# Patient Record
Sex: FEMALE | Race: BLACK OR AFRICAN AMERICAN | Employment: FULL TIME | URBAN - METROPOLITAN AREA
[De-identification: names, ages, dates, MRNs, and addresses within clinical notes are randomized per-mention and may not be internally consistent; named-entity substitution may affect disease eponyms.]

---

## 2017-01-14 ENCOUNTER — HOSPITAL ENCOUNTER (EMERGENCY)
Age: 22
Discharge: HOME | End: 2017-01-14
Attending: EMERGENCY MEDICINE
Payer: SELF-PAY

## 2017-01-14 VITALS
SYSTOLIC BLOOD PRESSURE: 164 MMHG | WEIGHT: 272 LBS | RESPIRATION RATE: 16 BRPM | OXYGEN SATURATION: 98 % | BODY MASS INDEX: 45.32 KG/M2 | TEMPERATURE: 98.2 F | HEART RATE: 90 BPM | HEIGHT: 65 IN | DIASTOLIC BLOOD PRESSURE: 72 MMHG

## 2017-01-14 DIAGNOSIS — J06.9 ACUTE UPPER RESPIRATORY INFECTION: Primary | ICD-10-CM

## 2017-01-14 DIAGNOSIS — J02.9 PHARYNGITIS, UNSPECIFIED ETIOLOGY: ICD-10-CM

## 2017-01-14 LAB
APPEARANCE UR: ABNORMAL
BACTERIA URNS QL MICRO: ABNORMAL /HPF
BILIRUB UR QL: NEGATIVE
CAOX CRY URNS QL MICRO: ABNORMAL
COLOR UR: ABNORMAL
DEPRECATED S PYO AG THROAT QL EIA: NEGATIVE
EPITH CASTS URNS QL MICRO: ABNORMAL /LPF
GLUCOSE UR STRIP.AUTO-MCNC: NEGATIVE MG/DL
HCG UR QL: NEGATIVE
HGB UR QL STRIP: NEGATIVE
KETONES UR QL STRIP.AUTO: NEGATIVE MG/DL
LEUKOCYTE ESTERASE UR QL STRIP.AUTO: NEGATIVE
MUCOUS THREADS URNS QL MICRO: ABNORMAL /LPF
NITRITE UR QL STRIP.AUTO: NEGATIVE
PH UR STRIP: 7.5 [PH] (ref 5–8)
PROT UR STRIP-MCNC: NEGATIVE MG/DL
RBC #/AREA URNS HPF: ABNORMAL /HPF (ref 0–5)
SP GR UR REFRACTOMETRY: 1.02 (ref 1–1.03)
UA: UC IF INDICATED,UAUC: ABNORMAL
UROBILINOGEN UR QL STRIP.AUTO: 0.2 EU/DL (ref 0.2–1)
WBC URNS QL MICRO: ABNORMAL /HPF (ref 0–4)

## 2017-01-14 PROCEDURE — 87880 STREP A ASSAY W/OPTIC: CPT | Performed by: EMERGENCY MEDICINE

## 2017-01-14 PROCEDURE — 74011250637 HC RX REV CODE- 250/637: Performed by: EMERGENCY MEDICINE

## 2017-01-14 PROCEDURE — 81025 URINE PREGNANCY TEST: CPT

## 2017-01-14 PROCEDURE — 87086 URINE CULTURE/COLONY COUNT: CPT | Performed by: EMERGENCY MEDICINE

## 2017-01-14 PROCEDURE — 87070 CULTURE OTHR SPECIMN AEROBIC: CPT | Performed by: EMERGENCY MEDICINE

## 2017-01-14 PROCEDURE — 81001 URINALYSIS AUTO W/SCOPE: CPT | Performed by: EMERGENCY MEDICINE

## 2017-01-14 PROCEDURE — 99284 EMERGENCY DEPT VISIT MOD MDM: CPT

## 2017-01-14 RX ORDER — IBUPROFEN 600 MG/1
600 TABLET ORAL
Status: COMPLETED | OUTPATIENT
Start: 2017-01-14 | End: 2017-01-14

## 2017-01-14 RX ORDER — ONDANSETRON 4 MG/1
8 TABLET, ORALLY DISINTEGRATING ORAL
Status: COMPLETED | OUTPATIENT
Start: 2017-01-14 | End: 2017-01-14

## 2017-01-14 RX ORDER — IBUPROFEN 600 MG/1
600 TABLET ORAL
Qty: 20 TAB | Refills: 0 | Status: SHIPPED | OUTPATIENT
Start: 2017-01-14 | End: 2017-08-17

## 2017-01-14 RX ORDER — ONDANSETRON 4 MG/1
4 TABLET, ORALLY DISINTEGRATING ORAL
Qty: 15 TAB | Refills: 0 | Status: SHIPPED | OUTPATIENT
Start: 2017-01-14 | End: 2017-08-17

## 2017-01-14 RX ORDER — BENZONATATE 100 MG/1
100 CAPSULE ORAL
Qty: 30 CAP | Refills: 0 | Status: SHIPPED | OUTPATIENT
Start: 2017-01-14 | End: 2017-01-21

## 2017-01-14 RX ADMIN — IBUPROFEN 600 MG: 600 TABLET, FILM COATED ORAL at 13:47

## 2017-01-14 RX ADMIN — ONDANSETRON 8 MG: 4 TABLET, ORALLY DISINTEGRATING ORAL at 13:13

## 2017-01-14 NOTE — LETTER
[address] 
[facility] EMERGENCY DEPT 
[address] [facility] [id number] [phone] Work/School Note Date: 1/14/2017 To Whom It May concern: 
 
Brady Wu was seen and treated today in the emergency room by the following provider(s): 
Attending Provider: Chelo Mcelroy MD.   
 
Brady Wu may return to work on January 15, 2017.  
 
Sincerely, 
 
 
 
 
Chelo Mcelory MD

## 2017-01-14 NOTE — ED TRIAGE NOTES
Pt complaining of a sore throat, cough, congestion, bilateral flank pain, nausea and vomiting onset of symptoms this wednesday

## 2017-01-14 NOTE — ED NOTES
Discharge instructions given by provider. Reports her pain is improved in throat and generalized aching rates 2/10.

## 2017-01-14 NOTE — ED PROVIDER NOTES
HPI Comments: 25 y.o. female with no significant past medical history who presents from home with chief complaint of sore throat. Pt reports she has had 7/10 sore throat for 3 days accompanied by cough, congestion, and 1 episode of vomiting yesterday. She states she had a kidney stone several months ago which led to a multi-systemic infection. Pt notes she stayed in [facility]'s ICU for 2-3 weeks to treat the infection 2 months ago, and she complains that she has not had her MP for the last 3 months. She has recently been taking Mucinex given to her by a relative for her sore throat. Pt denies dysuria. There are no other acute medical concerns at this time. Social hx: denies smoking and EtOH use. Note written by Lizzy Girard, as dictated by Ayo Duenas MD 1:02 PM    The history is provided by the patient. No past medical history on file. No past surgical history on file. No family history on file. Social History     Social History    Marital status: SINGLE     Spouse name: N/A    Number of children: N/A    Years of education: N/A     Occupational History    Not on file. Social History Main Topics    Smoking status: Not on file    Smokeless tobacco: Not on file    Alcohol use Not on file    Drug use: Not on file    Sexual activity: Not on file     Other Topics Concern    Not on file     Social History Narrative         ALLERGIES: Review of patient's allergies indicates no known allergies. Review of Systems   Constitutional: Negative for fever. HENT: Positive for congestion and sore throat. Negative for facial swelling and nosebleeds. Eyes: Negative for pain. Respiratory: Positive for cough. Negative for chest tightness and shortness of breath. Cardiovascular: Negative for chest pain and leg swelling. Gastrointestinal: Positive for vomiting. Negative for abdominal pain and diarrhea. Endocrine: Negative for polyuria.    Genitourinary: Negative for difficulty urinating and flank pain. Musculoskeletal: Negative for arthralgias and back pain. Skin: Negative for color change. Allergic/Immunologic: Negative for immunocompromised state. Neurological: Negative for dizziness and headaches. Hematological: Does not bruise/bleed easily. Psychiatric/Behavioral: Negative for agitation. All other systems reviewed and are negative. Vitals:    01/14/17 1242   BP: 164/72   Pulse: 90   Resp: 16   Temp: 98.2 °F (36.8 °C)   SpO2: 98%   Weight: 123.4 kg (272 lb)   Height: 5' 5\" (1.651 m)            Physical Exam   Constitutional: She is oriented to person, place, and time. She appears well-developed and well-nourished. Obese. HENT:   Head: Normocephalic and atraumatic. Right Ear: External ear normal.   Left Ear: External ear normal.   Nose: Nose normal.   Mouth/Throat: Oropharyngeal exudate present. Erythema and exudates on tonsils. Eyes: EOM are normal. Pupils are equal, round, and reactive to light. No scleral icterus. Neck: Normal range of motion. Neck supple. No JVD present. No tracheal deviation present. No thyromegaly present. Cardiovascular: Normal rate, regular rhythm and normal heart sounds. Exam reveals no friction rub. No murmur heard. Pulmonary/Chest: Effort normal and breath sounds normal. No stridor. No respiratory distress. She has no wheezes. She has no rales. She exhibits no tenderness. Abdominal: Soft. Bowel sounds are normal. She exhibits no distension. There is no tenderness. There is no rebound and no guarding. Musculoskeletal: Normal range of motion. She exhibits no edema or tenderness. Lymphadenopathy:     She has no cervical adenopathy. Neurological: She is alert and oriented to person, place, and time. She has normal reflexes. No cranial nerve deficit. Coordination normal.   Skin: Skin is warm and dry. No rash noted. No erythema. Psychiatric: She has a normal mood and affect.  Her behavior is normal. Judgment and thought content normal.   Nursing note and vitals reviewed. Note written by Lizzy Drew, as dictated by Adiel Becerra MD 1:02 PM     MDM  Number of Diagnoses or Management Options  Diagnosis management comments: 29-year-old  female presents to the emergency department with sore throat, nasal congestion, cough. Patient looks well and nontoxic. We'll check strep swab. She also had a pelvic kidney infection kidney stone couple of months ago. We'll check urinalysis. Will give ibuprofen and Zofran reassessed shortly. Patient agrees with this plan. Amount and/or Complexity of Data Reviewed  Clinical lab tests: ordered and reviewed  Tests in the medicine section of CPT®: ordered and reviewed  Decide to obtain previous medical records or to obtain history from someone other than the patient: yes  Review and summarize past medical records: yes  Independent visualization of images, tracings, or specimens: yes    Risk of Complications, Morbidity, and/or Mortality  Presenting problems: high  Diagnostic procedures: high  Management options: high    Patient Progress  Patient progress: improved    ED Course       Procedures    PROGRESS NOTE:  1:54 PM  Strep and pregnancy tests are negative, and UA is clear. Will discharge with cough medication, antiinflammatories, and nausea medication. Good return precautions given to patient. Close follow up with PCP recommended. Patient and/or family voices understanding of this plan. Discharge instructions were explained by me and all concerns were addressed.

## 2017-01-14 NOTE — DISCHARGE INSTRUCTIONS
We hope that we have addressed all of your medical concerns. The examination and treatment you received in the Emergency Department were for an emergent problem and were not intended as complete care. It is important that you follow up with your healthcare provider(s) for ongoing care. If your symptoms worsen or do not improve as expected, and you are unable to reach your usual health care provider(s), you should return to the Emergency Department. Today's healthcare is undergoing tremendous change, and patient satisfaction surveys are one of the many tools to assess the quality of medical care. You may receive a survey from the [employer] regarding your experience in the Emergency Department. I hope that your experience has been completely positive, particularly the medical care that I provided. As such, please participate in the survey; anything less than excellent does not meet my expectations or intentions. [address] [place] [address] participate in nationally recognized quality of care measures. If your blood pressure is greater than 120/80, as reported below, we urge that you seek medical care to address the potential of high blood pressure, commonly known as hypertension. Hypertension can be hereditary or can be caused by certain medical conditions, pain, stress, or \"white coat syndrome. \"       Please make an appointment with your health care provider(s) for follow up of your Emergency Department visit. VITALS:   Patient Vitals for the past 8 hrs:   Temp Pulse Resp BP SpO2   01/14/17 1242 98.2 °F (36.8 °C) 90 16 164/72 98 %          Thank you for allowing us to provide you with medical care today. We realize that you have many choices for your emergency care needs. Please choose us in the future for any continued health care needs.       Hubert Fernandez MD    [facility]   Office: [phone]            Recent Results (from the past 24 hour(s))   STREP AG SCREEN, GROUP A    Collection Time: 01/14/17  1:08 PM   Result Value Ref Range    Group A Strep Ag ID NEGATIVE  NEG     URINALYSIS W/ REFLEX CULTURE    Collection Time: 01/14/17  1:08 PM   Result Value Ref Range    Color YELLOW/STRAW      Appearance CLOUDY (A) CLEAR      Specific gravity 1.024 1.003 - 1.030      pH (UA) 7.5 5.0 - 8.0      Protein NEGATIVE  NEG mg/dL    Glucose NEGATIVE  NEG mg/dL    Ketone NEGATIVE  NEG mg/dL    Bilirubin NEGATIVE  NEG      Blood NEGATIVE  NEG      Urobilinogen 0.2 0.2 - 1.0 EU/dL    Nitrites NEGATIVE  NEG      Leukocyte Esterase NEGATIVE  NEG      WBC 0-4 0 - 4 /hpf    RBC 0-5 0 - 5 /hpf    Epithelial cells MODERATE (A) FEW /lpf    Bacteria 1+ (A) NEG /hpf    UA:UC IF INDICATED URINE CULTURE ORDERED (A) CNI      Mucus 1+ (A) NEG /lpf    CA Oxalate Crystals FEW (A) NEG     HCG URINE, QL. - POC    Collection Time: 01/14/17  1:15 PM   Result Value Ref Range    Pregnancy test,urine (POC) NEGATIVE  NEG         No results found. Rapid Strep Test: About This Test  What is it? A rapid strep test checks the bacteria in your throat to see if strep is the cause of your sore throat. Why is this test done? It may be done so your doctor can find out right away whether you have strep throat. There is another test for strep, called a throat culture, but that test takes a few days to get the results. How can you prepare for the test?  You don't need to do anything before you have this test.  What happens during the test?  · You will be asked to tilt your head back and open your mouth as wide as possible. · Your doctor will press your tongue down with a flat stick (tongue depressor) and then examine your mouth and throat. · A clean cotton swab will be rubbed over the back of your throat, around your tonsils, and over any red areas or sores to collect a sample. How long does the test take?   · The test takes less than a minute. · Results are available in 10 to 15 minutes. When should you call for help? Call your doctor now or seek immediate medical care if:  · Your pain gets worse on one side of your throat, or you have trouble opening your mouth. · You have a new or higher fever, or you have a fever with a stiff neck or severe headache. · Swallowing becomes harder, or you have any trouble breathing. · You are sensitive to light or feel very sleepy or confused. Watch closely for changes in your health, and be sure to contact your doctor if:  · You do not start to feel better after 2 days. Follow-up care is a key part of your treatment and safety. Be sure to make and go to all appointments, and call your doctor if you are having problems. It's also a good idea to keep a list of the medicines you take. Ask your doctor when you can expect to have your test results. Where can you learn more? Go to http://maryann-jm.info/. Enter B356 in the search box to learn more about \"Rapid Strep Test: About This Test.\"  Current as of: July 29, 2016  Content Version: 11.1  © 3675-6726 [employer]. Care instructions adapted under license by [employer] (which disclaims liability or warranty for this information). If you have questions about a medical condition or this instruction, always ask your healthcare professional. [address] any warranty or liability for your use of this information.

## 2017-01-16 LAB
BACTERIA SPEC CULT: NORMAL
BACTERIA SPEC CULT: NORMAL
CC UR VC: NORMAL
SERVICE CMNT-IMP: NORMAL
SERVICE CMNT-IMP: NORMAL

## 2017-08-17 ENCOUNTER — HOSPITAL ENCOUNTER (EMERGENCY)
Age: 22
Discharge: HOME | End: 2017-08-18
Attending: EMERGENCY MEDICINE
Payer: SELF-PAY

## 2017-08-17 ENCOUNTER — APPOINTMENT (OUTPATIENT)
Dept: CT IMAGING | Age: 22
End: 2017-08-17
Attending: PHYSICIAN ASSISTANT
Payer: SELF-PAY

## 2017-08-17 DIAGNOSIS — N13.30 HYDRONEPHROSIS OF RIGHT KIDNEY: ICD-10-CM

## 2017-08-17 DIAGNOSIS — N20.0 KIDNEY STONE ON RIGHT SIDE: Primary | ICD-10-CM

## 2017-08-17 LAB
BASOPHILS # BLD: 0 K/UL (ref 0–0.1)
BASOPHILS NFR BLD: 0 % (ref 0–1)
EOSINOPHIL # BLD: 0.1 K/UL (ref 0–0.4)
EOSINOPHIL NFR BLD: 1 % (ref 0–7)
ERYTHROCYTE [DISTWIDTH] IN BLOOD BY AUTOMATED COUNT: 14.8 % (ref 11.5–14.5)
HCG UR QL: NEGATIVE
HCT VFR BLD AUTO: 37.2 % (ref 35–47)
HGB BLD-MCNC: 12.3 G/DL (ref 11.5–16)
LYMPHOCYTES # BLD: 3.7 K/UL (ref 0.8–3.5)
LYMPHOCYTES NFR BLD: 30 % (ref 12–49)
MCH RBC QN AUTO: 27.1 PG (ref 26–34)
MCHC RBC AUTO-ENTMCNC: 33.1 G/DL (ref 30–36.5)
MCV RBC AUTO: 81.9 FL (ref 80–99)
MONOCYTES # BLD: 0.8 K/UL (ref 0–1)
MONOCYTES NFR BLD: 7 % (ref 5–13)
NEUTS SEG # BLD: 7.8 K/UL (ref 1.8–8)
NEUTS SEG NFR BLD: 62 % (ref 32–75)
PLATELET # BLD AUTO: 364 K/UL (ref 150–400)
RBC # BLD AUTO: 4.54 M/UL (ref 3.8–5.2)
WBC # BLD AUTO: 12.5 K/UL (ref 3.6–11)

## 2017-08-17 PROCEDURE — 85025 COMPLETE CBC W/AUTO DIFF WBC: CPT | Performed by: PHYSICIAN ASSISTANT

## 2017-08-17 PROCEDURE — 96374 THER/PROPH/DIAG INJ IV PUSH: CPT

## 2017-08-17 PROCEDURE — 99283 EMERGENCY DEPT VISIT LOW MDM: CPT

## 2017-08-17 PROCEDURE — 83690 ASSAY OF LIPASE: CPT | Performed by: PHYSICIAN ASSISTANT

## 2017-08-17 PROCEDURE — 96361 HYDRATE IV INFUSION ADD-ON: CPT

## 2017-08-17 PROCEDURE — 80053 COMPREHEN METABOLIC PANEL: CPT | Performed by: PHYSICIAN ASSISTANT

## 2017-08-17 PROCEDURE — 74176 CT ABD & PELVIS W/O CONTRAST: CPT

## 2017-08-17 PROCEDURE — 81025 URINE PREGNANCY TEST: CPT

## 2017-08-17 PROCEDURE — 81001 URINALYSIS AUTO W/SCOPE: CPT | Performed by: PHYSICIAN ASSISTANT

## 2017-08-17 PROCEDURE — 96375 TX/PRO/DX INJ NEW DRUG ADDON: CPT

## 2017-08-17 PROCEDURE — 74011250636 HC RX REV CODE- 250/636: Performed by: PHYSICIAN ASSISTANT

## 2017-08-17 PROCEDURE — 36415 COLL VENOUS BLD VENIPUNCTURE: CPT | Performed by: PHYSICIAN ASSISTANT

## 2017-08-17 RX ORDER — KETOROLAC TROMETHAMINE 30 MG/ML
30 INJECTION, SOLUTION INTRAMUSCULAR; INTRAVENOUS
Status: COMPLETED | OUTPATIENT
Start: 2017-08-17 | End: 2017-08-17

## 2017-08-17 RX ORDER — ONDANSETRON 2 MG/ML
4 INJECTION INTRAMUSCULAR; INTRAVENOUS
Status: COMPLETED | OUTPATIENT
Start: 2017-08-17 | End: 2017-08-17

## 2017-08-17 RX ADMIN — KETOROLAC TROMETHAMINE 30 MG: 30 INJECTION, SOLUTION INTRAMUSCULAR at 23:51

## 2017-08-17 RX ADMIN — ONDANSETRON 4 MG: 2 INJECTION INTRAMUSCULAR; INTRAVENOUS at 23:49

## 2017-08-17 RX ADMIN — SODIUM CHLORIDE 1000 ML: 900 INJECTION, SOLUTION INTRAVENOUS at 23:52

## 2017-08-18 VITALS
SYSTOLIC BLOOD PRESSURE: 139 MMHG | DIASTOLIC BLOOD PRESSURE: 54 MMHG | HEIGHT: 65 IN | TEMPERATURE: 97.4 F | WEIGHT: 280.2 LBS | RESPIRATION RATE: 18 BRPM | BODY MASS INDEX: 46.69 KG/M2 | HEART RATE: 89 BPM | OXYGEN SATURATION: 99 %

## 2017-08-18 LAB
ALBUMIN SERPL-MCNC: 3.9 G/DL (ref 3.5–5)
ALBUMIN/GLOB SERPL: 0.9 {RATIO} (ref 1.1–2.2)
ALP SERPL-CCNC: 108 U/L (ref 45–117)
ALT SERPL-CCNC: 17 U/L (ref 12–78)
ANION GAP SERPL CALC-SCNC: 5 MMOL/L (ref 5–15)
APPEARANCE UR: ABNORMAL
AST SERPL-CCNC: 18 U/L (ref 15–37)
BACTERIA URNS QL MICRO: NEGATIVE /HPF
BILIRUB SERPL-MCNC: 0.4 MG/DL (ref 0.2–1)
BILIRUB UR QL: NEGATIVE
BUN SERPL-MCNC: 12 MG/DL (ref 6–20)
BUN/CREAT SERPL: 15 (ref 12–20)
CALCIUM SERPL-MCNC: 9.4 MG/DL (ref 8.5–10.1)
CHLORIDE SERPL-SCNC: 102 MMOL/L (ref 97–108)
CO2 SERPL-SCNC: 30 MMOL/L (ref 21–32)
COLOR UR: ABNORMAL
CREAT SERPL-MCNC: 0.79 MG/DL (ref 0.55–1.02)
EPITH CASTS URNS QL MICRO: ABNORMAL /LPF
GLOBULIN SER CALC-MCNC: 4.3 G/DL (ref 2–4)
GLUCOSE SERPL-MCNC: 104 MG/DL (ref 65–100)
GLUCOSE UR STRIP.AUTO-MCNC: NEGATIVE MG/DL
HGB UR QL STRIP: ABNORMAL
KETONES UR QL STRIP.AUTO: NEGATIVE MG/DL
LEUKOCYTE ESTERASE UR QL STRIP.AUTO: NEGATIVE
LIPASE SERPL-CCNC: 107 U/L (ref 73–393)
MUCOUS THREADS URNS QL MICRO: ABNORMAL /LPF
NITRITE UR QL STRIP.AUTO: NEGATIVE
PH UR STRIP: 5.5 [PH] (ref 5–8)
POTASSIUM SERPL-SCNC: 3.8 MMOL/L (ref 3.5–5.1)
PROT SERPL-MCNC: 8.2 G/DL (ref 6.4–8.2)
PROT UR STRIP-MCNC: 30 MG/DL
RBC #/AREA URNS HPF: >100 /HPF (ref 0–5)
SODIUM SERPL-SCNC: 137 MMOL/L (ref 136–145)
SP GR UR REFRACTOMETRY: >1.03 (ref 1–1.03)
UROBILINOGEN UR QL STRIP.AUTO: 1 EU/DL (ref 0.2–1)
WBC URNS QL MICRO: ABNORMAL /HPF (ref 0–4)

## 2017-08-18 RX ORDER — HYDROCODONE BITARTRATE AND ACETAMINOPHEN 5; 325 MG/1; MG/1
1-2 TABLET ORAL
Qty: 20 TAB | Refills: 0 | Status: SHIPPED | OUTPATIENT
Start: 2017-08-18 | End: 2017-11-04

## 2017-08-18 RX ORDER — TAMSULOSIN HYDROCHLORIDE 0.4 MG/1
0.4 CAPSULE ORAL DAILY
Qty: 7 CAP | Refills: 0 | Status: SHIPPED | OUTPATIENT
Start: 2017-08-18 | End: 2017-08-25

## 2017-08-18 RX ORDER — ONDANSETRON 4 MG/1
4 TABLET, ORALLY DISINTEGRATING ORAL
Qty: 10 TAB | Refills: 0 | Status: SHIPPED | OUTPATIENT
Start: 2017-08-18 | End: 2017-11-04

## 2017-08-18 NOTE — ED TRIAGE NOTES
Patient arrives with c/o right flank pain since 1930. +nausea. Denies vomiting. Patient reports having the urge to have a BM but is unable to.

## 2017-08-18 NOTE — DISCHARGE INSTRUCTIONS
We hope that we have addressed all of your medical concerns. The examination and treatment you received in the Emergency Department were for an emergent problem and were not intended as complete care. It is important that you follow up with your healthcare provider(s) for ongoing care. If your symptoms worsen or do not improve as expected, and you are unable to reach your usual health care provider(s), you should return to the Emergency Department. Today's healthcare is undergoing tremendous change, and patient satisfaction surveys are one of the many tools to assess the quality of medical care. You may receive a survey from the [employer] regarding your experience in the Emergency Department. I hope that your experience has been completely positive, particularly the medical care that I provided. As such, please participate in the survey; anything less than excellent does not meet my expectations or intentions. [facility] and [address] [place] [address] participate in nationally recognized quality of care measures. If your blood pressure is greater than 120/80, as reported below, we urge that you seek medical care to address the potential of high blood pressure, commonly known as hypertension. Hypertension can be hereditary or can be caused by certain medical conditions, pain, stress, or \"white coat syndrome. \"       Please make an appointment with your health care provider(s) for follow up of your Emergency Department visit. VITALS:   Patient Vitals for the past 8 hrs:   Temp Pulse Resp BP SpO2   08/17/17 2314 97.4 °F (36.3 °C) 89 18 138/80 99 %          Thank you for allowing us to provide you with medical care today. We realize that you have many choices for your emergency care needs. Please choose us in the future for any continued health care needs.       Regards,           Itzel Owusu PA-C    [facility], [address] [facility]. Office: [phone]            Recent Results (from the past 24 hour(s))   URINALYSIS W/MICROSCOPIC    Collection Time: 08/17/17 11:44 PM   Result Value Ref Range    Color DARK YELLOW      Appearance CLOUDY (A) CLEAR      Specific gravity >1.030 (H) 1.003 - 1.030    pH (UA) 5.5 5.0 - 8.0      Protein 30 (A) NEG mg/dL    Glucose NEGATIVE  NEG mg/dL    Ketone NEGATIVE  NEG mg/dL    Bilirubin NEGATIVE  NEG      Blood LARGE (A) NEG      Urobilinogen 1.0 0.2 - 1.0 EU/dL    Nitrites NEGATIVE  NEG      Leukocyte Esterase NEGATIVE  NEG      WBC 0-4 0 - 4 /hpf    RBC >100 (H) 0 - 5 /hpf    Epithelial cells FEW FEW /lpf    Bacteria NEGATIVE  NEG /hpf    Mucus 2+ (A) NEG /lpf   CBC WITH AUTOMATED DIFF    Collection Time: 08/17/17 11:44 PM   Result Value Ref Range    WBC 12.5 (H) 3.6 - 11.0 K/uL    RBC 4.54 3.80 - 5.20 M/uL    HGB 12.3 11.5 - 16.0 g/dL    HCT 37.2 35.0 - 47.0 %    MCV 81.9 80.0 - 99.0 FL    MCH 27.1 26.0 - 34.0 PG    MCHC 33.1 30.0 - 36.5 g/dL    RDW 14.8 (H) 11.5 - 14.5 %    PLATELET 896 811 - 843 K/uL    NEUTROPHILS 62 32 - 75 %    LYMPHOCYTES 30 12 - 49 %    MONOCYTES 7 5 - 13 %    EOSINOPHILS 1 0 - 7 %    BASOPHILS 0 0 - 1 %    ABS. NEUTROPHILS 7.8 1.8 - 8.0 K/UL    ABS. LYMPHOCYTES 3.7 (H) 0.8 - 3.5 K/UL    ABS. MONOCYTES 0.8 0.0 - 1.0 K/UL    ABS. EOSINOPHILS 0.1 0.0 - 0.4 K/UL    ABS. BASOPHILS 0.0 0.0 - 0.1 K/UL   METABOLIC PANEL, COMPREHENSIVE    Collection Time: 08/17/17 11:44 PM   Result Value Ref Range    Sodium 137 136 - 145 mmol/L    Potassium 3.8 3.5 - 5.1 mmol/L    Chloride 102 97 - 108 mmol/L    CO2 30 21 - 32 mmol/L    Anion gap 5 5 - 15 mmol/L    Glucose 104 (H) 65 - 100 mg/dL    BUN 12 6 - 20 MG/DL    Creatinine 0.79 0.55 - 1.02 MG/DL    BUN/Creatinine ratio 15 12 - 20      GFR est AA >60 >60 ml/min/1.73m2    GFR est non-AA >60 >60 ml/min/1.73m2    Calcium 9.4 8.5 - 10.1 MG/DL    Bilirubin, total 0.4 0.2 - 1.0 MG/DL    ALT (SGPT) 17 12 - 78 U/L    AST (SGOT) 18 15 - 37 U/L    Alk. phosphatase 108 45 - 117 U/L    Protein, total 8.2 6.4 - 8.2 g/dL    Albumin 3.9 3.5 - 5.0 g/dL    Globulin 4.3 (H) 2.0 - 4.0 g/dL    A-G Ratio 0.9 (L) 1.1 - 2.2     LIPASE    Collection Time: 08/17/17 11:44 PM   Result Value Ref Range    Lipase 107 73 - 393 U/L   HCG URINE, QL. - POC    Collection Time: 08/17/17 11:45 PM   Result Value Ref Range    Pregnancy test,urine (POC) NEGATIVE  NEG         Ct Abd Pelv Wo Cont    Result Date: 8/18/2017  EXAM:  CT ABD PELV WO CONT INDICATION: R flank pain, hx of kidney stones COMPARISON: None. CONTRAST:  None. TECHNIQUE: Thin axial images were obtained through the abdomen and pelvis. Coronal and sagittal reconstructions were generated. Oral contrast was not administered. CT dose reduction was achieved through use of a standardized protocol tailored for this examination and automatic exposure control for dose modulation. The absence of intravenous contrast material reduces the sensitivity for evaluation of the solid parenchymal organs of the abdomen. FINDINGS: LUNG BASES: Clear. INCIDENTALLY IMAGED HEART AND MEDIASTINUM: Unremarkable. LIVER: No mass or biliary dilatation. GALLBLADDER: Unremarkable. SPLEEN: No mass. PANCREAS: No mass or ductal dilatation. ADRENALS: Unremarkable. KIDNEYS/URETERS: 2 mm proximal right ureteral stone with minimal right hydronephrosis. There are 2 punctate left renal stones in the mid and lower pole with no left hydronephrosis or left ureteral stone. No evident mass. STOMACH: Unremarkable. SMALL BOWEL: No dilatation or wall thickening. COLON: No dilatation or wall thickening. APPENDIX: Unremarkable. PERITONEUM: No ascites or pneumoperitoneum. RETROPERITONEUM: No lymphadenopathy or aortic aneurysm. REPRODUCTIVE ORGANS: Uterus and ovaries appear normal. URINARY BLADDER: No mass or calculus. BONES: No destructive bone lesion. ADDITIONAL COMMENTS: N/A     IMPRESSION: 2 mm proximal right ureteral stone with minimal right hydronephrosis.  Nonobstructing left nephrolithiasis. Kidney Stone: Care Instructions  Your Care Instructions    Kidney stones are formed when salts, minerals, and other substances normally found in the urine clump together. They can be as small as grains of sand or, rarely, as large as golf balls. While the stone is traveling through the ureter, which is the tube that carries urine from the kidney to the bladder, you will probably feel pain. The pain may be mild or very severe. You may also have some blood in your urine. As soon as the stone reaches the bladder, any intense pain should go away. If a stone is too large to pass on its own, you may need a medical procedure to help you pass the stone. The doctor has checked you carefully, but problems can develop later. If you notice any problems or new symptoms, get medical treatment right away. Follow-up care is a key part of your treatment and safety. Be sure to make and go to all appointments, and call your doctor if you are having problems. It's also a good idea to know your test results and keep a list of the medicines you take. How can you care for yourself at home? · Drink plenty of fluids, enough so that your urine is light yellow or clear like water. If you have kidney, heart, or liver disease and have to limit fluids, talk with your doctor before you increase the amount of fluids you drink. · Take pain medicines exactly as directed. Call your doctor if you think you are having a problem with your medicine. ¨ If the doctor gave you a prescription medicine for pain, take it as prescribed. ¨ If you are not taking a prescription pain medicine, ask your doctor if you can take an over-the-counter medicine. Read and follow all instructions on the label. · Your doctor may ask you to strain your urine so that you can collect your kidney stone when it passes. You can use a kitchen strainer or a tea strainer to catch the stone.  Store it in a plastic bag until you see your doctor again.  Preventing future kidney stones  Some changes in your diet may help prevent kidney stones. Depending on the cause of your stones, your doctor may recommend that you:  · Drink plenty of fluids, enough so that your urine is light yellow or clear like water. If you have kidney, heart, or liver disease and have to limit fluids, talk with your doctor before you increase the amount of fluids you drink. · Limit coffee, tea, and alcohol. Also avoid grapefruit juice. · Do not take more than the recommended daily dose of vitamins C and D.  · Avoid antacids such as Gaviscon, Maalox, Mylanta, or Tums. · Limit the amount of salt (sodium) in your diet. · Eat a balanced diet that is not too high in protein. · Limit foods that are high in a substance called oxalate, which can cause kidney stones. These foods include dark green vegetables, rhubarb, chocolate, wheat bran, nuts, cranberries, and beans. When should you call for help? Call your doctor now or seek immediate medical care if:  · You cannot keep down fluids. · Your pain gets worse. · You have a fever or chills. · You have new or worse pain in your back just below your rib cage (the flank area). · You have new or more blood in your urine. Watch closely for changes in your health, and be sure to contact your doctor if:  · You do not get better as expected. Where can you learn more? Go to http://maryann-jm.info/. Enter O819 in the search box to learn more about \"Kidney Stone: Care Instructions. \"  Current as of: April 3, 2017  Content Version: 11.3  © 2898-9211 [employer]. Care instructions adapted under license by [employer] (which disclaims liability or warranty for this information). If you have questions about a medical condition or this instruction, always ask your healthcare professional. [place] [address] any warranty or liability for your use of this information.          Learning About Diet for Kidney Stone Prevention  What are kidney stones? Kidney stones are made of salts and minerals in the urine that form small \"karolina. \" Stones can form in the kidneys and the ureters (the tubes that lead from the kidneys to the bladder). They can also form in the bladder. Stones may not cause a problem as long as they stay in the kidneys. But they can cause sudden, severe pain. Pain is most likely when the stones travel from the kidneys to the bladder. Kidney stones can cause bloody urine. Kidney stones often run in families. You are more likely to get them if you don't drink enough fluids, mainly water. Certain foods and drinks and some dietary supplements may also increase your risk for kidney stones if you consume too much of them. What can you do to prevent kidney stones? Changing what you eat may not prevent all types of kidney stones. But for people who have a history of certain kinds of kidney stones, some changes in diet may help. A dietitian can help you set up a meal plan that includes healthy, low-oxalate choices. Here are some general guidelines to get you started. Plan your meals and snacks around foods that are low in oxalate. These foods include:  · Corn, kale, parsnips, and squash,. · Beef, chicken, pork, turkey, and fish. · Milk, butter, cheese, and yogurt. You can eat certain foods that are medium-high in oxalate, but eat them only once in a while. These foods include:  · Bread. · Brown rice. · English muffins. · Figs. · Popcorn. · String beans. · Tomatoes. Limit very high-oxalate foods, including:  · Black tea. · Coffee. · Chocolate. · Dark green vegetables. · Nuts. Here are some other things you can do to help prevent kidney stones. · Drink plenty of fluids. If you have kidney, heart, or liver disease and have to limit fluids, talk with your doctor before you increase the amount of fluids you drink.   · Do not take more than the recommended daily dose of vitamins C and D.  · Limit the salt in your diet. · Eat a balanced diet that is not too high in protein. Follow-up care is a key part of your treatment and safety. Be sure to make and go to all appointments, and call your doctor if you are having problems. It's also a good idea to know your test results and keep a list of the medicines you take. Where can you learn more? Go to http://maryann-jm.info/. Enter C138 in the search box to learn more about \"Learning About Diet for Kidney Stone Prevention. \"  Current as of: July 26, 2016  Content Version: 11.3  © 2365-0927 [employer], [employer]. Care instructions adapted under license by [employer] (which disclaims liability or warranty for this information). If you have questions about a medical condition or this instruction, always ask your healthcare professional. [address] any warranty or liability for your use of this information.

## 2017-08-18 NOTE — ED PROVIDER NOTES
HPI Comments: 23 y.o. female with past medical history significant for kidney stone who presents from home accompanied by parents with chief complaint of right flank pain. Pt reports gradually worsening right flank pain since 1930 this evening. Pt describes the pain as \"shocking\" intermittent pain that was 10/10 when it was most severe. At time of onset, pt was performing house chores but denies heavy lifting. The pain began to radiate to her right upper and lower abdomen as pain persisted accompanied by nausea. She states she went to the bathroom due to severe nausea and \"I felt like I might throw up\", bent over the toilet, did not vomit but on the way back up began to have a different R mid-back pain that is exacerbated with movement, palpation, deep inhalation and \"when I cross my legs it hurts but when I uncross them it feels better\". She took two Aleve without relief after original flank pain began. While in ED, the pain in R flank is 5/10. She notes hx of similar symptoms related to a kidney stone x 1 year ago, seen at [facility] and dx with a L sided kidney store requiring lithotripsy. Pt denies F/C, CP, cough, SOB, vomiting or diarrhea. No recent travel, leg swelling, hemoptysis, taking estrogen or recent surgery. NKDA. No prescription medications. LMP: \"end of July\", denies chance of pregnancy. There are no other acute medical concerns at this time. Social hx: Non-smoker. No EtOH. Occupation: works at a   No PCP currently. Note written by Lizzy Mariee, as dictated by JORGE Tan 11:34 PM    The history is provided by the patient. No  was used. No past medical history on file. No past surgical history on file. No family history on file. Social History     Social History    Marital status: SINGLE     Spouse name: N/A    Number of children: N/A    Years of education: N/A     Occupational History    Not on file.      Social History Main Topics    Smoking status: Never Smoker    Smokeless tobacco: Not on file    Alcohol use No    Drug use: Not on file    Sexual activity: Not on file     Other Topics Concern    Not on file     Social History Narrative    No narrative on file         ALLERGIES: Review of patient's allergies indicates no known allergies. Review of Systems   Constitutional: Negative. Eyes: Negative for discharge. Respiratory: Negative for cough and shortness of breath. Cardiovascular: Negative for chest pain and leg swelling. Gastrointestinal: Positive for abdominal pain (right sided) and nausea. Negative for abdominal distention, diarrhea and vomiting. Genitourinary: Positive for flank pain (right). Negative for difficulty urinating, dysuria, frequency and hematuria. Musculoskeletal: Negative for back pain and neck pain. Skin: Negative for color change and pallor. Neurological: Negative for headaches. There were no vitals filed for this visit. Physical Exam   Constitutional: She is oriented to person, place, and time. She appears well-developed and well-nourished. She is active. Non-toxic appearance. No distress. Elevated BMI   HENT:   Head: Normocephalic and atraumatic. Eyes: Conjunctivae are normal. Pupils are equal, round, and reactive to light. Right eye exhibits no discharge. Left eye exhibits no discharge. Neck: Normal range of motion and full passive range of motion without pain. Neck supple. No tracheal tenderness present. Cardiovascular: Normal rate, regular rhythm, normal heart sounds, intact distal pulses and normal pulses. Exam reveals no gallop and no friction rub. No murmur heard. Pulmonary/Chest: Effort normal and breath sounds normal. No respiratory distress. She has no wheezes. She has no rales. She exhibits no tenderness. Abdominal: Soft. Bowel sounds are normal. She exhibits no distension. There is tenderness (suprapubic TTP).  There is no rebound and no guarding. R CVAT   Musculoskeletal: Normal range of motion. She exhibits no edema or tenderness. Back:    Neurological: She is alert and oriented to person, place, and time. She has normal strength. No cranial nerve deficit or sensory deficit. Coordination normal.   Skin: Skin is warm, dry and intact. No abrasion and no rash noted. She is not diaphoretic. No erythema. Psychiatric: She has a normal mood and affect. Her speech is normal and behavior is normal. Cognition and memory are normal.   Nursing note and vitals reviewed. MDM  Number of Diagnoses or Management Options  Diagnosis management comments:   Ddx: UTI, kidney stone, pregnancy, dehydration           Amount and/or Complexity of Data Reviewed  Clinical lab tests: ordered and reviewed  Tests in the radiology section of CPT®: ordered and reviewed    Patient Progress  Patient progress: stable    ED Course       Procedures    PERC score is 0, and pain began after bending forward and standing straight up, is also reproducible with palpation, deep breathing and \"when I cross my legs it hurt and when I uncrossed them it feels better\" which is more suggestive of a muscle strain. Will give Toradol and reassess. Rich Peralta PA-C    I discussed patient's PMH, exam findings as well as careplan with the ER attending who agrees with care plan. Rich Peralta PA-C    12:40 AM  I have reviewed the additional findings with the patient and encouraged them to follow-up with a primary care provider for appropriate outpatient evaluation and treatment. I have encouraged them to see the official results in [facility]\" or to retrieve the specifics of their results from medical records. The patient states that they understand that there were additional findings and that they agree to follow-up as recommended. DISCHARGE NOTE:  12:48 AM  The patient's results have been reviewed with them and/or available family.  Patient and/or family verbally conveyed their understanding and agreement of the patient's signs, symptoms, diagnosis, treatment and prognosis and additionally agree to follow up as recommended in the discharge instructions or to return to the Emergency Room should their condition change prior to their follow-up appointment. The patient/family verbally agrees with the care-plan and verbally conveys that all of their questions have been answered. The discharge instructions have also been provided to the patient and/or family with some educational information regarding the patient's diagnosis as well a list of reasons why the patient would want to return to the ER prior to their follow-up appointment, should their condition change. Plan:  1. F/U with urology  2. Rx Flomax, Norco, Zofran ODT  3.  Push liquids, kidney stone diet info given with discharge paperwork  Return precautions discussed and advised to return to ER if worse

## 2017-08-28 ENCOUNTER — HOSPITAL ENCOUNTER (EMERGENCY)
Age: 22
Discharge: HOME | End: 2017-08-29
Attending: EMERGENCY MEDICINE
Payer: SELF-PAY

## 2017-08-28 ENCOUNTER — APPOINTMENT (OUTPATIENT)
Dept: CT IMAGING | Age: 22
End: 2017-08-28
Attending: PHYSICIAN ASSISTANT
Payer: SELF-PAY

## 2017-08-28 ENCOUNTER — APPOINTMENT (OUTPATIENT)
Dept: ULTRASOUND IMAGING | Age: 22
End: 2017-08-28
Attending: PHYSICIAN ASSISTANT
Payer: SELF-PAY

## 2017-08-28 DIAGNOSIS — N39.0 URINARY TRACT INFECTION WITHOUT HEMATURIA, SITE UNSPECIFIED: ICD-10-CM

## 2017-08-28 DIAGNOSIS — R10.9 FLANK PAIN: Primary | ICD-10-CM

## 2017-08-28 DIAGNOSIS — N20.1 URETERAL STONE: ICD-10-CM

## 2017-08-28 LAB
ALBUMIN SERPL-MCNC: 3.7 G/DL (ref 3.5–5)
ALBUMIN/GLOB SERPL: 0.8 {RATIO} (ref 1.1–2.2)
ALP SERPL-CCNC: 115 U/L (ref 45–117)
ALT SERPL-CCNC: 21 U/L (ref 12–78)
ANION GAP SERPL CALC-SCNC: 8 MMOL/L (ref 5–15)
APPEARANCE UR: ABNORMAL
AST SERPL-CCNC: 12 U/L (ref 15–37)
BACTERIA URNS QL MICRO: ABNORMAL /HPF
BASOPHILS # BLD: 0 K/UL (ref 0–0.1)
BASOPHILS NFR BLD: 0 % (ref 0–1)
BILIRUB SERPL-MCNC: 0.4 MG/DL (ref 0.2–1)
BILIRUB UR QL CFM: NEGATIVE
BUN SERPL-MCNC: 9 MG/DL (ref 6–20)
BUN/CREAT SERPL: 11 (ref 12–20)
CALCIUM SERPL-MCNC: 9.4 MG/DL (ref 8.5–10.1)
CHLORIDE SERPL-SCNC: 102 MMOL/L (ref 97–108)
CO2 SERPL-SCNC: 28 MMOL/L (ref 21–32)
COLOR UR: ABNORMAL
CREAT SERPL-MCNC: 0.82 MG/DL (ref 0.55–1.02)
EOSINOPHIL # BLD: 0 K/UL (ref 0–0.4)
EOSINOPHIL NFR BLD: 0 % (ref 0–7)
EPITH CASTS URNS QL MICRO: ABNORMAL /LPF
ERYTHROCYTE [DISTWIDTH] IN BLOOD BY AUTOMATED COUNT: 15 % (ref 11.5–14.5)
GLOBULIN SER CALC-MCNC: 4.7 G/DL (ref 2–4)
GLUCOSE SERPL-MCNC: 100 MG/DL (ref 65–100)
GLUCOSE UR STRIP.AUTO-MCNC: NEGATIVE MG/DL
HCG UR QL: NEGATIVE
HCT VFR BLD AUTO: 37.7 % (ref 35–47)
HGB BLD-MCNC: 12.4 G/DL (ref 11.5–16)
HGB UR QL STRIP: ABNORMAL
HYALINE CASTS URNS QL MICRO: ABNORMAL /LPF (ref 0–5)
KETONES UR QL STRIP.AUTO: 15 MG/DL
LEUKOCYTE ESTERASE UR QL STRIP.AUTO: NEGATIVE
LIPASE SERPL-CCNC: 77 U/L (ref 73–393)
LYMPHOCYTES # BLD: 3.3 K/UL (ref 0.8–3.5)
LYMPHOCYTES NFR BLD: 18 % (ref 12–49)
MCH RBC QN AUTO: 27.4 PG (ref 26–34)
MCHC RBC AUTO-ENTMCNC: 32.9 G/DL (ref 30–36.5)
MCV RBC AUTO: 83.2 FL (ref 80–99)
MONOCYTES # BLD: 1.2 K/UL (ref 0–1)
MONOCYTES NFR BLD: 7 % (ref 5–13)
MUCOUS THREADS URNS QL MICRO: ABNORMAL /LPF
NEUTS SEG # BLD: 13.4 K/UL (ref 1.8–8)
NEUTS SEG NFR BLD: 75 % (ref 32–75)
NITRITE UR QL STRIP.AUTO: POSITIVE
PH UR STRIP: 6.5 [PH] (ref 5–8)
PLATELET # BLD AUTO: 340 K/UL (ref 150–400)
POTASSIUM SERPL-SCNC: 3.8 MMOL/L (ref 3.5–5.1)
PROT SERPL-MCNC: 8.4 G/DL (ref 6.4–8.2)
PROT UR STRIP-MCNC: >300 MG/DL
RBC # BLD AUTO: 4.53 M/UL (ref 3.8–5.2)
RBC #/AREA URNS HPF: >100 /HPF (ref 0–5)
SODIUM SERPL-SCNC: 138 MMOL/L (ref 136–145)
SP GR UR REFRACTOMETRY: >1.03 (ref 1–1.03)
UROBILINOGEN UR QL STRIP.AUTO: 1 EU/DL (ref 0.2–1)
WBC # BLD AUTO: 17.9 K/UL (ref 3.6–11)
WBC URNS QL MICRO: ABNORMAL /HPF (ref 0–4)

## 2017-08-28 PROCEDURE — 81025 URINE PREGNANCY TEST: CPT

## 2017-08-28 PROCEDURE — 76770 US EXAM ABDO BACK WALL COMP: CPT

## 2017-08-28 PROCEDURE — 81001 URINALYSIS AUTO W/SCOPE: CPT | Performed by: EMERGENCY MEDICINE

## 2017-08-28 PROCEDURE — 99283 EMERGENCY DEPT VISIT LOW MDM: CPT

## 2017-08-28 PROCEDURE — 96375 TX/PRO/DX INJ NEW DRUG ADDON: CPT

## 2017-08-28 PROCEDURE — 74011000258 HC RX REV CODE- 258: Performed by: PHYSICIAN ASSISTANT

## 2017-08-28 PROCEDURE — 74176 CT ABD & PELVIS W/O CONTRAST: CPT

## 2017-08-28 PROCEDURE — 74011250636 HC RX REV CODE- 250/636: Performed by: PHYSICIAN ASSISTANT

## 2017-08-28 PROCEDURE — 96365 THER/PROPH/DIAG IV INF INIT: CPT

## 2017-08-28 PROCEDURE — 36415 COLL VENOUS BLD VENIPUNCTURE: CPT | Performed by: PHYSICIAN ASSISTANT

## 2017-08-28 PROCEDURE — 87086 URINE CULTURE/COLONY COUNT: CPT | Performed by: PHYSICIAN ASSISTANT

## 2017-08-28 PROCEDURE — 80053 COMPREHEN METABOLIC PANEL: CPT | Performed by: PHYSICIAN ASSISTANT

## 2017-08-28 PROCEDURE — 83690 ASSAY OF LIPASE: CPT | Performed by: PHYSICIAN ASSISTANT

## 2017-08-28 PROCEDURE — 85025 COMPLETE CBC W/AUTO DIFF WBC: CPT | Performed by: PHYSICIAN ASSISTANT

## 2017-08-28 RX ORDER — KETOROLAC TROMETHAMINE 30 MG/ML
30 INJECTION, SOLUTION INTRAMUSCULAR; INTRAVENOUS
Status: COMPLETED | OUTPATIENT
Start: 2017-08-28 | End: 2017-08-28

## 2017-08-28 RX ORDER — ONDANSETRON 4 MG/1
4 TABLET, ORALLY DISINTEGRATING ORAL
Qty: 10 TAB | Refills: 0 | Status: SHIPPED | OUTPATIENT
Start: 2017-08-28 | End: 2017-11-04

## 2017-08-28 RX ORDER — OXYCODONE AND ACETAMINOPHEN 5; 325 MG/1; MG/1
1 TABLET ORAL
Qty: 10 TAB | Refills: 0 | Status: SHIPPED | OUTPATIENT
Start: 2017-08-28 | End: 2017-11-04

## 2017-08-28 RX ORDER — ONDANSETRON 2 MG/ML
4 INJECTION INTRAMUSCULAR; INTRAVENOUS
Status: COMPLETED | OUTPATIENT
Start: 2017-08-28 | End: 2017-08-28

## 2017-08-28 RX ORDER — CIPROFLOXACIN 500 MG/1
500 TABLET ORAL 2 TIMES DAILY
Qty: 14 TAB | Refills: 0 | Status: SHIPPED | OUTPATIENT
Start: 2017-08-28 | End: 2017-09-04

## 2017-08-28 RX ADMIN — SODIUM CHLORIDE 1000 ML: 900 INJECTION, SOLUTION INTRAVENOUS at 22:21

## 2017-08-28 RX ADMIN — KETOROLAC TROMETHAMINE 30 MG: 30 INJECTION, SOLUTION INTRAMUSCULAR at 22:18

## 2017-08-28 RX ADMIN — ONDANSETRON 4 MG: 2 INJECTION INTRAMUSCULAR; INTRAVENOUS at 22:18

## 2017-08-28 RX ADMIN — CEFTRIAXONE SODIUM 1 G: 1 INJECTION, POWDER, FOR SOLUTION INTRAMUSCULAR; INTRAVENOUS at 22:18

## 2017-08-29 VITALS
WEIGHT: 265 LBS | OXYGEN SATURATION: 99 % | RESPIRATION RATE: 20 BRPM | HEART RATE: 98 BPM | BODY MASS INDEX: 41.59 KG/M2 | DIASTOLIC BLOOD PRESSURE: 98 MMHG | TEMPERATURE: 98.1 F | SYSTOLIC BLOOD PRESSURE: 145 MMHG | HEIGHT: 67 IN

## 2017-08-29 NOTE — ED NOTES
Discharge instructions given by provider. Patient verbalized an understanding. Pt was not seen by this RN prior to pt discharge.

## 2017-08-29 NOTE — ED PROVIDER NOTES
HPI Comments: 23 y.o. female with past medical history significant for ureteral stone (was seen in ED on 8/17/17 for flank pain and diagnosed with stone; followed up with Dr. Esau Dandy and had ureteral stent placed last Monday) presents with complaints of right flank pain and dysuria which began earlier today. The pt explained that she was at work when she suddenly experienced worsening right flank pain and when she went to the use the restroom \"it burnt when I urinated. \"  She denies hematuria. The pt rates the pain as a 6/10 in severity. The pt describes the pain as sharp and intermittent. The pt denies taking anything at home for the discomfort. There are no other acute medical complaints at this time. PCP: Dahlia Miller PA-C    Patient is a 23 y.o. female presenting with urinary pain. Urinary Pain    Associated symptoms include back pain. Pertinent negatives include no nausea, no vomiting, no hematuria, no flank pain and no abdominal pain. No past medical history on file. No past surgical history on file. No family history on file. Social History     Social History    Marital status: SINGLE     Spouse name: N/A    Number of children: N/A    Years of education: N/A     Occupational History    Not on file. Social History Main Topics    Smoking status: Never Smoker    Smokeless tobacco: Never Used    Alcohol use No    Drug use: Not on file    Sexual activity: Not on file     Other Topics Concern    Not on file     Social History Narrative         ALLERGIES: Review of patient's allergies indicates no known allergies. Review of Systems   Constitutional: Negative for activity change, appetite change, diaphoresis and fever. HENT: Negative for ear discharge, ear pain, facial swelling, rhinorrhea, sore throat, tinnitus, trouble swallowing and voice change. Eyes: Negative for photophobia, pain, discharge, redness and visual disturbance.    Respiratory: Negative for cough, chest tightness, shortness of breath, wheezing and stridor. Cardiovascular: Negative for chest pain and palpitations. Gastrointestinal: Negative for abdominal pain, constipation, diarrhea, nausea and vomiting. Endocrine: Negative for polydipsia and polyuria. Genitourinary: Positive for dysuria. Negative for flank pain and hematuria. Musculoskeletal: Positive for back pain. Negative for arthralgias and myalgias. Skin: Negative for color change and rash. Neurological: Negative for dizziness, syncope, speech difficulty, light-headedness and numbness. Psychiatric/Behavioral: Negative for behavioral problems. Vitals:    08/28/17 2048 08/29/17 0006   BP: 159/83 (!) 145/98   Pulse: (!) 116 98   Resp: 20    Temp: 98.1 °F (36.7 °C)    SpO2: 99%    Weight: 120.2 kg (265 lb)    Height: 5' 7\" (1.702 m)             Physical Exam   Constitutional: She is oriented to person, place, and time. She appears well-developed and well-nourished. HENT:   Head: Normocephalic and atraumatic. Eyes: Conjunctivae are normal. Pupils are equal, round, and reactive to light. Right eye exhibits no discharge. Left eye exhibits no discharge. Neck: Normal range of motion. Neck supple. No thyromegaly present. Cardiovascular: Normal rate, regular rhythm and normal heart sounds. Exam reveals no gallop and no friction rub. No murmur heard. Pulmonary/Chest: Effort normal and breath sounds normal. No respiratory distress. She has no wheezes. Abdominal: Soft. Bowel sounds are normal. She exhibits no distension. There is no tenderness. There is no rebound and no guarding. Musculoskeletal: Normal range of motion. Neurological: She is alert and oriented to person, place, and time. Skin: Skin is warm. Psychiatric: She has a normal mood and affect.         MDM  Number of Diagnoses or Management Options  Flank pain:   Ureteral stone:   Urinary tract infection without hematuria, site unspecified:   Diagnosis management comments: Called and spoke to urology (Dr. Cintron Has on call for Dr. Alvaro Cardona) and reported pt UTI and new onset dysuria with worsening flank pain. He explained that as long as the pt is not febrile, does not appear toxic, and stent is in satisfactory position, she is okay to follow up at the [place] office as an outpatient and does not need to be admitted for IV abx. CT revealed satisfactory positioning of ureteral stent. Pt reports relief of symptoms after IV toradol. Tx with IV rocephin in ED. Will send urine for culture and dc home with abx and pain medicines. Pt given strict instructions to follow up with urologist tomorrow. Pt verbalized her understanding. Reviewed treatment plan with attending and they agree.   Tammy Bowling PA-C     ED Course       Procedures

## 2017-08-29 NOTE — DISCHARGE INSTRUCTIONS
Kidney Stone: Care Instructions  Your Care Instructions    Kidney stones are formed when salts, minerals, and other substances normally found in the urine clump together. They can be as small as grains of sand or, rarely, as large as golf balls. While the stone is traveling through the ureter, which is the tube that carries urine from the kidney to the bladder, you will probably feel pain. The pain may be mild or very severe. You may also have some blood in your urine. As soon as the stone reaches the bladder, any intense pain should go away. If a stone is too large to pass on its own, you may need a medical procedure to help you pass the stone. The doctor has checked you carefully, but problems can develop later. If you notice any problems or new symptoms, get medical treatment right away. Follow-up care is a key part of your treatment and safety. Be sure to make and go to all appointments, and call your doctor if you are having problems. It's also a good idea to know your test results and keep a list of the medicines you take. How can you care for yourself at home? · Drink plenty of fluids, enough so that your urine is light yellow or clear like water. If you have kidney, heart, or liver disease and have to limit fluids, talk with your doctor before you increase the amount of fluids you drink. · Take pain medicines exactly as directed. Call your doctor if you think you are having a problem with your medicine. ¨ If the doctor gave you a prescription medicine for pain, take it as prescribed. ¨ If you are not taking a prescription pain medicine, ask your doctor if you can take an over-the-counter medicine. Read and follow all instructions on the label. · Your doctor may ask you to strain your urine so that you can collect your kidney stone when it passes. You can use a kitchen strainer or a tea strainer to catch the stone. Store it in a plastic bag until you see your doctor again.   Preventing future kidney stones  Some changes in your diet may help prevent kidney stones. Depending on the cause of your stones, your doctor may recommend that you:  · Drink plenty of fluids, enough so that your urine is light yellow or clear like water. If you have kidney, heart, or liver disease and have to limit fluids, talk with your doctor before you increase the amount of fluids you drink. · Limit coffee, tea, and alcohol. Also avoid grapefruit juice. · Do not take more than the recommended daily dose of vitamins C and D.  · Avoid antacids such as Gaviscon, Maalox, Mylanta, or Tums. · Limit the amount of salt (sodium) in your diet. · Eat a balanced diet that is not too high in protein. · Limit foods that are high in a substance called oxalate, which can cause kidney stones. These foods include dark green vegetables, rhubarb, chocolate, wheat bran, nuts, cranberries, and beans. When should you call for help? Call your doctor now or seek immediate medical care if:  · You cannot keep down fluids. · Your pain gets worse. · You have a fever or chills. · You have new or worse pain in your back just below your rib cage (the flank area). · You have new or more blood in your urine. Watch closely for changes in your health, and be sure to contact your doctor if:  · You do not get better as expected. Where can you learn more? Go to http://maryann-jm.info/. Enter H166 in the search box to learn more about \"Kidney Stone: Care Instructions. \"  Current as of: April 3, 2017  Content Version: 11.3  © 5742-4949 [employer]. Care instructions adapted under license by [employer] (which disclaims liability or warranty for this information). If you have questions about a medical condition or this instruction, always ask your healthcare professional. [address] any warranty or liability for your use of this information.          Urinary Tract Infection in Women: Care Instructions  Your Care Instructions    A urinary tract infection, or UTI, is a general term for an infection anywhere between the kidneys and the urethra (where urine comes out). Most UTIs are bladder infections. They often cause pain or burning when you urinate. UTIs are caused by bacteria and can be cured with antibiotics. Be sure to complete your treatment so that the infection goes away. Follow-up care is a key part of your treatment and safety. Be sure to make and go to all appointments, and call your doctor if you are having problems. It's also a good idea to know your test results and keep a list of the medicines you take. How can you care for yourself at home? · Take your antibiotics as directed. Do not stop taking them just because you feel better. You need to take the full course of antibiotics. · Drink extra water and other fluids for the next day or two. This may help wash out the bacteria that are causing the infection. (If you have kidney, heart, or liver disease and have to limit fluids, talk with your doctor before you increase your fluid intake.)  · Avoid drinks that are carbonated or have caffeine. They can irritate the bladder. · Urinate often. Try to empty your bladder each time. · To relieve pain, take a hot bath or lay a heating pad set on low over your lower belly or genital area. Never go to sleep with a heating pad in place. To prevent UTIs  · Drink plenty of water each day. This helps you urinate often, which clears bacteria from your system. (If you have kidney, heart, or liver disease and have to limit fluids, talk with your doctor before you increase your fluid intake.)  · Urinate when you need to. · Urinate right after you have sex. · Change sanitary pads often. · Avoid douches, bubble baths, feminine hygiene sprays, and other feminine hygiene products that have deodorants. · After going to the bathroom, wipe from front to back. When should you call for help?   Call your doctor now or seek immediate medical care if:  · Symptoms such as fever, chills, nausea, or vomiting get worse or appear for the first time. · You have new pain in your back just below your rib cage. This is called flank pain. · There is new blood or pus in your urine. · You have any problems with your antibiotic medicine. Watch closely for changes in your health, and be sure to contact your doctor if:  · You are not getting better after taking an antibiotic for 2 days. · Your symptoms go away but then come back. Where can you learn more? Go to http://maryann-jm.info/. Enter K775 in the search box to learn more about \"Urinary Tract Infection in Women: Care Instructions. \"  Current as of: November 28, 2016  Content Version: 11.3  © 3487-6477 [employer]. Care instructions adapted under license by [employer] (which disclaims liability or warranty for this information). If you have questions about a medical condition or this instruction, always ask your healthcare professional. [place] [address] any warranty or liability for your use of this information. We hope that we have addressed all of your medical concerns. The examination and treatment you received in the Emergency Department were for an emergent problem and were not intended as complete care. It is important that you follow up with your healthcare provider(s) for ongoing care. If your symptoms worsen or do not improve as expected, and you are unable to reach your usual health care provider(s), you should return to the Emergency Department. Today's healthcare is undergoing tremendous change, and patient satisfaction surveys are one of the many tools to assess the quality of medical care. You may receive a survey from the [employer] organization regarding your experience in the Emergency Department.   I hope that your experience has been completely positive, particularly the medical care that I provided. As such, please participate in the survey; anything less than excellent does not meet my expectations or intentions. [facility] and [facility] participate in nationally recognized quality of care measures. If your blood pressure is greater than 120/80, as reported below, we urge that you seek medical care to address the potential of high blood pressure, commonly known as hypertension. Hypertension can be hereditary or can be caused by certain medical conditions, pain, stress, or \"white coat syndrome. \"       Please make an appointment with your health care provider(s) for follow up of your Emergency Department visit. VITALS:   Patient Vitals for the past 8 hrs:   Temp Pulse Resp BP SpO2   08/28/17 2048 98.1 °F (36.7 °C) (!) 116 20 159/83 99 %          Thank you for allowing us to provide you with medical care today. We realize that you have many choices for your emergency care needs. Please choose us in the future for any continued health care needs. Josue De La O, [address] [place] [address] 20.   Office: [phone]            Recent Results (from the past 24 hour(s))   URINALYSIS W/MICROSCOPIC    Collection Time: 08/28/17  9:07 PM   Result Value Ref Range    Color RED      Appearance TURBID (A) CLEAR      Specific gravity >1.030 (H) 1.003 - 1.030    pH (UA) 6.5 5.0 - 8.0      Protein >300 (A) NEG mg/dL    Glucose NEGATIVE  NEG mg/dL    Ketone 15 (A) NEG mg/dL    Blood LARGE (A) NEG      Urobilinogen 1.0 0.2 - 1.0 EU/dL    Nitrites POSITIVE (A) NEG      Leukocyte Esterase NEGATIVE  NEG      Bilirubin UA, confirm NEGATIVE  NEG      WBC  0 - 4 /hpf    RBC >100 (H) 0 - 5 /hpf    Epithelial cells FEW FEW /lpf    Bacteria 3+ (A) NEG /hpf    Mucus TRACE (A) NEG /lpf    Hyaline cast 2-5 0 - 5 /lpf   HCG URINE, QL. - POC    Collection Time: 08/28/17  9:21 PM   Result Value Ref Range Pregnancy test,urine (POC) NEGATIVE  NEG     CBC WITH AUTOMATED DIFF    Collection Time: 08/28/17 11:10 PM   Result Value Ref Range    WBC 17.9 (H) 3.6 - 11.0 K/uL    RBC 4.53 3.80 - 5.20 M/uL    HGB 12.4 11.5 - 16.0 g/dL    HCT 37.7 35.0 - 47.0 %    MCV 83.2 80.0 - 99.0 FL    MCH 27.4 26.0 - 34.0 PG    MCHC 32.9 30.0 - 36.5 g/dL    RDW 15.0 (H) 11.5 - 14.5 %    PLATELET 104 443 - 730 K/uL    NEUTROPHILS 75 32 - 75 %    LYMPHOCYTES 18 12 - 49 %    MONOCYTES 7 5 - 13 %    EOSINOPHILS 0 0 - 7 %    BASOPHILS 0 0 - 1 %    ABS. NEUTROPHILS 13.4 (H) 1.8 - 8.0 K/UL    ABS. LYMPHOCYTES 3.3 0.8 - 3.5 K/UL    ABS. MONOCYTES 1.2 (H) 0.0 - 1.0 K/UL    ABS. EOSINOPHILS 0.0 0.0 - 0.4 K/UL    ABS. BASOPHILS 0.0 0.0 - 0.1 K/UL   METABOLIC PANEL, COMPREHENSIVE    Collection Time: 08/28/17 11:10 PM   Result Value Ref Range    Sodium 138 136 - 145 mmol/L    Potassium 3.8 3.5 - 5.1 mmol/L    Chloride 102 97 - 108 mmol/L    CO2 28 21 - 32 mmol/L    Anion gap 8 5 - 15 mmol/L    Glucose 100 65 - 100 mg/dL    BUN 9 6 - 20 MG/DL    Creatinine 0.82 0.55 - 1.02 MG/DL    BUN/Creatinine ratio 11 (L) 12 - 20      GFR est AA >60 >60 ml/min/1.73m2    GFR est non-AA >60 >60 ml/min/1.73m2    Calcium 9.4 8.5 - 10.1 MG/DL    Bilirubin, total 0.4 0.2 - 1.0 MG/DL    ALT (SGPT) 21 12 - 78 U/L    AST (SGOT) 12 (L) 15 - 37 U/L    Alk. phosphatase 115 45 - 117 U/L    Protein, total 8.4 (H) 6.4 - 8.2 g/dL    Albumin 3.7 3.5 - 5.0 g/dL    Globulin 4.7 (H) 2.0 - 4.0 g/dL    A-G Ratio 0.8 (L) 1.1 - 2.2     LIPASE    Collection Time: 08/28/17 11:10 PM   Result Value Ref Range    Lipase 77 73 - 393 U/L       Ct Abd Pelv Wo Cont    Result Date: 8/28/2017  EXAM:  CT ABD PELV WO CONT INDICATION: flank pain  dysuria today 6 days post nephroureteral stent placement on the right COMPARISON: CONTRAST:  None. TECHNIQUE: Thin axial images were obtained through the abdomen and pelvis. Coronal and sagittal reconstructions were generated.  Oral contrast ultrasound same day and August 18, 2017 administered. CT dose reduction was achieved through use of a standardized protocol tailored for this examination and automatic exposure control for dose modulation. The absence of intravenous contrast material reduces the sensitivity for evaluation of the solid parenchymal organs of the abdomen. FINDINGS: LUNG BASES: Clear. INCIDENTALLY IMAGED HEART AND MEDIASTINUM: Unremarkable. LIVER: No mass or biliary dilatation. GALLBLADDER: Unremarkable. SPLEEN: No mass. PANCREAS: No mass or ductal dilatation. ADRENALS: Unremarkable. KIDNEYS/URETERS: A right-sided nephroureteral stent has been placed. It is in satisfactory position with resolving right-sided hydronephrosis. Previously demonstrated proximal right ureteral stone is no longer visualized. 1 mm nonobstructing stone within the midpole and lower pole left kidney. No left-sided hydronephrosis STOMACH: Unremarkable. SMALL BOWEL: No dilatation or wall thickening. COLON: No dilatation or wall thickening. APPENDIX: Unremarkable. PERITONEUM: No ascites or pneumoperitoneum. RETROPERITONEUM: No lymphadenopathy or aortic aneurysm. REPRODUCTIVE ORGANS: Uterus is not enlarged URINARY BLADDER: No mass or calculus. BONES: No destructive bone lesion. ADDITIONAL COMMENTS: N/A     IMPRESSION: 1. Satisfactory positioning of right nephroureteral stent with resultant right-sided hydronephrosis. Small right ureteral stone no longer visualized 2. Tiny nonobstructing left renal stones     Us Retroperitoneum Comp    Result Date: 8/28/2017  INDICATION:  r/o hydronephrosis EXAM: Renal and Bladder Ultrasound. The kidneys and bladder were scanned via high resolution real-time linear array sonography. COMPARISON: None  . FINDINGS:  Image quality is degraded by large patient size/habitus. The right kidney measures 11.6 cm and the left kidney 11.6 cm. The right intrarenal collecting system is mildly prominent . There is no hydronephrosis on the left.  The kidneys are symmetric and normal in echogenicity and show no focal parenchymal abnormalities, dilatation or calculi. The inferior vena cava and abdominal aorta are normal for age. The proximal common iliac arteries are obscured by overlying bowel gas. The urinary bladder is not well distended. There may be echogenic debris in the urinary bladder and a ureteral stent is partially visible. IMPRESSION: 1. Mild right pelviectasis. 2. Ureteral stent visualized in the bladder which contains echogenic debris and is poorly visualized. Ty Adair

## 2017-08-30 LAB
BACTERIA SPEC CULT: NORMAL
CC UR VC: NORMAL
SERVICE CMNT-IMP: NORMAL

## 2017-11-03 ENCOUNTER — HOSPITAL ENCOUNTER (EMERGENCY)
Age: 22
Discharge: HOME | End: 2017-11-04
Attending: EMERGENCY MEDICINE
Payer: SELF-PAY

## 2017-11-03 VITALS
HEART RATE: 87 BPM | HEIGHT: 63 IN | BODY MASS INDEX: 51.37 KG/M2 | RESPIRATION RATE: 20 BRPM | WEIGHT: 289.9 LBS | TEMPERATURE: 98.6 F | OXYGEN SATURATION: 98 % | DIASTOLIC BLOOD PRESSURE: 72 MMHG | SYSTOLIC BLOOD PRESSURE: 133 MMHG

## 2017-11-03 DIAGNOSIS — N20.0 KIDNEY STONE ON LEFT SIDE: ICD-10-CM

## 2017-11-03 DIAGNOSIS — R10.9 RIGHT FLANK PAIN: Primary | ICD-10-CM

## 2017-11-03 LAB — HCG UR QL: NEGATIVE

## 2017-11-03 PROCEDURE — 81003 URINALYSIS AUTO W/O SCOPE: CPT | Performed by: NURSE PRACTITIONER

## 2017-11-03 PROCEDURE — 99283 EMERGENCY DEPT VISIT LOW MDM: CPT

## 2017-11-03 PROCEDURE — 81025 URINE PREGNANCY TEST: CPT

## 2017-11-04 ENCOUNTER — APPOINTMENT (OUTPATIENT)
Dept: CT IMAGING | Age: 22
End: 2017-11-04
Attending: NURSE PRACTITIONER
Payer: SELF-PAY

## 2017-11-04 LAB
APPEARANCE UR: CLEAR
BILIRUB UR QL: NEGATIVE
COLOR UR: NORMAL
GLUCOSE UR STRIP.AUTO-MCNC: NEGATIVE MG/DL
HGB UR QL STRIP: NEGATIVE
KETONES UR QL STRIP.AUTO: NEGATIVE MG/DL
LEUKOCYTE ESTERASE UR QL STRIP.AUTO: NEGATIVE
NITRITE UR QL STRIP.AUTO: NEGATIVE
PH UR STRIP: 6 [PH] (ref 5–8)
PROT UR STRIP-MCNC: NEGATIVE MG/DL
SP GR UR REFRACTOMETRY: 1.02 (ref 1–1.03)
UROBILINOGEN UR QL STRIP.AUTO: 1 EU/DL (ref 0.2–1)

## 2017-11-04 PROCEDURE — 74176 CT ABD & PELVIS W/O CONTRAST: CPT

## 2017-11-04 PROCEDURE — 74011250636 HC RX REV CODE- 250/636: Performed by: NURSE PRACTITIONER

## 2017-11-04 PROCEDURE — 96372 THER/PROPH/DIAG INJ SC/IM: CPT

## 2017-11-04 RX ORDER — KETOROLAC TROMETHAMINE 10 MG/1
10 TABLET, FILM COATED ORAL
Qty: 12 TAB | Refills: 0 | Status: SHIPPED | OUTPATIENT
Start: 2017-11-04 | End: 2018-02-03

## 2017-11-04 RX ORDER — KETOROLAC TROMETHAMINE 30 MG/ML
INJECTION, SOLUTION INTRAMUSCULAR; INTRAVENOUS
Status: DISCONTINUED
Start: 2017-11-04 | End: 2017-11-04 | Stop reason: HOSPADM

## 2017-11-04 RX ORDER — KETOROLAC TROMETHAMINE 30 MG/ML
30 INJECTION, SOLUTION INTRAMUSCULAR; INTRAVENOUS
Status: COMPLETED | OUTPATIENT
Start: 2017-11-04 | End: 2017-11-04

## 2017-11-04 RX ORDER — ONDANSETRON 4 MG/1
4 TABLET, ORALLY DISINTEGRATING ORAL
Qty: 10 TAB | Refills: 0 | Status: SHIPPED | OUTPATIENT
Start: 2017-11-04 | End: 2018-02-03

## 2017-11-04 RX ADMIN — KETOROLAC TROMETHAMINE 30 MG: 30 INJECTION, SOLUTION INTRAMUSCULAR at 01:17

## 2017-11-04 NOTE — ED NOTES
Discharge instructions given by provider, patient verbalized an understanding, has no further questions at this time.

## 2017-11-04 NOTE — ED TRIAGE NOTES
Patient arrives with c/o bilateral flank back and lower back pain onset Monday. Patients states she's been trying to have a BM but sides hurt to push. Also c/o decrease appetite.

## 2017-11-04 NOTE — ED PROVIDER NOTES
HPI Comments: 23 y.o. female with hx of anxiety and kidney stones who presents from home with chief complaint of right flank pain. Pt reports hx of kidney stones. She presents today with mild to moderately right flank pain x1 week. The pain is exacerbated with ROM accompanied concentrated urine, nausea and constipation. Pt states that she is nauseated most significantly with eating. She states she vomited once earlier this week. Pt thinks she may also have MSK strain because she does a lot of lifting at work as a . She denies success with passing kidney stones in the past. She most recently evaluated for kidney stone symptoms at [facility] in October of 2017 for kidney stones as well as accompanied by anxiety with panic attacks. Pt reports she was d/c home but because she has \"bad outcomes\" w/ kidney stones in the past wanted reassurance in our ED about whether or not she had a kidney stone at this time. Pt denies fever, chills,diarrhea, body aches, urinary frequency, hematuria, current vomiting, abdominal pain. There are no other acute medical concerns at this time. (-) tobacco/ ETOH/ substance abuse. PCP: None    Note written by Lizzy Ramires, as dictated by Siomara Dillon NP 11:58 PM    The history is provided by the patient. No  was used. No past medical history on file. No past surgical history on file. No family history on file. Social History     Social History    Marital status: SINGLE     Spouse name: N/A    Number of children: N/A    Years of education: N/A     Occupational History    Not on file.      Social History Main Topics    Smoking status: Never Smoker    Smokeless tobacco: Never Used    Alcohol use No    Drug use: Not on file    Sexual activity: Not on file     Other Topics Concern    Not on file     Social History Narrative         ALLERGIES: Review of patient's allergies indicates no known allergies. Review of Systems   Constitutional: Negative for chills and fever. Respiratory: Negative for shortness of breath. Cardiovascular: Negative for chest pain. Gastrointestinal: Positive for constipation and nausea. Negative for abdominal pain and vomiting. Genitourinary: Positive for flank pain (right). Negative for difficulty urinating and dysuria.        + concentrated urine   Musculoskeletal: Negative for neck pain. Skin: Negative for wound. Neurological: Negative for dizziness and light-headedness. Vitals:    11/03/17 2248   BP: 133/72   Pulse: 87   Resp: 20   Temp: 98.6 °F (37 °C)   SpO2: 98%   Weight: 131.5 kg (289 lb 14.5 oz)   Height: 5' 3\" (1.6 m)            Physical Exam   Constitutional: She is oriented to person, place, and time. She appears well-developed and well-nourished. No distress. HENT:   Head: Normocephalic and atraumatic. Right Ear: External ear normal.   Left Ear: External ear normal.   Nose: Nose normal.   Mouth/Throat: Oropharynx is clear and moist. No oropharyngeal exudate. Eyes: Conjunctivae and EOM are normal. Pupils are equal, round, and reactive to light. Neck: Normal range of motion. Neck supple. Cardiovascular: Normal rate, regular rhythm, normal heart sounds and intact distal pulses. Pulmonary/Chest: Effort normal and breath sounds normal.   Abdominal: Soft. Bowel sounds are normal. There is no tenderness. There is no rebound and no guarding. Musculoskeletal: Normal range of motion. There are no step offs, deformities on palpation of cervical through lumbar spine. There is no para-spinal musculoskeletal TTP or tension noted. Neurological: She is alert and oriented to person, place, and time. Skin: Skin is warm and dry. Psychiatric: She has a normal mood and affect. Her behavior is normal. Judgment and thought content normal.   Nursing note and vitals reviewed.        MDM  Number of Diagnoses or Management Options  Kidney stone on left side:   Right flank pain:   Diagnosis management comments: Ddx: kidney stone, MSK strain/ pain, constipation     22 yo F present w/ concern for recurrent kidney stone on R side, notes pain is not as severe   As prior and has not had BM in several days. CT w/ L nonobstructing stone. Advised laxative to assist with pain/ constipation. For the potential kidney stone, I recommended pushing plenty of fluids, 2 quarts daily, and f/u with [facility] ASAP (pt is known pt of there's). The patient has been educated on the use of NSAIDS/ heatTherapy/ avoidance of strenuous activities to assist with relief of current symptoms. If narcotics were prescribed at time of discharge, the patient has been made aware of the risks of operating heavy machinery and or drinking alcohol while using these medications. F/u with PCP for chronic medical needs. If the patient does not have a PCP, they have been provided with a list of local providers and encouraged to establish care in the community. . Reasons to return to the ED have been reviewed at time of discharge.           Amount and/or Complexity of Data Reviewed  Clinical lab tests: reviewed and ordered  Tests in the radiology section of CPT®: ordered and reviewed  Review and summarize past medical records: yes  Discuss the patient with other providers: yes      ED Course       Procedures      LABORATORY TESTS:  Recent Results (from the past 12 hour(s))   URINALYSIS W/ RFLX MICROSCOPIC    Collection Time: 11/03/17 11:48 PM   Result Value Ref Range    Color YELLOW/STRAW      Appearance CLEAR CLEAR      Specific gravity 1.025 1.003 - 1.030      pH (UA) 6.0 5.0 - 8.0      Protein NEGATIVE  NEG mg/dL    Glucose NEGATIVE  NEG mg/dL    Ketone NEGATIVE  NEG mg/dL    Bilirubin NEGATIVE  NEG      Blood NEGATIVE  NEG      Urobilinogen 1.0 0.2 - 1.0 EU/dL    Nitrites NEGATIVE  NEG      Leukocyte Esterase NEGATIVE  NEG     HCG URINE, QL. - POC    Collection Time: 11/03/17 11:54 PM Result Value Ref Range    Pregnancy test,urine (POC) NEGATIVE  NEG         IMAGING RESULTS:  CT ABD PELV WO CONT   Final Result         EXAM:  CT ABD PELV WO CONT     INDICATION: r flank pain, hx of kidney stones     COMPARISON:   August 28, 2017      CONTRAST:  None.     TECHNIQUE:   Thin axial images were obtained through the abdomen and pelvis. Coronal and  sagittal reconstructions were generated. Oral contrast was not administered. CT  dose reduction was achieved through use of a standardized protocol tailored for  this examination and automatic exposure control for dose modulation.      The absence of intravenous contrast material reduces the sensitivity for  evaluation of the solid parenchymal organs of the abdomen.      Lung bases appear unremarkable. Liver and spleen are normal in size. Nonobstructing left intrarenal calculus. No significant abnormality of the right  kidney. No free air or free fluid, gallbladder and pancreas appear unremarkable. No bowel wall thickening or obstruction. The appendix appears normal. Mild  sigmoid diverticulosis. Uterus and ovaries appear normal by this technique the  bladder is not filled.     IMPRESSION   impression: Nonobstructing left intrarenal calculus. No other significant  findings.             MEDICATIONS GIVEN:  Medications   ketorolac (TORADOL) injection 30 mg (30 mg IntraMUSCular Given 11/4/17 0117)       IMPRESSION:  1. Right flank pain        PLAN:  1. Discharge Medication List as of 11/4/2017  1:41 AM      START taking these medications    Details   ketorolac (TORADOL) 10 mg tablet Take 1 Tab by mouth every six (6) hours as needed for Pain., Print, Disp-12 Tab, R-0         CONTINUE these medications which have CHANGED    Details   ondansetron (ZOFRAN ODT) 4 mg disintegrating tablet Take 1 Tab by mouth every eight (8) hours as needed for Nausea. , Print, Disp-10 Tab, R-0         STOP taking these medications       oxyCODONE-acetaminophen (PERCOCET) 5-325 mg per tablet Comments:   Reason for Stopping:         HYDROcodone-acetaminophen (Mati Handler) 5-325 mg per tablet Comments:   Reason for Stoppin.   Follow-up Information     Follow up With Details Comments [facility] Schedule an appointment as soon as possible for a visit  [address] [place] [address] Dr Obrien [id number]      [facility] EMERGENCY DEPT Go to As needed, If symptoms worsen [facility]  [phone]    [facility] Schedule an appointment as soon as possible for a visit  [address] [place] [address]          3. Return to ED if worse    Discharge Note:    The patient is ready for discharge. The patient's signs, symptoms, diagnosis, and discharge instruction have been discussed and the patient has conveyed their understanding. The patient is to follow up as recommended or return to the ER should their symptoms worsen. Plan has been discussed and the patient is in agreement.     Albaro Sam NP

## 2017-11-09 ENCOUNTER — HOSPITAL ENCOUNTER (EMERGENCY)
Age: 22
Discharge: HOME | End: 2017-11-09
Attending: EMERGENCY MEDICINE
Payer: SELF-PAY

## 2017-11-09 ENCOUNTER — APPOINTMENT (OUTPATIENT)
Dept: GENERAL RADIOLOGY | Age: 22
End: 2017-11-09
Attending: EMERGENCY MEDICINE
Payer: SELF-PAY

## 2017-11-09 VITALS
OXYGEN SATURATION: 97 % | RESPIRATION RATE: 18 BRPM | WEIGHT: 286 LBS | TEMPERATURE: 98.3 F | BODY MASS INDEX: 50.68 KG/M2 | HEIGHT: 63 IN | DIASTOLIC BLOOD PRESSURE: 89 MMHG | HEART RATE: 88 BPM | SYSTOLIC BLOOD PRESSURE: 136 MMHG

## 2017-11-09 DIAGNOSIS — R10.84 ABDOMINAL PAIN, GENERALIZED: Primary | ICD-10-CM

## 2017-11-09 LAB
ALBUMIN SERPL-MCNC: 3.5 G/DL (ref 3.5–5)
ALBUMIN/GLOB SERPL: 0.7 {RATIO} (ref 1.1–2.2)
ALP SERPL-CCNC: 119 U/L (ref 45–117)
ALT SERPL-CCNC: 19 U/L (ref 12–78)
ANION GAP SERPL CALC-SCNC: 7 MMOL/L (ref 5–15)
APPEARANCE UR: CLEAR
AST SERPL-CCNC: 15 U/L (ref 15–37)
BACTERIA URNS QL MICRO: ABNORMAL /HPF
BASOPHILS # BLD: 0 K/UL (ref 0–0.1)
BASOPHILS NFR BLD: 0 % (ref 0–1)
BILIRUB SERPL-MCNC: 0.2 MG/DL (ref 0.2–1)
BILIRUB UR QL: NEGATIVE
BUN SERPL-MCNC: 12 MG/DL (ref 6–20)
BUN/CREAT SERPL: 17 (ref 12–20)
CALCIUM SERPL-MCNC: 9.6 MG/DL (ref 8.5–10.1)
CHLORIDE SERPL-SCNC: 103 MMOL/L (ref 97–108)
CO2 SERPL-SCNC: 27 MMOL/L (ref 21–32)
COLOR UR: ABNORMAL
CREAT SERPL-MCNC: 0.69 MG/DL (ref 0.55–1.02)
EOSINOPHIL # BLD: 0.1 K/UL (ref 0–0.4)
EOSINOPHIL NFR BLD: 1 % (ref 0–7)
EPITH CASTS URNS QL MICRO: ABNORMAL /LPF
ERYTHROCYTE [DISTWIDTH] IN BLOOD BY AUTOMATED COUNT: 15.3 % (ref 11.5–14.5)
GLOBULIN SER CALC-MCNC: 4.7 G/DL (ref 2–4)
GLUCOSE SERPL-MCNC: 86 MG/DL (ref 65–100)
GLUCOSE UR STRIP.AUTO-MCNC: NEGATIVE MG/DL
HCT VFR BLD AUTO: 39.3 % (ref 35–47)
HGB BLD-MCNC: 12.8 G/DL (ref 11.5–16)
HGB UR QL STRIP: ABNORMAL
HYALINE CASTS URNS QL MICRO: ABNORMAL /LPF (ref 0–5)
KETONES UR QL STRIP.AUTO: NEGATIVE MG/DL
LEUKOCYTE ESTERASE UR QL STRIP.AUTO: NEGATIVE
LIPASE SERPL-CCNC: 238 U/L (ref 73–393)
LYMPHOCYTES # BLD: 3.4 K/UL (ref 0.8–3.5)
LYMPHOCYTES NFR BLD: 27 % (ref 12–49)
MCH RBC QN AUTO: 27.2 PG (ref 26–34)
MCHC RBC AUTO-ENTMCNC: 32.6 G/DL (ref 30–36.5)
MCV RBC AUTO: 83.6 FL (ref 80–99)
MONOCYTES # BLD: 1 K/UL (ref 0–1)
MONOCYTES NFR BLD: 7 % (ref 5–13)
NEUTS SEG # BLD: 8.4 K/UL (ref 1.8–8)
NEUTS SEG NFR BLD: 65 % (ref 32–75)
NITRITE UR QL STRIP.AUTO: NEGATIVE
PH UR STRIP: 5.5 [PH] (ref 5–8)
PLATELET # BLD AUTO: 360 K/UL (ref 150–400)
POTASSIUM SERPL-SCNC: 4.1 MMOL/L (ref 3.5–5.1)
PROT SERPL-MCNC: 8.2 G/DL (ref 6.4–8.2)
PROT UR STRIP-MCNC: NEGATIVE MG/DL
RBC # BLD AUTO: 4.7 M/UL (ref 3.8–5.2)
RBC #/AREA URNS HPF: ABNORMAL /HPF (ref 0–5)
SODIUM SERPL-SCNC: 137 MMOL/L (ref 136–145)
SP GR UR REFRACTOMETRY: 1.03 (ref 1–1.03)
UROBILINOGEN UR QL STRIP.AUTO: 0.2 EU/DL (ref 0.2–1)
WBC # BLD AUTO: 12.9 K/UL (ref 3.6–11)
WBC URNS QL MICRO: ABNORMAL /HPF (ref 0–4)

## 2017-11-09 PROCEDURE — 74020 XR ABD FLAT/ ERECT: CPT

## 2017-11-09 PROCEDURE — 83690 ASSAY OF LIPASE: CPT | Performed by: EMERGENCY MEDICINE

## 2017-11-09 PROCEDURE — 85025 COMPLETE CBC W/AUTO DIFF WBC: CPT | Performed by: EMERGENCY MEDICINE

## 2017-11-09 PROCEDURE — 36415 COLL VENOUS BLD VENIPUNCTURE: CPT | Performed by: EMERGENCY MEDICINE

## 2017-11-09 PROCEDURE — 99283 EMERGENCY DEPT VISIT LOW MDM: CPT

## 2017-11-09 PROCEDURE — 81001 URINALYSIS AUTO W/SCOPE: CPT | Performed by: EMERGENCY MEDICINE

## 2017-11-09 PROCEDURE — 80053 COMPREHEN METABOLIC PANEL: CPT | Performed by: EMERGENCY MEDICINE

## 2017-11-09 RX ORDER — DICYCLOMINE HYDROCHLORIDE 20 MG/1
20 TABLET ORAL EVERY 6 HOURS
Qty: 20 TAB | Refills: 0 | Status: SHIPPED | OUTPATIENT
Start: 2017-11-09 | End: 2017-11-14

## 2017-11-09 RX ORDER — ONDANSETRON 4 MG/1
4 TABLET, ORALLY DISINTEGRATING ORAL
Qty: 20 TAB | Refills: 0 | Status: SHIPPED | OUTPATIENT
Start: 2017-11-09 | End: 2018-02-03

## 2017-11-09 NOTE — ED TRIAGE NOTES
Reports was dx with a kidney stone Sunday, states she has been taking Toradol and has felt minimal relief.  States she is also not urinating as frequently as normal. Reports n/v.

## 2017-11-10 NOTE — ED PROVIDER NOTES
HPI Comments: 23 y.o. female with no significant past medical history who presents from home with chief complaint of flank pain. Pt states that she was seen in the ED last week for L-sided flank pain which has worsened since that time and is now constant up her back. She has been taking Toradol with little relief of symptoms. She is not able to sleep on her sides due to the pain but she has difficulty sleeping on her back due to suspected sleep apnea. She has also been experiencing some nausea after eating, vomiting, decreased urinary frequency, and slight pain in her lower abd when urinating. She reports a couple episodes of vomiting this morning at work as well. Pt denies hematuria or dysuria. There are no other acute medical concerns at this time. Old Chart Review: Pt was seen by Dr. Demarcus Palafox on 11/3/17. Pt had a CT of abd and pelvis done at that time which showed a non obstructed stone in the L-kidney with no other acute findings. Pt also had negative pregnancy test and negative UA. Social hx: (-) tobacco use; (-) EtOH use    Note written by Lizzy Webb, as dictated by Reynaldo Lemus MD 7:02 PM    The history is provided by the patient. No  was used. No past medical history on file. No past surgical history on file. No family history on file. Social History     Social History    Marital status: SINGLE     Spouse name: N/A    Number of children: N/A    Years of education: N/A     Occupational History    Not on file. Social History Main Topics    Smoking status: Never Smoker    Smokeless tobacco: Never Used    Alcohol use No    Drug use: Not on file    Sexual activity: Not on file     Other Topics Concern    Not on file     Social History Narrative         ALLERGIES: Review of patient's allergies indicates no known allergies. Review of Systems   Constitutional: Negative. Negative for appetite change, fever and unexpected weight change. HENT: Negative. Negative for ear pain, hearing loss, nosebleeds, rhinorrhea, sore throat and trouble swallowing. Respiratory: Negative. Negative for cough, chest tightness and shortness of breath. Cardiovascular: Negative. Negative for chest pain and palpitations. Gastrointestinal: Positive for abdominal pain, nausea and vomiting. Negative for abdominal distention and blood in stool. Endocrine: Negative. Genitourinary: Positive for flank pain and frequency (decreased). Negative for dysuria and hematuria. Musculoskeletal: Positive for back pain. Negative for myalgias. Skin: Negative. Negative for rash. Allergic/Immunologic: Negative. Neurological: Negative. Negative for dizziness, syncope, weakness and numbness. Hematological: Negative. Psychiatric/Behavioral: Negative. All other systems reviewed and are negative. Vitals:    11/09/17 1852   BP: 136/89   Pulse: 88   Resp: 18   Temp: 98.3 °F (36.8 °C)   SpO2: 97%   Weight: 129.7 kg (286 lb)   Height: 5' 3\" (1.6 m)            Physical Exam   Constitutional: She is oriented to person, place, and time. She appears well-developed and well-nourished. No distress. HENT:   Head: Normocephalic and atraumatic. Right Ear: External ear normal.   Left Ear: External ear normal.   Nose: Nose normal.   Mouth/Throat: Oropharynx is clear and moist.   Eyes: Conjunctivae and EOM are normal. Pupils are equal, round, and reactive to light. Neck: Normal range of motion. Neck supple. No JVD present. No thyromegaly present. Cardiovascular: Normal rate, regular rhythm, normal heart sounds and intact distal pulses. No murmur heard. Pulmonary/Chest: Effort normal and breath sounds normal. No respiratory distress. She has no wheezes. She has no rales. Abdominal: Soft. Bowel sounds are normal. She exhibits no distension. There is no tenderness. Musculoskeletal: Normal range of motion. She exhibits no edema.    Neurological: She is alert and oriented to person, place, and time. No cranial nerve deficit. Skin: Skin is warm and dry. No rash noted. Psychiatric: She has a normal mood and affect. Her behavior is normal. Thought content normal.      Note written by Lizzy Neal, as dictated by Maribell Florez MD 7:02 PM    [facility]  ED Course       Procedures    PROGRESS NOTE:  7:59 PM  Pt's chem is normal. Lipase is normal. Chronic mild leukocytosis of 12.9 with no shift. UA shows trace blood but no other findings consistent with acute infection. Abd series is normal.   Assessment: Nonspecific abd pain. Plan: Will give trial of Bentyl and Zofran and instruction to f/u with PCP. No suspicion for acute infection or other acute process at this time.

## 2017-11-10 NOTE — ED NOTES
Bedside and Verbal shift change report given to Patsy Logan (oncoming nurse) by Urvashi Guan (offgoing nurse). Report included the following information SBAR, Kardex and ED Summary.

## 2017-11-10 NOTE — ED NOTES
Pt discharged by the provider. Discharge instructions were given and questions were answered by the provider.  No final assessment was done by this RN

## 2017-11-10 NOTE — DISCHARGE INSTRUCTIONS
Abdominal Pain: Care Instructions  Your Care Instructions    Abdominal pain has many possible causes. Some aren't serious and get better on their own in a few days. Others need more testing and treatment. If your pain continues or gets worse, you need to be rechecked and may need more tests to find out what is wrong. You may need surgery to correct the problem. Don't ignore new symptoms, such as fever, nausea and vomiting, urination problems, pain that gets worse, and dizziness. These may be signs of a more serious problem. Your doctor may have recommended a follow-up visit in the next 8 to 12 hours. If you are not getting better, you may need more tests or treatment. The doctor has checked you carefully, but problems can develop later. If you notice any problems or new symptoms, get medical treatment right away. Follow-up care is a key part of your treatment and safety. Be sure to make and go to all appointments, and call your doctor if you are having problems. It's also a good idea to know your test results and keep a list of the medicines you take. How can you care for yourself at home? · Rest until you feel better. · To prevent dehydration, drink plenty of fluids, enough so that your urine is light yellow or clear like water. Choose water and other caffeine-free clear liquids until you feel better. If you have kidney, heart, or liver disease and have to limit fluids, talk with your doctor before you increase the amount of fluids you drink. · If your stomach is upset, eat mild foods, such as rice, dry toast or crackers, bananas, and applesauce. Try eating several small meals instead of two or three large ones. · Wait until 48 hours after all symptoms have gone away before you have spicy foods, alcohol, and drinks that contain caffeine. · Do not eat foods that are high in fat. · Avoid anti-inflammatory medicines such as aspirin, ibuprofen (Advil, Motrin), and naproxen (Aleve).  These can cause stomach upset. Talk to your doctor if you take daily aspirin for another health problem. When should you call for help? Call 911 anytime you think you may need emergency care. For example, call if:  ? · You passed out (lost consciousness). ? · You pass maroon or very bloody stools. ? · You vomit blood or what looks like coffee grounds. ? · You have new, severe belly pain. ?Call your doctor now or seek immediate medical care if:  ? · Your pain gets worse, especially if it becomes focused in one area of your belly. ? · You have a new or higher fever. ? · Your stools are black and look like tar, or they have streaks of blood. ? · You have unexpected vaginal bleeding. ? · You have symptoms of a urinary tract infection. These may include:  ¨ Pain when you urinate. ¨ Urinating more often than usual.  ¨ Blood in your urine. ? · You are dizzy or lightheaded, or you feel like you may faint. ? Watch closely for changes in your health, and be sure to contact your doctor if:  ? · You are not getting better after 1 day (24 hours). Where can you learn more? Go to http://maryannRingCentraljm.info/. Enter M863 in the search box to learn more about \"Abdominal Pain: Care Instructions. \"  Current as of: March 20, 2017  Content Version: 11.4  © 3001-5405 [employer]. Care instructions adapted under license by [employer] (which disclaims liability or warranty for this information). If you have questions about a medical condition or this instruction, always ask your healthcare professional. [place] [address] any warranty or liability for your use of this information. We hope that we have addressed all of your medical concerns. The examination and treatment you received in the Emergency Department were for an emergent problem and were not intended as complete care. It is important that you follow up with your healthcare provider(s) for ongoing care.  If your symptoms worsen or do not improve as expected, and you are unable to reach your usual health care provider(s), you should return to the Emergency Department. Today's healthcare is undergoing tremendous change, and patient satisfaction surveys are one of the many tools to assess the quality of medical care. You may receive a survey from the [employer] regarding your experience in the Emergency Department. I hope that your experience has been completely positive, particularly the medical care that I provided. As such, please participate in the survey; anything less than excellent does not meet my expectations or intentions. [facility] and [facility] participate in nationally recognized quality of care measures. If your blood pressure is greater than 120/80, as reported below, we urge that you seek medical care to address the potential of high blood pressure, commonly known as hypertension. Hypertension can be hereditary or can be caused by certain medical conditions, pain, stress, or \"white coat syndrome. \"       Please make an appointment with your health care provider(s) for follow up of your Emergency Department visit. VITALS:   Patient Vitals for the past 8 hrs:   Temp Pulse Resp BP SpO2   11/09/17 1921 - - - - 97 %   11/09/17 1852 98.3 °F (36.8 °C) 88 18 136/89 97 %          Thank you for allowing us to provide you with medical care today. We realize that you have many choices for your emergency care needs. Please choose us in the future for any continued health care needs. Regards,           Michael Lagos, [facility]: [phone]            Recent Results (from the past 24 hour(s))   METABOLIC PANEL, COMPREHENSIVE    Collection Time: 11/09/17  7:07 PM   Result Value Ref Range    Sodium 137 136 - 145 mmol/L    Potassium 4.1 3.5 - 5.1 mmol/L    Chloride 103 97 - 108 mmol/L    CO2 27 21 - 32 mmol/L    Anion gap 7 5 - 15 mmol/L    Glucose 86 65 - 100 mg/dL    BUN 12 6 - 20 MG/DL    Creatinine 0.69 0.55 - 1.02 MG/DL    BUN/Creatinine ratio 17 12 - 20      GFR est AA >60 >60 ml/min/1.73m2    GFR est non-AA >60 >60 ml/min/1.73m2    Calcium 9.6 8.5 - 10.1 MG/DL    Bilirubin, total 0.2 0.2 - 1.0 MG/DL    ALT (SGPT) 19 12 - 78 U/L    AST (SGOT) 15 15 - 37 U/L    Alk. phosphatase 119 (H) 45 - 117 U/L    Protein, total 8.2 6.4 - 8.2 g/dL    Albumin 3.5 3.5 - 5.0 g/dL    Globulin 4.7 (H) 2.0 - 4.0 g/dL    A-G Ratio 0.7 (L) 1.1 - 2.2     LIPASE    Collection Time: 11/09/17  7:07 PM   Result Value Ref Range    Lipase 238 73 - 393 U/L   CBC WITH AUTOMATED DIFF    Collection Time: 11/09/17  7:07 PM   Result Value Ref Range    WBC 12.9 (H) 3.6 - 11.0 K/uL    RBC 4.70 3.80 - 5.20 M/uL    HGB 12.8 11.5 - 16.0 g/dL    HCT 39.3 35.0 - 47.0 %    MCV 83.6 80.0 - 99.0 FL    MCH 27.2 26.0 - 34.0 PG    MCHC 32.6 30.0 - 36.5 g/dL    RDW 15.3 (H) 11.5 - 14.5 %    PLATELET 046 941 - 580 K/uL    NEUTROPHILS 65 32 - 75 %    LYMPHOCYTES 27 12 - 49 %    MONOCYTES 7 5 - 13 %    EOSINOPHILS 1 0 - 7 %    BASOPHILS 0 0 - 1 %    ABS. NEUTROPHILS 8.4 (H) 1.8 - 8.0 K/UL    ABS. LYMPHOCYTES 3.4 0.8 - 3.5 K/UL    ABS. MONOCYTES 1.0 0.0 - 1.0 K/UL    ABS. EOSINOPHILS 0.1 0.0 - 0.4 K/UL    ABS.  BASOPHILS 0.0 0.0 - 0.1 K/UL   URINALYSIS W/MICROSCOPIC    Collection Time: 11/09/17  7:07 PM   Result Value Ref Range    Color YELLOW/STRAW      Appearance CLEAR CLEAR      Specific gravity 1.026 1.003 - 1.030      pH (UA) 5.5 5.0 - 8.0      Protein NEGATIVE  NEG mg/dL    Glucose NEGATIVE  NEG mg/dL    Ketone NEGATIVE  NEG mg/dL    Bilirubin NEGATIVE  NEG      Blood TRACE (A) NEG      Urobilinogen 0.2 0.2 - 1.0 EU/dL    Nitrites NEGATIVE  NEG      Leukocyte Esterase NEGATIVE  NEG      WBC 0-4 0 - 4 /hpf    RBC 0-5 0 - 5 /hpf    Epithelial cells FEW FEW /lpf    Bacteria 1+ (A) NEG /hpf    Hyaline cast 2-5 0 - 5 /lpf       Xr Abd Flat/ Erect    Result Date: 11/9/2017  EXAM:  XR ABD FLAT/ ERECT INDICATION:   abdominal pain, ? Constipation COMPARISON: None. FINDINGS: Supine and upright views of the abdomen demonstrate a normal gas pattern. There is no free intraperitoneal air. No soft tissue masses or pathologic calcifications are seen. The bones and soft tissues are within normal limits. IMPRESSION: Normal abdomen.

## 2017-11-16 ENCOUNTER — OFFICE VISIT (OUTPATIENT)
Dept: FAMILY MEDICINE CLINIC | Age: 22
End: 2017-11-16

## 2017-11-16 VITALS
OXYGEN SATURATION: 100 % | DIASTOLIC BLOOD PRESSURE: 78 MMHG | HEIGHT: 63 IN | SYSTOLIC BLOOD PRESSURE: 113 MMHG | HEART RATE: 85 BPM | RESPIRATION RATE: 18 BRPM | BODY MASS INDEX: 51.03 KG/M2 | WEIGHT: 288 LBS | TEMPERATURE: 96.8 F

## 2017-11-16 DIAGNOSIS — R06.83 SNORING: ICD-10-CM

## 2017-11-16 DIAGNOSIS — N20.0 KIDNEY STONE ON LEFT SIDE: ICD-10-CM

## 2017-11-16 DIAGNOSIS — M54.9 ACUTE RIGHT-SIDED BACK PAIN, UNSPECIFIED BACK LOCATION: ICD-10-CM

## 2017-11-16 DIAGNOSIS — R19.8 ALTERNATING CONSTIPATION AND DIARRHEA: ICD-10-CM

## 2017-11-16 DIAGNOSIS — R07.9 RIGHT-SIDED CHEST PAIN: ICD-10-CM

## 2017-11-16 DIAGNOSIS — Z23 ENCOUNTER FOR IMMUNIZATION: ICD-10-CM

## 2017-11-16 DIAGNOSIS — R10.9 RIGHT FLANK PAIN: Primary | ICD-10-CM

## 2017-11-16 RX ORDER — DICYCLOMINE HYDROCHLORIDE 20 MG/1
20 TABLET ORAL EVERY 6 HOURS
COMMUNITY
End: 2018-02-06

## 2017-11-16 NOTE — PROGRESS NOTES
Subjective:   Deepthi Chappell is a 23 y.o. female with history of Kidney stones  CC: Back pain  History provided by patient and records    HPI:  4 weeks of pain in sides and back. Patient reports nausea and vomiting after eating. Reports nausea after all meals with then vomiting small amounts afterwards, or with cough. Noting right sided back and flank pain that occur after eating (Any foods), with bending, and with laying on the side. Described as a tightness up to 10/10 intensity. Radiation to the right chest and right hand tingling and hand numbness on 2 occasions. Also noting \"losing breath\" with walking short distances and with talking on occasion. Patient endorse mild back pain present during this visit but denied radiating chest pain, radiating arm pain, or shortness of breath during this visit. Last episode of chest pain had occurred about 2 days prior. Has attempted use of Mother's albuterol with no improvement of symptoms. Has been to ED on multiple occasions. Patient has been taking Bentyl 20 mg every 6 hours since last ED Visit 11/9/17. Denies dysuria, urgency, frequency, fevers/chills. Noting some improvement with Hot showers. Had been taking Toradol, but no significant improvement. Recently has noted Acid reflux/sour taste in the back of the throat, noting improvement with Bentyl. Also noted that over the past weeks with looser stools that occur after meals or feeling of constipation. Patient has been seen by [facility] starting several months ago with admission to [place] [address] around [facility] period and required stenting of the left kidney. With Obstructing Kidney stone was a constant pain not relieved with any positioning or activity. Initial stone in 2016 with prolonged hospital stay related to bacteremia and reported \"effected my heart\". Patient with concern about \"anxiety attacks\" over the past 4 weeks.   Patient noted to be fatigued over the past 4 weeks as well. Patient has significant snoring and per father will stop breathing and wakes with headaches. LMP: Last Regular periods in 2016, then irregular, no period for over a year, not on birth control    Social History:  - Diet: Primarily fast food  - Exercise: Not currently    Family History: No family history of Biliary or GI illness. Father with CHF at ~49 y.o. Current Outpatient Prescriptions on File Prior to Visit   Medication Sig Dispense Refill    ondansetron (ZOFRAN ODT) 4 mg disintegrating tablet Take 1 Tab by mouth every eight (8) hours as needed for Nausea. 20 Tab 0    ketorolac (TORADOL) 10 mg tablet Take 1 Tab by mouth every six (6) hours as needed for Pain. 12 Tab 0    ondansetron (ZOFRAN ODT) 4 mg disintegrating tablet Take 1 Tab by mouth every eight (8) hours as needed for Nausea. 10 Tab 0     No current facility-administered medications on file prior to visit. There is no problem list on file for this patient. Social History     Social History    Marital status: UNKNOWN     Spouse name: N/A    Number of children: N/A    Years of education: N/A     Occupational History          started in 3/17     Social History Main Topics    Smoking status: Former Smoker     Quit date: 8/16/2017    Smokeless tobacco: Never Used      Comment: 1/4 pack ppd for 6 months    Alcohol use No      Comment: Last drink March    Drug use: No      Comment: Has done [employer] Sexual activity: No     Other Topics Concern    Not on file     Social History Narrative       Review of Systems   Constitutional: Positive for malaise/fatigue. Negative for chills and fever. HENT: Negative for congestion and ear pain. Respiratory: Negative for cough. Gastrointestinal: Negative for abdominal pain, nausea and vomiting. Genitourinary: Positive for flank pain. Negative for dysuria, frequency, hematuria and urgency. Skin: Negative for rash. Neurological: Negative for headaches. Psychiatric/Behavioral: Negative for depression and suicidal ideas. Objective:     Visit Vitals    /78    Pulse 85    Temp 96.8 °F (36 °C) (Oral)    Resp 18    Ht 5' 3\" (1.6 m)    Wt 288 lb (130.6 kg)    SpO2 100%    BMI 51.02 kg/m2        Physical Exam   Constitutional: She appears well-developed and well-nourished. No distress. HENT:   Head: Normocephalic and atraumatic. Eyes: Conjunctivae are normal.   Neck: Normal range of motion. Neck supple. Cardiovascular: Normal rate, regular rhythm, normal heart sounds and intact distal pulses. Exam reveals no gallop and no friction rub. No murmur heard. Denies tenderness to palpation in the chest.   Pulmonary/Chest: Effort normal and breath sounds normal. No respiratory distress. She has no wheezes. She has no rales. Patient with normal, unlabored breathing on exam.  No discomfort with deep breathing   Abdominal: Soft. Bowel sounds are normal. She exhibits no distension and no mass. There is no rebound and no guarding. Irritating right back on palpation right upper quadrant. Musculoskeletal: She exhibits no edema. No right arm tenderness or pain on exam.   Psychiatric: She has a normal mood and affect. Her behavior is normal. Judgment and thought content normal.   Nursing note and vitals reviewed. Pertinent Labs/Studies:  Lab Results   Component Value Date/Time    Sodium 137 11/09/2017 07:07 PM    Potassium 4.1 11/09/2017 07:07 PM    Chloride 103 11/09/2017 07:07 PM    CO2 27 11/09/2017 07:07 PM    Anion gap 7 11/09/2017 07:07 PM    Glucose 86 11/09/2017 07:07 PM    BUN 12 11/09/2017 07:07 PM    Creatinine 0.69 11/09/2017 07:07 PM    BUN/Creatinine ratio 17 11/09/2017 07:07 PM    GFR est AA >60 11/09/2017 07:07 PM    GFR est non-AA >60 11/09/2017 07:07 PM    Calcium 9.6 11/09/2017 07:07 PM    Bilirubin, total 0.2 11/09/2017 07:07 PM    AST (SGOT) 15 11/09/2017 07:07 PM    Alk.  phosphatase 119 11/09/2017 07:07 PM    Protein, total 8.2 11/09/2017 07:07 PM    Albumin 3.5 11/09/2017 07:07 PM    Globulin 4.7 11/09/2017 07:07 PM    A-G Ratio 0.7 11/09/2017 07:07 PM    ALT (SGPT) 19 11/09/2017 07:07 PM     Lab Results   Component Value Date/Time    WBC 12.9 11/09/2017 07:07 PM    HGB 12.8 11/09/2017 07:07 PM    HCT 39.3 11/09/2017 07:07 PM    PLATELET 560 27/92/6777 07:07 PM    MCV 83.6 11/09/2017 07:07 PM         Assessment and orders:       ICD-10-CM ICD-9-CM    1. Right flank pain R10.9 789.09    2. Right-sided chest pain R07.9 786.50 REFERRAL TO CARDIOLOGY   3. Snoring R06.83 786.09 REFERRAL TO SLEEP STUDIES   4. Acute right-sided back pain, unspecified back location M54.9 724.5    5. Alternating constipation and diarrhea R19.8 787.99    6. Kidney stone on left side N20.0 592.0    7. Encounter for immunization Z23 V03.89 INFLUENZA VIRUS VAC QUAD,SPLIT,PRESV FREE SYRINGE IM      [place] IMMUNIZ ADMIN,1 SINGLE/COMB VAC/TOXOID     Diagnoses and all orders for this visit:    Right flank pain/Acute right-sided back pain, unspecified back location:Back Pain likely MSK, and likely contributing to \"chest pain\". Cardiology Evaluation planned. Will recommend trying scheduled Aleve if that will improve pain. Patient has recently started job requiring lots of activity/bending. Right-sided chest pain: No chest pain or SOB on this visit. Patient with atypical chest pains, significant illness in the last year, and significant family history. May be related to obesity, but given history, Cardiology evaluation is reasonable. Advised on ER precautions. [facility] Restrepo [facility]    Snoring: Patient with significant snoring, and description consistent with OWEN, sleep study ordered. -     REFERRAL TO SLEEP STUDIES    Alternating constipation and diarrhea: 4 weeks of alternating diarrhea/constipation, IBS like but too early to diagnose. Improved with Bentyl at this time. Advised on High Fiber/Fodmap diet.   May consider starting bowel regimen in the future, and if concern for GERD, consider PPI in future also. May benefit from nutrionist consult, but financial constraints    Kidney stone on left side: Non-obstructing Left renal stone, Has follow up with Urology scheduled in March, but advised to see if able to get in with sooner. Encounter for immunization  -     INFLUENZA VIRUS VACCINE QUADRIVALENT, PRESERVATIVE FREE SYRINGE (22091)  -     [place] IMMUNIZ ADMIN,1 SINGLE/COMB VAC/TOXOID      Follow-up Disposition:  Return in about 2 weeks (around 11/30/2017). I have reviewed patient medical and social history and medications. I have reviewed pertinent labs results and other data. I have discussed the diagnosis with the patient and the intended plan as seen in the above orders. The patient has received an after-visit summary and questions were answered concerning future plans. I have discussed medication side effects and warnings with the patient as well.     Vanesa Dey MD  Resident [facility]  11/16/17    Patient discussed with Dr. Johnny Mackay, Attending Physician

## 2017-11-16 NOTE — PROGRESS NOTES
Chief Complaint   Patient presents with   Compa Cords Establish Care    Flank Pain     right sided pain

## 2017-11-16 NOTE — MR AVS SNAPSHOT
Visit Information Date & Time Provider Department Dept. Phone Encounter #  
 11/16/2017  9:35 AM Rosa Posada MD [address] [place] [address] [phone] [id number] Upcoming Health Maintenance Date Due Hepatitis A Peds Age 1-18 (1 of 2 - Standard Series) 3/19/1998 DTaP/Tdap/Td series (1 - Tdap) 3/19/2004 HPV AGE 9Y-26Y (1 of 3 - Female 3 Dose Series) 3/19/2008 Allergies as of 11/16/2017  Review Complete On: 11/16/2017 By: Josephine Pablo LPN No Known Allergies Current Immunizations  Reviewed on 11/16/2017 Name Date Influenza Vaccine (Quad) PF 11/16/2017 Reviewed by Josephine Pablo LPN on 90/74/4901 at 10:08 AM  
You Were Diagnosed With   
  
 Codes Comments Right flank pain    -  Primary ICD-10-CM: R10.9 ICD-9-CM: 789.09 Right-sided chest pain     ICD-10-CM: R07.9 ICD-9-CM: 786.50 Snoring     ICD-10-CM: R06.83 
ICD-9-CM: 786.09 Acute right-sided back pain, unspecified back location     ICD-10-CM: M54.9 ICD-9-CM: 724.5 Alternating constipation and diarrhea     ICD-10-CM: R19.8 ICD-9-CM: 787.99 Kidney stone on left side     ICD-10-CM: N20.0 ICD-9-CM: 592.0 Encounter for immunization     ICD-10-CM: O23 ICD-9-CM: V03.89 Vitals BP Pulse Temp Resp Height(growth percentile) Weight(growth percentile) 113/78 85 96.8 °F (36 °C) (Oral) 18 5' 3\" (1.6 m) 288 lb (130.6 kg) SpO2 BMI OB Status Smoking Status 100% 51.02 kg/m2 Unknown Former Smoker Vitals History BMI and BSA Data Body Mass Index Body Surface Area 51.02 kg/m 2 2.41 m 2 Preferred Pharmacy Pharmacy Name Phone [phone], [phone] Your Updated Medication List  
  
   
This list is accurate as of: 11/16/17 10:34 AM.  Always use your most recent med list.  
  
  
  
  
 BENTYL 20 mg tablet Generic drug:  dicyclomine Take 20 mg by mouth every six (6) hours. ketorolac 10 mg tablet Commonly known as:  TORADOL Take 1 Tab by mouth every six (6) hours as needed for Pain. * ondansetron 4 mg disintegrating tablet Commonly known as:  ZOFRAN ODT Take 1 Tab by mouth every eight (8) hours as needed for Nausea. * ondansetron 4 mg disintegrating tablet Commonly known as:  ZOFRAN ODT Take 1 Tab by mouth every eight (8) hours as needed for Nausea. * Notice: This list has 2 medication(s) that are the same as other medications prescribed for you. Read the directions carefully, and ask your doctor or other care provider to review them with you. We Performed the Following INFLUENZA VIRUS VAC QUAD,SPLIT,PRESV FREE SYRINGE IM [id number] CPT(R)] TN IMMUNIZ ADMIN,1 SINGLE/COMB VAC/TOXOID [id number] CPT(R)] REFERRAL TO CARDIOLOGY [TMV64 Custom] REFERRAL TO SLEEP STUDIES [REF99 Custom] Referral Information Referral ID Referred By Referred To  
  
 [id number] Rich Jesus MD   
   [address] [place] [address] Phone: [phone] Fax: [phone] Visits Status Start Date End Date 1 New Request 11/16/17 11/16/18 If your referral has a status of pending review or denied, additional information will be sent to support the outcome of this decision. Referral ID Referred By Referred To  
 [id number] Júnior MORAN Not Available Visits Status Start Date End Date 1 New Request 11/16/17 11/16/18 If your referral has a status of pending review or denied, additional information will be sent to support the outcome of this decision. Patient Instructions HPV (Human Papillomavirus) Vaccine Gardasil®: What You Need to Know What is HPV? Genital human papillomavirus (HPV) is the most common sexually transmitted virus in the [place].  More than half of sexually active men and women are infected with HPV at some time in their lives. About 20 million Americans are currently infected, and about 6 million more get infected each year. HPV is usually spread through sexual contact. Most HPV infections don't cause any symptoms, and go away on their own. But HPV can cause cervical cancer in women. Cervical cancer is the 2nd leading cause of cancer deaths among women around the world. In the [place], about 12,000 women get cervical cancer every year and about 4,000 are expected to die from it. HPV is also associated with several less common cancers, such as vaginal and vulvar cancers in women, and anal and oropharyngeal (back of the throat, including base of tongue and tonsils) cancers in both men and women. HPV can also cause genital warts and warts in the throat. There is no cure for HPV infection, but some of the problems it causes can be treated. HPV vaccine-Why get vaccinated? The HPV vaccine you are getting is one of two vaccines that can be given to prevent HPV. It may be given to both males and females. This vaccine can prevent most cases of cervical cancer in females, if it is given before exposure to the virus. In addition, it can prevent vaginal and vulvar cancer in females, and genital warts and anal cancer in both males and females. Protection from HPV vaccine is expected to be long-lasting. But vaccination is not a substitute for cervical cancer screening. Women should still get regular Pap tests. Who should get this HPV vaccine and when? HPV vaccine is given as a 3-dose series · 1st Dose: Now 
· 2nd Dose: 1 to 2 months after Dose 1 · 3rd Dose: 6 months after Dose 1 Additional (booster) doses are not recommended. Routine vaccination · This HPV vaccine is recommended for girls and boys 6or 15years of age. It may be given starting at age 5. Why is HPV vaccine recommended at 6or 15years of age?  HPV infection is easily acquired, even with only one sex partner. That is why it is important to get HPV vaccine before any sexual contact takes place. Also, response to the vaccine is better at this age than at older ages. Catch-up vaccination This vaccine is recommended for the following people who have not completed the 3-dose series: · Females 15 through 32years of age · Males 15 through 24years of age This vaccine may be given to men 25 through 32years of age who have not completed the 3-dose series. It is recommended for men through age 32 who have sex with men or whose immune system is weakened because of HIV infection, other illness, or medications. HPV vaccine may be given at the same time as other vaccines. Some people should not get HPV vaccine or should wait · Anyone who has ever had a life-threatening allergic reaction to any component of HPV vaccine, or to a previous dose of HPV vaccine, should not get the vaccine. Tell your doctor if the person getting vaccinated has any severe allergies, including an allergy to yeast. 
· HPV vaccine is not recommended for pregnant women. However, receiving HPV vaccine when pregnant is not a reason to consider terminating the pregnancy. Women who are breast feeding may get the vaccine. · People who are mildly ill when a dose of HPV vaccine is planned can still be vaccinated. People with a moderate or severe illness should wait until they are better. What are the risks from this vaccine? This HPV vaccine has been used in the [place] and around the world for about six years and has been very safe. However, any medicine could possibly cause a serious problem, such as a severe allergic reaction. The risk of any vaccine causing a serious injury, or death, is extremely small. Life-threatening allergic reactions from vaccines are very rare. If they do occur, it would be within a few minutes to a few hours after the vaccination. Several mild to moderate problems are known to occur with this HPV vaccine. These do not last long and go away on their own. · Reactions in the arm where the shot was given: 
¨ Pain (about 8 people in 10) ¨ Redness or swelling (about 1 person in 4) · Fever ¨ Mild (100°F) (about 1 person in 10) ¨ Moderate (102°F) (about 1 person in 72) · Other problems: 
¨ Headache (about 1 person in 3) · Fainting: Brief fainting spells and related symptoms (such as jerking movements) can happen after any medical procedure, including vaccination. Sitting or lying down for about 15 minutes after a vaccination can help prevent fainting and injuries caused by falls. Tell your doctor if the patient feels dizzy or light-headed, or has vision changes or ringing in the ears. Like all vaccines, HPV vaccines will continue to be monitored for unusual or severe problems. What if there is a serious reaction? What should I look for? · Look for anything that concerns you, such as signs of a severe allergic reaction, very high fever, or behavior changes. Signs of a severe allergic reaction can include hives, swelling of the face and throat, difficulty breathing, a fast heartbeat, dizziness, and weakness. These would start a few minutes to a few hours after the vaccination. What should I do? · If you think it is a severe allergic reaction or other emergency that can't wait, call 9-1-1 or get the person to the nearest hospital. Otherwise, call your doctor. · Afterward, the reaction should be reported to the Vaccine Adverse Event Reporting System (VAERS). Your doctor might file this report, or you can do it yourself through the VAERS web site at www.vaers. hhs.gov, or by calling 0-273.854.1503. VAERS is only for reporting reactions. They do not give medical advice.  
The [place] Vaccine Injury Compensation Program 
The [place] Vaccine Injury Compensation Program (VICP) is a federal program that was created to compensate people who may have been injured by certain vaccines. Persons who believe they may have been injured by a vaccine can learn about the program and about filing a claim by calling 9-250.865.9319 or visiting the [employer] website at www.[place]iv[place].gov/vaccinecompensation. How can I learn more? · Ask your doctor. · Call your local or state health department. · Contact the Centers for Disease Control and Prevention (CDC): 
¨ Call 4-164.904.9232 (1-800-CDC-INFO) or ¨ Visit the CDC's website at www.cdc.gov/vaccines. Vaccine Information Statement (Interim) HPV Vaccine (Gardasil) 
(5/17/2013) [address] 792HN-18 [facility] and [employer] Centers for Disease Control and Prevention Many Vaccine Information Statements are available in [place] and other languages. See www.immunize.org/vis. Muchas hojas de información sobre vacunas están disponibles en español y en otros idiomas. Visite www.immunize.org/vis. Care instructions adapted under license by [employer] (which disclaims liability or warranty for this information). If you have questions about a medical condition or this instruction, always ask your healthcare professional. [place] [address] any warranty or liability for your use of this information. Please look up the FODMAP Diet High-Fiber Diet: Care Instructions Your Care Instructions A high-fiber diet may help you relieve constipation and feel less bloated. Your doctor and dietitian will help you make a high-fiber eating plan based on your personal needs. The plan will include the things you like to eat. It will also make sure that you get 30 grams of fiber a day. Before you make changes to the way you eat, be sure to talk with your doctor or dietitian. Follow-up care is a key part of your treatment and safety.  Be sure to make and go to all appointments, and call your doctor if you are having problems. It's also a good idea to know your test results and keep a list of the medicines you take. How can you care for yourself at home? · You can increase how much fiber you get if you eat more of certain foods. These foods include: ¨ Whole-grain breads and cereals. ¨ Fruits, such as pears, apples, and peaches. Eat the skins, peels, and seeds, if you can. ¨ Vegetables, such as broccoli, cabbage, spinach, carrots, asparagus, and squash. ¨ Starchy vegetables. These include potatoes with skins, kidney beans, and lima beans. · Take a fiber supplement every day if your doctor recommends it. Examples are Benefiber, Citrucel, FiberCon, and Metamucil. Ask your doctor how much to take. · Drink plenty of fluids, enough so that your urine is light yellow or clear like water. If you have kidney, heart, or liver disease and have to limit fluids, talk with your doctor before you increase the amount of fluids you drink. · Get some exercise every day. Exercise helps stool move through the colon. It also helps prevent constipation. · Keep a food diary. Try to notice and write down what foods cause gas, pain, or other symptoms. Then you can avoid these foods. Where can you learn more? Go to http://maryann-jm.info/. Enter E208 in the search box to learn more about \"High-Fiber Diet: Care Instructions. \" Current as of: May 12, 2017 Content Version: 11.4 © 3820-9687 [employer]. Care instructions adapted under license by [employer] (which disclaims liability or warranty for this information). If you have questions about a medical condition or this instruction, always ask your healthcare professional. [place] [address] any warranty or liability for your use of this information. Introducing [facility]!    
 Patricia Velazco introduces [employer] patient portal. Now you can access parts of your medical record, email your doctor's office, and request medication refills online. 1. In your internet browser, go to [email or web address] 2. Click on the First Time User? Click Here link in the Sign In box. You will see the New Member Sign Up page. 3. Enter your [employer] Access Code exactly as it appears below. You will not need to use this code after youve completed the sign-up process. If you do not sign up before the expiration date, you must request a new code. · [employer] Access Code: [email or web address] Expires: 2/10/2018  3:05 PM 
 
4. Enter the last four digits of your Social Security Number (xxxx) and Date of Birth (mm/dd/yyyy) as indicated and click Submit. You will be taken to the next sign-up page. 5. Create a [employer] ID. This will be your [employer] login ID and cannot be changed, so think of one that is secure and easy to remember. 6. Create a [employer] password. You can change your password at any time. 7. Enter your Password Reset Question and Answer. This can be used at a later time if you forget your password. 8. Enter your e-mail address. You will receive e-mail notification when new information is available in [address] 9. Click Sign Up. You can now view and download portions of your medical record. 10. Click the Download Summary menu link to download a portable copy of your medical information. If you have questions, please visit the Frequently Asked Questions section of the [employer] website. Remember, [employer] is NOT to be used for urgent needs. For medical emergencies, dial 911. Now available from your iPhone and Android! Please provide this summary of care documentation to your next provider. Your primary care clinician is listed as Jessica Contreras. If you have any questions after today's visit, please call [phone].

## 2017-11-16 NOTE — PATIENT INSTRUCTIONS
HPV (Human Papillomavirus) Vaccine Gardasil®: What You Need to Know  What is HPV? Genital human papillomavirus (HPV) is the most common sexually transmitted virus in the [place]. More than half of sexually active men and women are infected with HPV at some time in their lives. About 20 million Americans are currently infected, and about 6 million more get infected each year. HPV is usually spread through sexual contact. Most HPV infections don't cause any symptoms, and go away on their own. But HPV can cause cervical cancer in women. Cervical cancer is the 2nd leading cause of cancer deaths among women around the world. In the [place], about 12,000 women get cervical cancer every year and about 4,000 are expected to die from it. HPV is also associated with several less common cancers, such as vaginal and vulvar cancers in women, and anal and oropharyngeal (back of the throat, including base of tongue and tonsils) cancers in both men and women. HPV can also cause genital warts and warts in the throat. There is no cure for HPV infection, but some of the problems it causes can be treated. HPV vaccine-Why get vaccinated? The HPV vaccine you are getting is one of two vaccines that can be given to prevent HPV. It may be given to both males and females. This vaccine can prevent most cases of cervical cancer in females, if it is given before exposure to the virus. In addition, it can prevent vaginal and vulvar cancer in females, and genital warts and anal cancer in both males and females. Protection from HPV vaccine is expected to be long-lasting. But vaccination is not a substitute for cervical cancer screening. Women should still get regular Pap tests. Who should get this HPV vaccine and when? HPV vaccine is given as a 3-dose series  · 1st Dose: Now  · 2nd Dose: 1 to 2 months after Dose 1  · 3rd Dose: 6 months after Dose 1  Additional (booster) doses are not recommended.   Routine vaccination  · This HPV vaccine is recommended for girls and boys 6or 15years of age. It may be given starting at age 5. Why is HPV vaccine recommended at 6or 15years of age? HPV infection is easily acquired, even with only one sex partner. That is why it is important to get HPV vaccine before any sexual contact takes place. Also, response to the vaccine is better at this age than at older ages. Catch-up vaccination  This vaccine is recommended for the following people who have not completed the 3-dose series:  · Females 15 through 32years of age  · Males 15 through 24years of age  This vaccine may be given to men 25 through 32years of age who have not completed the 3-dose series. It is recommended for men through age 32 who have sex with men or whose immune system is weakened because of HIV infection, other illness, or medications. HPV vaccine may be given at the same time as other vaccines. Some people should not get HPV vaccine or should wait  · Anyone who has ever had a life-threatening allergic reaction to any component of HPV vaccine, or to a previous dose of HPV vaccine, should not get the vaccine. Tell your doctor if the person getting vaccinated has any severe allergies, including an allergy to yeast.  · HPV vaccine is not recommended for pregnant women. However, receiving HPV vaccine when pregnant is not a reason to consider terminating the pregnancy. Women who are breast feeding may get the vaccine. · People who are mildly ill when a dose of HPV vaccine is planned can still be vaccinated. People with a moderate or severe illness should wait until they are better. What are the risks from this vaccine? This HPV vaccine has been used in the [place] and around the world for about six years and has been very safe. However, any medicine could possibly cause a serious problem, such as a severe allergic reaction.  The risk of any vaccine causing a serious injury, or death, is extremely small.  Life-threatening allergic reactions from vaccines are very rare. If they do occur, it would be within a few minutes to a few hours after the vaccination. Several mild to moderate problems are known to occur with this HPV vaccine. These do not last long and go away on their own. · Reactions in the arm where the shot was given:  ¨ Pain (about 8 people in 10)  ¨ Redness or swelling (about 1 person in 4)  · Fever  ¨ Mild (100°F) (about 1 person in 10)  ¨ Moderate (102°F) (about 1 person in 65)  · Other problems:  ¨ Headache (about 1 person in 3)  · Fainting: Brief fainting spells and related symptoms (such as jerking movements) can happen after any medical procedure, including vaccination. Sitting or lying down for about 15 minutes after a vaccination can help prevent fainting and injuries caused by falls. Tell your doctor if the patient feels dizzy or light-headed, or has vision changes or ringing in the ears. Like all vaccines, HPV vaccines will continue to be monitored for unusual or severe problems. What if there is a serious reaction? What should I look for? · Look for anything that concerns you, such as signs of a severe allergic reaction, very high fever, or behavior changes. Signs of a severe allergic reaction can include hives, swelling of the face and throat, difficulty breathing, a fast heartbeat, dizziness, and weakness. These would start a few minutes to a few hours after the vaccination. What should I do? · If you think it is a severe allergic reaction or other emergency that can't wait, call 9-1-1 or get the person to the nearest hospital. Otherwise, call your doctor. · Afterward, the reaction should be reported to the Vaccine Adverse Event Reporting System (VAERS). Your doctor might file this report, or you can do it yourself through the VAERS web site at www.vaers. hhs.gov, or by calling 8-181.513.1223. VAERS is only for reporting reactions. They do not give medical advice.   The National Vaccine Injury Compensation Program  The National Vaccine Injury Compensation Program (VICP) is a federal program that was created to compensate people who may have been injured by certain vaccines. Persons who believe they may have been injured by a vaccine can learn about the program and about filing a claim by calling 6-348.435.8495 or visiting the [employer] website at www.[facility].gov/vaccinecompensation. How can I learn more? · Ask your doctor. · Call your local or state health department. · Contact the Centers for Disease Control and Prevention (CDC):  ¨ Call 3-389.455.5754 (1-800-CDC-INFO) or  ¨ Visit the CDC's website at www.cdc.gov/vaccines. Vaccine Information Statement (Interim)  HPV Vaccine (Gardasil)  (5/17/2013)  42 LOSS[address]rie [id number]  Department of Health and Human Services  Centers for Disease Control and Prevention  Many Vaccine Information Statements are available in [place] and other languages. See www.immunize.org/vis. Muchas hojas de información sobre vacunas están disponibles en español y en otros idiomas. Visite www.immunize.org/vis. Care instructions adapted under license by [employer] (which disclaims liability or warranty for this information). If you have questions about a medical condition or this instruction, always ask your healthcare professional. [address] any warranty or liability for your use of this information. Please look up the FODMAP Diet     High-Fiber Diet: Care Instructions  Your Care Instructions    A high-fiber diet may help you relieve constipation and feel less bloated. Your doctor and dietitian will help you make a high-fiber eating plan based on your personal needs. The plan will include the things you like to eat. It will also make sure that you get 30 grams of fiber a day. Before you make changes to the way you eat, be sure to talk with your doctor or dietitian.   Follow-up care is a key part of your treatment and safety. Be sure to make and go to all appointments, and call your doctor if you are having problems. It's also a good idea to know your test results and keep a list of the medicines you take. How can you care for yourself at home? · You can increase how much fiber you get if you eat more of certain foods. These foods include:  ¨ Whole-grain breads and cereals. ¨ Fruits, such as pears, apples, and peaches. Eat the skins, peels, and seeds, if you can. ¨ Vegetables, such as broccoli, cabbage, spinach, carrots, asparagus, and squash. ¨ Starchy vegetables. These include potatoes with skins, kidney beans, and lima beans. · Take a fiber supplement every day if your doctor recommends it. Examples are Benefiber, Citrucel, FiberCon, and Metamucil. Ask your doctor how much to take. · Drink plenty of fluids, enough so that your urine is light yellow or clear like water. If you have kidney, heart, or liver disease and have to limit fluids, talk with your doctor before you increase the amount of fluids you drink. · Get some exercise every day. Exercise helps stool move through the colon. It also helps prevent constipation. · Keep a food diary. Try to notice and write down what foods cause gas, pain, or other symptoms. Then you can avoid these foods. Where can you learn more? Go to http://maryann-jm.info/. Enter P007 in the search box to learn more about \"High-Fiber Diet: Care Instructions. \"  Current as of: May 12, 2017  Content Version: 11.4  © 3783-5331 [employer]. Care instructions adapted under license by [employer] (which disclaims liability or warranty for this information). If you have questions about a medical condition or this instruction, always ask your healthcare professional. [address] any warranty or liability for your use of this information.

## 2017-11-22 ENCOUNTER — TELEPHONE (OUTPATIENT)
Dept: FAMILY MEDICINE CLINIC | Age: 22
End: 2017-11-22

## 2017-11-22 NOTE — TELEPHONE ENCOUNTER
Cardio  appt 1/4/2018  [facility]    6th floor  [phone]  Does not have dr name      [facility]  Feb 27 2018 @ 11:40 am  [phone]  Does not have dr yoon

## 2018-01-31 ENCOUNTER — TELEPHONE (OUTPATIENT)
Dept: FAMILY MEDICINE CLINIC | Age: 23
End: 2018-01-31

## 2018-02-03 ENCOUNTER — HOSPITAL ENCOUNTER (EMERGENCY)
Age: 23
Discharge: HOME | End: 2018-02-03
Attending: EMERGENCY MEDICINE
Payer: SUBSIDIZED

## 2018-02-03 VITALS
SYSTOLIC BLOOD PRESSURE: 156 MMHG | HEIGHT: 65 IN | RESPIRATION RATE: 18 BRPM | BODY MASS INDEX: 47.48 KG/M2 | TEMPERATURE: 97.7 F | WEIGHT: 285 LBS | HEART RATE: 79 BPM | DIASTOLIC BLOOD PRESSURE: 82 MMHG | OXYGEN SATURATION: 100 %

## 2018-02-03 DIAGNOSIS — N20.0 KIDNEY STONES: Primary | ICD-10-CM

## 2018-02-03 LAB
AMORPH CRY URNS QL MICRO: ABNORMAL
APPEARANCE UR: ABNORMAL
BACTERIA URNS QL MICRO: NEGATIVE /HPF
BILIRUB UR QL: NEGATIVE
COLOR UR: ABNORMAL
EPITH CASTS URNS QL MICRO: ABNORMAL /LPF
GLUCOSE UR STRIP.AUTO-MCNC: NEGATIVE MG/DL
HGB UR QL STRIP: NEGATIVE
KETONES UR QL STRIP.AUTO: NEGATIVE MG/DL
LEUKOCYTE ESTERASE UR QL STRIP.AUTO: NEGATIVE
NITRITE UR QL STRIP.AUTO: NEGATIVE
PH UR STRIP: 7.5 [PH] (ref 5–8)
PROT UR STRIP-MCNC: NEGATIVE MG/DL
RBC #/AREA URNS HPF: ABNORMAL /HPF (ref 0–5)
SP GR UR REFRACTOMETRY: 1.02 (ref 1–1.03)
UR CULT HOLD, URHOLD: NORMAL
UROBILINOGEN UR QL STRIP.AUTO: 0.2 EU/DL (ref 0.2–1)
WBC URNS QL MICRO: ABNORMAL /HPF (ref 0–4)

## 2018-02-03 PROCEDURE — 74011250636 HC RX REV CODE- 250/636

## 2018-02-03 PROCEDURE — 96372 THER/PROPH/DIAG INJ SC/IM: CPT

## 2018-02-03 PROCEDURE — 99283 EMERGENCY DEPT VISIT LOW MDM: CPT

## 2018-02-03 PROCEDURE — 81001 URINALYSIS AUTO W/SCOPE: CPT | Performed by: NURSE PRACTITIONER

## 2018-02-03 RX ORDER — KETOROLAC TROMETHAMINE 30 MG/ML
30 INJECTION, SOLUTION INTRAMUSCULAR; INTRAVENOUS
Status: COMPLETED | OUTPATIENT
Start: 2018-02-03 | End: 2018-02-03

## 2018-02-03 RX ORDER — KETOROLAC TROMETHAMINE 10 MG/1
10 TABLET, FILM COATED ORAL
Qty: 10 TAB | Refills: 0 | Status: SHIPPED | OUTPATIENT
Start: 2018-02-03 | End: 2018-03-27

## 2018-02-03 RX ORDER — ONDANSETRON 4 MG/1
4 TABLET, ORALLY DISINTEGRATING ORAL
Qty: 10 TAB | Refills: 0 | Status: SHIPPED | OUTPATIENT
Start: 2018-02-03 | End: 2018-03-07 | Stop reason: SDUPTHER

## 2018-02-03 RX ORDER — KETOROLAC TROMETHAMINE 30 MG/ML
INJECTION, SOLUTION INTRAMUSCULAR; INTRAVENOUS
Status: COMPLETED
Start: 2018-02-03 | End: 2018-02-03

## 2018-02-03 RX ADMIN — KETOROLAC TROMETHAMINE 30 MG: 30 INJECTION, SOLUTION INTRAMUSCULAR at 15:22

## 2018-02-03 RX ADMIN — KETOROLAC TROMETHAMINE 30 MG: 30 INJECTION, SOLUTION INTRAMUSCULAR; INTRAVENOUS at 15:22

## 2018-02-03 NOTE — ED TRIAGE NOTES
Complains of bilateral flank pain times two months, intermittent numbness to bilateral lower extremities times two weeks, and headache times three week

## 2018-02-03 NOTE — ED PROVIDER NOTES
HPI Comments: 23 y.o. female with past medical history significant for kidney stones who presents to the ED with chief complaint of flank pain. Pt reports bilateral flank pain x 1 month that has gotten progressively worse. Pt states she has hx of bilateral kidney stones and has had to have multiple ureteral stents placed in the past. Pt states she is followed by [facility] and has an appointment on 3/2. Pt states she has also had unilateral headaches and intermittent numbness in her thighs. Pt states she took Tylenol this morning with some relief. Pt states she has had good fluid intake. Pt denies seeing a neurologist. Pt notes she has an appointment with cardiology next week for evaluation of recent chest pains. There are no other acute medical complaints voiced at this time. Social Hx: Former smoker. PCP: Concetta Pedraza MD    Note written by Lizzy Adames, as dictated by Ministerio Soriano NP 2:48 PM     The history is provided by the patient.         Past Medical History:   Diagnosis Date    Kidney stones        Past Surgical History:   Procedure Laterality Date    HX UROLOGICAL      Stenting x 4 (Starting in 2016, developed severe infection)         Family History:   Problem Relation Age of Onset    Heart Disease Father 55    Hypertension Father     Sleep Apnea Father     No Known Problems Sister     Cancer Maternal Grandmother      Brain Cancer    Heart Disease Paternal Grandmother     Cancer Paternal Grandfather        Social History     Social History    Marital status: SINGLE     Spouse name: N/A    Number of children: N/A    Years of education: N/A     Occupational History          started in 3/17     Social History Main Topics    Smoking status: Former Smoker     Quit date: 8/16/2017    Smokeless tobacco: Never Used      Comment: 1/4 pack ppd for 6 months    Alcohol use No      Comment: Last drink March    Drug use: No      Comment: Has done [employer] Sexual activity: No     Other Topics Concern    Not on file     Social History Narrative         ALLERGIES: Review of patient's allergies indicates no known allergies. Review of Systems   Constitutional: Negative. Negative for chills, diaphoresis and fever. HENT: Negative. Negative for congestion, rhinorrhea and trouble swallowing. Eyes: Negative. Respiratory: Negative. Negative for shortness of breath. Cardiovascular: Negative. Gastrointestinal: Negative. Negative for abdominal pain, nausea and vomiting. Endocrine: Negative. Genitourinary: Positive for flank pain (bilateral). Musculoskeletal: Negative for arthralgias, myalgias, neck pain and neck stiffness. Skin: Negative. Negative for rash. Allergic/Immunologic: Negative. Neurological: Positive for numbness (intermittently in thighs) and headaches (unilateral). Negative for dizziness, syncope and weakness. Hematological: Negative. Psychiatric/Behavioral: Negative. All other systems reviewed and are negative. Vitals:    02/03/18 1429 02/03/18 1526   BP: (!) 200/93 156/82   Pulse: 79    Resp: 18    Temp: 97.7 °F (36.5 °C)    SpO2: 100%    Weight: 129.3 kg (285 lb)    Height: 5' 5\" (1.651 m)             Physical Exam   Constitutional: She is oriented to person, place, and time. Vital signs are normal. She appears well-developed and well-nourished. Non-toxic appearance. She does not have a sickly appearance. She does not appear ill. HENT:   Head: Normocephalic and atraumatic. Eyes: Conjunctivae, EOM and lids are normal. Pupils are equal, round, and reactive to light. Neck: Trachea normal, normal range of motion and full passive range of motion without pain. Neck supple. Cardiovascular: Normal rate, regular rhythm, normal heart sounds and normal pulses. Pulmonary/Chest: Effort normal and breath sounds normal.   Abdominal: Soft. Normal appearance and bowel sounds are normal. There is tenderness.  There is CVA tenderness. Musculoskeletal: Normal range of motion. Neurological: She is alert and oriented to person, place, and time. She has normal strength. GCS eye subscore is 4. GCS verbal subscore is 5. GCS motor subscore is 6. Skin: Skin is warm, dry and intact. Psychiatric: She has a normal mood and affect. Her speech is normal and behavior is normal. Judgment and thought content normal. Cognition and memory are normal.   Nursing note and vitals reviewed. MDM  Number of Diagnoses or Management Options  Kidney stones: established and worsening     Amount and/or Complexity of Data Reviewed  Clinical lab tests: ordered    Risk of Complications, Morbidity, and/or Mortality  Presenting problems: minimal  Diagnostic procedures: minimal  Management options: minimal    Patient Progress  Patient progress: improved        ED Course       Procedures    LABORATORY TESTS:  Recent Results (from the past 12 hour(s))   URINALYSIS W/MICROSCOPIC    Collection Time: 02/03/18  3:07 PM   Result Value Ref Range    Color YELLOW/STRAW      Appearance CLOUDY (A) CLEAR      Specific gravity 1.018 1.003 - 1.030      pH (UA) 7.5 5.0 - 8.0      Protein NEGATIVE  NEG mg/dL    Glucose NEGATIVE  NEG mg/dL    Ketone NEGATIVE  NEG mg/dL    Bilirubin NEGATIVE  NEG      Blood NEGATIVE  NEG      Urobilinogen 0.2 0.2 - 1.0 EU/dL    Nitrites NEGATIVE  NEG      Leukocyte Esterase NEGATIVE  NEG      WBC 0-4 0 - 4 /hpf    RBC 0-5 0 - 5 /hpf    Epithelial cells FEW FEW /lpf    Bacteria NEGATIVE  NEG /hpf    Amorphous Crystals FEW (A) NEG     URINE CULTURE HOLD SAMPLE    Collection Time: 02/03/18  3:07 PM   Result Value Ref Range    Urine culture hold        URINE ON HOLD IN MICROBIOLOGY DEPT FOR 3 DAYS. IF UNPRESERVED URINE IS SUBMITTED, IT CANNOT BE USED FOR ADDITIONAL TESTING AFTER 24 HRS, RECOLLECTION WILL BE REQUIRED.        IMAGING RESULTS:    MEDICATIONS GIVEN:  Medications   ketorolac (TORADOL) injection 30 mg (30 mg IntraMUSCular Given 2/3/18 1522)       IMPRESSION:  1. Kidney stones        PLAN:  1. Toradol and Zofran  2. F[facility] with [facility] and the [place] hotline  3. F/U with PCP  Return to ED if worse    Discharge Note  3:50 PM  The patient is ready for discharge. The patient's signs, symptoms, diagnosis, and discharge instructions have been discussed and the patient has conveyed their understanding. The patient is to follow up as recommended or return to the ER should their symptoms worsen. Plan has been discussed and the patient is in agreement. Bell Gomez Pagé FNP-BC.

## 2018-02-03 NOTE — ED NOTES
Tashi Renee NP reviewed discharge instructions with the patient. The patient verbalized understanding. All questions and concerns were addressed. The patient declined a wheelchair and is discharged ambulatory in the care of family members with instructions and prescriptions in hand. Pt is alert and oriented x 4. Respirations are clear and unlabored.

## 2018-02-03 NOTE — DISCHARGE INSTRUCTIONS

## 2018-02-06 ENCOUNTER — OFFICE VISIT (OUTPATIENT)
Dept: CARDIOLOGY CLINIC | Age: 23
End: 2018-02-06

## 2018-02-06 ENCOUNTER — OFFICE VISIT (OUTPATIENT)
Dept: FAMILY MEDICINE CLINIC | Age: 23
End: 2018-02-06

## 2018-02-06 VITALS
BODY MASS INDEX: 50.09 KG/M2 | OXYGEN SATURATION: 99 % | DIASTOLIC BLOOD PRESSURE: 98 MMHG | SYSTOLIC BLOOD PRESSURE: 144 MMHG | WEIGHT: 293 LBS | HEART RATE: 80 BPM

## 2018-02-06 VITALS
HEIGHT: 65 IN | SYSTOLIC BLOOD PRESSURE: 122 MMHG | RESPIRATION RATE: 16 BRPM | BODY MASS INDEX: 48.82 KG/M2 | TEMPERATURE: 97.5 F | HEART RATE: 100 BPM | WEIGHT: 293 LBS | OXYGEN SATURATION: 99 % | DIASTOLIC BLOOD PRESSURE: 78 MMHG

## 2018-02-06 DIAGNOSIS — R06.02 SOB (SHORTNESS OF BREATH): ICD-10-CM

## 2018-02-06 DIAGNOSIS — I10 ESSENTIAL HYPERTENSION: ICD-10-CM

## 2018-02-06 DIAGNOSIS — N92.6 IRREGULAR MENSES: ICD-10-CM

## 2018-02-06 DIAGNOSIS — K21.9 GASTROESOPHAGEAL REFLUX DISEASE WITHOUT ESOPHAGITIS: ICD-10-CM

## 2018-02-06 DIAGNOSIS — R07.9 CHEST PAIN, UNSPECIFIED TYPE: Primary | ICD-10-CM

## 2018-02-06 DIAGNOSIS — R10.11 RUQ ABDOMINAL PAIN: Primary | ICD-10-CM

## 2018-02-06 LAB
HCG URINE, QL. (POC): NEGATIVE
VALID INTERNAL CONTROL?: YES

## 2018-02-06 RX ORDER — CHLORTHALIDONE 25 MG/1
12.5 TABLET ORAL DAILY
Qty: 30 TAB | Refills: 3 | Status: ON HOLD | OUTPATIENT
Start: 2018-02-06 | End: 2018-08-03

## 2018-02-06 RX ORDER — POTASSIUM CHLORIDE 750 MG/1
10 TABLET, EXTENDED RELEASE ORAL DAILY
Qty: 30 TAB | Refills: 6 | Status: ON HOLD | OUTPATIENT
Start: 2018-02-06 | End: 2018-08-03

## 2018-02-06 RX ORDER — PANTOPRAZOLE SODIUM 40 MG/1
40 TABLET, DELAYED RELEASE ORAL DAILY
Qty: 30 TAB | Refills: 0 | Status: SHIPPED | OUTPATIENT
Start: 2018-02-06 | End: 2018-07-06

## 2018-02-06 NOTE — PROGRESS NOTES
Abeba Evans MD. [facility]              Patient: Celina Potter  : 1995      Today's Date: 2018            HISTORY OF PRESENT ILLNESS:     History of Present Illness:  Referred for \"right sided chest pain\". She says she has been having \"pain on her sides, back and her stomach and sometimes in her chest\" for several months. She was in the ER and was told she crystals in urine and given pain meds. She gets a sharp pain with a deep breath. She has SOB going to the bathroom when she rushes there. She says she thinks she had \"infection in heart\" 2 years ago at [facility]. PAST MEDICAL HISTORY:     Past Medical History:   Diagnosis Date    Kidney stones          Past Surgical History:   Procedure Laterality Date    HX UROLOGICAL      Stenting x 4 (Starting in 2016, developed severe infection)           MEDICATIONS:     Current Outpatient Prescriptions   Medication Sig Dispense Refill    chlorthalidone (HYGROTEN) 25 mg tablet Take 0.5 Tabs by mouth daily. 30 Tab 3    potassium chloride (KLOR-CON) 10 mEq tablet Take 1 Tab by mouth daily. 30 Tab 6    ondansetron (ZOFRAN ODT) 4 mg disintegrating tablet Take 1 Tab by mouth every eight (8) hours as needed for Nausea. 10 Tab 0    ketorolac (TORADOL) 10 mg tablet Take 1 Tab by mouth every six (6) hours as needed for Pain.  10 Tab 0       No Known Allergies            SOCIAL HISTORY:     Social History   Substance Use Topics    Smoking status: Former Smoker     Quit date: 2018    Smokeless tobacco: Never Used      Comment:  pack ppd for 6 months    Alcohol use No      Comment: rarely           FAMILY HISTORY:     Family History   Problem Relation Age of Onset    Heart Disease Father 55    Hypertension Father     Sleep Apnea Father     No Known Problems Sister     Cancer Maternal Grandmother      Brain Cancer    Heart Disease Paternal Grandmother     Cancer Paternal Grandfather                REVIEW OF SYMPTOMS:     Review of Symptoms:  Constitutional: Negative for fever, chills  HEENT: Negative for nosebleeds, tinnitus, and vision changes. Respiratory: Negative for cough, wheezing  Cardiovascular: Negative for   syncope, and PND. Gastrointestinal: Negative for  diarrhea, melena. Genitourinary: Negative for dysuria  Musculoskeletal: + back pain   Skin: Negative for rash  Heme: No problems bleeding. Neurological: Negative for speech change and focal weakness. + vomiting         PHYSICAL EXAM:     Physical Exam:  Visit Vitals    BP (!) 144/98 (BP 1 Location: Left arm, BP Patient Position: Sitting)    Pulse 80    Wt 301 lb (136.5 kg)    SpO2 99%    BMI 50.09 kg/m2     Patient appears generally well, mood and affect are appropriate and pleasant. HEENT:  Hearing intact, non-icteric, normocephalic, atraumatic. Neck Exam: Supple, No JVD or carotid bruits. Lung Exam: Clear to auscultation, even breath sounds. Cardiac Exam: Regular rate and rhythm with no murmur  Abdomen: Soft,  normal bowel sounds. No bruits or masses. + obese. Mild tender on palpation   Extremities: Moves all ext well. No lower extremity edema. Vascular: 2+ dorsalis pedis pulses bilaterally. Psych: Appropriate affect  Neuro - Grossly intact        LABS / OTHER STUDIES:         Lab Results   Component Value Date/Time    Sodium 137 11/09/2017 07:07 PM    Potassium 4.1 11/09/2017 07:07 PM    Chloride 103 11/09/2017 07:07 PM    CO2 27 11/09/2017 07:07 PM    Anion gap 7 11/09/2017 07:07 PM    Glucose 86 11/09/2017 07:07 PM    BUN 12 11/09/2017 07:07 PM    Creatinine 0.69 11/09/2017 07:07 PM    BUN/Creatinine ratio 17 11/09/2017 07:07 PM    GFR est AA >60 11/09/2017 07:07 PM    GFR est non-AA >60 11/09/2017 07:07 PM    Calcium 9.6 11/09/2017 07:07 PM    Bilirubin, total 0.2 11/09/2017 07:07 PM    AST (SGOT) 15 11/09/2017 07:07 PM    Alk.  phosphatase 119 11/09/2017 07:07 PM    Protein, total 8.2 11/09/2017 07:07 PM    Albumin 3.5 11/09/2017 07:07 PM Globulin 4.7 11/09/2017 07:07 PM    A-G Ratio 0.7 11/09/2017 07:07 PM    ALT (SGPT) 19 11/09/2017 07:07 PM       Lab Results   Component Value Date/Time    WBC 12.9 11/09/2017 07:07 PM    HGB 12.8 11/09/2017 07:07 PM    HCT 39.3 11/09/2017 07:07 PM    PLATELET 396 56/57/3384 07:07 PM    MCV 83.6 11/09/2017 07:07 PM       No results found for: CHOL, CHOLPOCT, CHOLX, CHLST, CHOLV, HDL, HDLPOC, LDL, LDLCPOC, LDLC, DLDLP, VLDLC, VLDL, TGLX, TRIGL, TRIGP, TGLPOCT, CHHD, CHHDX        CARDIAC DIAGNOSTICS:     Cardiac Evaluation Includes:  EKG 2/6/18 - NSR, normal               ASSESSMENT AND PLAN:     Assessment and Plan:  1) BERRY and non-cardiac chest pain   - check an echo   - does not need a stress test for her non-cardiac pain   - Defer belly, side and back pain to PCP     2) She says she thinks she had \"infection in heart\" 2 years ago at [facility]. - will get records     3) HTN  - start chlorthalidone 12.5 mg and Kdur 10 (reviewed pregnancy risks)   - info given on diet   - Will let PCP manage BP long term and they can increase meds and follow labs     4) Snores and tired - sleep study pending     5) Obesity   - Weight Loss clinic - refer her there     6) Phone FU after testing. See me back as needed. Patient expressed understanding of the plan - questions were answered. Was a . Sunshine Bravo MD, [address], [facility]  Ph: [phone]   Ph [phone]      ADDENDUM   2/23/2018  Echo 2/21/18 - LVEF 60-65%, normal study     Will have nurse call with normal results.

## 2018-02-06 NOTE — PATIENT INSTRUCTIONS
Abdominal Pain: Care Instructions  Your Care Instructions    Abdominal pain has many possible causes. Some aren't serious and get better on their own in a few days. Others need more testing and treatment. If your pain continues or gets worse, you need to be rechecked and may need more tests to find out what is wrong. You may need surgery to correct the problem. Don't ignore new symptoms, such as fever, nausea and vomiting, urination problems, pain that gets worse, and dizziness. These may be signs of a more serious problem. Your doctor may have recommended a follow-up visit in the next 8 to 12 hours. If you are not getting better, you may need more tests or treatment. The doctor has checked you carefully, but problems can develop later. If you notice any problems or new symptoms, get medical treatment right away. Follow-up care is a key part of your treatment and safety. Be sure to make and go to all appointments, and call your doctor if you are having problems. It's also a good idea to know your test results and keep a list of the medicines you take. How can you care for yourself at home? · Rest until you feel better. · To prevent dehydration, drink plenty of fluids, enough so that your urine is light yellow or clear like water. Choose water and other caffeine-free clear liquids until you feel better. If you have kidney, heart, or liver disease and have to limit fluids, talk with your doctor before you increase the amount of fluids you drink. · If your stomach is upset, eat mild foods, such as rice, dry toast or crackers, bananas, and applesauce. Try eating several small meals instead of two or three large ones. · Wait until 48 hours after all symptoms have gone away before you have spicy foods, alcohol, and drinks that contain caffeine. · Do not eat foods that are high in fat. · Avoid anti-inflammatory medicines such as aspirin, ibuprofen (Advil, Motrin), and naproxen (Aleve).  These can cause stomach upset. Talk to your doctor if you take daily aspirin for another health problem. When should you call for help? Call 911 anytime you think you may need emergency care. For example, call if:  ? · You passed out (lost consciousness). ? · You pass maroon or very bloody stools. ? · You vomit blood or what looks like coffee grounds. ? · You have new, severe belly pain. ?Call your doctor now or seek immediate medical care if:  ? · Your pain gets worse, especially if it becomes focused in one area of your belly. ? · You have a new or higher fever. ? · Your stools are black and look like tar, or they have streaks of blood. ? · You have unexpected vaginal bleeding. ? · You have symptoms of a urinary tract infection. These may include:  ¨ Pain when you urinate. ¨ Urinating more often than usual.  ¨ Blood in your urine. ? · You are dizzy or lightheaded, or you feel like you may faint. ? Watch closely for changes in your health, and be sure to contact your doctor if:  ? · You are not getting better after 1 day (24 hours). Where can you learn more? Go to http://maryann-jm.info/. Enter M775 in the search box to learn more about \"Abdominal Pain: Care Instructions. \"  Current as of: March 20, 2017  Content Version: 11.4  © 0392-6844 [employer]. Care instructions adapted under license by [employer] (which disclaims liability or warranty for this information). If you have questions about a medical condition or this instruction, always ask your healthcare professional. [place] [address] any warranty or liability for your use of this information.

## 2018-02-06 NOTE — PROGRESS NOTES
Chief Complaint   Patient presents with   [facility] Follow Up     Pt was seen at [facility] ED 4 days ago due to having a migraine. Pt was treating with medications and injections.  Pt states that there has not been any improvements

## 2018-02-06 NOTE — PROGRESS NOTES
Tim Ramirez is an 23 y.o. female who presents with CC of abdominal pain. hx of bilateral kidney stones and multiple ureteral stents placed in the past.     Went to ED on 2/3/2018 with CC of flank pain. Told that UA had crystals. Dx with kidney stone pain. Prescribed zofran and Toradol. Pt states that she is having epigastric, RUQ, and LUQ since 11/2017. Not changed. Sharp pain, 6-8/10. Worsens when eating any foods. No improving factors noted. No N/V, fever, dysuria, blood in stool. She reports this pain is not similar to her pain with past kidney stones. Pt has acidic taste in mouth when she burps, never taken PPI, family hx of GERD. Pt states she has had good fluid intake. Pain control with tylenol, little use of Toradol. Of note, pt was hospitalized ([facility]) in 7/2017 2/2 to hydronephrosis on the left 2/2 to kidney stone. Required stent. With Obstructing Kidney stone was a constant pain not relieved with any positioning or activity. Initial stone in 2016 with prolonged hospital stay related to bacteremia and reported \"effected my heart\". 11/2017 had CT that showed nonobstructing left intrarenal calculus. Pt states she is followed by [facility] and has an appointment on 3/2. Visited cardiology earlier today to work-up sharp chest pain with a deep breath that she stated she had last clinic visit. Will check ECHO. No stress test needed. Placed on chlorthalidone for HTN. She has hx of irregular menses since 2016 when she was 28 yo, last LMP 2017. Sexually active. No birth control. First menses at 17 yo. No exercise. Diet not balanced more fast food. Hx of marijuana use. Pt states that she used to use daily. But quit at the start of year as she moved with her parents. Marijuana made abdominal pain worse. Hx of smoking. Used to smoke cigars. Quit at the start of this year. No EtOH use.      Allergies - reviewed:   No Known Allergies      Medications - reviewed: Current Outpatient Prescriptions   Medication Sig    chlorthalidone (HYGROTEN) 25 mg tablet Take 0.5 Tabs by mouth daily.  potassium chloride (KLOR-CON) 10 mEq tablet Take 1 Tab by mouth daily.  pantoprazole (PROTONIX) 40 mg tablet Take 1 Tab by mouth daily.  ondansetron (ZOFRAN ODT) 4 mg disintegrating tablet Take 1 Tab by mouth every eight (8) hours as needed for Nausea.  ketorolac (TORADOL) 10 mg tablet Take 1 Tab by mouth every six (6) hours as needed for Pain. No current facility-administered medications for this visit. Past Medical History - reviewed:  Past Medical History:   Diagnosis Date    Kidney stones          Past Surgical History - reviewed:   Past Surgical History:   Procedure Laterality Date    HX UROLOGICAL      Stenting x 4 (Starting in 2016, developed severe infection)         Social History - reviewed:  Social History     Social History    Marital status: SINGLE     Spouse name: N/A    Number of children: N/A    Years of education: N/A     Occupational History          started in 3/17     Social History Main Topics    Smoking status: Former Smoker     Quit date: 1/16/2018    Smokeless tobacco: Never Used      Comment: 1/4 pack ppd for 6 months    Alcohol use No      Comment: rarely    Drug use: No      Comment: Has done [employer] Sexual activity: No     Other Topics Concern    Not on file     Social History Narrative         Family History - reviewed:  Family History   Problem Relation Age of Onset    Heart Disease Father 55    Hypertension Father     Sleep Apnea Father     No Known Problems Sister     Cancer Maternal Grandmother      Brain Cancer    Heart Disease Paternal Grandmother     Cancer Paternal Grandfather          Immunizations - reviewed:   Immunization History   Administered Date(s) Administered    Influenza Vaccine (Quad) PF 11/16/2017     ROS  Constitutional: Negative for chills and fever.    HENT: Negative for congestion and ear pain. Respiratory: Negative for cough. Gastrointestinal: Positive for abdominal pain. Negative for nausea and vomiting. Genitourinary: Positive for flank pain. Negative for dysuria, frequency, hematuria and urgency. Skin: Negative for rash. Neurological: Negative for headaches. Psychiatric/Behavioral: Negative for depression and suicidal ideas. Physical Exam  Visit Vitals    /78    Pulse 100    Temp 97.5 °F (36.4 °C) (Oral)    Resp 16    Ht 5' 5\" (1.651 m)    Wt 301 lb (136.5 kg)    SpO2 99%    BMI 50.09 kg/m2       Physical Exam   Constitutional: She appears well-developed and well-nourished. No distress. HENT:   Head: Normocephalic and atraumatic. Eyes: Conjunctivae are normal.   Neck: Normal range of motion. Neck supple. Cardiovascular: Normal rate, regular rhythm, normal heart sounds and intact distal pulses. Exam reveals no gallop and no friction rub. No murmur heard. No tenderness to palpation in the chest.   Pulmonary/Chest: Effort normal and breath sounds normal. No respiratory distress. She has no wheezes. She has no rales. Abdominal: Soft. Bowel sounds are normal. She exhibits no distension and no mass. There is no rebound and no guarding. Tenderness on palpation right upper quadrant. Musculoskeletal: She exhibits no edema. No CVA tenderness. Psychiatric: She has a normal mood and affect. Her behavior is normal. Judgment and thought content normal.   Nursing note and vitals reviewed. Assessment/Plan    ICD-10-CM ICD-9-CM    1. RUQ abdominal pain R10.11 789.01 NM HEPATOBILIARY DUCT SCAN   2. Irregular menses N92.6 626.4 AMB POC URINE PREGNANCY TEST, VISUAL COLOR COMPARISON   3. Gastroesophageal reflux disease without esophagitis K21.9 530.81 pantoprazole (PROTONIX) 40 mg tablet   4. BMI 50.0-59.9, adult [facility]) Z68.43 V85.43      Abdominal pain: Pt has GERD symptoms. No acute abd on exam. UA in ED had no blood and no CVA tenderness on exam today.  Will treat with PPI. However, given that this is an obese female with irregular menses and had tenderness in the RUQ, would benefit from HIDA scan. Pt does not have insurance, therefore we discussed that we will wait and see how PPI will help. If ineffective than she will obtain HIDA scan. UPT negative today. Chest pain: cardiology following. Likely non-cardiac. Will follow-up ECHO. Will obtain records from [employer] [facility] I personally contacted them and asked for hospital records that they agreed to fax to clinic.     HTN: chlorthalidone 12.5 mg and Kdur 10. Pt aware of risks to potential pregnancy. Pt stated that she will  medication when she has the money. I have reviewed/discussed the above normal BMI with the patient. I have recommended the following interventions: dietary management education, guidance, and counseling, encourage exercise and monitor weight . Follow-up Disposition:  Return in about 2 weeks (around 2/20/2018). I have discussed the diagnosis with the patient and the intended plan as seen in the above orders. Patient verbalized understanding of the plan and agrees with the plan. The patient has received an after-visit summary and questions were answered concerning future plans. I have discussed medication side effects and warnings with the patient as well. Informed patient to return to the office if new symptoms arise.         Mark Anthony Avelar DO  Family Medicine Resident

## 2018-02-06 NOTE — MR AVS SNAPSHOT
[address] 
[phone] Patient: Olinda Ulloa MRN: [id number] :1995 Visit Information Date & Time Provider Department Dept. Phone Encounter #  
 2018  2:30 PM Usama Abdul DO [address] [facility] [phone] [id number] Your Appointments 2/15/2018  3:00 PM  
ECHO CARDIOGRAMS 2D with OSIEL HERNANDEZ  
[facility] ([facility]) Appt Note: per dr Loredo Eye dx hernandez [address] [facility] [address] [facility] [address]  
[phone]  
  
   
 [address] [facility] [address] [facility] [address]  
  
    
 2018 11:40 AM  
New Patient with Lori Mcgee MD  
[address] ([facility]) Appt Note: NP_ref by Dr José Astudillo_snoring_self pay [address] [facility] [id number] [address] [facility] [phone] Upcoming Health Maintenance Date Due Hepatitis A Peds Age 1-18 (1 of 2 - Standard Series) 3/19/1998 DTaP/Tdap/Td series (1 - Tdap) 3/19/2004 HPV AGE 9Y-26Y (1 of 3 - Female 3 Dose Series) 3/19/2008 Allergies as of 2018  Review Complete On: 2018 By: Usama Abdul DO No Known Allergies Current Immunizations  Reviewed on 2017 Name Date Influenza Vaccine (Quad) PF 2017 Not reviewed this visit You Were Diagnosed With   
  
 Codes Comments RUQ abdominal pain    -  Primary ICD-10-CM: R10.11 ICD-9-CM: 789.01 Irregular menses     ICD-10-CM: N92.6 ICD-9-CM: 626.4 Gastroesophageal reflux disease without esophagitis     ICD-10-CM: K21.9 ICD-9-CM: 530.81 Vitals BP Pulse Temp Resp Height(growth percentile) Weight(growth percentile) 122/78 100 97.5 °F (36.4 °C) (Oral) 16 5' 5\" (1.651 m) 301 lb (136.5 kg) SpO2 BMI OB Status Smoking Status 99% 50.09 kg/m2 Unknown Former Smoker BMI and BSA Data Body Mass Index Body Surface Area 50.09 kg/m 2 2.5 m 2 Preferred Pharmacy Pharmacy Name Phone [address] [facility], [address] [facility] [address] [facility] [phone] Your Updated Medication List  
  
   
This list is accurate as of: 2/6/18  3:26 PM.  Always use your most recent med list.  
  
  
  
  
 chlorthalidone 25 mg tablet Commonly known as:  Birdie Whitakers Take 0.5 Tabs by mouth daily. ketorolac 10 mg tablet Commonly known as:  TORADOL Take 1 Tab by mouth every six (6) hours as needed for Pain. ondansetron 4 mg disintegrating tablet Commonly known as:  ZOFRAN ODT Take 1 Tab by mouth every eight (8) hours as needed for Nausea. pantoprazole 40 mg tablet Commonly known as:  PROTONIX Take 1 Tab by mouth daily. potassium chloride 10 mEq tablet Commonly known as:  KLOR-CON Take 1 Tab by mouth daily. Prescriptions Sent to Pharmacy Refills  
 pantoprazole (PROTONIX) 40 mg tablet 0 Sig: Take 1 Tab by mouth daily. Class: Normal  
 Pharmacy: [facility] [address], [address] [place] [address] #: [phone] Route: Oral  
  
We Performed the Following AMB POC URINE PREGNANCY TEST, VISUAL COLOR COMPARISON [38096 CPT(R)] To-Do List   
 02/06/2018 Imaging:  NM HEPATOBILIARY DUCT SCAN Patient Instructions Abdominal Pain: Care Instructions Your Care Instructions Abdominal pain has many possible causes. Some aren't serious and get better on their own in a few days. Others need more testing and treatment. If your pain continues or gets worse, you need to be rechecked and may need more tests to find out what is wrong. You may need surgery to correct the problem.  
Don't ignore new symptoms, such as fever, nausea and vomiting, urination problems, pain that gets worse, and dizziness. These may be signs of a more serious problem. Your doctor may have recommended a follow-up visit in the next 8 to 12 hours. If you are not getting better, you may need more tests or treatment. The doctor has checked you carefully, but problems can develop later. If you notice any problems or new symptoms, get medical treatment right away. Follow-up care is a key part of your treatment and safety. Be sure to make and go to all appointments, and call your doctor if you are having problems. It's also a good idea to know your test results and keep a list of the medicines you take. How can you care for yourself at home? · Rest until you feel better. · To prevent dehydration, drink plenty of fluids, enough so that your urine is light yellow or clear like water. Choose water and other caffeine-free clear liquids until you feel better. If you have kidney, heart, or liver disease and have to limit fluids, talk with your doctor before you increase the amount of fluids you drink. · If your stomach is upset, eat mild foods, such as rice, dry toast or crackers, bananas, and applesauce. Try eating several small meals instead of two or three large ones. · Wait until 48 hours after all symptoms have gone away before you have spicy foods, alcohol, and drinks that contain caffeine. · Do not eat foods that are high in fat. · Avoid anti-inflammatory medicines such as aspirin, ibuprofen (Advil, Motrin), and naproxen (Aleve). These can cause stomach upset. Talk to your doctor if you take daily aspirin for another health problem. When should you call for help? Call 911 anytime you think you may need emergency care. For example, call if: 
? · You passed out (lost consciousness). ? · You pass maroon or very bloody stools. ? · You vomit blood or what looks like coffee grounds. ? · You have new, severe belly pain. ?Call your doctor now or seek immediate medical care if: ? · Your pain gets worse, especially if it becomes focused in one area of your belly. ? · You have a new or higher fever. ? · Your stools are black and look like tar, or they have streaks of blood. ? · You have unexpected vaginal bleeding. ? · You have symptoms of a urinary tract infection. These may include: 
¨ Pain when you urinate. ¨ Urinating more often than usual. 
¨ Blood in your urine. ? · You are dizzy or lightheaded, or you feel like you may faint. ? Watch closely for changes in your health, and be sure to contact your doctor if: 
? · You are not getting better after 1 day (24 hours). Where can you learn more? Go to http://maryann-jm.info/. Enter T067 in the search box to learn more about \"Abdominal Pain: Care Instructions. \" Current as of: March 20, 2017 Content Version: 11.4 © 3697-9380 [employer]. Care instructions adapted under license by [employer] (which disclaims liability or warranty for this information). If you have questions about a medical condition or this instruction, always ask your healthcare professional. [place] [address] any warranty or liability for your use of this information. Introducing [facility]! Be Ragland introduces Byban patient portal. Now you can access parts of your medical record, email your doctor's office, and request medication refills online. 1. In your internet browser, go to [email or web address] 2. Click on the First Time User? Click Here link in the Sign In box. You will see the New Member Sign Up page. 3. Enter your Byban Access Code exactly as it appears below. You will not need to use this code after youve completed the sign-up process. If you do not sign up before the expiration date, you must request a new code. · Byban Access Code: [email or web address] Expires: 2/10/2018  3:05 PM 
 
 4. Enter the last four digits of your Social Security Number (xxxx) and Date of Birth (mm/dd/yyyy) as indicated and click Submit. You will be taken to the next sign-up page. 5. Create a PHARMAJET ID. This will be your PHARMAJET login ID and cannot be changed, so think of one that is secure and easy to remember. 6. Create a PHARMAJET password. You can change your password at any time. 7. Enter your Password Reset Question and Answer. This can be used at a later time if you forget your password. 8. Enter your e-mail address. You will receive e-mail notification when new information is available in [address] 9. Click Sign Up. You can now view and download portions of your medical record. 10. Click the Download Summary menu link to download a portable copy of your medical information. If you have questions, please visit the Frequently Asked Questions section of the PHARMAJET website. Remember, PHARMAJET is NOT to be used for urgent needs. For medical emergencies, dial 911. Now available from your iPhone and Android! Please provide this summary of care documentation to your next provider. Your primary care clinician is listed as Concetta Pedraza. If you have any questions after today's visit, please call [phone].

## 2018-02-06 NOTE — PROGRESS NOTES
Pt complains of BLE swelling    Pt complains of shortness of breath    Pt complains of chest pains    Pt complains of shortness of breath on exertion    Pt complains of neck palps    Visit Vitals    BP (!) 144/98 (BP 1 Location: Left arm, BP Patient Position: Sitting)    Pulse 80    Wt 301 lb (136.5 kg)    SpO2 99%    BMI 50.09 kg/m2

## 2018-02-06 NOTE — PROGRESS NOTES
23year old female complaining of chronic pain    Recently has had renal calculi    Was seen by cardiologist this am -- will check echo  Has HTN -- started on medication    Has irregular menses  Not on birth control  Sexually active  Will obtain pregnancy test today    Need records from admission at [address] [facility] [address] [place] done on 2/4/18: No blood    Based on symptoms, would suspect GI etiology -- will start PPI for trial (two weeks)     Consider HIDA scan if PPI not effective    I reviewed with the resident the medical history and the resident's findings on the physical examination. I discussed with the resident the patient's diagnosis and concur with the plan.

## 2018-02-06 NOTE — MR AVS SNAPSHOT
1659 [facility] 600 1900 [facility] 
[phone] Patient: Reena Hastings MRN: [id number] :1995 Visit Information Date & Time Provider Department Dept. Phone Encounter #  
 2018  9:00 AM Gloria Berrios MD [facility] [place] [phone] [id number] Your Appointments 2018  2:30 PM  
ACUTE CARE with Ami Door, DO [facility] ([address]) Appt Note: [facility] follow up for bp and migraines [address]  
[phone]  
  
   
 [address] [facility] [address] [phone]  
  
    
 2018 11:40 AM  
New Patient with Abdi Rothman MD  
[address] [facility] ([address]) Appt Note: NP_ref by Dr Amy Gamboa Eggleston_snoring_self pay [address] [facility] [address] [phone] 2628 [facility] [phone] Upcoming Health Maintenance Date Due Hepatitis A Peds Age 1-18 (1 of 2 - Standard Series) 3/19/1998 DTaP/Tdap/Td series (1 - Tdap) 3/19/2004 HPV AGE 9Y-26Y (1 of 3 - Female 3 Dose Series) 3/19/2008 Allergies as of 2018  Review Complete On: 2018 By: Gloria Berrios MD  
 No Known Allergies Current Immunizations  Reviewed on 2017 Name Date Influenza Vaccine (Quad) PF 2017 Not reviewed this visit You Were Diagnosed With   
  
 Codes Comments Chest pain, unspecified type    -  Primary ICD-10-CM: R07.9 ICD-9-CM: 786.50 Vitals BP Pulse Weight(growth percentile) SpO2 BMI OB Status (!) 144/98 (BP 1 Location: Left arm, BP Patient Position: Sitting) 80 301 lb (136.5 kg) 99% 50.09 kg/m2 Unknown Smoking Status Former Smoker Vitals History BMI and BSA Data Body Mass Index Body Surface Area  50.09 kg/m 2 2.5 m 2  
  
  
 Preferred Pharmacy Pharmacy Name Phone [address] [facility], [address] [facility] [address] [facility] MARLON Garcia [facility] [phone] Your Updated Medication List  
  
   
This list is accurate as of: 2/6/18  9:55 AM.  Always use your most recent med list.  
  
  
  
  
 chlorthalidone 25 mg tablet Commonly known as:  Franci Gun Take 0.5 Tabs by mouth daily. ketorolac 10 mg tablet Commonly known as:  TORADOL Take 1 Tab by mouth every six (6) hours as needed for Pain. ondansetron 4 mg disintegrating tablet Commonly known as:  ZOFRAN ODT Take 1 Tab by mouth every eight (8) hours as needed for Nausea. potassium chloride 10 mEq tablet Commonly known as:  KLOR-CON Take 1 Tab by mouth daily. Prescriptions Sent to Pharmacy Refills  
 chlorthalidone (HYGROTEN) 25 mg tablet 3 Sig: Take 0.5 Tabs by mouth daily. Class: Normal  
 Pharmacy: [facility] [address], [address] Ph #: [phone] Route: Oral  
 potassium chloride (KLOR-CON) 10 mEq tablet 6 Sig: Take 1 Tab by mouth daily. Class: Normal  
 Pharmacy: [facility] [address], [address] Ph #: [phone] Route: Oral  
  
We Performed the Following AMB POC EKG ROUTINE W/ 12 LEADS, INTER & REP [96418 CPT(R)] Introducing [facility] SERVICES! Chris Kunz introduces [employer] patient portal. Now you can access parts of your medical record, email your doctor's office, and request medication refills online. 1. In your internet browser, go to [email or web address] 2. Click on the First Time User? Click Here link in the Sign In box. You will see the New Member Sign Up page. 3. Enter your [employer] Access Code exactly as it appears below. You will not need to use this code after youve completed the sign-up process.  If you do not sign up before the expiration date, you must request a new code. 
 
· [employer] Access Code: [email or web address] Expires: 2/10/2018  3:05 PM 
 
4. Enter the last four digits of your Social Security Number (xxxx) and Date of Birth (mm/dd/yyyy) as indicated and click Submit. You will be taken to the next sign-up page. 5. Create a M[employer] ID. This will be your [employer] login ID and cannot be changed, so think of one that is secure and easy to remember. 6. Create a [employer] password. You can change your password at any time. 7. Enter your Password Reset Question and Answer. This can be used at a later time if you forget your password. 8. Enter your e-mail address. You will receive e-mail notification when new information is available in [address] 9. Click Sign Up. You can now view and download portions of your medical record. 10. Click the Download Summary menu link to download a portable copy of your medical information. If you have questions, please visit the Frequently Asked Questions section of the [employer] website. Remember, [employer] is NOT to be used for urgent needs. For medical emergencies, dial 911. Now available from your iPhone and Android! Please provide this summary of care documentation to your next provider. Your primary care clinician is listed as Aashish No. If you have any questions after today's visit, please call [phone].

## 2018-02-21 ENCOUNTER — CLINICAL SUPPORT (OUTPATIENT)
Dept: CARDIOLOGY CLINIC | Age: 23
End: 2018-02-21

## 2018-02-21 DIAGNOSIS — R06.02 SOB (SHORTNESS OF BREATH): ICD-10-CM

## 2018-02-21 DIAGNOSIS — I10 ESSENTIAL HYPERTENSION: ICD-10-CM

## 2018-02-21 DIAGNOSIS — R07.9 CHEST PAIN, UNSPECIFIED TYPE: ICD-10-CM

## 2018-02-23 ENCOUNTER — TELEPHONE (OUTPATIENT)
Dept: CARDIOLOGY CLINIC | Age: 23
End: 2018-02-23

## 2018-02-23 NOTE — TELEPHONE ENCOUNTER
----- Message from Reid Powell MD sent at 2/23/2018 12:13 AM EST -----  Regarding: please call pt  Please call. Echo 2/21/18 - LVEF 60-65%, normal study     Will have nurse call with normal results.        Thanks,  SK

## 2018-02-27 ENCOUNTER — OFFICE VISIT (OUTPATIENT)
Dept: SLEEP MEDICINE | Age: 23
End: 2018-02-27

## 2018-02-27 ENCOUNTER — HOSPITAL ENCOUNTER (OUTPATIENT)
Dept: SLEEP MEDICINE | Age: 23
Discharge: HOME | End: 2018-02-27
Payer: SUBSIDIZED

## 2018-02-27 VITALS
BODY MASS INDEX: 48.82 KG/M2 | WEIGHT: 293 LBS | HEART RATE: 83 BPM | HEIGHT: 65 IN | DIASTOLIC BLOOD PRESSURE: 81 MMHG | SYSTOLIC BLOOD PRESSURE: 137 MMHG | OXYGEN SATURATION: 99 %

## 2018-02-27 DIAGNOSIS — G47.33 OBSTRUCTIVE SLEEP APNEA SYNDROME: Primary | ICD-10-CM

## 2018-02-27 PROBLEM — E66.01 OBESITY, MORBID (HCC): Status: ACTIVE | Noted: 2018-02-27

## 2018-02-27 PROCEDURE — 95806 SLEEP STUDY UNATT&RESP EFFT: CPT | Performed by: INTERNAL MEDICINE

## 2018-02-27 NOTE — Clinical Note
Thank you for the referral.  I will keep you informed of her progress.  [address], Arron Morales

## 2018-02-27 NOTE — MR AVS SNAPSHOT
303 [facility] 
 
 
 9250 [facility] [address] [id number] [phone] Patient: Homar Maria MRN: [id number] :1995 Visit Information Date & Time Provider Department Dept. Phone Encounter #  
 2018 11:40 AM Kellie Desouza MD [facility] [address] [id number] Your Appointments 3/7/2018 11:15 AM  
ROUTINE CARE with Karishma Keith DO [facility] Thompson ([facility]) Appt Note: 2 week fv from 18; r/s appt for F/U  
 [address] [place] [address]. [address] [place] [address]  
[phone]  
  
   
 [address] [place] [phone] Upcoming Health Maintenance Date Due Hepatitis A Peds Age 1-18 (1 of 2 - Standard Series) 3/19/1998 DTaP/Tdap/Td series (1 - Tdap) 3/19/2004 HPV AGE 9Y-26Y (1 of 3 - Female 3 Dose Series) 3/19/2008 Allergies as of 2018  Review Complete On: 2018 By: Kellie Desouza MD  
 No Known Allergies Current Immunizations  Reviewed on 2017 Name Date Influenza Vaccine (Quad) PF 2017 Not reviewed this visit You Were Diagnosed With   
  
 Codes Comments Obstructive sleep apnea syndrome    -  Primary ICD-10-CM: G47.33 
ICD-9-CM: 327.23 Vitals BP Pulse Height(growth percentile) Weight(growth percentile) SpO2 BMI  
 137/81 83 5' 5\" (1.651 m) 300 lb (136.1 kg) 99% 49.92 kg/m2 OB Status Smoking Status Unknown Former Smoker Vitals History BMI and BSA Data Body Mass Index Body Surface Area  
 49.92 kg/m 2 2.5 m 2 [facility]  [address] [facility] [facility] [phone] Your Updated Medication List  
  
   
This list is accurate as of 18 12:35 PM.  Always use your most recent med list.  
  
  
  
  
 chlorthalidone 25 mg tablet Commonly known as:  Derryl Risen Take 0.5 Tabs by mouth daily. ketorolac 10 mg tablet Commonly known as:  TORADOL Take 1 Tab by mouth every six (6) hours as needed for Pain. ondansetron 4 mg disintegrating tablet Commonly known as:  ZOFRAN ODT Take 1 Tab by mouth every eight (8) hours as needed for Nausea. pantoprazole 40 mg tablet Commonly known as:  PROTONIX Take 1 Tab by mouth daily. potassium chloride 10 mEq tablet Commonly known as:  KLOR-CON Take 1 Tab by mouth daily. We Performed the Following SLEEP STUDY UNATTENDED, 4 CHANNEL K021980 CPT(R)] Patient Instructions 217 [facility]., [place]. 1668 [facility], [address] Tel.  [phone] Fax. 100 [facility] 60 1001 [facility] [address], [address] [facility] Tel.  [phone] Fax. [phone] [address] Shannan Machado 33 Tel.  [phone] Fax. [phone] Sleep Apnea: After Your Visit Your Care Instructions Sleep apnea occurs when you frequently stop breathing for 10 seconds or longer during sleep. It can be mild to severe, based on the number of times per hour that you stop breathing or have slowed breathing. Blocked or narrowed airways in your nose, mouth, or throat can cause sleep apnea. Your airway can become blocked when your throat muscles and tongue relax during sleep. Sleep apnea is common, occurring in 1 out of 20 individuals. Individuals having any of the following characteristics should be evaluated and treated right away due to high risk and detrimental consequences from untreated sleep apnea: 
1. Obesity 2. Congestive Heart failure 3. Atrial Fibrillation 4. Uncontrolled Hypertension 5. Type II Diabetes 6. Night-time Arrhythmias 7. Stroke 8. Pulmonary Hypertension 9. High-risk Driving Populations (pilots, truck drivers, etc.) 10. Patients Considering Weight-loss Surgery How do you know you have sleep apnea? You probably have sleep apnea if you answer 'yes' to 3 or more of the following questions: S - Have you been told that you Snore? T - Are you often Tired during the day? O - Has anyone Observed you stop breathing while sleeping? P- Do you have (or are being treated for) high blood Pressure? B - Are you obese (Body Mass Index > 35)? A - Is your Age 48years old or older? N - Is your Neck size greater than 16 inches? G - Are you male Gender? A sleep physician can prescribe a breathing device that prevents tissues in the throat from blocking your airway. Or your doctor may recommend using a dental device (oral breathing device) to help keep your airway open. In some cases, surgery may be needed to remove enlarged tissues in the throat. Follow-up care is a key part of your treatment and safety. Be sure to make and go to all appointments, and call your doctor if you are having problems. It's also a good idea to know your test results and keep a list of the medicines you take. How can you care for yourself at home? · Lose weight, if needed. It may reduce the number of times you stop breathing or have slowed breathing. · Go to bed at the same time every night. · Sleep on your side. It may stop mild apnea. If you tend to roll onto your back, sew a pocket in the back of your pajama top. Put a tennis ball into the pocket, and stitch the pocket shut. This will help keep you from sleeping on your back. · Avoid alcohol and medicines such as sleeping pills and sedatives before bed. · Do not smoke. Smoking can make sleep apnea worse. If you need help quitting, talk to your doctor about stop-smoking programs and medicines. These can increase your chances of quitting for good. · Prop up the head of your bed 4 to 6 inches by putting bricks under the legs of the bed. · Treat breathing problems, such as a stuffy nose, caused by a cold or allergies. · Use a continuous positive airway pressure (CPAP) breathing machine if lifestyle changes do not help your apnea and your doctor recommends it. The machine keeps your airway from closing when you sleep. · If CPAP does not help you, ask your doctor whether you should try other breathing machines. A bilevel positive airway pressure machine has two types of air pressureâone for breathing in and one for breathing out. Another device raises or lowers air pressure as needed while you breathe. · If your nose feels dry or bleeds when using one of these machines, talk with your doctor about increasing moisture in the air. A humidifier may help. · If your nose is runny or stuffy from using a breathing machine, talk with your doctor about using decongestants or a corticosteroid nasal spray. When should you call for help? Watch closely for changes in your health, and be sure to contact your doctor if: 
· You still have sleep apnea even though you have made lifestyle changes. · You are thinking of trying a device such as CPAP. · You are having problems using a CPAP or similar machine. Where can you learn more? Go to ArcMail. Enter F457 in the search box to learn more about \"Sleep Apnea: After Your Visit. \"  
© 9954-4127 Healthwise, Incorporated. Care instructions adapted under license by [employer] (which disclaims liability or warranty for this information). This care instruction is for use with your licensed healthcare professional. If you have questions about a medical condition or this instruction, always ask your healthcare professional. Jag Noblesash any warranty or liability for your use of this information. PROPER SLEEP HYGIENE What to avoid · Do not have drinks with caffeine, such as coffee or black tea, for 8 hours before bed. · Do not smoke or use other types of tobacco near bedtime. Nicotine is a stimulant and can keep you awake. · Avoid drinking alcohol late in the evening, because it can cause you to wake in the middle of the night. · Do not eat a big meal close to bedtime. If you are hungry, eat a light snack. · Do not drink a lot of water close to bedtime, because the need to urinate may wake you up during the night. · Do not read or watch TV in bed. Use the bed only for sleeping and sexual activity. What to try · Go to bed at the same time every night, and wake up at the same time every morning. Do not take naps during the day. · Keep your bedroom quiet, dark, and cool. · Get regular exercise, but not within 3 to 4 hours of your bedtime. Azucena Page · Sleep on a comfortable pillow and mattress. · If watching the clock makes you anxious, turn it facing away from you so you cannot see the time. · If you worry when you lie down, start a worry book. Well before bedtime, write down your worries, and then set the book and your concerns aside. · Try meditation or other relaxation techniques before you go to bed. · If you cannot fall asleep, get up and go to another room until you feel sleepy. Do something relaxing. Repeat your bedtime routine before you go to bed again. · Make your house quiet and calm about an hour before bedtime. Turn down the lights, turn off the TV, log off the computer, and turn down the volume on music. This can help you relax after a busy day. Drowsy Driving The DrFirst cites drowsiness as a causing factor in more than 825,962 police reported crashes annually, resulting in 76,000 injuries and 1,500 deaths. Other surveys suggest 55% of people polled have driven while drowsy in the past year, 23% had fallen asleep but not crashed, 3% crashed, and 2% had and accident due to drowsy driving. Who is at risk? Young Drivers: One study of drowsy driving accidents states that 55% of the drivers were under 25 years. Of those, 75% were male. Shift Workers and Travelers: People who work overnight or travel across time zones frequently are at higher risk of experiencing Circadian Rhythm Disorders. They are trying to work and function when their body is programed to sleep. Sleep Deprived: Lack of sleep has a serious impact on your ability to pay attention or focus on a task. Consistently getting less than the average of 8 hours your body needs creates partial or cumulative sleep deprivation. Untreated Sleep Disorders: Sleep Apnea, Narcolepsy, R.L.S., and other sleep disorders (untreated) prevent a person from getting enough restful sleep. This leads to excessive daytime sleepiness and increases the risk for drowsy driving accidents by up to 7 times. Medications / Alcohol: Even over the counter medications can cause drowsiness. Medications that impair a drivers attention should have a warning label. Alcohol naturally makes you sleepy and on its own can cause accidents. Combined with excessive drowsiness its effects are amplified. Signs of Drowsy Driving: * You don't remember driving the last few miles * You may drift out of your manuel * You are unable to focus and your thoughts wander * You may yawn more often than normal 
 * You have difficulty keeping your eyes open / nodding off * Missing traffic signs, speeding, or tailgating Prevention-  
Good sleep hygiene, lifestyle and behavioral choices have the most impact on drowsy driving. There is no substitute for sleep and the average person requires 8 hours nightly. If you find yourself driving drowsy, stop and sleep. Consider the sleep hygiene tips provided during your visit as well. Medication Refill Policy: Refills for all medications require 1 week advance notice. Please have your pharmacy fax a refill request. We are unable to fax, or call in \"controled substance\" medications and you will need to pick these prescriptions up from our office. MyChart Activation Thank you for requesting access to [employer]. Please follow the instructions below to securely access and download your online medical record. [employer] allows you to send messages to your doctor, view your test results, renew your prescriptions, schedule appointments, and more. How Do I Sign Up? 1. In your internet browser, go to [email or web address]. 2. Click on the First Time User? Click Here link in the Sign In box. You will see the New Member Sign Up page. 3. Enter your [employer] Access Code exactly as it appears below. You will not need to use this code after youve completed the sign-up process. If you do not sign up before the expiration date, you must request a new code. [employer] Access Code: [email or web address] Expires: 2018  7:30 PM (This is the date your [employer] access code will ) 4. Enter the last four digits of your Social Security Number (xxxx) and Date of Birth (mm/dd/yyyy) as indicated and click Submit. You will be taken to the next sign-up page. 5. Create a [employer] ID. This will be your [employer] login ID and cannot be changed, so think of one that is secure and easy to remember. 6. Create a [employer] password. You can change your password at any time. 7. Enter your Password Reset Question and Answer. This can be used at a later time if you forget your password. 8. Enter your e-mail address. You will receive e-mail notification when new information is available in [address] 9. Click Sign Up. You can now view and download portions of your medical record. 10. Click the Download Summary menu link to download a portable copy of your medical information. Additional Information If you have questions, please call [phone]. Remember, [employer] is NOT to be used for urgent needs. For medical emergencies, dial 911. Introducing [facility] SERVICES!    
 Roge Johnston introduces [employer] patient portal. Now you can access parts of your medical record, email your doctor's office, and request medication refills online. 1. In your internet browser, go to [email or web address] 2. Click on the First Time User? Click Here link in the Sign In box. You will see the New Member Sign Up page. 3. Enter your [employer] Access Code exactly as it appears below. You will not need to use this code after youve completed the sign-up process. If you do not sign up before the expiration date, you must request a new code. · [employer] Access Code: [email or web address] Expires: 5/12/2018  7:30 PM 
 
4. Enter the last four digits of your Social Security Number (xxxx) and Date of Birth (mm/dd/yyyy) as indicated and click Submit. You will be taken to the next sign-up page. 5. Create a [employer] ID. This will be your [employer] login ID and cannot be changed, so think of one that is secure and easy to remember. 6. Create a [employer] password. You can change your password at any time. 7. Enter your Password Reset Question and Answer. This can be used at a later time if you forget your password. 8. Enter your e-mail address. You will receive e-mail notification when new information is available in [address] 9. Click Sign Up. You can now view and download portions of your medical record. 10. Click the Download Summary menu link to download a portable copy of your medical information. If you have questions, please visit the Frequently Asked Questions section of the [employer] website. Remember, [employer] is NOT to be used for urgent needs. For medical emergencies, dial 911. Now available from your iPhone and Android! Please provide this summary of care documentation to your next provider. Your primary care clinician is listed as Marquis Weber. If you have any questions after today's visit, please call [phone].

## 2018-02-27 NOTE — PATIENT INSTRUCTIONS
217 [facility] [place]. [place], [address]  Tel.  [phone]  Fax. 100 [facility] 60  [place], [address] [facility]  Tel.  [phone]  Fax. [phone] [address] [address] Shannan Machado  Tel.  [phone]  Fax. [phone]     Sleep Apnea: After Your Visit  Your Care Instructions  Sleep apnea occurs when you frequently stop breathing for 10 seconds or longer during sleep. It can be mild to severe, based on the number of times per hour that you stop breathing or have slowed breathing. Blocked or narrowed airways in your nose, mouth, or throat can cause sleep apnea. Your airway can become blocked when your throat muscles and tongue relax during sleep. Sleep apnea is common, occurring in 1 out of 20 individuals. Individuals having any of the following characteristics should be evaluated and treated right away due to high risk and detrimental consequences from untreated sleep apnea:  1. Obesity  2. Congestive Heart failure  3. Atrial Fibrillation  4. Uncontrolled Hypertension  5. Type II Diabetes  6. Night-time Arrhythmias  7. Stroke  8. Pulmonary Hypertension  9. High-risk Driving Populations (pilots, truck drivers, etc.)  10. Patients Considering Weight-loss Surgery    How do you know you have sleep apnea? You probably have sleep apnea if you answer 'yes' to 3 or more of the following questions:  S - Have you been told that you Snore? T - Are you often Tired during the day? O - Has anyone Observed you stop breathing while sleeping? P- Do you have (or are being treated for) high blood Pressure? B - Are you obese (Body Mass Index > 35)? A - Is your Age 48years old or older? N - Is your Neck size greater than 16 inches? G - Are you male Gender? A sleep physician can prescribe a breathing device that prevents tissues in the throat from blocking your airway.  Or your doctor may recommend using a dental device (oral breathing device) to help keep your airway open. In some cases, surgery may be needed to remove enlarged tissues in the throat. Follow-up care is a key part of your treatment and safety. Be sure to make and go to all appointments, and call your doctor if you are having problems. It's also a good idea to know your test results and keep a list of the medicines you take. How can you care for yourself at home? · Lose weight, if needed. It may reduce the number of times you stop breathing or have slowed breathing. · Go to bed at the same time every night. · Sleep on your side. It may stop mild apnea. If you tend to roll onto your back, sew a pocket in the back of your pajama top. Put a tennis ball into the pocket, and stitch the pocket shut. This will help keep you from sleeping on your back. · Avoid alcohol and medicines such as sleeping pills and sedatives before bed. · Do not smoke. Smoking can make sleep apnea worse. If you need help quitting, talk to your doctor about stop-smoking programs and medicines. These can increase your chances of quitting for good. · Prop up the head of your bed 4 to 6 inches by putting bricks under the legs of the bed. · Treat breathing problems, such as a stuffy nose, caused by a cold or allergies. · Use a continuous positive airway pressure (CPAP) breathing machine if lifestyle changes do not help your apnea and your doctor recommends it. The machine keeps your airway from closing when you sleep. · If CPAP does not help you, ask your doctor whether you should try other breathing machines. A bilevel positive airway pressure machine has two types of air pressureâone for breathing in and one for breathing out. Another device raises or lowers air pressure as needed while you breathe. · If your nose feels dry or bleeds when using one of these machines, talk with your doctor about increasing moisture in the air. A humidifier may help.   · If your nose is runny or stuffy from using a breathing machine, talk with your doctor about using decongestants or a corticosteroid nasal spray. When should you call for help? Watch closely for changes in your health, and be sure to contact your doctor if:  · You still have sleep apnea even though you have made lifestyle changes. · You are thinking of trying a device such as CPAP. · You are having problems using a CPAP or similar machine. Where can you learn more? Go to [employer]. Enter Z784 in the search box to learn more about \"Sleep Apnea: After Your Visit. \"   © 2211-6556 Healthwise, [employer] ([employer]). Care instructions adapted under license by Daquan Sandhu (which disclaims liability or warranty for this information). This care instruction is for use with your licensed healthcare professional. If you have questions about a medical condition or this instruction, always ask your healthcare professional. Dulce Maria Zafar any warranty or liability for your use of this information. PROPER SLEEP HYGIENE    What to avoid  · Do not have drinks with caffeine, such as coffee or black tea, for 8 hours before bed. · Do not smoke or use other types of tobacco near bedtime. Nicotine is a stimulant and can keep you awake. · Avoid drinking alcohol late in the evening, because it can cause you to wake in the middle of the night. · Do not eat a big meal close to bedtime. If you are hungry, eat a light snack. · Do not drink a lot of water close to bedtime, because the need to urinate may wake you up during the night. · Do not read or watch TV in bed. Use the bed only for sleeping and sexual activity. What to try  · Go to bed at the same time every night, and wake up at the same time every morning. Do not take naps during the day. · Keep your bedroom quiet, dark, and cool. · Get regular exercise, but not within 3 to 4 hours of your bedtime. .  · Sleep on a comfortable pillow and mattress.   · If watching the clock makes you anxious, turn it facing away from you so you cannot see the time. · If you worry when you lie down, start a worry book. Well before bedtime, write down your worries, and then set the book and your concerns aside. · Try meditation or other relaxation techniques before you go to bed. · If you cannot fall asleep, get up and go to another room until you feel sleepy. Do something relaxing. Repeat your bedtime routine before you go to bed again. · Make your house quiet and calm about an hour before bedtime. Turn down the lights, turn off the TV, log off the computer, and turn down the volume on music. This can help you relax after a busy day. Drowsy Driving  The [address] [place] [address] Road Traffic Safety Administration cites drowsiness as a causing factor in more than 434,863 police reported crashes annually, resulting in 76,000 injuries and 1,500 deaths. Other surveys suggest 55% of people polled have driven while drowsy in the past year, 23% had fallen asleep but not crashed, 3% crashed, and 2% had and accident due to drowsy driving. Who is at risk? Young Drivers: One study of drowsy driving accidents states that 55% of the drivers were under 25 years. Of those, 75% were male. Shift Workers and Travelers: People who work overnight or travel across time zones frequently are at higher risk of experiencing Circadian Rhythm Disorders. They are trying to work and function when their body is programed to sleep. Sleep Deprived: Lack of sleep has a serious impact on your ability to pay attention or focus on a task. Consistently getting less than the average of 8 hours your body needs creates partial or cumulative sleep deprivation. Untreated Sleep Disorders: Sleep Apnea, Narcolepsy, R.L.S., and other sleep disorders (untreated) prevent a person from getting enough restful sleep. This leads to excessive daytime sleepiness and increases the risk for drowsy driving accidents by up to 7 times.   Medications / Alcohol: Even over the counter medications can cause drowsiness. Medications that impair a drivers attention should have a warning label. Alcohol naturally makes you sleepy and on its own can cause accidents. Combined with excessive drowsiness its effects are amplified. Signs of Drowsy Driving:   * You don't remember driving the last few miles   * You may drift out of your manuel   * You are unable to focus and your thoughts wander   * You may yawn more often than normal   * You have difficulty keeping your eyes open / nodding off   * Missing traffic signs, speeding, or tailgating  Prevention-   Good sleep hygiene, lifestyle and behavioral choices have the most impact on drowsy driving. There is no substitute for sleep and the average person requires 8 hours nightly. If you find yourself driving drowsy, stop and sleep. Consider the sleep hygiene tips provided during your visit as well. Medication Refill Policy: Refills for all medications require 1 week advance notice. Please have your pharmacy fax a refill request. We are unable to fax, or call in \"controled substance\" medications and you will need to pick these prescriptions up from our office. [employer] Activation    Thank you for requesting access to [employer]. Please follow the instructions below to securely access and download your online medical record. [employer] allows you to send messages to your doctor, view your test results, renew your prescriptions, schedule appointments, and more. How Do I Sign Up? 1. In your internet browser, go to [email or web address]. 2. Click on the First Time User? Click Here link in the Sign In box. You will see the New Member Sign Up page. 3. Enter your [employer] Access Code exactly as it appears below. You will not need to use this code after youve completed the sign-up process. If you do not sign up before the expiration date, you must request a new code.     [employer] Access Code: [email or web address]  Expires: 5/12/2018  7:30 PM (This is the date your [employer] access code will )    4. Enter the last four digits of your Social Security Number (xxxx) and Date of Birth (mm/dd/yyyy) as indicated and click Submit. You will be taken to the next sign-up page. 5. Create a [employer] ID. This will be your [employer] login ID and cannot be changed, so think of one that is secure and easy to remember. 6. Create a [employer] password. You can change your password at any time. 7. Enter your Password Reset Question and Answer. This can be used at a later time if you forget your password. 8. Enter your e-mail address. You will receive e-mail notification when new information is available in [address] 9. Click Sign Up. You can now view and download portions of your medical record. 10. Click the Download Summary menu link to download a portable copy of your medical information. Additional Information    If you have questions, please call [phone]. Remember, [employer] is NOT to be used for urgent needs. For medical emergencies, dial 911.

## 2018-02-27 NOTE — PROGRESS NOTES
217 [facility]., [place]. [place], [address]  Tel.  [phone]  Fax. 100 [facility] 60  [place], [address] [facility]  Tel.  [phone]  Fax. [phone] [facility]5 [facility] 3 Shannan Machado   Tel.  [phone]  Fax. [phone]         Subjective:      Stella Aleman is an 23 y.o. female referred for evaluation for a sleep disorder. She complains of snoring, snorting associated with excessive daytime sleepiness. Symptoms began a few years ago, gradually worsening since that time. She usually can fall asleep in variable minutes. Family or house members note snoring, choking, periods of not breathing. She denies falling asleep while driving. Stella Aleman does wake up frequently at night. She is bothered by waking up too early and left unable to get back to sleep. She actually sleeps about 4 (between 4-5) hours at night and wakes up about 1 (between 1-3) times during the night. She   work shifts:  .   Kylie Hill indicates she does get too little sleep at night. Her bedtime is 0500. She awakens at 1500. She does take naps. She takes 3 naps a week lasting 2, Hour(s), 45 min to an hour, Minute(s). She has the following observed behaviors: Sitting up in bed while still asleep;  . Other remarks: snoring, palpitations on waking. Headaches on waking  She used to be a , currently unemployed  [place] Sleepiness Score: 12   which reflect mild daytime drowsiness. No Known Allergies      Current Outpatient Prescriptions:     chlorthalidone (HYGROTEN) 25 mg tablet, Take 0.5 Tabs by mouth daily. , Disp: 30 Tab, Rfl: 3    potassium chloride (KLOR-CON) 10 mEq tablet, Take 1 Tab by mouth daily. , Disp: 30 Tab, Rfl: 6    pantoprazole (PROTONIX) 40 mg tablet, Take 1 Tab by mouth daily. , Disp: 30 Tab, Rfl: 0    ondansetron (ZOFRAN ODT) 4 mg disintegrating tablet, Take 1 Tab by mouth every eight (8) hours as needed for Nausea., Disp: 10 Tab, Rfl: 0    ketorolac (TORADOL) 10 mg tablet, Take 1 Tab by mouth every six (6) hours as needed for Pain., Disp: 10 Tab, Rfl: 0     She  has a past medical history of Kidney stones and Morbid obesity ([address] She  has a past surgical history that includes hx urological and hx heart catheterization. She family history includes Cancer in her maternal grandmother and paternal grandfather; Heart Disease in her paternal grandmother; Heart Disease (age of onset: 55) in her father; Hypertension in her father; No Known Problems in her sister; Sleep Apnea in her father. She  reports that she quit smoking about 6 weeks ago. She has never used smokeless tobacco. She reports that she does not drink alcohol or use illicit drugs. Review of Systems:  Constitutional:  +weight gain. Eyes:  No blurred vision. CVS:  No significant chest pain  Pulm:  No significant shortness of breath  GI:  No significant nausea or vomiting  :  No significant nocturia  Musculoskeletal:  No significant joint pain at night  Skin:  No significant rashes  Neuro:  No significant dizziness   Psych:  No active mood issues    Sleep Review of Systems: notable for + difficulty falling asleep; +frequent awakenings at night;  regular dreaming noted; no nightmares ; + early morning headaches; no memory problems; no concentration issues; no history of any automobile or occupational accidents due to daytime drowsiness. Objective:     Visit Vitals    /81    Pulse 83    Ht 5' 5\" (1.651 m)    Wt 300 lb (136.1 kg)    SpO2 99%    BMI 49.92 kg/m2         General:   Not in acute distress   Eyes:  Anicteric sclerae, no obvious strabismus   Nose:  No obvious nasal septum deviation    Oropharynx:   Class 3 oropharyngeal outlet, thick tongue base, enlarged and boggy uvula, low-lying soft palate, narrow tonsilo-pharyngeal pilars   Tonsils:   tonsils are present and normal   Neck:   Neck circ.  in \"inches\": 18.25; midline trachea   Chest/Lungs:  Equal lung expansion, clear on auscultation    CVS:  Normal rate, regular rhythm; no JVD   Skin:  Warm to touch; no obvious rashes   Neuro:  No focal deficits ; no obvious tremor    Psych:  Normal affect,  normal countenance;          Assessment:       ICD-10-CM ICD-9-CM    1. Obstructive sleep apnea syndrome G47.33 327.23 SLEEP STUDY UNATTENDED, 4 CHANNEL         Plan:     * The patient currently has a high Risk for having sleep apnea. STOP-BANG score 6.  * PSG was ordered for initial evaluation. I have reviewed the different types of sleep studies. Attended sleep studies and home sleep apnea tests. Home sleep testing tests only for the presence and severity of sleep apnea. she understands that if the HSAT does not provide reliable result(such as poor data/failed HSAT recording), she may have to repeat the HSAT or come in for an attended polysomnogram.   * She was provided information on sleep apnea including coresponding risk factors and the importance of proper treatment. * Counseling was provided regarding proper sleep hygiene and safe driving. * Treatment options for sleep apnea were reviewed. The treatment plan was reviewed with the patient in detail and reviewed with the patient and the lead technologist. she understands that the lead technologist will be calling her  with the results and assisting with the next step in the treatment plan as outlined today during the consultation with me. All of her questions were addressed. she is not against a trial of PAP if found to have significant sleep apnea. 2. Hypertension - she continues on her current regimen. I have reviewed the relationship between hypertension as it relates to sleep-disordered breathing. 3. Obesity - I have counseled the patient regarding the benefits of weight loss. I have reviewed/discussed the above normal BMI with the patient. I have recommended the following interventions: dietary management education, guidance, and counseling . Dada Santos       Thank you for allowing us to participate in your patient's medical care. We'll keep you updated on these investigations.     Jennifer Liz MD  Diplomate in Sleep Medicine  [facility]

## 2018-02-28 ENCOUNTER — DOCUMENTATION ONLY (OUTPATIENT)
Dept: SLEEP MEDICINE | Age: 23
End: 2018-02-28

## 2018-03-01 ENCOUNTER — TELEPHONE (OUTPATIENT)
Dept: SLEEP MEDICINE | Age: 23
End: 2018-03-01

## 2018-03-01 NOTE — TELEPHONE ENCOUNTER
[facility]    Date of Study: 2/27/2018    The following information was gathered from the patients study log:    · Lights off: 10:30 pm  · Estimated sleep onset: 11:00 pm    · Awakened a total of 3 times  · The patient felt they slept 5 hours  · Patient took no medication before starting the test      Further information provided: I wake up coughing 2-3 times last night, and with dry mouth. My sister says I mouth breath.

## 2018-03-06 ENCOUNTER — TELEPHONE (OUTPATIENT)
Dept: SLEEP MEDICINE | Age: 23
End: 2018-03-06

## 2018-03-06 ENCOUNTER — DOCUMENTATION ONLY (OUTPATIENT)
Dept: SLEEP MEDICINE | Age: 23
End: 2018-03-06

## 2018-03-06 DIAGNOSIS — G47.33 OBSTRUCTIVE SLEEP APNEA SYNDROME: Primary | ICD-10-CM

## 2018-03-06 NOTE — TELEPHONE ENCOUNTER
Results of sleep study in [employer]  Lead tech to convey results to patient  HS AT results in [employer]. Test positive for significant sleep apnea. AHI 14/hour and lowest oxygen saturation was 84%. We had discussed treatment options at initial consultation. Based on the results of the home sleep apnea test, I believe a trial of APAP would be an effective mode of therapy. APAP order attached. she should be seen in the sleep disorder center 4-6 weeks after initiating PAP therapy. The APAP will have modem access so she can call the sleep center if she  has questions/concerns regarding the initial PAP settings. Front staff to Order PAP and call patient and let them know which DME company they should be hearing from after results reviewed with lead support technologist.   Schedule for first adherence visit in 6 weeks.

## 2018-03-06 NOTE — PROGRESS NOTES
This note is being routed to Dr. Meka Batres. Sleep Medicine consult note and sleep study report in patient's chart for review.     Thank you for the referral.

## 2018-03-07 ENCOUNTER — DOCUMENTATION ONLY (OUTPATIENT)
Dept: SLEEP MEDICINE | Age: 23
End: 2018-03-07

## 2018-03-07 ENCOUNTER — OFFICE VISIT (OUTPATIENT)
Dept: FAMILY MEDICINE CLINIC | Age: 23
End: 2018-03-07

## 2018-03-07 VITALS
WEIGHT: 293 LBS | RESPIRATION RATE: 16 BRPM | OXYGEN SATURATION: 99 % | TEMPERATURE: 98.2 F | BODY MASS INDEX: 48.82 KG/M2 | HEART RATE: 98 BPM | DIASTOLIC BLOOD PRESSURE: 80 MMHG | SYSTOLIC BLOOD PRESSURE: 137 MMHG | HEIGHT: 65 IN

## 2018-03-07 DIAGNOSIS — R11.0 NAUSEA: ICD-10-CM

## 2018-03-07 DIAGNOSIS — R10.11 RUQ ABDOMINAL PAIN: ICD-10-CM

## 2018-03-07 DIAGNOSIS — F32.2 SEVERE DEPRESSION (HCC): Primary | ICD-10-CM

## 2018-03-07 DIAGNOSIS — G47.30 SLEEP APNEA, UNSPECIFIED TYPE: ICD-10-CM

## 2018-03-07 RX ORDER — ONDANSETRON 4 MG/1
4 TABLET, ORALLY DISINTEGRATING ORAL
Qty: 30 TAB | Refills: 0 | Status: SHIPPED | OUTPATIENT
Start: 2018-03-07 | End: 2018-03-07 | Stop reason: SDUPTHER

## 2018-03-07 RX ORDER — ESCITALOPRAM OXALATE 10 MG/1
10 TABLET ORAL DAILY
Qty: 30 TAB | Refills: 1 | Status: SHIPPED | OUTPATIENT
Start: 2018-03-07 | End: 2018-05-14 | Stop reason: SDUPTHER

## 2018-03-07 RX ORDER — ONDANSETRON 4 MG/1
4 TABLET, ORALLY DISINTEGRATING ORAL
Qty: 10 TAB | Refills: 0 | Status: SHIPPED | OUTPATIENT
Start: 2018-03-07 | End: 2018-03-20

## 2018-03-07 NOTE — PATIENT INSTRUCTIONS
If the situation destabilizes and the patient feels unsafe, they should go to the Emergency Room. Consider counseling with Dr. Agustina Esquivel #[phone]    If needed, consider Maira Orta at #[phone] or [phone] after hours    The above plan was reviewed with and a verbal contract not to harm self or others was spoken by the patient. [address] [phone] [address] [address] [phone]   [address] [phone]    [facility] [phone]         HIDA Scan: About This Test  What is it? A HIDA scan is an imaging test that checks how your gallbladder is working. The gallbladder is a small sac under your liver. It stores bile, a fluid that helps your body digest fats. If there are problems with the gallbladder, such as gallstones, the gallbladder may not store or empty bile properly. During a HIDA scan, a camera takes pictures of your gallbladder after a radioactive tracer is injected into a vein in your arm. The tracer travels through your liver, gallbladder, bile ducts, and small intestine. The camera takes a series of pictures of the tracer as it moves along. Your doctor can use these pictures to look for leaks, blockages, or any other problems. Why is this test done? The HIDA scan may be done to:  · Help find the cause of pain in the upper belly, especially if the pain is on the right side. · Find out if bile is leaking. · Find anything that may be blocking the bile ducts. A HIDA scan is sometimes done if an earlier ultrasound test did not give enough information. How can you prepare for the test?  · Before the HIDA scan, tell your doctor if:  Genevie Primrose You are or might be pregnant. ¨ You are breastfeeding.  Do not breastfeed your baby for 2 days after this test. During this time, you can give your baby breast milk you stored before the test, or you can give formula. Discard the breast milk you pump for 2 days after the test.  Padmaja Mendoza taking certain medicines like morphine for pain. ¨ Within the past 4 days, you've had an X-ray test that used barium. · The doctor may tell you not to eat or drink anything but water for 4 to 6 hours before the test. Follow all instructions carefully. If you haven't eaten for more than 24 hours before the test, tell your doctor. What happens during the test?  · You will remove any clothing around your belly. You will be given a gown or paper covering to use during the test.  · You will lie on your back on a table. · A thin tube, call an IV, will be put into a vein in your arm. · A radioactive tracer chemical will be injected into the IV. A medicine that stimulates your gallbladder may also be injected. · The scanning camera will be placed close over your belly. · A picture will be taken right away. The whole scan may last up to 60 minutes as the tracer passes through your liver and into your gallbladder and small intestine. Several more pictures, each lasting a few minutes, may be taken over the next 2 to 4 hours. Each picture will take only a few minutes, but you will have to lie still for the whole test.  What else should you know about the test?  · The HIDA scan itself is painless, but you may feel a brief sting or pinch as the IV is placed in your arm. · You may feel a brief pain in your belly as the medicine that stimulates your gallbladder starts to work. · The amount of radiation in the tracer chemical is very small. It is generally not harmful to health, and it's not a risk to people who touch you after the test. But there is a slight risk of damage to cells or tissue from being exposed to any radiation. The camera itself does not produce any radiation. What happens after the test?  · You will probably be able to go home right away.   · Most of the tracer will leave your body within 24 hours through your urine and stool. When you go to the bathroom during that time, be sure to flush the toilet and wash your hands well with soap and water. · Your doctor will discuss the results of the test with you. · You can go back to your usual activities right away. · If you are breastfeeding, you will need to use saved breast milk or formula for 2 days after the test. This is so you won't pass the tracer to your baby. Follow-up care is a key part of your treatment and safety. Be sure to make and go to all appointments, and call your doctor if you are having problems. It's also a good idea to keep a list of the medicines you take. Ask your doctor when you can expect to have your test results. Where can you learn more? Go to http://maryann-jm.info/. Enter F573 in the search box to learn more about \"HIDA Scan: About This Test.\"  Current as of: October 14, 2016  Content Version: 11.4  © 4170-1051 [employer]. Care instructions adapted under license by [employer] (which disclaims liability or warranty for this information). If you have questions about a medical condition or this instruction, always ask your healthcare professional. [place] [address] any warranty or liability for your use of this information. Abdominal Pain: Care Instructions  Your Care Instructions    Abdominal pain has many possible causes. Some aren't serious and get better on their own in a few days. Others need more testing and treatment. If your pain continues or gets worse, you need to be rechecked and may need more tests to find out what is wrong. You may need surgery to correct the problem. Don't ignore new symptoms, such as fever, nausea and vomiting, urination problems, pain that gets worse, and dizziness. These may be signs of a more serious problem. Your doctor may have recommended a follow-up visit in the next 8 to 12 hours.  If you are not getting better, you may need more tests or treatment. The doctor has checked you carefully, but problems can develop later. If you notice any problems or new symptoms, get medical treatment right away. Follow-up care is a key part of your treatment and safety. Be sure to make and go to all appointments, and call your doctor if you are having problems. It's also a good idea to know your test results and keep a list of the medicines you take. How can you care for yourself at home? · Rest until you feel better. · To prevent dehydration, drink plenty of fluids, enough so that your urine is light yellow or clear like water. Choose water and other caffeine-free clear liquids until you feel better. If you have kidney, heart, or liver disease and have to limit fluids, talk with your doctor before you increase the amount of fluids you drink. · If your stomach is upset, eat mild foods, such as rice, dry toast or crackers, bananas, and applesauce. Try eating several small meals instead of two or three large ones. · Wait until 48 hours after all symptoms have gone away before you have spicy foods, alcohol, and drinks that contain caffeine. · Do not eat foods that are high in fat. · Avoid anti-inflammatory medicines such as aspirin, ibuprofen (Advil, Motrin), and naproxen (Aleve). These can cause stomach upset. Talk to your doctor if you take daily aspirin for another health problem. When should you call for help? Call 911 anytime you think you may need emergency care. For example, call if:  ? · You passed out (lost consciousness). ? · You pass maroon or very bloody stools. ? · You vomit blood or what looks like coffee grounds. ? · You have new, severe belly pain. ?Call your doctor now or seek immediate medical care if:  ? · Your pain gets worse, especially if it becomes focused in one area of your belly. ? · You have a new or higher fever. ? · Your stools are black and look like tar, or they have streaks of blood.    ? · You have unexpected vaginal bleeding. ? · You have symptoms of a urinary tract infection. These may include:  ¨ Pain when you urinate. ¨ Urinating more often than usual.  ¨ Blood in your urine. ? · You are dizzy or lightheaded, or you feel like you may faint. ? Watch closely for changes in your health, and be sure to contact your doctor if:  ? · You are not getting better after 1 day (24 hours). Where can you learn more? Go to http://maryann-jm.info/. Enter T687 in the search box to learn more about \"Abdominal Pain: Care Instructions. \"  Current as of: March 20, 2017  Content Version: 11.4  © 1871-4338 [employer]. Care instructions adapted under license by [employer] (which disclaims liability or warranty for this information). If you have questions about a medical condition or this instruction, always ask your healthcare professional. [place] [address] any warranty or liability for your use of this information. Recovering From Depression: Care Instructions  Your Care Instructions    Taking good care of yourself is important as you recover from depression. In time, your symptoms will fade as your treatment takes hold. Do not give up. Instead, focus your energy on getting better. Your mood will improve. It just takes some time. Focus on things that can help you feel better, such as being with friends and family, eating well, and getting enough rest. But take things slowly. Do not do too much too soon. You will begin to feel better gradually. Follow-up care is a key part of your treatment and safety. Be sure to make and go to all appointments, and call your doctor if you are having problems. It's also a good idea to know your test results and keep a list of the medicines you take. How can you care for yourself at home?   Be realistic  · If you have a large task to do, break it up into smaller steps you can handle, and just do what you can.  · You may want to put off important decisions until your depression has lifted. If you have plans that will have a major impact on your life, such as marriage, divorce, or a job change, try to wait a bit. Talk it over with friends and loved ones who can help you look at the overall picture first.  · Reaching out to people for help is important. Do not isolate yourself. Let your family and friends help you. Find someone you can trust and confide in, and talk to that person. · Be patient, and be kind to yourself. Remember that depression is not your fault and is not something you can overcome with willpower alone. Treatment is necessary for depression, just like for any other illness. Feeling better takes time, and your mood will improve little by little. Stay active  · Stay busy and get outside. Take a walk, or try some other light exercise. · Talk with your doctor about an exercise program. Exercise can help with mild depression. · Go to a movie or concert. Take part in a [employer] activity or other social gathering. Go to a ball game. · Ask a friend to have dinner with you. Take care of yourself  · Eat a balanced diet with plenty of fresh fruits and vegetables, whole grains, and lean protein. If you have lost your appetite, eat small snacks rather than large meals. · Avoid drinking alcohol or using illegal drugs. Do not take medicines that have not been prescribed for you. They may interfere with medicines you may be taking for depression, or they may make your depression worse. · Take your medicines exactly as they are prescribed. You may start to feel better within 1 to 3 weeks of taking antidepressant medicine. But it can take as many as 6 to 8 weeks to see more improvement. If you have questions or concerns about your medicines, or if you do not notice any improvement by 3 weeks, talk to your doctor. · If you have any side effects from your medicine, tell your doctor.  Antidepressants can make you feel tired, dizzy, or nervous. Some people have dry mouth, constipation, headaches, sexual problems, or diarrhea. Many of these side effects are mild and will go away on their own after you have been taking the medicine for a few weeks. Some may last longer. Talk to your doctor if side effects are bothering you too much. You might be able to try a different medicine. · Get enough sleep. If you have problems sleeping:  ¨ Go to bed at the same time every night, and get up at the same time every morning. ¨ Keep your bedroom dark and quiet. ¨ Do not exercise after 5:00 p.m. ¨ Avoid drinks with caffeine after 5:00 p.m. · Avoid sleeping pills unless they are prescribed by the doctor treating your depression. Sleeping pills may make you groggy during the day, and they may interact with other medicine you are taking. · If you have any other illnesses, such as diabetes, heart disease, or high blood pressure, make sure to continue with your treatment. Tell your doctor about all of the medicines you take, including those with or without a prescription. · Keep the numbers for these national suicide hotlines: 0-623-019-TALK (4-403.681.6421) and 8-990-BEQFLKF (6-901.464.2298). If you or someone you know talks about suicide or feeling hopeless, get help right away. When should you call for help? Call 911 anytime you think you may need emergency care. For example, call if:  ? · You feel like hurting yourself or someone else. ? · Someone you know has depression and is about to attempt or is attempting suicide. ?Call your doctor now or seek immediate medical care if:  ? · You hear voices. ? · Someone you know has depression and:  ¨ Starts to give away his or her possessions. ¨ Uses illegal drugs or drinks alcohol heavily. ¨ Talks or writes about death, including writing suicide notes or talking about guns, knives, or pills. ¨ Starts to spend a lot of time alone. ¨ Acts very aggressively or suddenly appears calm. ?Watch closely for changes in your health, and be sure to contact your doctor if:  ? · You do not get better as expected. Where can you learn more? Go to http://maryann-jm.info/. Enter J061 in the search box to learn more about \"Recovering From Depression: Care Instructions. \"  Current as of: May 12, 2017  Content Version: 11.4  © 2384-7481 [employer]. Care instructions adapted under license by [employer] (which disclaims liability or warranty for this information). If you have questions about a medical condition or this instruction, always ask your healthcare professional. [address] any warranty or liability for your use of this information.

## 2018-03-07 NOTE — PROGRESS NOTES
Jennifer Dash is an 23 y.o. female who presents for evaluation of depression and follow-up on abdominal pain. Depression:  Patient never diagnosed with depression. Pt states that ever since she went to ED 2/3 everything has changed. Her abdominal/flank pain is unchanged with PPI. Ongoing symptoms include: depressed mood, fatigue, difficulty concentrating, anhedonia, recurrent thoughts and attempt to harm herself. For 4 wks now. Has attempt to harm herself by overdosing on medications and cutting herself at the wrist. Pt states she continues to have thoughts. No guns or weapons at home. Reports that it is very difficult to find a job. Is not seeing a Counsellor. Patient had ran out of money and needed to move in back with mother, father, and sister. This has been stressful for her. Mother want to help pt but her advice only frustrates rather help pt. Pt's sister also makes pt upset.      Abdominal pain:  Pain is in the RUQ. Last visit we discussed imaging to rule out gallbladder causes. Pt declined 2/2 to financial issues. Plan was to attempt PPI and follow-up. She states that pain is unchanged. However she had not modified diet. Did not keep a food diary to help associate pain with certain food. Diet is noted to be high in junk/fast food and microwaved foods. She states she overeats around 10 pm to morning time. Then during the day she does not eat as she is sleeping. Nauseous at times. No V/D. No blood in stool. UPT negative last visit, not sexually active since last visit. Sleep apnea:  Dx by sleep medicine last week. Pt contacted yesterday to let her know that they will be getting her a machine. Currently using her father's CPAP to help her sleep at times in the morning. Sleeps during the day. No exercise. No attempt to lose weight.     She has hx of irregular menses since 2016 when she was 26 yo, last LMP 2017. Sexually active. No birth control.  First menses at 17 yo.      No exercise. Diet not balanced more fast food.     Hx of marijuana use. Pt states that she used to use daily. But quit at the start of year as she moved with her parents. Marijuana made abdominal pain worse.     Hx of smoking. Used to smoke cigars. Quit at the start of this year.     No EtOH use. Allergies - reviewed:   No Known Allergies      Medications - reviewed:   Current Outpatient Prescriptions   Medication Sig    escitalopram oxalate (LEXAPRO) 10 mg tablet Take 1 Tab by mouth daily.  ondansetron (ZOFRAN ODT) 4 mg disintegrating tablet Take 1 Tab by mouth every eight (8) hours as needed for Nausea.  chlorthalidone (HYGROTEN) 25 mg tablet Take 0.5 Tabs by mouth daily.  potassium chloride (KLOR-CON) 10 mEq tablet Take 1 Tab by mouth daily.  pantoprazole (PROTONIX) 40 mg tablet Take 1 Tab by mouth daily.  ketorolac (TORADOL) 10 mg tablet Take 1 Tab by mouth every six (6) hours as needed for Pain. No current facility-administered medications for this visit.           Past Medical History - reviewed:  Past Medical History:   Diagnosis Date    Kidney stones     Morbid obesity ([facility] [address]          Past Surgical History - reviewed:   Past Surgical History:   Procedure Laterality Date    HX HEART CATHETERIZATION      HX UROLOGICAL      Stenting x 4 (Starting in 2016, developed severe infection)         Social History - reviewed:  Social History     Social History    Marital status: SINGLE     Spouse name: N/A    Number of children: N/A    Years of education: N/A     Occupational History          started in 3/17     Social History Main Topics    Smoking status: Former Smoker     Quit date: 1/16/2018    Smokeless tobacco: Never Used      Comment: 1/4 pack ppd for 6 months    Alcohol use No      Comment: rarely    Drug use: No      Comment: Has done [employer] Sexual activity: No     Other Topics Concern    Not on file     Social History Narrative         Family History - reviewed:  Family History   Problem Relation Age of Onset    Heart Disease Father 55    Hypertension Father     Sleep Apnea Father     No Known Problems Sister     Cancer Maternal Grandmother      Brain Cancer    Heart Disease Paternal Grandmother     Cancer Paternal Grandfather          Immunizations - reviewed:   Immunization History   Administered Date(s) Administered    Influenza Vaccine (Quad) PF 11/16/2017       ROS  General/Constitutional:   No headache, fever, fatigue    Neck:   No swelling, masses, stiffness, pain, or limited movement     Cardiac:    No chest pain      Respiratory:   No cough or shortness of breath     GI: abdominal pain. No nausea/vomiting, diarrhea, bloody or dark stools       :   No dysuria or  hematuria    Neurological:   No loss of consciousness, dizziness, seizures, dysarthria, cognitive changes, memory changes,  problems with balance, or unilateral weakness       Physical Exam  Visit Vitals    /80    Pulse 98    Temp 98.2 °F (36.8 °C) (Oral)    Resp 16    Ht 5' 5\" (1.651 m)    Wt 301 lb (136.5 kg)    SpO2 99%    BMI 50.09 kg/m2       Constitutional: She appears well-developed and well-nourished. No distress. HENT:   Head: Normocephalic and atraumatic. Eyes: Conjunctivae are normal.   Neck: Normal range of motion. Neck supple. Cardiovascular: Normal rate, regular rhythm, normal heart sounds and intact distal pulses.  Exam reveals no gallop and no friction rub.    No murmur heard. No tenderness to palpation in the chest.   Pulmonary/Chest: Effort normal and breath sounds normal. No respiratory distress. She has no wheezes. She has no rales. Abdominal: Soft. Bowel sounds are normal. She exhibits no distension and no mass. There is no rebound and no guarding. Tenderness on palpation right upper quadrant.   Musculoskeletal: She exhibits no edema. No CVA tenderness. Psychiatric: She has a normal mood and affect.  Her behavior is normal. Judgment and thought content normal.   Nursing note and vitals reviewed. PHQ over the last two weeks 3/7/2018   Little interest or pleasure in doing things Several days   Feeling down, depressed or hopeless Nearly every day   Total Score PHQ 2 4   Trouble falling or staying asleep, or sleeping too much Nearly every day   Feeling tired or having little energy Nearly every day   Poor appetite or overeating Nearly every day   Feeling bad about yourself - or that you are a failure or have let yourself or your family down Nearly every day   Trouble concentrating on things such as school, work, reading or watching TV Nearly every day   Moving or speaking so slowly that other people could have noticed; or the opposite being so fidgety that others notice Nearly every day   Thoughts of being better off dead, or hurting yourself in some way Several days   PHQ 9 Score 23   How difficult have these problems made it for you to do your work, take care of your home and get along with others Very difficult     Assessment/Plan    ICD-10-CM ICD-9-CM    1. Severe depression (HCC) F32.2 311 escitalopram oxalate (LEXAPRO) 10 mg tablet   2. Nausea R11.0 787.02 ondansetron (ZOFRAN ODT) 4 mg disintegrating tablet      DISCONTINUED: ondansetron (ZOFRAN ODT) 4 mg disintegrating tablet   3. RUQ abdominal pain R10.11 789.01 NM HEPATOBILIARY DUCT SCAN   4. BMI 50.0-59.9, adult ([facility] [address] Z68.43 V85.43    5. Sleep apnea, unspecified type G47.30 780.57      Depression: Noted by symptoms. Will start Lexapro. Precautions given. Encouraged patient to follow up with [address] [place] [address]. Referred to [facility], pt stated she will go tomorrow. F/u  in 2-3 weeks , consider adjusting dose based on response. Pt to work on resetting sleep schedule. Suicidal ideation: Pt states she does not have thoughts of hurting self now. No weapons at home. Talked to crisis ([phone]) prior to pt leaving clinic.  Crisis suggested pt follow up with [place] [facility] ([phone]). Has pt call [facility]. Pt is aware that their service is walk in. Verbally states that she will go tomorrow. Gave pt the suicide hotline. Pt seen by attending prior to sending  pt home. Abdominal pain: No acute abd on exam. HIDA scan ordered. Ordered Zofran PRN. UPT negative last visit. Precautions given. Pt understands when to seek medical attention. Sleep apnea: Pt followed by sleep medicine. Discussed sleep hygiene.      I have reviewed/discussed the above normal BMI with the patient. I have recommended the following interventions: dietary management education, guidance, and counseling, encourage exercise and monitor weight . Follow-up Disposition:  Return in about 2 weeks (around 3/21/2018) for depression and weight check. I have discussed the diagnosis with the patient and the intended plan as seen in the above orders. Patient verbalized understanding of the plan and agrees with the plan. The patient has received an after-visit summary and questions were answered concerning future plans. I have discussed medication side effects and warnings with the patient as well. Informed patient to return to the office if new symptoms arise.         Fely Gastelum DO  Family Medicine Resident

## 2018-03-07 NOTE — MR AVS SNAPSHOT
[address] 
[phone] Patient: Asad Partida MRN: [id number] :1995 Visit Information Date & Time Provider Department Dept. Phone Encounter #  
 3/7/2018 11:15 AM Cinda Smith  [address] [facility] [phone] [id number] Follow-up Instructions Return in about 1 week (around 3/14/2018). Upcoming Health Maintenance Date Due Hepatitis A Peds Age 1-18 (1 of 2 - Standard Series) 3/19/1998 DTaP/Tdap/Td series (1 - Tdap) 3/19/2004 HPV AGE 9Y-26Y (1 of 3 - Female 3 Dose Series) 3/19/2008 Allergies as of 3/7/2018  Review Complete On: 2018 By: Theresa Boast, MD  
 No Known Allergies Current Immunizations  Reviewed on 2017 Name Date Influenza Vaccine (Quad) PF 2017 Not reviewed this visit You Were Diagnosed With   
  
 Codes Comments [facility]    -  Primary ICD-10-CM: F32.2 ICD-9-CM: 048 Nausea     ICD-10-CM: R11.0 ICD-9-CM: 787.02   
 RUQ abdominal pain     ICD-10-CM: R10.11 ICD-9-CM: 789.01 Vitals BP Pulse Temp Resp Height(growth percentile) Weight(growth percentile) 137/80 98 98.2 °F (36.8 °C) (Oral) 16 5' 5\" (1.651 m) 301 lb (136.5 kg) SpO2 BMI OB Status Smoking Status 99% 50.09 kg/m2 Unknown Former Smoker Vitals History BMI and BSA Data Body Mass Index Body Surface Area 50.09 kg/m 2 2.5 m 2 Preferred Pharmacy Pharmacy Name Phone [address] [facility], [address] [facility] [address] [facility] MARLON Garcia [facility] [phone] Your Updated Medication List  
  
   
This list is accurate as of 3/7/18 12:28 PM.  Always use your most recent med list.  
  
  
  
  
 chlorthalidone 25 mg tablet Commonly known as:  Royanne Royalty Take 0.5 Tabs by mouth daily. escitalopram oxalate 10 mg tablet Commonly known as:  [place] Reyna Take 1 Tab by mouth daily. ketorolac 10 mg tablet Commonly known as:  TORADOL Take 1 Tab by mouth every six (6) hours as needed for Pain. ondansetron 4 mg disintegrating tablet Commonly known as:  ZOFRAN ODT Take 1 Tab by mouth every eight (8) hours as needed for Nausea. pantoprazole 40 mg tablet Commonly known as:  PROTONIX Take 1 Tab by mouth daily. potassium chloride 10 mEq tablet Commonly known as:  KLOR-CON Take 1 Tab by mouth daily. Prescriptions Printed Refills  
 escitalopram oxalate (LEXAPRO) 10 mg tablet 1 Sig: Take 1 Tab by mouth daily. Class: Print Route: Oral  
 ondansetron (ZOFRAN ODT) 4 mg disintegrating tablet 0 Sig: Take 1 Tab by mouth every eight (8) hours as needed for Nausea. Class: Print Route: Oral  
  
Follow-up Instructions Return in about 1 week (around 3/14/2018). To-Do List   
 03/07/2018 Imaging:  NM HEPATOBILIARY DUCT SCAN Patient Instructions If the situation destabilizes and the patient feels unsafe, they should go to the Emergency Room. Consider counseling with Dr. Bernarda Tobin #[phone] If needed, consider [facility] at #[phone] or [phone] after hours The above plan was reviewed with and a verbal contract not to harm self or others was spoken by the patient. [facility] [phone] [address] [place] [address] [phone] [place] [address] [phone] [facility] [address]- [phone] [email or web address]- [phone] 7 [facility] [phone] [facility] [phone] [facility] [phone] HIDA Scan: About This Test 
What is it? A HIDA scan is an imaging test that checks how your gallbladder is working. The gallbladder is a small sac under your liver. It stores bile, a fluid that helps your body digest fats.  If there are problems with the gallbladder, such as gallstones, the gallbladder may not store or empty bile properly. During a HIDA scan, a camera takes pictures of your gallbladder after a radioactive tracer is injected into a vein in your arm. The tracer travels through your liver, gallbladder, bile ducts, and small intestine. The camera takes a series of pictures of the tracer as it moves along. Your doctor can use these pictures to look for leaks, blockages, or any other problems. Why is this test done? The HIDA scan may be done to: 
· Help find the cause of pain in the upper belly, especially if the pain is on the right side. · Find out if bile is leaking. · Find anything that may be blocking the bile ducts. A HIDA scan is sometimes done if an earlier ultrasound test did not give enough information. How can you prepare for the test? 
· Before the HIDA scan, tell your doctor if: 
Rohini Whittington You are or might be pregnant. ¨ You are breastfeeding. Do not breastfeed your baby for 2 days after this test. During this time, you can give your baby breast milk you stored before the test, or you can give formula. Discard the breast milk you pump for 2 days after the test. 
Beena Hollins taking certain medicines like morphine for pain. ¨ Within the past 4 days, you've had an X-ray test that used barium. · The doctor may tell you not to eat or drink anything but water for 4 to 6 hours before the test. Follow all instructions carefully. If you haven't eaten for more than 24 hours before the test, tell your doctor. What happens during the test? 
· You will remove any clothing around your belly. You will be given a gown or paper covering to use during the test. 
· You will lie on your back on a table. · A thin tube, call an IV, will be put into a vein in your arm. · A radioactive tracer chemical will be injected into the IV. A medicine that stimulates your gallbladder may also be injected. · The scanning camera will be placed close over your belly. · A picture will be taken right away.  The whole scan may last up to 61 minutes as the tracer passes through your liver and into your gallbladder and small intestine. Several more pictures, each lasting a few minutes, may be taken over the next 2 to 4 hours. Each picture will take only a few minutes, but you will have to lie still for the whole test. 
What else should you know about the test? 
· The HIDA scan itself is painless, but you may feel a brief sting or pinch as the IV is placed in your arm. · You may feel a brief pain in your belly as the medicine that stimulates your gallbladder starts to work. · The amount of radiation in the tracer chemical is very small. It is generally not harmful to health, and it's not a risk to people who touch you after the test. But there is a slight risk of damage to cells or tissue from being exposed to any radiation. The camera itself does not produce any radiation. What happens after the test? 
· You will probably be able to go home right away. · Most of the tracer will leave your body within 24 hours through your urine and stool. When you go to the bathroom during that time, be sure to flush the toilet and wash your hands well with soap and water. · Your doctor will discuss the results of the test with you. · You can go back to your usual activities right away. · If you are breastfeeding, you will need to use saved breast milk or formula for 2 days after the test. This is so you won't pass the tracer to your baby. Follow-up care is a key part of your treatment and safety. Be sure to make and go to all appointments, and call your doctor if you are having problems. It's also a good idea to keep a list of the medicines you take. Ask your doctor when you can expect to have your test results. Where can you learn more? Go to http://maryann-jm.info/. Enter F573 in the search box to learn more about \"HIDA Scan: About This Test.\" Current as of: October 14, 2016 Content Version: 11.4 © 6011-7065 Voxa. Care instructions adapted under license by DeliRadio (which disclaims liability or warranty for this information). If you have questions about a medical condition or this instruction, always ask your healthcare professional. [address] any warranty or liability for your use of this information. Abdominal Pain: Care Instructions Your Care Instructions Abdominal pain has many possible causes. Some aren't serious and get better on their own in a few days. Others need more testing and treatment. If your pain continues or gets worse, you need to be rechecked and may need more tests to find out what is wrong. You may need surgery to correct the problem. Don't ignore new symptoms, such as fever, nausea and vomiting, urination problems, pain that gets worse, and dizziness. These may be signs of a more serious problem. Your doctor may have recommended a follow-up visit in the next 8 to 12 hours. If you are not getting better, you may need more tests or treatment. The doctor has checked you carefully, but problems can develop later. If you notice any problems or new symptoms, get medical treatment right away. Follow-up care is a key part of your treatment and safety. Be sure to make and go to all appointments, and call your doctor if you are having problems. It's also a good idea to know your test results and keep a list of the medicines you take. How can you care for yourself at home? · Rest until you feel better. · To prevent dehydration, drink plenty of fluids, enough so that your urine is light yellow or clear like water. Choose water and other caffeine-free clear liquids until you feel better. If you have kidney, heart, or liver disease and have to limit fluids, talk with your doctor before you increase the amount of fluids you drink.  
· If your stomach is upset, eat mild foods, such as rice, dry toast or crackers, bananas, and applesauce. Try eating several small meals instead of two or three large ones. · Wait until 48 hours after all symptoms have gone away before you have spicy foods, alcohol, and drinks that contain caffeine. · Do not eat foods that are high in fat. · Avoid anti-inflammatory medicines such as aspirin, ibuprofen (Advil, Motrin), and naproxen (Aleve). These can cause stomach upset. Talk to your doctor if you take daily aspirin for another health problem. When should you call for help? Call 911 anytime you think you may need emergency care. For example, call if: 
? · You passed out (lost consciousness). ? · You pass maroon or very bloody stools. ? · You vomit blood or what looks like coffee grounds. ? · You have new, severe belly pain. ?Call your doctor now or seek immediate medical care if: 
? · Your pain gets worse, especially if it becomes focused in one area of your belly. ? · You have a new or higher fever. ? · Your stools are black and look like tar, or they have streaks of blood. ? · You have unexpected vaginal bleeding. ? · You have symptoms of a urinary tract infection. These may include: 
¨ Pain when you urinate. ¨ Urinating more often than usual. 
¨ Blood in your urine. ? · You are dizzy or lightheaded, or you feel like you may faint. ? Watch closely for changes in your health, and be sure to contact your doctor if: 
? · You are not getting better after 1 day (24 hours). Where can you learn more? Go to http://maryann-jm.info/. Enter K037 in the search box to learn more about \"Abdominal Pain: Care Instructions. \" Current as of: March 20, 2017 Content Version: 11.4 © 3579-0787 Dynis. Care instructions adapted under license by Appbistro (which disclaims liability or warranty for this information).  If you have questions about a medical condition or this instruction, always ask your healthcare professional. [address] any warranty or liability for your use of this information. Recovering From Depression: Care Instructions Your Care Instructions Taking good care of yourself is important as you recover from depression. In time, your symptoms will fade as your treatment takes hold. Do not give up. Instead, focus your energy on getting better. Your mood will improve. It just takes some time. Focus on things that can help you feel better, such as being with friends and family, eating well, and getting enough rest. But take things slowly. Do not do too much too soon. You will begin to feel better gradually. Follow-up care is a key part of your treatment and safety. Be sure to make and go to all appointments, and call your doctor if you are having problems. It's also a good idea to know your test results and keep a list of the medicines you take. How can you care for yourself at home? Be realistic · If you have a large task to do, break it up into smaller steps you can handle, and just do what you can. · You may want to put off important decisions until your depression has lifted. If you have plans that will have a major impact on your life, such as marriage, divorce, or a job change, try to wait a bit. Talk it over with friends and loved ones who can help you look at the overall picture first. 
· Reaching out to people for help is important. Do not isolate yourself. Let your family and friends help you. Find someone you can trust and confide in, and talk to that person. · Be patient, and be kind to yourself. Remember that depression is not your fault and is not something you can overcome with willpower alone. Treatment is necessary for depression, just like for any other illness. Feeling better takes time, and your mood will improve little by little. Stay active · Stay busy and get outside. Take a walk, or try some other light exercise. · Talk with your doctor about an exercise program. Exercise can help with mild depression. · Go to a movie or concert. Take part in a Protestant activity or other social gathering. Go to a ball game. · Ask a friend to have dinner with you. Take care of yourself · Eat a balanced diet with plenty of fresh fruits and vegetables, whole grains, and lean protein. If you have lost your appetite, eat small snacks rather than large meals. · Avoid drinking alcohol or using illegal drugs. Do not take medicines that have not been prescribed for you. They may interfere with medicines you may be taking for depression, or they may make your depression worse. · Take your medicines exactly as they are prescribed. You may start to feel better within 1 to 3 weeks of taking antidepressant medicine. But it can take as many as 6 to 8 weeks to see more improvement. If you have questions or concerns about your medicines, or if you do not notice any improvement by 3 weeks, talk to your doctor. · If you have any side effects from your medicine, tell your doctor. Antidepressants can make you feel tired, dizzy, or nervous. Some people have dry mouth, constipation, headaches, sexual problems, or diarrhea. Many of these side effects are mild and will go away on their own after you have been taking the medicine for a few weeks. Some may last longer. Talk to your doctor if side effects are bothering you too much. You might be able to try a different medicine. · Get enough sleep. If you have problems sleeping: ¨ Go to bed at the same time every night, and get up at the same time every morning. ¨ Keep your bedroom dark and quiet. ¨ Do not exercise after 5:00 p.m. ¨ Avoid drinks with caffeine after 5:00 p.m. · Avoid sleeping pills unless they are prescribed by the doctor treating your depression.  Sleeping pills may make you groggy during the day, and they may interact with other medicine you are taking. · If you have any other illnesses, such as diabetes, heart disease, or high blood pressure, make sure to continue with your treatment. Tell your doctor about all of the medicines you take, including those with or without a prescription. · Keep the numbers for these national suicide hotlines: 0-468-314-TALK (7-954.620.8484) and 6-957-ARNUTEI (7-537.100.5337). If you or someone you know talks about suicide or feeling hopeless, get help right away. When should you call for help? Call 911 anytime you think you may need emergency care. For example, call if: 
? · You feel like hurting yourself or someone else. ? · Someone you know has depression and is about to attempt or is attempting suicide. ?Call your doctor now or seek immediate medical care if: 
? · You hear voices. ? · Someone you know has depression and: 
¨ Starts to give away his or her possessions. ¨ Uses illegal drugs or drinks alcohol heavily. ¨ Talks or writes about death, including writing suicide notes or talking about guns, knives, or pills. ¨ Starts to spend a lot of time alone. ¨ Acts very aggressively or suddenly appears calm. ? Watch closely for changes in your health, and be sure to contact your doctor if: 
? · You do not get better as expected. Where can you learn more? Go to http://maryann-jm.info/. Enter A191 in the search box to learn more about \"Recovering From Depression: Care Instructions. \" Current as of: May 12, 2017 Content Version: 11.4 © 1053-7335 SevenLunches. Care instructions adapted under license by SavaJe Technologies (which disclaims liability or warranty for this information). If you have questions about a medical condition or this instruction, always ask your healthcare professional. [address] any warranty or liability for your use of this information. Introducing [facility]! [facility] introduces [facility] patient portal. Now you can access parts of your medical record, email your doctor's office, and request medication refills online. 1. In your internet browser, go to [email or web address] 2. Click on the First Time User? Click Here link in the Sign In box. You will see the New Member Sign Up page. 3. Enter your [facility] Access Code exactly as it appears below. You will not need to use this code after youve completed the sign-up process. If you do not sign up before the expiration date, you must request a new code. · [facility] Access Code: [email or web address] Expires: 5/12/2018  7:30 PM 
 
4. Enter the last four digits of your Social Security Number (xxxx) and Date of Birth (mm/dd/yyyy) as indicated and click Submit. You will be taken to the next sign-up page. 5. Create a [facility] ID. This will be your [facility] login ID and cannot be changed, so think of one that is secure and easy to remember. 6. Create a [facility] password. You can change your password at any time. 7. Enter your Password Reset Question and Answer. This can be used at a later time if you forget your password. 8. Enter your e-mail address. You will receive e-mail notification when new information is available in [address] 9. Click Sign Up. You can now view and download portions of your medical record. 10. Click the Download Summary menu link to download a portable copy of your medical information. If you have questions, please visit the Frequently Asked Questions section of the [facility] website. Remember, [facility] is NOT to be used for urgent needs. For medical emergencies, dial 911. Now available from your iPhone and Android! Please provide this summary of care documentation to your next provider. Your primary care clinician is listed as Luis Peck. If you have any questions after today's visit, please call [phone].

## 2018-03-09 NOTE — PROGRESS NOTES
I saw and evaluated the patient, performing the key elements of the service. I discussed the findings, assessment and plan with the resident and agree with the resident's findings and plan as documented in the resident's note. Severe depression. Crisis was contacted. Pt currently demonstrates safety and denies any weapons at the home. Has appt with [facility] within the week.

## 2018-03-14 ENCOUNTER — HOSPITAL ENCOUNTER (EMERGENCY)
Age: 23
Discharge: HOME | End: 2018-03-14
Attending: EMERGENCY MEDICINE
Payer: SUBSIDIZED

## 2018-03-14 ENCOUNTER — APPOINTMENT (OUTPATIENT)
Dept: CT IMAGING | Age: 23
End: 2018-03-14
Attending: NURSE PRACTITIONER
Payer: SUBSIDIZED

## 2018-03-14 VITALS
BODY MASS INDEX: 50.02 KG/M2 | TEMPERATURE: 98 F | OXYGEN SATURATION: 99 % | HEIGHT: 64 IN | RESPIRATION RATE: 16 BRPM | SYSTOLIC BLOOD PRESSURE: 177 MMHG | DIASTOLIC BLOOD PRESSURE: 90 MMHG | WEIGHT: 293 LBS | HEART RATE: 88 BPM

## 2018-03-14 DIAGNOSIS — R10.9 FLANK PAIN: Primary | ICD-10-CM

## 2018-03-14 LAB
ALBUMIN SERPL-MCNC: 3.6 G/DL (ref 3.5–5)
ALBUMIN/GLOB SERPL: 0.8 {RATIO} (ref 1.1–2.2)
ALP SERPL-CCNC: 132 U/L (ref 45–117)
ALT SERPL-CCNC: 15 U/L (ref 12–78)
ANION GAP SERPL CALC-SCNC: 12 MMOL/L (ref 5–15)
APPEARANCE UR: ABNORMAL
AST SERPL-CCNC: 16 U/L (ref 15–37)
BACTERIA URNS QL MICRO: ABNORMAL /HPF
BASOPHILS # BLD: 0 K/UL (ref 0–0.1)
BASOPHILS NFR BLD: 0 % (ref 0–1)
BILIRUB SERPL-MCNC: 0.1 MG/DL (ref 0.2–1)
BILIRUB UR QL: NEGATIVE
BUN SERPL-MCNC: 9 MG/DL (ref 6–20)
BUN/CREAT SERPL: 14 (ref 12–20)
CALCIUM SERPL-MCNC: 9.1 MG/DL (ref 8.5–10.1)
CHLORIDE SERPL-SCNC: 102 MMOL/L (ref 97–108)
CO2 SERPL-SCNC: 26 MMOL/L (ref 21–32)
COLOR UR: ABNORMAL
CREAT SERPL-MCNC: 0.64 MG/DL (ref 0.55–1.02)
DIFFERENTIAL METHOD BLD: ABNORMAL
EOSINOPHIL # BLD: 0.2 K/UL (ref 0–0.4)
EOSINOPHIL NFR BLD: 2 % (ref 0–7)
EPITH CASTS URNS QL MICRO: ABNORMAL /LPF
ERYTHROCYTE [DISTWIDTH] IN BLOOD BY AUTOMATED COUNT: 15.4 % (ref 11.5–14.5)
GLOBULIN SER CALC-MCNC: 4.8 G/DL (ref 2–4)
GLUCOSE SERPL-MCNC: 97 MG/DL (ref 65–100)
GLUCOSE UR STRIP.AUTO-MCNC: NEGATIVE MG/DL
HCG UR QL: NEGATIVE
HCT VFR BLD AUTO: 40.9 % (ref 35–47)
HGB BLD-MCNC: 13.3 G/DL (ref 11.5–16)
HGB UR QL STRIP: ABNORMAL
IMM GRANULOCYTES # BLD: 0.1 K/UL (ref 0–0.04)
IMM GRANULOCYTES NFR BLD AUTO: 1 % (ref 0–0.5)
KETONES UR QL STRIP.AUTO: NEGATIVE MG/DL
LEUKOCYTE ESTERASE UR QL STRIP.AUTO: NEGATIVE
LIPASE SERPL-CCNC: 111 U/L (ref 73–393)
LYMPHOCYTES # BLD: 2.8 K/UL (ref 0.8–3.5)
LYMPHOCYTES NFR BLD: 27 % (ref 12–49)
MCH RBC QN AUTO: 27.1 PG (ref 26–34)
MCHC RBC AUTO-ENTMCNC: 32.5 G/DL (ref 30–36.5)
MCV RBC AUTO: 83.5 FL (ref 80–99)
MONOCYTES # BLD: 0.6 K/UL (ref 0–1)
MONOCYTES NFR BLD: 6 % (ref 5–13)
NEUTS SEG # BLD: 6.5 K/UL (ref 1.8–8)
NEUTS SEG NFR BLD: 64 % (ref 32–75)
NITRITE UR QL STRIP.AUTO: NEGATIVE
NRBC # BLD: 0 K/UL (ref 0–0.01)
NRBC BLD-RTO: 0 PER 100 WBC
PH UR STRIP: 6 [PH] (ref 5–8)
PLATELET # BLD AUTO: 402 K/UL (ref 150–400)
PMV BLD AUTO: 9.7 FL (ref 8.9–12.9)
POTASSIUM SERPL-SCNC: 3.5 MMOL/L (ref 3.5–5.1)
PROT SERPL-MCNC: 8.4 G/DL (ref 6.4–8.2)
PROT UR STRIP-MCNC: NEGATIVE MG/DL
RBC # BLD AUTO: 4.9 M/UL (ref 3.8–5.2)
RBC #/AREA URNS HPF: ABNORMAL /HPF (ref 0–5)
SODIUM SERPL-SCNC: 140 MMOL/L (ref 136–145)
SP GR UR REFRACTOMETRY: 1.03 (ref 1–1.03)
UROBILINOGEN UR QL STRIP.AUTO: 0.2 EU/DL (ref 0.2–1)
WBC # BLD AUTO: 10.1 K/UL (ref 3.6–11)
WBC URNS QL MICRO: ABNORMAL /HPF (ref 0–4)

## 2018-03-14 PROCEDURE — 81025 URINE PREGNANCY TEST: CPT

## 2018-03-14 PROCEDURE — 85025 COMPLETE CBC W/AUTO DIFF WBC: CPT | Performed by: NURSE PRACTITIONER

## 2018-03-14 PROCEDURE — 81001 URINALYSIS AUTO W/SCOPE: CPT | Performed by: NURSE PRACTITIONER

## 2018-03-14 PROCEDURE — 74011636320 HC RX REV CODE- 636/320: Performed by: RADIOLOGY

## 2018-03-14 PROCEDURE — 99283 EMERGENCY DEPT VISIT LOW MDM: CPT

## 2018-03-14 PROCEDURE — 80053 COMPREHEN METABOLIC PANEL: CPT | Performed by: NURSE PRACTITIONER

## 2018-03-14 PROCEDURE — 74177 CT ABD & PELVIS W/CONTRAST: CPT

## 2018-03-14 PROCEDURE — 90791 PSYCH DIAGNOSTIC EVALUATION: CPT

## 2018-03-14 PROCEDURE — 83690 ASSAY OF LIPASE: CPT | Performed by: NURSE PRACTITIONER

## 2018-03-14 PROCEDURE — 36415 COLL VENOUS BLD VENIPUNCTURE: CPT | Performed by: NURSE PRACTITIONER

## 2018-03-14 RX ADMIN — IOPAMIDOL 95 ML: 755 INJECTION, SOLUTION INTRAVENOUS at 14:57

## 2018-03-14 NOTE — ED NOTES
Verbal shift change report given to [address] (oncoming nurse) by Cassie Maharaj (offgoing nurse). Report included the following information SBAR, Kardex, ED Summary, [facility] and Recent Results.

## 2018-03-14 NOTE — ED NOTES
Patient given discharge instruction by Kayleen Cm NP. Verbalized understanding, pt discharge home with family.

## 2018-03-14 NOTE — DISCHARGE INSTRUCTIONS
Abdominal Pain: Care Instructions  Your Care Instructions    Abdominal pain has many possible causes. Some aren't serious and get better on their own in a few days. Others need more testing and treatment. If your pain continues or gets worse, you need to be rechecked and may need more tests to find out what is wrong. You may need surgery to correct the problem. Don't ignore new symptoms, such as fever, nausea and vomiting, urination problems, pain that gets worse, and dizziness. These may be signs of a more serious problem. Your doctor may have recommended a follow-up visit in the next 8 to 12 hours. If you are not getting better, you may need more tests or treatment. The doctor has checked you carefully, but problems can develop later. If you notice any problems or new symptoms, get medical treatment right away. Follow-up care is a key part of your treatment and safety. Be sure to make and go to all appointments, and call your doctor if you are having problems. It's also a good idea to know your test results and keep a list of the medicines you take. How can you care for yourself at home? · Rest until you feel better. · To prevent dehydration, drink plenty of fluids, enough so that your urine is light yellow or clear like water. Choose water and other caffeine-free clear liquids until you feel better. If you have kidney, heart, or liver disease and have to limit fluids, talk with your doctor before you increase the amount of fluids you drink. · If your stomach is upset, eat mild foods, such as rice, dry toast or crackers, bananas, and applesauce. Try eating several small meals instead of two or three large ones. · Wait until 48 hours after all symptoms have gone away before you have spicy foods, alcohol, and drinks that contain caffeine. · Do not eat foods that are high in fat. · Avoid anti-inflammatory medicines such as aspirin, ibuprofen (Advil, Motrin), and naproxen (Aleve).  These can cause stomach upset. Talk to your doctor if you take daily aspirin for another health problem. When should you call for help? Call 911 anytime you think you may need emergency care. For example, call if:  ? · You passed out (lost consciousness). ? · You pass maroon or very bloody stools. ? · You vomit blood or what looks like coffee grounds. ? · You have new, severe belly pain. ?Call your doctor now or seek immediate medical care if:  ? · Your pain gets worse, especially if it becomes focused in one area of your belly. ? · You have a new or higher fever. ? · Your stools are black and look like tar, or they have streaks of blood. ? · You have unexpected vaginal bleeding. ? · You have symptoms of a urinary tract infection. These may include:  ¨ Pain when you urinate. ¨ Urinating more often than usual.  ¨ Blood in your urine. ? · You are dizzy or lightheaded, or you feel like you may faint. ? Watch closely for changes in your health, and be sure to contact your doctor if:  ? · You are not getting better after 1 day (24 hours). Where can you learn more? Go to http://maryann-jm.info/. Enter G783 in the search box to learn more about \"Abdominal Pain: Care Instructions. \"  Current as of: March 20, 2017  Content Version: 11.4  © 4992-8261 Velostack. Care instructions adapted under license by Avro Technologies (which disclaims liability or warranty for this information). If you have questions about a medical condition or this instruction, always ask your healthcare professional. [place] [address] any warranty or liability for your use of this information.

## 2018-03-14 NOTE — BSMART NOTE
Comprehensive Assessment Form Part 1      Section I - Disposition    Axis I - Adjustment Disorder with Depression and Anxiety   Axis II - Deferred  Axis III -   Past Medical History:   Diagnosis Date    Kidney stones     Morbid obesity ([address]        Axis IV - Financial concerns  [place] V - 48      The Medical Doctor to Psychiatrist conference was not completed. The Medical Doctor is in agreement with Psychiatrist disposition because of (reason) Admission is not recommended at this time. The plan is discharge and referred to [facility], [facility], and [facility]. [place] Crisis number given as well. The on-call Psychiatrist consulted was Dr. Aleksander Bella. The admitting Psychiatrist will be Dr. Gela Bains. The admitting Diagnosis is NA. The Payor source is self pay. Section II - Integrated Summary  Summary:  Patient came in for abdominal pain. Also reported depression and anxiety with suicidal ideation yesterday. Patient denied current suicidal ideation but indicated yesterday she was in pain, mad, and upset and had SI without a specific plan. Patient denied any prior treatment for depression and anxiety but later indicated she was given Lexapro at the [facility] and has been on it for a couple weeks. Patient was alert and oriented. She reported poor sleep, racing thoughts at night, overall irritability, heart racing at times, and getting annoyed quickly. Patient reported she does sleep a lot during the day. Patient reported she doesn't eat during the day but eats at night \"a lot. \"  Patient denied any hallucinations and there is no evidence of psychosis. Patient denied current SI or HI and denied any past attempts. Patient reported she had thought of taking her pills a couple weeks ago but \"hid them from herself\"  So she didn't do that. Encouraged her to have her parents assist her with medication if she feels unsafe.   She denied any current thoughts of overdose currently. Patient willing to discuss her current medication with her PCP and was given counseling referrals to speak to someone on a regular basis. Patient also verbally contracts for safety and will talk with family or return to ER if she has suicidal thoughts. The patienthas demonstrated mental capacity to provide informed consent. The information is given by the patient and parent. The Chief Complaint is abdominal pain, anxiety, and depression. The Precipitant Factors are financial stressors. Previous Hospitalizations: NA  The patient has not previously been in restraints. Current Psychiatrist and/or  is NA. Lethality Assessment:    The potential for suicide noted by the following:Patient denied current SI. The potential for homicide is not noted. The patient has not been a perpetrator of sexual or physical abuse. There are not pending charges. The patient is not felt to be at risk for self harm or harm to others. The attending nurse was advised that security has not been notified. Section III - Psychosocial  The patient's overall mood and attitude is anxious. Feelings of helplessness and hopelessness are not observed. Generalized anxiety is not observed. Panic is not observed. Phobias are not observed. Obsessive compulsive tendencies are not observed. Section IV - Mental Status Exam  The patient's appearance shows no evidence of impairment. The patient's behavior shows no evidence of impairment. The patient is oriented to time, place, person and situation. The patient's speech shows no evidence of impairment. The patient's mood is anxious. The range of affect is constricted. The patient's thought content demonstrates no evidence of impairment. The thought process shows no evidence of impairment. The patient's perception shows no evidence of impairment. The patient's memory shows no evidence of impairment.   The patient reported she doesn't eat during day but eats \"alot at night\". The patient's sleep has evidence of insomnia but patient also reported sleeping excessively during day. The patient shows no insight. The patient's judgement is psychologically impaired. Section V - Substance Abuse  The patient is not using substances. Patient reported past THC use and indicated none since January. Section VI - Living Arrangements  The patient is single. The patient lives with a parent. The patient has no children. The patient does plan to return home upon discharge. The patient does not have legal issues pending. The patient's source of income comes from family. Confucianism and cultural practices have not been voiced at this time. The patient's greatest support comes from family and this person will be involved with the treatment. The patient has not been in an event described as horrible or outside the realm of ordinary life experience either currently or in the past.  The patient has not been a victim of sexual/physical abuse. Section VII - Other Areas of Clinical Concern  The highest grade achieved is 11 with the overall quality of school experience being described as did not pass 12th due to SOL's. The patient is currently unemployed and speaks [place] as a primary language. The patient has no communication impairments affecting communication. The patient's preference for learning can be described as: can read and write adequately.   The patient's hearing is normal.  The patient's vision is normal.      Pepe Mendez LPC

## 2018-03-14 NOTE — ED PROVIDER NOTES
HPI Comments: 23 y.o. female with past medical history significant for kidney stones and morbid obesity who presents ambulatory from home with chief complaint of left flank pain. Pt states left flank pain onset acutely approximately 2 months ago and has been gradually worsening since. Pt states she woke up this morning with \"severe\" pain that is now generalized to her abdomen. Pt reports 1 episode of vomiting associated with pain. Of note, pt reports recent suicidal ideations as recently as yesterday with no plan. 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 Pertinent negatives include change in appetite, fever, ill-contacts, congestion, chest pain and SOB. There are no other acute medical concerns at this time. Social hx: former tobacco smoker; rare EtOH use; denies illicit drug use  PCP: Stalin Fulton MD  Past Medical History:  No date: Kidney stones  No date: Morbid obesity ([facility] [address]  Past Surgical History:  No date: HX HEART CATHETERIZATION  No date: HX UROLOGICAL      Comment: Stenting x 4 (Starting in 2016, developed                severe infection)      Note written by Lizzy Cleaning, as dictated by Kam Frey NP 1:30 PM      The history is provided by the patient.         Past Medical History:   Diagnosis Date    Kidney stones     Morbid obesity ([address]        Past Surgical History:   Procedure Laterality Date    HX HEART CATHETERIZATION      HX UROLOGICAL      Stenting x 4 (Starting in 2016, developed severe infection)         Family History:   Problem Relation Age of Onset    Heart Disease Father 55    Hypertension Father     Sleep Apnea Father     No Known Problems Sister     Cancer Maternal Grandmother      Brain Cancer    Heart Disease Paternal Grandmother     Cancer Paternal Grandfather        Social History     Social History    Marital status: SINGLE     Spouse name: N/A    Number of children: N/A    Years of education: N/A Occupational History          started in 3/17     Social History Main Topics    Smoking status: Former Smoker     Quit date: 1/16/2018    Smokeless tobacco: Never Used      Comment: 1/4 pack ppd for 6 months    Alcohol use No      Comment: rarely    Drug use: No      Comment: Has done [employer] Sexual activity: No     Other Topics Concern    Not on file     Social History Narrative         ALLERGIES: Aspirin    Review of Systems   Constitutional: Negative. Negative for appetite change, chills, diaphoresis, fatigue, fever and unexpected weight change. HENT: Negative. Negative for congestion, ear discharge, ear pain, hearing loss, nosebleeds, rhinorrhea, sinus pain, sinus pressure, sore throat and trouble swallowing. Eyes: Negative for photophobia, pain, redness and visual disturbance. Respiratory: Negative. Negative for cough, chest tightness, shortness of breath and wheezing. Cardiovascular: Negative. Negative for chest pain and palpitations. Gastrointestinal: Positive for abdominal pain (generalized), nausea and vomiting. Negative for abdominal distention and blood in stool. Endocrine: Negative. Genitourinary: Positive for flank pain (left). Negative for difficulty urinating, dysuria, frequency, hematuria and urgency. Musculoskeletal: Negative for back pain, myalgias, neck pain and neck stiffness. Skin: Negative. Negative for color change, pallor, rash and wound. Allergic/Immunologic: Negative. Neurological: Negative. Negative for dizziness, syncope, speech difficulty, weakness, numbness and headaches. Hematological: Negative. Does not bruise/bleed easily. Psychiatric/Behavioral: Negative. Negative for behavioral problems. The patient is not nervous/anxious. All other systems reviewed and are negative.       Vitals:    03/14/18 1303   BP: 177/90   Pulse: 88   Resp: 16   Temp: 98 °F (36.7 °C)   SpO2: 99%   Weight: 136.1 kg (300 lb)   Height: 5' 4\" (1.626 m) Physical Exam   Constitutional: She is oriented to person, place, and time. She appears well-developed and well-nourished. No distress. HENT:   Head: Normocephalic and atraumatic. Right Ear: External ear normal.   Left Ear: External ear normal.   Nose: Nose normal.   Mouth/Throat: Oropharynx is clear and moist.   Eyes: Conjunctivae and EOM are normal. Pupils are equal, round, and reactive to light. Right eye exhibits no discharge. Left eye exhibits no discharge. Neck: Normal range of motion. Neck supple. No JVD present. No tracheal deviation present. No thyromegaly present. Cardiovascular: Normal rate, regular rhythm, normal heart sounds and intact distal pulses. Exam reveals no gallop. No murmur heard. Pulmonary/Chest: Effort normal and breath sounds normal. No respiratory distress. She has no wheezes. She has no rales. She exhibits no tenderness. Abdominal: Soft. Bowel sounds are normal. She exhibits no distension. There is no tenderness. There is no rebound and no guarding. Left flank pain. Musculoskeletal: Normal range of motion. She exhibits no edema or tenderness. Neurological: She is alert and oriented to person, place, and time. No cranial nerve deficit. Skin: Skin is warm and dry. No rash noted. No erythema. No pallor. Psychiatric: She has a normal mood and affect. Her behavior is normal. Judgment and thought content normal.   Nursing note and vitals reviewed. Note written by Lizzy Baez, as dictated by Noemi Hill NP 1:30 PM    MDM  Number of Diagnoses or Management Options  Flank pain: new and requires workup  Diagnosis management comments: Plan:  Discharge to home and follow up with PCP, follow up with outpatient counselor.        Amount and/or Complexity of Data Reviewed  Clinical lab tests: ordered and reviewed  Tests in the radiology section of CPT®: ordered and reviewed          ED Course       Procedures    2:30 PM  alan Erickson patient. Patient does not meet admission criteria. She provided patient with outpatient counseling resources. 4:30 PM  CT negative. HANSA provided patient with mental health resources. Discussed available lab and imaging results with patient. She verbalizes understanding and agrees with care plan. Will discharge patient home with specific return precautions; supportive care; f/u with PCP. Patient's results have been reviewed with them. Patient and/or family have verbally conveyed their understanding and agreement of the patient's signs, symptoms, diagnosis, treatment and prognosis and additionally agree to follow up as recommended or return to the Emergency Room should their condition change prior to follow-up. Discharge instructions have also been provided to the patient with some educational information regarding their diagnosis as well a list of reasons why they would want to return to the ER prior to their follow-up appointment should their condition change.

## 2018-03-16 ENCOUNTER — HOSPITAL ENCOUNTER (OUTPATIENT)
Dept: NUCLEAR MEDICINE | Age: 23
Discharge: HOME | End: 2018-03-16
Attending: FAMILY MEDICINE
Payer: SUBSIDIZED

## 2018-03-16 DIAGNOSIS — R10.11 RUQ ABDOMINAL PAIN: ICD-10-CM

## 2018-03-16 PROCEDURE — 78226 HEPATOBILIARY SYSTEM IMAGING: CPT

## 2018-03-19 ENCOUNTER — TELEPHONE (OUTPATIENT)
Dept: FAMILY MEDICINE CLINIC | Age: 23
End: 2018-03-19

## 2018-03-19 DIAGNOSIS — K82.8 DYSFUNCTIONAL GALLBLADDER: Primary | ICD-10-CM

## 2018-03-19 NOTE — TELEPHONE ENCOUNTER
Called pt to discuss HIDA scan results. She states she as RUQ pain with eating. But currently she is able to eat and drink. Pain 6/10. No N/V. Will refer pt to Gen Surg as HIDA scan showed EF 9% (nml 35-40%)    She also shares her depression is well controlled with medication and psychology (she is being followed by [facility])    Precautions given of when to seek medical attention. All questions answered.     Jasmyne Santiago, DO

## 2018-03-20 ENCOUNTER — HOSPITAL ENCOUNTER (EMERGENCY)
Age: 23
Discharge: HOME | End: 2018-03-20
Attending: EMERGENCY MEDICINE | Admitting: EMERGENCY MEDICINE
Payer: SUBSIDIZED

## 2018-03-20 VITALS
RESPIRATION RATE: 16 BRPM | BODY MASS INDEX: 48.82 KG/M2 | HEART RATE: 85 BPM | OXYGEN SATURATION: 97 % | HEIGHT: 65 IN | SYSTOLIC BLOOD PRESSURE: 121 MMHG | DIASTOLIC BLOOD PRESSURE: 92 MMHG | WEIGHT: 293 LBS | TEMPERATURE: 97.6 F

## 2018-03-20 DIAGNOSIS — R11.0 NAUSEA: ICD-10-CM

## 2018-03-20 DIAGNOSIS — R10.11 ABDOMINAL PAIN, RIGHT UPPER QUADRANT: Primary | ICD-10-CM

## 2018-03-20 LAB
ALBUMIN SERPL-MCNC: 3.7 G/DL (ref 3.5–5)
ALBUMIN/GLOB SERPL: 0.8 {RATIO} (ref 1.1–2.2)
ALP SERPL-CCNC: 127 U/L (ref 45–117)
ALT SERPL-CCNC: 22 U/L (ref 12–78)
ANION GAP SERPL CALC-SCNC: 11 MMOL/L (ref 5–15)
APPEARANCE UR: ABNORMAL
AST SERPL-CCNC: 14 U/L (ref 15–37)
BACTERIA URNS QL MICRO: ABNORMAL /HPF
BASOPHILS # BLD: 0 K/UL (ref 0–0.1)
BASOPHILS NFR BLD: 0 % (ref 0–1)
BILIRUB SERPL-MCNC: 0.2 MG/DL (ref 0.2–1)
BILIRUB UR QL: NEGATIVE
BUN SERPL-MCNC: 10 MG/DL (ref 6–20)
BUN/CREAT SERPL: 13 (ref 12–20)
CALCIUM SERPL-MCNC: 9.7 MG/DL (ref 8.5–10.1)
CHLORIDE SERPL-SCNC: 103 MMOL/L (ref 97–108)
CO2 SERPL-SCNC: 25 MMOL/L (ref 21–32)
COLOR UR: ABNORMAL
CREAT SERPL-MCNC: 0.78 MG/DL (ref 0.55–1.02)
DIFFERENTIAL METHOD BLD: ABNORMAL
EOSINOPHIL # BLD: 0.1 K/UL (ref 0–0.4)
EOSINOPHIL NFR BLD: 1 % (ref 0–7)
EPITH CASTS URNS QL MICRO: ABNORMAL /LPF
ERYTHROCYTE [DISTWIDTH] IN BLOOD BY AUTOMATED COUNT: 15.6 % (ref 11.5–14.5)
GLOBULIN SER CALC-MCNC: 4.9 G/DL (ref 2–4)
GLUCOSE SERPL-MCNC: 94 MG/DL (ref 65–100)
GLUCOSE UR STRIP.AUTO-MCNC: NEGATIVE MG/DL
HCG UR QL: NEGATIVE
HCT VFR BLD AUTO: 41.4 % (ref 35–47)
HGB BLD-MCNC: 13.2 G/DL (ref 11.5–16)
HGB UR QL STRIP: ABNORMAL
IMM GRANULOCYTES # BLD: 0 K/UL (ref 0–0.04)
IMM GRANULOCYTES NFR BLD AUTO: 0 % (ref 0–0.5)
KETONES UR QL STRIP.AUTO: NEGATIVE MG/DL
LEUKOCYTE ESTERASE UR QL STRIP.AUTO: NEGATIVE
LIPASE SERPL-CCNC: 110 U/L (ref 73–393)
LYMPHOCYTES # BLD: 3 K/UL (ref 0.8–3.5)
LYMPHOCYTES NFR BLD: 24 % (ref 12–49)
MCH RBC QN AUTO: 26.5 PG (ref 26–34)
MCHC RBC AUTO-ENTMCNC: 31.9 G/DL (ref 30–36.5)
MCV RBC AUTO: 83 FL (ref 80–99)
MONOCYTES # BLD: 0.9 K/UL (ref 0–1)
MONOCYTES NFR BLD: 7 % (ref 5–13)
NEUTS SEG # BLD: 8.6 K/UL (ref 1.8–8)
NEUTS SEG NFR BLD: 68 % (ref 32–75)
NITRITE UR QL STRIP.AUTO: NEGATIVE
NRBC # BLD: 0 K/UL (ref 0–0.01)
NRBC BLD-RTO: 0 PER 100 WBC
PH UR STRIP: 6.5 [PH] (ref 5–8)
PLATELET # BLD AUTO: 380 K/UL (ref 150–400)
POTASSIUM SERPL-SCNC: 3.9 MMOL/L (ref 3.5–5.1)
PROT SERPL-MCNC: 8.6 G/DL (ref 6.4–8.2)
PROT UR STRIP-MCNC: NEGATIVE MG/DL
RBC # BLD AUTO: 4.99 M/UL (ref 3.8–5.2)
RBC #/AREA URNS HPF: ABNORMAL /HPF (ref 0–5)
RBC MORPH BLD: ABNORMAL
SODIUM SERPL-SCNC: 139 MMOL/L (ref 136–145)
SP GR UR REFRACTOMETRY: 1.02 (ref 1–1.03)
UR CULT HOLD, URHOLD: NORMAL
UROBILINOGEN UR QL STRIP.AUTO: 0.2 EU/DL (ref 0.2–1)
WBC # BLD AUTO: 12.6 K/UL (ref 3.6–11)
WBC URNS QL MICRO: ABNORMAL /HPF (ref 0–4)

## 2018-03-20 PROCEDURE — 96375 TX/PRO/DX INJ NEW DRUG ADDON: CPT

## 2018-03-20 PROCEDURE — 80053 COMPREHEN METABOLIC PANEL: CPT | Performed by: PHYSICIAN ASSISTANT

## 2018-03-20 PROCEDURE — 81025 URINE PREGNANCY TEST: CPT

## 2018-03-20 PROCEDURE — 99285 EMERGENCY DEPT VISIT HI MDM: CPT

## 2018-03-20 PROCEDURE — 83690 ASSAY OF LIPASE: CPT | Performed by: PHYSICIAN ASSISTANT

## 2018-03-20 PROCEDURE — 36415 COLL VENOUS BLD VENIPUNCTURE: CPT | Performed by: PHYSICIAN ASSISTANT

## 2018-03-20 PROCEDURE — 81001 URINALYSIS AUTO W/SCOPE: CPT | Performed by: PHYSICIAN ASSISTANT

## 2018-03-20 PROCEDURE — 74011250636 HC RX REV CODE- 250/636: Performed by: PHYSICIAN ASSISTANT

## 2018-03-20 PROCEDURE — 96361 HYDRATE IV INFUSION ADD-ON: CPT

## 2018-03-20 PROCEDURE — 85025 COMPLETE CBC W/AUTO DIFF WBC: CPT | Performed by: PHYSICIAN ASSISTANT

## 2018-03-20 PROCEDURE — 96374 THER/PROPH/DIAG INJ IV PUSH: CPT

## 2018-03-20 RX ORDER — MORPHINE SULFATE 4 MG/ML
4 INJECTION INTRAVENOUS
Status: COMPLETED | OUTPATIENT
Start: 2018-03-20 | End: 2018-03-20

## 2018-03-20 RX ORDER — ONDANSETRON 4 MG/1
4 TABLET, ORALLY DISINTEGRATING ORAL
Qty: 12 TAB | Refills: 0 | Status: SHIPPED | OUTPATIENT
Start: 2018-03-20 | End: 2018-07-06

## 2018-03-20 RX ORDER — DICYCLOMINE HYDROCHLORIDE 20 MG/1
20 TABLET ORAL
Qty: 20 TAB | Refills: 0 | Status: SHIPPED | OUTPATIENT
Start: 2018-03-20 | End: 2018-07-06

## 2018-03-20 RX ORDER — BUMETANIDE 0.25 MG/ML
0.5 INJECTION INTRAMUSCULAR; INTRAVENOUS
Status: DISCONTINUED | OUTPATIENT
Start: 2018-03-20 | End: 2018-03-20

## 2018-03-20 RX ORDER — ONDANSETRON 2 MG/ML
4 INJECTION INTRAMUSCULAR; INTRAVENOUS
Status: COMPLETED | OUTPATIENT
Start: 2018-03-20 | End: 2018-03-20

## 2018-03-20 RX ADMIN — SODIUM CHLORIDE 1000 ML: 900 INJECTION, SOLUTION INTRAVENOUS at 19:14

## 2018-03-20 RX ADMIN — ONDANSETRON 4 MG: 2 INJECTION INTRAMUSCULAR; INTRAVENOUS at 19:14

## 2018-03-20 RX ADMIN — MORPHINE SULFATE 4 MG: 4 INJECTION INTRAVENOUS at 19:15

## 2018-03-20 NOTE — ED PROVIDER NOTES
HPI Comments: 27 y/o female with PMHx of kidney stones, obesity and depression, presenting with complaint of RUQ abdominal pain. She states she has been having intermittent RUQ pain for several weeks, worse after eating. She had a HIDA scan done last week which revealed delayed emptying but no evidence of cholelithiasis. This evening she ate a hamburger for dinner, followed by worsened abdominal pain and associated nausea, vomiting and diarrhea. Her pain was 10/10 earlier this evening but is currently 8/10, described as sharp, and radiates to her low back. She has not tried anything to relieve the pain. She endorses chills and light-headedness, but denies fever, bloody stool, chest pain, shortness of breath or syncope. The history is provided by the patient.         Past Medical History:   Diagnosis Date    Kidney stones     Morbid obesity ([address]        Past Surgical History:   Procedure Laterality Date    HX HEART CATHETERIZATION      HX UROLOGICAL      Stenting x 4 (Starting in 2016, developed severe infection)         Family History:   Problem Relation Age of Onset    Heart Disease Father 55    Hypertension Father     Sleep Apnea Father     No Known Problems Sister     Cancer Maternal Grandmother      Brain Cancer    Heart Disease Paternal Grandmother     Cancer Paternal Grandfather        Social History     Social History    Marital status: SINGLE     Spouse name: N/A    Number of children: N/A    Years of education: N/A     Occupational History          started in 3/17     Social History Main Topics    Smoking status: Former Smoker     Quit date: 1/16/2018    Smokeless tobacco: Never Used      Comment: 1/4 pack ppd for 6 months    Alcohol use No      Comment: rarely    Drug use: No      Comment: Has done [employer] Sexual activity: No     Other Topics Concern    Not on file     Social History Narrative         ALLERGIES: Aspirin    Review of Systems   Constitutional: Positive for chills. Negative for fever. Respiratory: Negative for shortness of breath. Cardiovascular: Negative for chest pain. Gastrointestinal: Positive for abdominal pain, diarrhea, nausea and vomiting. Negative for blood in stool. Musculoskeletal: Negative for myalgias. Skin: Negative for wound. Neurological: Positive for light-headedness. Negative for syncope. All other systems reviewed and are negative. Vitals:    03/20/18 1759   BP: 178/80   Pulse: 85   Resp: 16   Temp: 97.6 °F (36.4 °C)   SpO2: 99%   Weight: 136.1 kg (300 lb)   Height: 5' 5\" (1.651 m)            Physical Exam   Constitutional: She is oriented to person, place, and time. She appears well-developed and well-nourished. No distress. HENT:   Head: Normocephalic and atraumatic. Eyes: Conjunctivae and EOM are normal.   Neck: Normal range of motion. Neck supple. Cardiovascular: Normal rate, regular rhythm and normal heart sounds. Pulmonary/Chest: Effort normal and breath sounds normal.   Abdominal: Soft. She exhibits no distension. There is tenderness in the right upper quadrant and periumbilical area. There is no rigidity, no rebound, no guarding, no tenderness at McBurney's point and negative Martin's sign. Musculoskeletal: Normal range of motion. Neurological: She is alert and oriented to person, place, and time. Skin: Skin is warm and dry. She is not diaphoretic. Nursing note and vitals reviewed. MDM  Number of Diagnoses or Management Options  Abdominal pain, right upper quadrant:      Amount and/or Complexity of Data Reviewed  Clinical lab tests: ordered and reviewed  Discuss the patient with other providers: yes (Dr. Masoud Sood, ED attending)    Patient Progress  Patient progress: stable        ED Course       Procedures      25 y/o female with PMHx of kidney stones, obesity and depression, presenting with complaint of RUQ abdominal pain. The patient is well-appearing with negative Martin's sign or peritoneal signs. CBC, CMP and lipase obtained, reveal WBC 12.6 but are otherwise unremarkable. UA with moderate epithelial cells, trace blood, 1+ bacteria - likely contaminated specimen. Low clinical suspicion for pyelonephritis, kidney stone, cholecystitis, SBO, or other acute abdominal pathology. Pain improved with 1 dose IV morphine, patient ambulatory and in no distress. She has an appointment with a GI doctor next week and is safe to follow up at that time. Given Rx for zofran and bentyl. Strict ED return precautions discussed and provided in writing at time of discharge. The patient verbalized understanding and agreement with this plan. I was personally available for consultation in the emergency department. I have reviewed the chart and agree with the documentation recorded by the [facility], including the assessment, treatment plan, and disposition.   Andrew Starr MD

## 2018-03-20 NOTE — ED TRIAGE NOTES
Pt had a gall bladder test which took pictures, pt was told to come to the ED if n/v. Pt stated that the pain started after she ate hamburger. Pt took nothing for pain/symptoms PTA.

## 2018-03-21 NOTE — DISCHARGE INSTRUCTIONS
Abdominal Pain: Care Instructions  Your Care Instructions    Abdominal pain has many possible causes. Some aren't serious and get better on their own in a few days. Others need more testing and treatment. If your pain continues or gets worse, you need to be rechecked and may need more tests to find out what is wrong. You may need surgery to correct the problem. Don't ignore new symptoms, such as fever, nausea and vomiting, urination problems, pain that gets worse, and dizziness. These may be signs of a more serious problem. Your doctor may have recommended a follow-up visit in the next 8 to 12 hours. If you are not getting better, you may need more tests or treatment. The doctor has checked you carefully, but problems can develop later. If you notice any problems or new symptoms, get medical treatment right away. Follow-up care is a key part of your treatment and safety. Be sure to make and go to all appointments, and call your doctor if you are having problems. It's also a good idea to know your test results and keep a list of the medicines you take. How can you care for yourself at home? · Rest until you feel better. · To prevent dehydration, drink plenty of fluids, enough so that your urine is light yellow or clear like water. Choose water and other caffeine-free clear liquids until you feel better. If you have kidney, heart, or liver disease and have to limit fluids, talk with your doctor before you increase the amount of fluids you drink. · If your stomach is upset, eat mild foods, such as rice, dry toast or crackers, bananas, and applesauce. Try eating several small meals instead of two or three large ones. · Wait until 48 hours after all symptoms have gone away before you have spicy foods, alcohol, and drinks that contain caffeine. · Do not eat foods that are high in fat. · Avoid anti-inflammatory medicines such as aspirin, ibuprofen (Advil, Motrin), and naproxen (Aleve).  These can cause stomach upset. Talk to your doctor if you take daily aspirin for another health problem. When should you call for help? Call 911 anytime you think you may need emergency care. For example, call if:  ? · You passed out (lost consciousness). ? · You pass maroon or very bloody stools. ? · You vomit blood or what looks like coffee grounds. ? · You have new, severe belly pain. ?Call your doctor now or seek immediate medical care if:  ? · Your pain gets worse, especially if it becomes focused in one area of your belly. ? · You have a new or higher fever. ? · Your stools are black and look like tar, or they have streaks of blood. ? · You have unexpected vaginal bleeding. ? · You have symptoms of a urinary tract infection. These may include:  ¨ Pain when you urinate. ¨ Urinating more often than usual.  ¨ Blood in your urine. ? · You are dizzy or lightheaded, or you feel like you may faint. ? Watch closely for changes in your health, and be sure to contact your doctor if:  ? · You are not getting better after 1 day (24 hours). Where can you learn more? Go to http://maryann-jm.info/. Enter K829 in the search box to learn more about \"Abdominal Pain: Care Instructions. \"  Current as of: March 20, 2017  Content Version: 11.4  © 6627-6630 [employer]. Care instructions adapted under license by [employer] (which disclaims liability or warranty for this information). If you have questions about a medical condition or this instruction, always ask your healthcare professional. [place] [address] any warranty or liability for your use of this information.

## 2018-03-27 ENCOUNTER — OFFICE VISIT (OUTPATIENT)
Dept: SURGERY | Age: 23
End: 2018-03-27

## 2018-03-27 VITALS
WEIGHT: 293 LBS | HEIGHT: 65 IN | TEMPERATURE: 97.9 F | SYSTOLIC BLOOD PRESSURE: 137 MMHG | RESPIRATION RATE: 20 BRPM | OXYGEN SATURATION: 98 % | DIASTOLIC BLOOD PRESSURE: 88 MMHG | HEART RATE: 98 BPM | BODY MASS INDEX: 48.82 KG/M2

## 2018-03-27 DIAGNOSIS — E66.01 MORBID OBESITY WITH BMI OF 45.0-49.9, ADULT (HCC): ICD-10-CM

## 2018-03-27 DIAGNOSIS — K81.1 CHRONIC CHOLECYSTITIS: Primary | ICD-10-CM

## 2018-03-27 NOTE — PERIOP NOTES
[address] [facility], [address]    MAIN OR [phone]    MAIN PRE OP [phone]    AMBULATORY PRE OP [phone]    PRE-ADMISSION TESTING [phone]       Surgery Date:   04/09/2018        Is surgery arrival time given by surgeon? NO  If NO, [facility] staff will call you between 3 and 7pm the day before your surgery with your arrival time. (If your surgery is on a Monday, we will call you the Friday before.)    Call [phone] after 7pm Monday-Friday if you did not receive your arrival time.     Answers to Common Questions   When You  Arrive   Arrive at the [facility] [address] [facility] on the day of your surgery  Have your insurance card, photo ID, and any copayment (if needed)     Food   and   Drink   NO food or drink after midnight the night before surgery    This means NO water, gum, mints, coffee, juice, etc.  No alcohol (beer, wine, liquor) 24 hours before and after surgery     Medicine to   TAKE   Morning of Surgery   MEDICATIONS TO TAKE THE MORNING OF SURGERY WITH A SIP OF WATER:    Bentyl, Lexapro, Zofran, Protonix     Medicine  To  STOP   FOR PAIN   NO Aspirin for pain    NO Non-Steroidal Anti-Inflammatory Drugs (NSAIDs:   for example, Ibuprofen (Advil, Motrin), Naproxen (Aleve)   STOP herbal supplements and vitamins 1 week before surgery   You can take Tylenol - follow instructions on the bottle     Blood  Thinners    If you take Aspirin, Plavix, Coumadin, blood-thinning or anti-clot medicine, talk to your surgeon and/or follow the instructions from the doctor who told you to take that medicine     Clothing  Jewelry  Valuables  Bathing     CLOTHING   Wear loose, comfortable clothes   Wear glasses instead of contacts   Leave money, jewelry and valuables at home   No make-up, particularly mascara, the day of surgery   REMOVE ALL piercings, rings, and jewelry - leave at home   Wear hair loose or down; no pony-tails, buns, or metal hair clips    BATHING   Follow all special bath instructions (for total joint replacement, spine and bowel surgeries.)   If you shower the morning of surgery, please do not apply any lotions, powders, or deodorants afterwards. Do not shave or trim anywhere 24 hours before surgery. Going Home  or  Spending the Night    SAME-DAY SURGERY: You must have a responsible adult drive you home and stay with you 24 hours after surgery   ADMITS: If your doctor is keeping you into the hospital after surgery, leave personal belongings/luggage in your car until you have a hospital room number. Hospital discharge time is 12 noon  Drivers must be here before 12 noon unless you are told differently         Follow all instructions so your surgery wont be cancelled. Please, be on time. If a situation occurs and you are delayed the day of surgery, call [phone]     If your physical condition changes (like a fever, cold, flu, etc.) call your surgeon as soon as possible. The P[facility] staff can be reached at [phone]. OTHER SPECIAL INSTRUCTIONS:  Free  Parking    The patient was contacted  via phone. She  verbalize  understanding of all instructions and does not  need reinforcement.

## 2018-03-27 NOTE — PROGRESS NOTES
1. Have you been to the ER, urgent care clinic since your last visit? Hospitalized since your last visit? No    2. Have you seen or consulted any other health care providers outside of the [address] [place] [address] since your last visit? Include any pap smears or colon screening.  No

## 2018-03-27 NOTE — PROGRESS NOTES
Cholelithasis Consultation      Subjective:      Tory Hutchins is an 26 y.o.  female who was referred by: Christina Kelly MD for evaluation of chronic cholecystitis. Patient complains of RUQ pain that is moderate in nature. Pain is sharp. Eating makes it worse. Nothing makes it better. It is associated with nausea and reflux. Patient denies fevers and chills. Past Medical History:   Diagnosis Date    Kidney stones     Morbid obesity ([address]     Sleep apnea     Does not have CPAP as yet     Family History   Problem Relation Age of Onset    Heart Disease Father 55    Hypertension Father     Sleep Apnea Father     No Known Problems Sister     Cancer Maternal Grandmother      Brain Cancer    Heart Disease Paternal Grandmother     Cancer Paternal Grandfather      Cannot display prior to admission medications because the patient has not been admitted in this contact. Allergies   Allergen Reactions    Aspirin Itching     Social History     Social History    Marital status: SINGLE     Spouse name: N/A    Number of children: N/A    Years of education: N/A     Occupational History          started in 3/17     Social History Main Topics    Smoking status: Former Smoker     Years: 1.00     Quit date: 1/16/2018    Smokeless tobacco: Never Used      Comment: 1/4 pack ppd for 6 months    Alcohol use No      Comment: rarely    Drug use: No      Comment: Has done [employer] Sexual activity: No     Other Topics Concern    Not on file     Social History Narrative       Review of Systems: A comprehensive review of systems was negative except for that written in the HPI.      Objective:        Visit Vitals    /88 (BP 1 Location: Left arm, BP Patient Position: Sitting)    Pulse 98    Temp 97.9 °F (36.6 °C) (Oral)    Resp 20    Ht 5' 5\" (1.651 m)    Wt 301 lb 8 oz (136.8 kg)    SpO2 98%    BMI 50.17 kg/m2        Visit Vitals    /88 (BP 1 Location: Left arm, BP Patient Position: Sitting)    Pulse 98    Temp 97.9 °F (36.6 °C) (Oral)    Resp 20    Ht 5' 5\" (1.651 m)    Wt 301 lb 8 oz (136.8 kg)    SpO2 98%    BMI 50.17 kg/m2     General appearance: alert, cooperative, no distress, appears stated age, morbidly obese  Head: Normocephalic, without obvious abnormality, atraumatic  Eyes: conjunctivae/corneas clear., EOM's intact. Lungs: clear to auscultation bilaterally  Heart: regular rate and rhythm, S1, S2 normal, no murmur, click, rub or gallop  Abdomen: soft, non-tender. Bowel sounds normal. No masses,  no organomegaly    Imaging:  images and reports reviewed    Lab Review:      No results found for this or any previous visit (from the past 24 hour(s)). Labs and radiology images and reports reviewed      Assessment:   Chronic cholecystitis    Plan/Recommendations/Medical Decision Makin. I recommend laparoscopic cholecystectomy with possible cholangiogram  Treatment alternatives were discussed. 2. Discussed aspects of surgical intervention, methods, risks (including by not limited to infection, bleeding, retained gallstones in the abdominal cavity and/or the main bile ducts, and injury to adjacent structures including the biliary system, common bile duct, and intestines.)  The patient understands the risks, any and all questions were answered to the patient's satisfaction. 3. Patient does wish to proceed with surgery. Surgery : robotic assisted, laparoscopic cholecystectomy possible open     Length:  1.5 hours    Diagnosis :     ICD-10-CM ICD-9-CM   1.  Chronic cholecystitis K81.1 575.11       Anesthesia : general anesthesia    Surgery Type: Outpatient    Position:  supine    Hospital : [facility]    Blood thinner NO

## 2018-03-28 ENCOUNTER — DOCUMENTATION ONLY (OUTPATIENT)
Dept: SLEEP MEDICINE | Age: 23
End: 2018-03-28

## 2018-04-06 ENCOUNTER — ANESTHESIA EVENT (OUTPATIENT)
Dept: SURGERY | Age: 23
DRG: 418 | End: 2018-04-06
Payer: SUBSIDIZED

## 2018-04-09 ENCOUNTER — HOSPITAL ENCOUNTER (INPATIENT)
Age: 23
LOS: 2 days | Discharge: HOME | DRG: 418 | End: 2018-04-14
Attending: SURGERY | Admitting: SURGERY
Payer: SUBSIDIZED

## 2018-04-09 ENCOUNTER — ANESTHESIA (OUTPATIENT)
Dept: SURGERY | Age: 23
DRG: 418 | End: 2018-04-09
Payer: SUBSIDIZED

## 2018-04-09 DIAGNOSIS — I10 ESSENTIAL HYPERTENSION: ICD-10-CM

## 2018-04-09 DIAGNOSIS — E66.01 OBESITY, MORBID (HCC): ICD-10-CM

## 2018-04-09 DIAGNOSIS — R07.9 CHEST PAIN, UNSPECIFIED TYPE: ICD-10-CM

## 2018-04-09 DIAGNOSIS — K81.9 CHOLECYSTITIS: Primary | ICD-10-CM

## 2018-04-09 DIAGNOSIS — R06.02 SOB (SHORTNESS OF BREATH): ICD-10-CM

## 2018-04-09 LAB — HCG UR QL: NEGATIVE

## 2018-04-09 PROCEDURE — 74011000254 HC RX REV CODE- 254: Performed by: SURGERY

## 2018-04-09 PROCEDURE — 74011250636 HC RX REV CODE- 250/636

## 2018-04-09 PROCEDURE — 77030035277 HC OBTRTR BLDELSS DISP INTU -B: Performed by: SURGERY

## 2018-04-09 PROCEDURE — 8E0W4CZ ROBOTIC ASSISTED PROCEDURE OF TRUNK REGION, PERCUTANEOUS ENDOSCOPIC APPROACH: ICD-10-PCS | Performed by: SURGERY

## 2018-04-09 PROCEDURE — 76060000034 HC ANESTHESIA 1.5 TO 2 HR: Performed by: SURGERY

## 2018-04-09 PROCEDURE — 74011250637 HC RX REV CODE- 250/637: Performed by: SURGERY

## 2018-04-09 PROCEDURE — 74011250636 HC RX REV CODE- 250/636: Performed by: SURGERY

## 2018-04-09 PROCEDURE — 77030013079 HC BLNKT BAIR HGGR 3M -A: Performed by: ANESTHESIOLOGY

## 2018-04-09 PROCEDURE — 99218 HC RM OBSERVATION: CPT

## 2018-04-09 PROCEDURE — 77030032490 HC SLV COMPR SCD KNE COVD -B: Performed by: SURGERY

## 2018-04-09 PROCEDURE — 74011000250 HC RX REV CODE- 250

## 2018-04-09 PROCEDURE — 74011250636 HC RX REV CODE- 250/636: Performed by: ANESTHESIOLOGY

## 2018-04-09 PROCEDURE — 81025 URINE PREGNANCY TEST: CPT

## 2018-04-09 PROCEDURE — 77030012406 HC DRN WND PENRS BARD -A: Performed by: SURGERY

## 2018-04-09 PROCEDURE — 77030035051: Performed by: SURGERY

## 2018-04-09 PROCEDURE — 77030026438 HC STYL ET INTUB CARD -A: Performed by: ANESTHESIOLOGY

## 2018-04-09 PROCEDURE — 77030011640 HC PAD GRND REM COVD -A: Performed by: SURGERY

## 2018-04-09 PROCEDURE — 74011000254 HC RX REV CODE- 254

## 2018-04-09 PROCEDURE — 77030008684 HC TU ET CUF COVD -B: Performed by: ANESTHESIOLOGY

## 2018-04-09 PROCEDURE — 77030002895 HC DEV VASC CLOSR COVD -B: Performed by: SURGERY

## 2018-04-09 PROCEDURE — 77030020268 HC MISC GENERAL SUPPLY: Performed by: SURGERY

## 2018-04-09 PROCEDURE — 77030010935 HC CLP LIG ABSRB TELE -B: Performed by: SURGERY

## 2018-04-09 PROCEDURE — 77030016151 HC PROTCTR LNS DFOG COVD -B: Performed by: SURGERY

## 2018-04-09 PROCEDURE — 77030020703 HC SEAL CANN DISP INTU -B: Performed by: SURGERY

## 2018-04-09 PROCEDURE — 88304 TISSUE EXAM BY PATHOLOGIST: CPT | Performed by: SURGERY

## 2018-04-09 PROCEDURE — 77030020782 HC GWN BAIR PAWS FLX 3M -B

## 2018-04-09 PROCEDURE — 77030027138 HC INCENT SPIROMETER -A

## 2018-04-09 PROCEDURE — 0FT44ZZ RESECTION OF GALLBLADDER, PERCUTANEOUS ENDOSCOPIC APPROACH: ICD-10-PCS | Performed by: SURGERY

## 2018-04-09 PROCEDURE — 77030018836 HC SOL IRR NACL ICUM -A: Performed by: SURGERY

## 2018-04-09 PROCEDURE — 77030010507 HC ADH SKN DERMBND J&J -B: Performed by: SURGERY

## 2018-04-09 PROCEDURE — 76010000875 HC OR TIME 1.5 TO 2HR INTENSV - TIER 2: Performed by: SURGERY

## 2018-04-09 PROCEDURE — 77030002933 HC SUT MCRYL J&J -A: Performed by: SURGERY

## 2018-04-09 PROCEDURE — 76210000017 HC OR PH I REC 1.5 TO 2 HR: Performed by: SURGERY

## 2018-04-09 PROCEDURE — 74011000250 HC RX REV CODE- 250: Performed by: SURGERY

## 2018-04-09 RX ORDER — SODIUM CHLORIDE, SODIUM LACTATE, POTASSIUM CHLORIDE, CALCIUM CHLORIDE 600; 310; 30; 20 MG/100ML; MG/100ML; MG/100ML; MG/100ML
100 INJECTION, SOLUTION INTRAVENOUS CONTINUOUS
Status: DISCONTINUED | OUTPATIENT
Start: 2018-04-09 | End: 2018-04-09 | Stop reason: HOSPADM

## 2018-04-09 RX ORDER — DIPHENHYDRAMINE HYDROCHLORIDE 50 MG/ML
50 INJECTION, SOLUTION INTRAMUSCULAR; INTRAVENOUS
Status: ACTIVE | OUTPATIENT
Start: 2018-04-09 | End: 2018-04-10

## 2018-04-09 RX ORDER — SUCCINYLCHOLINE CHLORIDE 20 MG/ML
INJECTION INTRAMUSCULAR; INTRAVENOUS AS NEEDED
Status: DISCONTINUED | OUTPATIENT
Start: 2018-04-09 | End: 2018-04-09 | Stop reason: HOSPADM

## 2018-04-09 RX ORDER — LIDOCAINE HYDROCHLORIDE 10 MG/ML
0.1 INJECTION, SOLUTION EPIDURAL; INFILTRATION; INTRACAUDAL; PERINEURAL AS NEEDED
Status: DISCONTINUED | OUTPATIENT
Start: 2018-04-09 | End: 2018-04-09 | Stop reason: HOSPADM

## 2018-04-09 RX ORDER — BUPIVACAINE HYDROCHLORIDE AND EPINEPHRINE 5; 5 MG/ML; UG/ML
30 INJECTION, SOLUTION EPIDURAL; INTRACAUDAL; PERINEURAL ONCE
Status: COMPLETED | OUTPATIENT
Start: 2018-04-09 | End: 2018-04-09

## 2018-04-09 RX ORDER — CHLORTHALIDONE 25 MG/1
12.5 TABLET ORAL DAILY
Status: DISCONTINUED | OUTPATIENT
Start: 2018-04-10 | End: 2018-04-14 | Stop reason: HOSPADM

## 2018-04-09 RX ORDER — SODIUM CHLORIDE 0.9 % (FLUSH) 0.9 %
5-10 SYRINGE (ML) INJECTION EVERY 8 HOURS
Status: DISCONTINUED | OUTPATIENT
Start: 2018-04-09 | End: 2018-04-14 | Stop reason: HOSPADM

## 2018-04-09 RX ORDER — INDOCYANINE GREEN AND WATER 25 MG
5 KIT INJECTION
Status: COMPLETED | OUTPATIENT
Start: 2018-04-09 | End: 2018-04-09

## 2018-04-09 RX ORDER — PANTOPRAZOLE SODIUM 40 MG/1
40 TABLET, DELAYED RELEASE ORAL DAILY
Status: DISCONTINUED | OUTPATIENT
Start: 2018-04-10 | End: 2018-04-14 | Stop reason: HOSPADM

## 2018-04-09 RX ORDER — PROPOFOL 10 MG/ML
INJECTION, EMULSION INTRAVENOUS AS NEEDED
Status: DISCONTINUED | OUTPATIENT
Start: 2018-04-09 | End: 2018-04-09 | Stop reason: HOSPADM

## 2018-04-09 RX ORDER — ROCURONIUM BROMIDE 10 MG/ML
INJECTION, SOLUTION INTRAVENOUS AS NEEDED
Status: DISCONTINUED | OUTPATIENT
Start: 2018-04-09 | End: 2018-04-09 | Stop reason: HOSPADM

## 2018-04-09 RX ORDER — HYDROMORPHONE HYDROCHLORIDE 2 MG/ML
INJECTION, SOLUTION INTRAMUSCULAR; INTRAVENOUS; SUBCUTANEOUS AS NEEDED
Status: DISCONTINUED | OUTPATIENT
Start: 2018-04-09 | End: 2018-04-09 | Stop reason: HOSPADM

## 2018-04-09 RX ORDER — DIPHENHYDRAMINE HYDROCHLORIDE 50 MG/ML
12.5 INJECTION, SOLUTION INTRAMUSCULAR; INTRAVENOUS AS NEEDED
Status: DISCONTINUED | OUTPATIENT
Start: 2018-04-09 | End: 2018-04-09 | Stop reason: HOSPADM

## 2018-04-09 RX ORDER — OXYCODONE AND ACETAMINOPHEN 5; 325 MG/1; MG/1
2 TABLET ORAL
Status: DISCONTINUED | OUTPATIENT
Start: 2018-04-09 | End: 2018-04-10

## 2018-04-09 RX ORDER — ESCITALOPRAM OXALATE 10 MG/1
10 TABLET ORAL DAILY
Status: DISCONTINUED | OUTPATIENT
Start: 2018-04-10 | End: 2018-04-14 | Stop reason: HOSPADM

## 2018-04-09 RX ORDER — ENOXAPARIN SODIUM 100 MG/ML
40 INJECTION SUBCUTANEOUS EVERY 12 HOURS
Status: DISCONTINUED | OUTPATIENT
Start: 2018-04-10 | End: 2018-04-14 | Stop reason: HOSPADM

## 2018-04-09 RX ORDER — HYDROMORPHONE HYDROCHLORIDE 1 MG/ML
.25-1 INJECTION, SOLUTION INTRAMUSCULAR; INTRAVENOUS; SUBCUTANEOUS
Status: DISCONTINUED | OUTPATIENT
Start: 2018-04-09 | End: 2018-04-09 | Stop reason: HOSPADM

## 2018-04-09 RX ORDER — DEXAMETHASONE SODIUM PHOSPHATE 4 MG/ML
INJECTION, SOLUTION INTRA-ARTICULAR; INTRALESIONAL; INTRAMUSCULAR; INTRAVENOUS; SOFT TISSUE AS NEEDED
Status: DISCONTINUED | OUTPATIENT
Start: 2018-04-09 | End: 2018-04-09 | Stop reason: HOSPADM

## 2018-04-09 RX ORDER — ONDANSETRON 4 MG/1
4 TABLET, ORALLY DISINTEGRATING ORAL
Status: DISCONTINUED | OUTPATIENT
Start: 2018-04-09 | End: 2018-04-14 | Stop reason: HOSPADM

## 2018-04-09 RX ORDER — CEFAZOLIN SODIUM 1 G/3ML
INJECTION, POWDER, FOR SOLUTION INTRAMUSCULAR; INTRAVENOUS AS NEEDED
Status: DISCONTINUED | OUTPATIENT
Start: 2018-04-09 | End: 2018-04-09 | Stop reason: HOSPADM

## 2018-04-09 RX ORDER — SODIUM CHLORIDE 0.9 % (FLUSH) 0.9 %
5-10 SYRINGE (ML) INJECTION EVERY 8 HOURS
Status: DISCONTINUED | OUTPATIENT
Start: 2018-04-09 | End: 2018-04-09 | Stop reason: HOSPADM

## 2018-04-09 RX ORDER — DICYCLOMINE HYDROCHLORIDE 20 MG/1
20 TABLET ORAL
Status: DISCONTINUED | OUTPATIENT
Start: 2018-04-09 | End: 2018-04-14 | Stop reason: HOSPADM

## 2018-04-09 RX ORDER — NALOXONE HYDROCHLORIDE 0.4 MG/ML
0.4 INJECTION, SOLUTION INTRAMUSCULAR; INTRAVENOUS; SUBCUTANEOUS AS NEEDED
Status: DISCONTINUED | OUTPATIENT
Start: 2018-04-09 | End: 2018-04-14 | Stop reason: HOSPADM

## 2018-04-09 RX ORDER — FENTANYL CITRATE 50 UG/ML
INJECTION, SOLUTION INTRAMUSCULAR; INTRAVENOUS AS NEEDED
Status: DISCONTINUED | OUTPATIENT
Start: 2018-04-09 | End: 2018-04-09 | Stop reason: HOSPADM

## 2018-04-09 RX ORDER — HYDROMORPHONE HYDROCHLORIDE 2 MG/ML
0.5 INJECTION, SOLUTION INTRAMUSCULAR; INTRAVENOUS; SUBCUTANEOUS
Status: DISCONTINUED | OUTPATIENT
Start: 2018-04-09 | End: 2018-04-14 | Stop reason: HOSPADM

## 2018-04-09 RX ORDER — ONDANSETRON 2 MG/ML
INJECTION INTRAMUSCULAR; INTRAVENOUS AS NEEDED
Status: DISCONTINUED | OUTPATIENT
Start: 2018-04-09 | End: 2018-04-09 | Stop reason: HOSPADM

## 2018-04-09 RX ORDER — SODIUM CHLORIDE, SODIUM LACTATE, POTASSIUM CHLORIDE, CALCIUM CHLORIDE 600; 310; 30; 20 MG/100ML; MG/100ML; MG/100ML; MG/100ML
125 INJECTION, SOLUTION INTRAVENOUS CONTINUOUS
Status: DISCONTINUED | OUTPATIENT
Start: 2018-04-09 | End: 2018-04-09 | Stop reason: HOSPADM

## 2018-04-09 RX ORDER — SODIUM CHLORIDE 0.9 % (FLUSH) 0.9 %
5-10 SYRINGE (ML) INJECTION AS NEEDED
Status: DISCONTINUED | OUTPATIENT
Start: 2018-04-09 | End: 2018-04-09 | Stop reason: HOSPADM

## 2018-04-09 RX ORDER — GLYCOPYRROLATE 0.2 MG/ML
INJECTION INTRAMUSCULAR; INTRAVENOUS AS NEEDED
Status: DISCONTINUED | OUTPATIENT
Start: 2018-04-09 | End: 2018-04-09 | Stop reason: HOSPADM

## 2018-04-09 RX ORDER — MIDAZOLAM HYDROCHLORIDE 1 MG/ML
INJECTION, SOLUTION INTRAMUSCULAR; INTRAVENOUS AS NEEDED
Status: DISCONTINUED | OUTPATIENT
Start: 2018-04-09 | End: 2018-04-09 | Stop reason: HOSPADM

## 2018-04-09 RX ORDER — LIDOCAINE HYDROCHLORIDE 20 MG/ML
INJECTION, SOLUTION EPIDURAL; INFILTRATION; INTRACAUDAL; PERINEURAL AS NEEDED
Status: DISCONTINUED | OUTPATIENT
Start: 2018-04-09 | End: 2018-04-09 | Stop reason: HOSPADM

## 2018-04-09 RX ORDER — ONDANSETRON 2 MG/ML
4 INJECTION INTRAMUSCULAR; INTRAVENOUS
Status: DISCONTINUED | OUTPATIENT
Start: 2018-04-09 | End: 2018-04-14 | Stop reason: HOSPADM

## 2018-04-09 RX ORDER — ONDANSETRON 2 MG/ML
4 INJECTION INTRAMUSCULAR; INTRAVENOUS AS NEEDED
Status: DISCONTINUED | OUTPATIENT
Start: 2018-04-09 | End: 2018-04-09 | Stop reason: HOSPADM

## 2018-04-09 RX ORDER — POTASSIUM CHLORIDE 750 MG/1
10 TABLET, FILM COATED, EXTENDED RELEASE ORAL DAILY
Status: DISCONTINUED | OUTPATIENT
Start: 2018-04-10 | End: 2018-04-14 | Stop reason: HOSPADM

## 2018-04-09 RX ORDER — NEOSTIGMINE METHYLSULFATE 1 MG/ML
INJECTION INTRAVENOUS AS NEEDED
Status: DISCONTINUED | OUTPATIENT
Start: 2018-04-09 | End: 2018-04-09 | Stop reason: HOSPADM

## 2018-04-09 RX ORDER — SODIUM CHLORIDE 0.9 % (FLUSH) 0.9 %
5-10 SYRINGE (ML) INJECTION AS NEEDED
Status: DISCONTINUED | OUTPATIENT
Start: 2018-04-09 | End: 2018-04-14 | Stop reason: HOSPADM

## 2018-04-09 RX ADMIN — HYDROMORPHONE HYDROCHLORIDE 1 MG: 2 INJECTION, SOLUTION INTRAMUSCULAR; INTRAVENOUS; SUBCUTANEOUS at 13:05

## 2018-04-09 RX ADMIN — GLYCOPYRROLATE 0.4 MG: 0.2 INJECTION INTRAMUSCULAR; INTRAVENOUS at 12:46

## 2018-04-09 RX ADMIN — FENTANYL CITRATE 25 MCG: 50 INJECTION, SOLUTION INTRAMUSCULAR; INTRAVENOUS at 12:48

## 2018-04-09 RX ADMIN — SUCCINYLCHOLINE CHLORIDE 100 MG: 20 INJECTION INTRAMUSCULAR; INTRAVENOUS at 11:42

## 2018-04-09 RX ADMIN — DEXAMETHASONE SODIUM PHOSPHATE 8 MG: 4 INJECTION, SOLUTION INTRA-ARTICULAR; INTRALESIONAL; INTRAMUSCULAR; INTRAVENOUS; SOFT TISSUE at 11:57

## 2018-04-09 RX ADMIN — Medication 10 ML: at 17:19

## 2018-04-09 RX ADMIN — OXYCODONE HYDROCHLORIDE AND ACETAMINOPHEN 2 TABLET: 5; 325 TABLET ORAL at 20:48

## 2018-04-09 RX ADMIN — FENTANYL CITRATE 50 MCG: 50 INJECTION, SOLUTION INTRAMUSCULAR; INTRAVENOUS at 11:51

## 2018-04-09 RX ADMIN — OXYCODONE HYDROCHLORIDE AND ACETAMINOPHEN 2 TABLET: 5; 325 TABLET ORAL at 16:02

## 2018-04-09 RX ADMIN — FENTANYL CITRATE 50 MCG: 50 INJECTION, SOLUTION INTRAMUSCULAR; INTRAVENOUS at 12:59

## 2018-04-09 RX ADMIN — HYDROMORPHONE HYDROCHLORIDE 1 MG: 2 INJECTION, SOLUTION INTRAMUSCULAR; INTRAVENOUS; SUBCUTANEOUS at 13:11

## 2018-04-09 RX ADMIN — SODIUM CHLORIDE, SODIUM LACTATE, POTASSIUM CHLORIDE, AND CALCIUM CHLORIDE: 600; 310; 30; 20 INJECTION, SOLUTION INTRAVENOUS at 12:46

## 2018-04-09 RX ADMIN — CEFAZOLIN SODIUM 3 G: 1 INJECTION, POWDER, FOR SOLUTION INTRAMUSCULAR; INTRAVENOUS at 11:44

## 2018-04-09 RX ADMIN — ONDANSETRON 4 MG: 2 INJECTION INTRAMUSCULAR; INTRAVENOUS at 12:42

## 2018-04-09 RX ADMIN — PROPOFOL 200 MG: 10 INJECTION, EMULSION INTRAVENOUS at 11:42

## 2018-04-09 RX ADMIN — HYDROMORPHONE HYDROCHLORIDE 0.5 MG: 2 INJECTION INTRAMUSCULAR; INTRAVENOUS; SUBCUTANEOUS at 17:18

## 2018-04-09 RX ADMIN — Medication 10 ML: at 23:11

## 2018-04-09 RX ADMIN — ROCURONIUM BROMIDE 5 MG: 10 INJECTION, SOLUTION INTRAVENOUS at 11:42

## 2018-04-09 RX ADMIN — NEOSTIGMINE METHYLSULFATE 2 MG: 1 INJECTION INTRAVENOUS at 12:46

## 2018-04-09 RX ADMIN — MIDAZOLAM HYDROCHLORIDE 5 MG: 1 INJECTION, SOLUTION INTRAMUSCULAR; INTRAVENOUS at 11:26

## 2018-04-09 RX ADMIN — HYDROMORPHONE HYDROCHLORIDE 0.5 MG: 2 INJECTION INTRAMUSCULAR; INTRAVENOUS; SUBCUTANEOUS at 23:11

## 2018-04-09 RX ADMIN — LIDOCAINE HYDROCHLORIDE 80 MG: 20 INJECTION, SOLUTION EPIDURAL; INFILTRATION; INTRACAUDAL; PERINEURAL at 11:42

## 2018-04-09 RX ADMIN — INDOCYANINE GREEN AND WATER 5 MG: KIT at 11:30

## 2018-04-09 RX ADMIN — SODIUM CHLORIDE, SODIUM LACTATE, POTASSIUM CHLORIDE, AND CALCIUM CHLORIDE 100 ML/HR: 600; 310; 30; 20 INJECTION, SOLUTION INTRAVENOUS at 10:14

## 2018-04-09 RX ADMIN — FENTANYL CITRATE 100 MCG: 50 INJECTION, SOLUTION INTRAMUSCULAR; INTRAVENOUS at 11:47

## 2018-04-09 RX ADMIN — ROCURONIUM BROMIDE 35 MG: 10 INJECTION, SOLUTION INTRAVENOUS at 11:50

## 2018-04-09 RX ADMIN — FENTANYL CITRATE 100 MCG: 50 INJECTION, SOLUTION INTRAMUSCULAR; INTRAVENOUS at 11:42

## 2018-04-09 RX ADMIN — ROCURONIUM BROMIDE 10 MG: 10 INJECTION, SOLUTION INTRAVENOUS at 12:00

## 2018-04-09 RX ADMIN — FENTANYL CITRATE 25 MCG: 50 INJECTION, SOLUTION INTRAMUSCULAR; INTRAVENOUS at 12:44

## 2018-04-09 NOTE — ANESTHESIA PREPROCEDURE EVALUATION
Anesthetic History   No history of anesthetic complications            Review of Systems / Medical History  Patient summary reviewed, nursing notes reviewed and pertinent labs reviewed    Pulmonary        Sleep apnea (recent diagnosis, no cpap yet, will be admitted post op for observation): No treatment           Neuro/Psych   Within defined limits           Cardiovascular    Hypertension              Exercise tolerance: >4 METS     GI/Hepatic/Renal  Within defined limits              Endo/Other        Morbid obesity     Other Findings            Physical Exam    Airway  Mallampati: III  TM Distance: 4 - 6 cm  Neck ROM: normal range of motion   Mouth opening: Normal     Cardiovascular    Rhythm: regular  Rate: normal         Dental    Dentition: Lower dentition intact and Upper dentition intact     Pulmonary  Breath sounds clear to auscultation               Abdominal  GI exam deferred       Other Findings            Anesthetic Plan    ASA: 3  Anesthesia type: general          Induction: Intravenous  Anesthetic plan and risks discussed with: Patient

## 2018-04-09 NOTE — PERIOP NOTES
TRANSFER - OUT REPORT:    Verbal report given to MIKE Coleman(name) on [employer]  being transferred to [facility](unit) for routine progression of care       Report consisted of patients Situation, Background, Assessment and   Recommendations(SBAR). Information from the following report(s) SBAR, Kardex, Procedure Summary, Intake/Output, MAR, Accordion and Cardiac Rhythm RSR was reviewed with the receiving nurse. Lines:   Peripheral IV 04/09/18 Left Wrist (Active)   Site Assessment Clean, dry, & intact 4/9/2018  1:10 PM   Phlebitis Assessment 0 4/9/2018  1:10 PM   Infiltration Assessment 0 4/9/2018  1:10 PM   Dressing Status Clean, dry, & intact 4/9/2018  1:10 PM   Dressing Type Transparent;Tape 4/9/2018  1:10 PM   Hub Color/Line Status Pink;Patent; Infusing 4/9/2018  1:10 PM   Alcohol Cap Used Yes 4/9/2018 10:12 AM        Opportunity for questions and clarification was provided.       Patient transported with:   O2 @ 3 liters

## 2018-04-09 NOTE — PROGRESS NOTES
Problem: Falls - Risk of  Goal: *Absence of Falls  Document Shabbir Fall Risk and appropriate interventions in the flowsheet.    Outcome: Progressing Towards Goal  Fall Risk Interventions:

## 2018-04-09 NOTE — BRIEF OP NOTE
BRIEF OPERATIVE NOTE    Date of Procedure: 4/9/2018   Preoperative Diagnosis: GALLSTONES  Postoperative Diagnosis: GALLSTONES    Procedure(s):  ROBOTIC ASSISTED LAPAROSCOPIC CHOLECYSTECTOMY  Surgeon(s) and Role:     * Sydni Pittman MD - Primary         Assistant Staff: None      Surgical Staff:  Circ-1: Colby Severe, RN  Circ-Relief: Tita Beltrán RN  Scrub Tech-1: Geovanny Peace  Surg Asst-1: Oscar Guerrero LPN  Surg Asst-Relief: Yumiko Guevara  Float Staff:  Virgil Conklin  Event Time In   Incision Start 1200   Incision Close      Anesthesia: General   Estimated Blood Loss: minimal  Specimens:   ID Type Source Tests Collected by Time Destination   1 : GALLBLADDER Preservative Abdomen  Sydni Pittman MD 4/9/2018 1236 Pathology      Findings: normal appearing gallbladder   Complications: none  Implants: * No implants in log *

## 2018-04-09 NOTE — INTERVAL H&P NOTE
H&P Update:  Dragan Mcneil was seen and examined. History and physical has been reviewed. The patient has been examined.  There have been no significant clinical changes since the completion of the originally dated History and Physical.    Signed By: Joessito Terrell MD     April 9, 2018 11:04 AM

## 2018-04-09 NOTE — H&P (VIEW-ONLY)
Cholelithasis Consultation      Subjective:      Kaelyn Malcolm is an 26 y.o.  female who was referred by: Navi Morton MD for evaluation of chronic cholecystitis. Patient complains of RUQ pain that is moderate in nature. Pain is sharp. Eating makes it worse. Nothing makes it better. It is associated with nausea and reflux. Patient denies fevers and chills. Past Medical History:   Diagnosis Date    Kidney stones     Morbid obesity ([address]     Sleep apnea     Does not have CPAP as yet     Family History   Problem Relation Age of Onset    Heart Disease Father 55    Hypertension Father     Sleep Apnea Father     No Known Problems Sister     Cancer Maternal Grandmother      Brain Cancer    Heart Disease Paternal Grandmother     Cancer Paternal Grandfather      Cannot display prior to admission medications because the patient has not been admitted in this contact. Allergies   Allergen Reactions    Aspirin Itching     Social History     Social History    Marital status: SINGLE     Spouse name: N/A    Number of children: N/A    Years of education: N/A     Occupational History          started in 3/17     Social History Main Topics    Smoking status: Former Smoker     Years: 1.00     Quit date: 1/16/2018    Smokeless tobacco: Never Used      Comment: 1/4 pack ppd for 6 months    Alcohol use No      Comment: rarely    Drug use: No      Comment: Has done [employer] Sexual activity: No     Other Topics Concern    Not on file     Social History Narrative       Review of Systems: A comprehensive review of systems was negative except for that written in the HPI.      Objective:        Visit Vitals    /88 (BP 1 Location: Left arm, BP Patient Position: Sitting)    Pulse 98    Temp 97.9 °F (36.6 °C) (Oral)    Resp 20    Ht 5' 5\" (1.651 m)    Wt 301 lb 8 oz (136.8 kg)    SpO2 98%    BMI 50.17 kg/m2        Visit Vitals    /88 (BP 1 Location: Left arm, BP Patient Position: Sitting)    Pulse 98    Temp 97.9 °F (36.6 °C) (Oral)    Resp 20    Ht 5' 5\" (1.651 m)    Wt 301 lb 8 oz (136.8 kg)    SpO2 98%    BMI 50.17 kg/m2     General appearance: alert, cooperative, no distress, appears stated age, morbidly obese  Head: Normocephalic, without obvious abnormality, atraumatic  Eyes: conjunctivae/corneas clear., EOM's intact. Lungs: clear to auscultation bilaterally  Heart: regular rate and rhythm, S1, S2 normal, no murmur, click, rub or gallop  Abdomen: soft, non-tender. Bowel sounds normal. No masses,  no organomegaly    Imaging:  images and reports reviewed    Lab Review:      No results found for this or any previous visit (from the past 24 hour(s)). Labs and radiology images and reports reviewed      Assessment:   Chronic cholecystitis    Plan/Recommendations/Medical Decision Makin. I recommend laparoscopic cholecystectomy with possible cholangiogram  Treatment alternatives were discussed. 2. Discussed aspects of surgical intervention, methods, risks (including by not limited to infection, bleeding, retained gallstones in the abdominal cavity and/or the main bile ducts, and injury to adjacent structures including the biliary system, common bile duct, and intestines.)  The patient understands the risks, any and all questions were answered to the patient's satisfaction. 3. Patient does wish to proceed with surgery. Surgery : robotic assisted, laparoscopic cholecystectomy possible open     Length:  1.5 hours    Diagnosis :     ICD-10-CM ICD-9-CM   1.  Chronic cholecystitis K81.1 575.11       Anesthesia : general anesthesia    Surgery Type: Outpatient    Position:  supine    Hospital : [facility]    Blood thinner NO

## 2018-04-09 NOTE — PROGRESS NOTES
Primary Nurse Lyla Harper RN and Cecil Tsang RN performed a dual skin assessment on this patient No impairment noted  Nelson score is 21

## 2018-04-09 NOTE — OP NOTES
[address] REPORT    Dg Schuster  MR#: [id number]  : 1995  ACCOUNT #: [de-identified]   DATE OF SERVICE: 2018    PREOPERATIVE DIAGNOSIS:  Chronic cholecystitis. POSTOPERATIVE DIAGNOSIS:  Chronic cholecystitis. PROCEDURE PERFORMED:  Robotic-assisted multiport laparoscopic cholecystectomy with Firefly imaging. SURGEON:  Germaine Chandra MD     ASSISTANT:  Dino Whitaker, Surgical Assistant    ANESTHESIA:  General endotracheal.    ESTIMATED BLOOD LOSS:  Minimal.    TUBES AND DRAINS:  None. SPECIMENS REMOVED:  Gallbladder. COMPLICATIONS:  None apparent. IMPLANTS:  none    FINDINGS:  Normal-appearing gallbladder. INDICATIONS FOR PROCEDURE:  The patient is a 82-year-old morbidly obese female who presents with signs and symptoms of chronic cholecystitis. She presents for a cholecystectomy. DESCRIPTION OF PROCEDURE:  The patient was identified in the preoperative holding area, informed consent obtained. She was given preoperative antibiotics. She was then taken to the operating room and placed in a supine position, underwent general endotracheal anesthesia without complication. Her abdomen was then prepped and draped in the usual sterile fashion. A time-out was performed to confirm that the patient was the correct patient, and that she was presenting for robotic-assisted laparoscopically. Once this was confirmed, entry into the abdomen was obtained in the left midclavicular line 2 cm above the umbilicus. At this site, 3 mL of 0.5% Marcaine were injected in the subcutaneous space, an 8 mm incision made, and an 8 mm robotic trocar containing a 5 mm, 0-degree laparoscope was used to dissect the layers of the abdominal wall under direct laparoscopic vision. Entry into the abdomen was confirmed visually. Once within the abdomen, insufflation was provided through this trocar to a pressure of 15 mmHg.   The patient tolerated insufflation well, and there were no apparent complications. A 360-degree evaluation of the abdomen was then performed from this trocar site. There were no peritoneal implants. The surface anatomy of the liver appeared normal.  Attention was focused across the abdominal wall, and 2 additional 8 mm trocars were placed. Finally, a 5 mm trocar was placed in the right mid axillary line. An additional 5 mm trocar was placed in the right lower quadrant. The robotic bedside cart was then docked to the 8 mm trocars. I broke scrub and went to the console. The assistant grasped the fundus of the gallbladder and retracted it over the dome of the liver. A fenestrated grasper was used to grab the infundibulum and retract it to the patient's right and inferiorly. Hook cautery was then used to dissect out the contents of the triangle of Calot. Firefly fluorescence imaging was switched back and forth between normal light to ensure no biliary structures were injured during the dissection. The lower third of the gallbladder was  from the gallbladder plate using the electrocautery. The critical view was obtained, where the only 2 structures entering the gallbladder were the cystic duct and the cystic artery. Once this view was obtained, a clip was placed on the proximal cystic artery, a clip placed on the proximal cystic duct, a third clip placed just on the base of the infundibulum. Scissors were then used to divide the cystic duct and the cystic artery. The gallbladder was then  from the liver using electrocautery. The gallbladder was then placed in a 5 mm retrieval bag and brought out through one of the 8 mm trocar sites. Insufflation was vented through the trocars and they were removed. The skin at all trocar sites closed with 4-0 Monocryl and Dermabond. The patient was extubated in the room, taken to the postanesthesia care in stable condition. Instrument and sponge counts correct x2.       Shailesh Sheth, MD Robbie Mixon / Luis Abel  D: 04/09/2018 13:11     T: 04/09/2018 13:53  JOB #: [id number]

## 2018-04-09 NOTE — IP AVS SNAPSHOT
[facility] 
 
 
 [address] 
[phone] Patient: Em Harrison MRN: [id number] :1995 About your hospitalization You were admitted on:  2018 You last received care in the:  [facility] 4M POST SURG ORT 1 You were discharged on:  2018 Why you were hospitalized Your primary diagnosis was:  Not on File Your diagnoses also included:  Cholecystitis Follow-up Information Follow up With Details Comments Contact Info Jethro Mckenzie MD   [address] 100[address] [facility] 
[phone] Your Scheduled Appointments 2018  2:00 PM EDT  
POST OP 10 MIN with Phillip Wright MD  
[address] [facility] 1555 [facility] [address]  
[phone] Discharge Orders None A check mario indicates which time of day the medication should be taken. My Medications START taking these medications Instructions Each Dose to Equal  
 Morning Noon Evening Bedtime  
 ciprofloxacin HCl 500 mg tablet Commonly known as:  CIPRO Your next dose is:  Tomorrow 4/15/18 at [facility] 1 Tab by mouth every twelve (12) hours for 7 days. 500 mg  
    
   
   
   
  
 oxyCODONE-acetaminophen 7.5-325 mg per tablet Commonly known as:  PERCOCET 7.5 Your next dose is: Today 18 by 7:30pm AS NEEDED Take 1-2 Tabs by mouth every four (4) hours as needed for Pain. Max Daily Amount: 12 Tabs. 1-2 Tab CONTINUE taking these medications Instructions Each Dose to Equal  
 Morning Noon Evening Bedtime  
 chlorthalidone 25 mg tablet Commonly known as:  Maria Elena Santoro Your next dose is:  Tomorrow 4/15/18 at 9am  
   
 Take 0.5 Tabs by mouth daily. 12.5 mg  
    
   
   
   
  
 dicyclomine 20 mg tablet Commonly known as:  BENTYL Your next dose is:  AS NEEDED Take 1 Tab by mouth every six (6) hours as needed for Pain for up to 20 doses. 20 mg  
    
   
   
   
  
 escitalopram oxalate 10 mg tablet Commonly known as:  Sindhu Citron Your next dose is:  Tomorrow 4/15/18 at 9am  
   
 Take 1 Tab by mouth daily. 10 mg  
    
   
   
   
  
 ondansetron 4 mg disintegrating tablet Commonly known as:  ZOFRAN ODT Your next dose is: Today 4/14/18 by 7:30pm AS NEEDED Take 1 Tab by mouth every eight (8) hours as needed for Nausea. 4 mg  
    
   
   
   
  
 pantoprazole 40 mg tablet Commonly known as:  PROTONIX Your next dose is:  Tomorrow 4/15/18 at 9am  
   
 Take 1 Tab by mouth daily. 40 mg  
    
   
   
   
  
 potassium chloride 10 mEq tablet Commonly known as:  KLOR-CON Your next dose is:  Tomorrow 4/15/18 at 9am  
   
 Take 1 Tab by mouth daily. 10 mEq Where to Get Your Medications These medications were sent to [address] Phone:  [phone]  
  ciprofloxacin HCl 500 mg tablet Information on where to get these meds will be given to you by the nurse or doctor. ! Ask your nurse or doctor about these medications  
  oxyCODONE-acetaminophen 7.5-325 mg per tablet Opioid Education Prescription Opioids: What You Need to Know: 
 
Prescription opioids can be used to help relieve moderate-to-severe pain and are often prescribed following a surgery or injury, or for certain health conditions. These medications can be an important part of treatment but also come with serious risks. Opioids are strong pain medicines. Examples include hydrocodone, oxycodone, fentanyl, and morphine. Heroin is an example of an illegal opioid.   It is important to work with your health care provider to make sure you are getting the safest, most effective care. WHAT ARE THE RISKS AND SIDE EFFECTS OF OPIOID USE? Prescription opioids carry serious risks of addiction and overdose, especially with prolonged use. An opioid overdose, often marked by slow breathing, can cause sudden death. The use of prescription opioids can have a number of side effects as well, even when taken as directed. · Tolerance-meaning you might need to take more of a medication for the same pain relief · Physical dependence-meaning you have symptoms of withdrawal when the medication is stopped. Withdrawal symptoms can include nausea, sweating, chills, diarrhea, stomach cramps, and muscle aches. Withdrawal can last up to several weeks, depending on which drug you took and how long you took it. · Increased sensitivity to pain · Constipation · Nausea, vomiting, and dry mouth · Sleepiness and dizziness · Confusion · Depression · Low levels of testosterone that can result in lower sex drive, energy, and strength · Itching and sweating RISKS ARE GREATER WITH:      
· History of drug misuse, substance use disorder, or overdose · Mental health conditions (such as depression or anxiety) · Sleep apnea · Older age (72 years or older) · Pregnancy Avoid alcohol while taking prescription opioids. Also, unless specifically advised by your health care provider, medications to avoid include: · Benzodiazepines (such as Xanax or Valium) · Muscle relaxants (such as Soma or Flexeril) · Hypnotics (such as Ambien or Lunesta) · Other prescription opioids KNOW YOUR OPTIONS Talk to your health care provider about ways to manage your pain that don't involve prescription opioids. Some of these options may actually work better and have fewer risks and side effects. Options may include: 
· Pain relievers such as acetaminophen, ibuprofen, and naproxen · Some medications that are also used for depression or seizures · Physical therapy and exercise · Counseling to help patients learn how to cope better with triggers of pain and stress. · Application of heat or cold compress · Massage therapy · Relaxation techniques Be Informed Make sure you know the name of your medication, how much and how often to take it, and its potential risks & side effects. IF YOU ARE PRESCRIBED OPIOIDS FOR PAIN: 
· Never take opioids in greater amounts or more often than prescribed. Remember the goal is not to be pain-free but to manage your pain at a tolerable level. · Follow up with your primary care provider to: · Work together to create a plan on how to manage your pain. · Talk about ways to help manage your pain that don't involve prescription opioids. · Talk about any and all concerns and side effects. · Help prevent misuse and abuse. · Never sell or share prescription opioids · Help prevent misuse and abuse. · Store prescription opioids in a secure place and out of reach of others (this may include visitors, children, friends, and family). · Safely dispose of unused/unwanted prescription opioids: Find your community drug take-back program or your pharmacy mail-back program, or flush them down the toilet, following guidance from the Food and Drug Administration (www.fda.gov/Drugs/ResourcesForYou). · Visit www.cdc.gov/drugoverdose to learn about the risks of opioid abuse and overdose. · If you believe you may be struggling with addiction, tell your health care provider and ask for guidance or call [address] [place] [address] at [phone]. Discharge Instructions Cholecystectomy: What to Expect at [facility] Your Recovery After your surgery, it is normal to feel weak and tired for several days after you return home. Your belly may be swollen. If you had laparoscopic surgery, you may also have pain in your shoulder for about 24 hours. You may have gas or need to burp a lot at first, and a few people get diarrhea. The diarrhea usually goes away in 2 to 4 weeks, but it may last longer. How quickly you recover depends on whether you had a laparoscopic or open surgery. · For a laparoscopic surgery, most people can go back to work or their normal routine in 1 to 2 weeks, but it may take longer, depending on the type of work you do. · For an open surgery, it will probably take 4 to 6 weeks before you get back to your normal routine. This care sheet gives you a general idea about how long it will take for you to recover. However, each person recovers at a different pace. Follow the steps below to get better as quickly as possible. How can you care for yourself at home? Activity ? · Rest when you feel tired. Getting enough sleep will help you recover. ? · Try to walk each day. Start out by walking a little more than you did the day before. Gradually increase the amount you walk. Walking boosts blood flow and helps prevent pneumonia and constipation. ? · For about 2 to 4 weeks, avoid lifting anything that would make you strain. This may include a child, heavy grocery bags and milk containers, a heavy briefcase or backpack, cat litter or dog food bags, or a vacuum . ? · Avoid strenuous activities, such as biking, jogging, weightlifting, and aerobic exercise, until your doctor says it is okay. ? · You may shower 24 to 48 hours after surgery, if your doctor okays it. Pat the cut (incision) dry. Do not take a bath for the first 2 weeks, or until your doctor tells you it is okay. ? · You may drive when you are no longer taking pain medicine and can quickly move your foot from the gas pedal to the brake. You must also be able to sit comfortably for a long period of time, even if you do not plan to go far. You might get caught in traffic.   
? · For a laparoscopic surgery, most people can go back to work or their normal routine in 1 to 2 weeks, but it may take longer. For an open surgery, it will probably take 4 to 6 weeks before you get back to your normal routine. ? · Your doctor will tell you when you can have sex again. ? Diet ? · Eat smaller meals more often instead of fewer larger meals. You can eat a normal diet, but avoid eating fatty foods for about 1 month. Fatty foods include hamburger, whole milk, cheese, and many snack foods. If your stomach is upset, try bland, low-fat foods like plain rice, broiled chicken, toast, and yogurt. ? · Drink plenty of fluids (unless your doctor tells you not to). ? · If you have diarrhea, try avoiding spicy foods, dairy products, fatty foods, and alcohol. You can also watch to see if specific foods cause it, and stop eating them. If the diarrhea continues for more than 2 weeks, talk to your doctor. ? · You may notice that your bowel movements are not regular right after your surgery. This is common. Try to avoid constipation and straining with bowel movements. You may want to take a fiber supplement every day. If you have not had a bowel movement after a couple of days, ask your doctor about taking a mild laxative. Medicines ? · Your doctor will tell you if and when you can restart your medicines. He or she will also give you instructions about taking any new medicines. ? · If you take blood thinners, such as warfarin (Coumadin), clopidogrel (Plavix), or aspirin, be sure to talk to your doctor. He or she will tell you if and when to start taking those medicines again. Make sure that you understand exactly what your doctor wants you to do. ? · Take pain medicines exactly as directed. ¨ If the doctor gave you a prescription medicine for pain, take it as prescribed. ¨ If you are not taking a prescription pain medicine, take an over-the-counter medicine such as acetaminophen (Tylenol), ibuprofen (Advil, Motrin), or naproxen (Aleve).  Read and follow all instructions on the label. ¨ Do not take two or more pain medicines at the same time unless the doctor told you to. Many pain medicines contain acetaminophen, which is Tylenol. Too much Tylenol can be harmful. ? · If you think your pain medicine is making you sick to your stomach: 
¨ Take your medicine after meals (unless your doctor tells you not to). ¨ Ask your doctor for a different pain medicine. ? · If your doctor prescribed antibiotics, take them as directed. Do not stop taking them just because you feel better. You need to take the full course of antibiotics. Incision care ? · If you have strips of tape on the incision, or cut, leave the tape on for a week or until it falls off.  
? · After 24 to 48 hours, wash the area daily with warm, soapy water, and pat it dry. ? · You may have staples to hold the cut together. Keep them dry until your doctor takes them out. This is usually in 7 to 10 days. ? · Keep the area clean and dry. You may cover it with a gauze bandage if it weeps or rubs against clothing. Change the bandage every day. ?Ice ? · To reduce swelling and pain, put ice or a cold pack on your belly for 10 to 20 minutes at a time. Do this every 1 to 2 hours. Put a thin cloth between the ice and your skin. Follow-up care is a key part of your treatment and safety. Be sure to make and go to all appointments, and call your doctor if you are having problems. It's also a good idea to know your test results and keep a list of the medicines you take. When should you call for help? Call 911 anytime you think you may need emergency care. For example, call if: 
? · You passed out (lost consciousness). ? · You are short of breath. Juanito Taylor ? Call your doctor now or seek immediate medical care if: 
? · You are sick to your stomach and cannot drink fluids. ? · You have pain that does not get better when you take your pain medicine. ? · You cannot pass stools or gas. ? · You have signs of infection, such as: ¨ Increased pain, swelling, warmth, or redness. ¨ Red streaks leading from the incision. ¨ Pus draining from the incision. ¨ A fever. ? · Bright red blood has soaked through the bandage over your incision. ? · You have loose stitches, or your incision comes open. ? · You have signs of a blood clot in your leg (called a deep vein thrombosis), such as: 
¨ Pain in your calf, back of knee, thigh, or groin. ¨ Redness and swelling in your leg or groin. ? Watch closely for any changes in your health, and be sure to contact your doctor if you have any problems. Where can you learn more? Go to http://maryann-jm.info/. Enter [phone] in the search box to learn more about \"Cholecystectomy: What to Expect at Home. \" Current as of: May 12, 2017 Content Version: 11.4 © 5836-1178 wizboo. Care instructions adapted under license by [employer] (which disclaims liability or warranty for this information). If you have questions about a medical condition or this instruction, always ask your healthcare professional. [place] [address] any warranty or liability for your use of this information. Chalino De La Garza MD, PhD, [facility] General Surgery at [address], [address] [place] [address] 
[phone] Fax [phone] Introducing [facility]! [employer] introduces [employer] patient portal. Now you can access parts of your medical record, email your doctor's office, and request medication refills online. 1. In your internet browser, go to [email or web address] 2. Click on the First Time User? Click Here link in the Sign In box. You will see the New Member Sign Up page. 3. Enter your [employer] Access Code exactly as it appears below. You will not need to use this code after youve completed the sign-up process.  If you do not sign up before the expiration date, you must request a new code. · [employer] Access Code: [email or web address] Expires: 5/12/2018  8:30 PM 
 
4. Enter the last four digits of your Social Security Number (xxxx) and Date of Birth (mm/dd/yyyy) as indicated and click Submit. You will be taken to the next sign-up page. 5. Create a [employer] ID. This will be your [employer] login ID and cannot be changed, so think of one that is secure and easy to remember. 6. Create a [employer] password. You can change your password at any time. 7. Enter your Password Reset Question and Answer. This can be used at a later time if you forget your password. 8. Enter your e-mail address. You will receive e-mail notification when new information is available in [address] 9. Click Sign Up. You can now view and download portions of your medical record. 10. Click the Download Summary menu link to download a portable copy of your medical information. If you have questions, please visit the Frequently Asked Questions section of the [employer] website. Remember, [employer] is NOT to be used for urgent needs. For medical emergencies, dial 911. Now available from your iPhone and Android! Introducing Mayco Joshua As a [facility] patient, I wanted to make you aware of our electronic visit tool called Mayco Joshua. [employer] 24/7 allows you to connect within minutes with a medical provider 24 hours a day, seven days a week via a mobile device or tablet or logging into a secure website from your computer. You can access Mayco Joshua from anywhere in the [place].  
 
A virtual visit might be right for you when you have a simple condition and feel like you just dont want to get out of bed, or cant get away from work for an appointment, when your regular [employer] provider is not available (evenings, weekends or holidays), or when youre out of town and need minor care. Electronic visits cost only $49 and if the [employer] 24/7 provider determines a prescription is needed to treat your condition, one can be electronically transmitted to a nearby pharmacy*. Please take a moment to enroll today if you have not already done so. The enrollment process is free and takes just a few minutes. To enroll, please download the [employer] 24/7 marline to your tablet or phone, or visit [email or web address] to enroll on your computer. And, as an [address] [place] [address] patient with a [employer] account, the results of your visits will be scanned into your electronic medical record and your primary care provider will be able to view the scanned results. We urge you to continue to see your regular [employer] provider for your ongoing medical care. And while your primary care provider may not be the one available when you seek a [employer] virtual visit, the peace of mind you get from getting a real diagnosis real time can be priceless. For more information on [employer], view our Frequently Asked Questions (FAQs) at [email or web address]. Sincerely, 
 
Deshawn Baxter MD 
Chief Medical Officer [address] *:  certain medications cannot be prescribed via [employer] Providers Seen During Your Hospitalization Provider Specialty Primary office phone Abhishek Valdovinos MD General Surgery [phone] Your Primary Care Physician (PCP) Primary Care Physician Office Phone Office Fax Matias Valdez [phone] You are allergic to the following Allergen Reactions Aspirin Itching Recent Documentation Height Weight Breastfeeding? BMI OB Status Smoking Status 1.651 m 136.1 kg No 49.92 kg/m2 Unknown Former Smoker Emergency Contacts Name Discharge Info Relation Home Work Mobile Aviva Marie N/A  AT THIS TIME [6] Parent [1] [phone] [address] [place] [address],[address] CAREGIVER [3] Father [15] [phone] Patient Belongings The following personal items are in your possession at time of discharge: 
  Dental Appliances: None  Visual Aid: None      Home Medications: None   Jewelry: None  Clothing: Other (comment) (street clothes to Pacu)    Other Valuables: None Please provide this summary of care documentation to your next provider. Signatures-by signing, you are acknowledging that this After Visit Summary has been reviewed with you and you have received a copy. Patient Signature:  ____________________________________________________________ Date:  ____________________________________________________________  
  
[facility] Signature:  ____________________________________________________________ Date:  ____________________________________________________________

## 2018-04-09 NOTE — ANESTHESIA POSTPROCEDURE EVALUATION
Post-Anesthesia Evaluation and Assessment    Patient: Aníbal Jc MRN: [id number]  SSN: [id number]    YOB: 1995  Age: 26 y.o. Sex: female       Cardiovascular Function/Vital Signs  Visit Vitals    /84    Pulse (!) 103    Temp 37.2 °C (98.9 °F)    Resp 29    Ht 5' 5\" (1.651 m)    Wt 136.1 kg (300 lb)    SpO2 91%    BMI 49.92 kg/m2       Patient is status post general anesthesia for Procedure(s):  ROBOTIC ASSISTED LAPAROSCOPIC CHOLECYSTECTOMY. Nausea/Vomiting: None    Postoperative hydration reviewed and adequate. Pain:  Pain Scale 1: Visual (04/09/18 1414)  Pain Intensity 1: 0 (04/09/18 1414)   Managed    Neurological Status:   Neuro (WDL): Exceptions to WDL (04/09/18 1310)  Neuro  Neurologic State: Alert;Irritable;Restless (04/09/18 1310)  RUE Motor Response: Numbness (at times) (04/09/18 1018)  RLE Motor Response: Numbness (at times) (04/09/18 1018)   At baseline    Mental Status and Level of Consciousness: Arousable    Pulmonary Status:   O2 Device: Nasal cannula (04/09/18 1414)   Adequate oxygenation and airway patent    Complications related to anesthesia: None    Post-anesthesia assessment completed.  No concerns    Signed By: Yaima Rene MD     April 9, 2018

## 2018-04-09 NOTE — PROGRESS NOTES
[facility] Dosing Services: 4/9/2018    Pharmacist Lovenox Dose Adjustment Progress Note  Physician: Payton Carmen    Lovenox was automatically dose-adjusted per [facility] P&T Committee Protocol, with respect to Body Mass Index. Wt Readings from Last 1 Encounters:   03/27/18 136.1 kg (300 lb)       Ht Readings from Last 1 Encounters:   03/27/18 165.1 cm (65\")           Previous Dose Lovenox 40 mg sc daily   Creatinine Clearance Estimated Creatinine Clearance: 159.6 mL/min (based on Cr of 0.78). Creatinine Lab Results   Component Value Date/Time    Creatinine 0.78 03/20/2018 06:28 PM       Platelet Lab Results   Component Value Date/Time    PLATELET 489 51/93/6022 06:28 PM      H/H Lab Results   Component Value Date/Time    HGB 13.2 03/20/2018 06:28 PM        Other anticoagulants:   Relevant drug interactions:     Pharmacist made change to enoxaparin therapy based on:  BMI > 40: dose changed to: Lovenox 40 mg sc Q 12 hrs    - Pharmacy to automatically make dose adjustment for renal dysfunction (creatinine clearance less than 30 mL/min)  - Pharmacy to automatically make dose adjustment for obesity (BMI greater than 40)  - Pharmacy to make dose rounding adjustments per [facility] dose adjustment scale. Pharmacy to monitor patients progress. Will make dose adjustment as needed per changing renal function. Will communicate further recommendations regarding patients anticoagulation therapy with prescriber.     Signed Geovanna Sarabia, [facility] information: [phone]

## 2018-04-09 NOTE — IP AVS SNAPSHOT
[address] [facility] 
 
 
 [address] [facility] 
[phone] Patient: Zhao Harrell MRN: [id number] :1995 A check mario indicates which time of day the medication should be taken. My Medications START taking these medications Instructions Each Dose to Equal  
 Morning Noon Evening Bedtime  
 ciprofloxacin HCl 500 mg tablet Commonly known as:  CIPRO Your next dose is:  Tomorrow 4/15/18 at [facility] 1 Tab by mouth every twelve (12) hours for 7 days. 500 mg  
    
   
   
   
  
 oxyCODONE-acetaminophen 7.5-325 mg per tablet Commonly known as:  PERCOCET 7.5 Your next dose is: Today 18 by 7:30pm AS NEEDED Take 1-2 Tabs by mouth every four (4) hours as needed for Pain. Max Daily Amount: 12 Tabs. 1-2 Tab CONTINUE taking these medications Instructions Each Dose to Equal  
 Morning Noon Evening Bedtime  
 chlorthalidone 25 mg tablet Commonly known as:  Ala Nims Your next dose is:  Tomorrow 4/15/18 at 9am  
   
 Take 0.5 Tabs by mouth daily. 12.5 mg  
    
   
   
   
  
 dicyclomine 20 mg tablet Commonly known as:  BENTYL Your next dose is:  AS NEEDED Take 1 Tab by mouth every six (6) hours as needed for Pain for up to 20 doses. 20 mg  
    
   
   
   
  
 escitalopram oxalate 10 mg tablet Commonly known as:  Parul Polio Your next dose is:  Tomorrow 4/15/18 at 9am  
   
 Take 1 Tab by mouth daily. 10 mg  
    
   
   
   
  
 ondansetron 4 mg disintegrating tablet Commonly known as:  ZOFRAN ODT Your next dose is: Today 18 by 7:30pm AS NEEDED Take 1 Tab by mouth every eight (8) hours as needed for Nausea. 4 mg  
    
   
   
   
  
 pantoprazole 40 mg tablet Commonly known as:  PROTONIX Your next dose is:  Tomorrow 4/15/18 at 9am  
   
 Take 1 Tab by mouth daily. 40 mg  
    
   
   
   
  
 potassium chloride 10 mEq tablet Commonly known as:  KLOR-CON Your next dose is:  Tomorrow 4/15/18 at 9am  
   
 Take 1 Tab by mouth daily. 10 mEq Where to Get Your Medications These medications were sent to [facility] [address] [place] [address] Phone:  [phone]  
  ciprofloxacin HCl 500 mg tablet Information on where to get these meds will be given to you by the nurse or doctor. ! Ask your nurse or doctor about these medications  
  oxyCODONE-acetaminophen 7.5-325 mg per tablet

## 2018-04-10 LAB
ALBUMIN SERPL-MCNC: 3.1 G/DL (ref 3.5–5)
ALBUMIN/GLOB SERPL: 0.7 {RATIO} (ref 1.1–2.2)
ALP SERPL-CCNC: 112 U/L (ref 45–117)
ALT SERPL-CCNC: 32 U/L (ref 12–78)
ANION GAP SERPL CALC-SCNC: 7 MMOL/L (ref 5–15)
APPEARANCE UR: CLEAR
AST SERPL-CCNC: 30 U/L (ref 15–37)
BACTERIA URNS QL MICRO: NEGATIVE /HPF
BILIRUB SERPL-MCNC: 0.3 MG/DL (ref 0.2–1)
BILIRUB UR QL: NEGATIVE
BUN SERPL-MCNC: 6 MG/DL (ref 6–20)
BUN/CREAT SERPL: 9 (ref 12–20)
CALCIUM SERPL-MCNC: 9.2 MG/DL (ref 8.5–10.1)
CHLORIDE SERPL-SCNC: 103 MMOL/L (ref 97–108)
CO2 SERPL-SCNC: 27 MMOL/L (ref 21–32)
COLOR UR: NORMAL
CREAT SERPL-MCNC: 0.68 MG/DL (ref 0.55–1.02)
EPITH CASTS URNS QL MICRO: NORMAL /LPF
ERYTHROCYTE [DISTWIDTH] IN BLOOD BY AUTOMATED COUNT: 15.3 % (ref 11.5–14.5)
GLOBULIN SER CALC-MCNC: 4.5 G/DL (ref 2–4)
GLUCOSE SERPL-MCNC: 144 MG/DL (ref 65–100)
GLUCOSE UR STRIP.AUTO-MCNC: NEGATIVE MG/DL
HCT VFR BLD AUTO: 37.2 % (ref 35–47)
HGB BLD-MCNC: 11.9 G/DL (ref 11.5–16)
HGB UR QL STRIP: NEGATIVE
HYALINE CASTS URNS QL MICRO: NORMAL /LPF (ref 0–5)
KETONES UR QL STRIP.AUTO: NEGATIVE MG/DL
LEUKOCYTE ESTERASE UR QL STRIP.AUTO: NEGATIVE
MCH RBC QN AUTO: 27 PG (ref 26–34)
MCHC RBC AUTO-ENTMCNC: 32 G/DL (ref 30–36.5)
MCV RBC AUTO: 84.4 FL (ref 80–99)
NITRITE UR QL STRIP.AUTO: NEGATIVE
NRBC # BLD: 0 K/UL (ref 0–0.01)
NRBC BLD-RTO: 0 PER 100 WBC
PH UR STRIP: 7 [PH] (ref 5–8)
PLATELET # BLD AUTO: 376 K/UL (ref 150–400)
PMV BLD AUTO: 9.4 FL (ref 8.9–12.9)
POTASSIUM SERPL-SCNC: 4.1 MMOL/L (ref 3.5–5.1)
PROT SERPL-MCNC: 7.6 G/DL (ref 6.4–8.2)
PROT UR STRIP-MCNC: NEGATIVE MG/DL
RBC # BLD AUTO: 4.41 M/UL (ref 3.8–5.2)
RBC #/AREA URNS HPF: NORMAL /HPF (ref 0–5)
SODIUM SERPL-SCNC: 137 MMOL/L (ref 136–145)
SP GR UR REFRACTOMETRY: 1.02 (ref 1–1.03)
UA: UC IF INDICATED,UAUC: NORMAL
UROBILINOGEN UR QL STRIP.AUTO: 0.2 EU/DL (ref 0.2–1)
WBC # BLD AUTO: 12.3 K/UL (ref 3.6–11)
WBC URNS QL MICRO: NORMAL /HPF (ref 0–4)

## 2018-04-10 PROCEDURE — 36415 COLL VENOUS BLD VENIPUNCTURE: CPT | Performed by: SURGERY

## 2018-04-10 PROCEDURE — 77030013140 HC MSK NEB VYRM -A

## 2018-04-10 PROCEDURE — 94640 AIRWAY INHALATION TREATMENT: CPT

## 2018-04-10 PROCEDURE — 81001 URINALYSIS AUTO W/SCOPE: CPT | Performed by: SURGERY

## 2018-04-10 PROCEDURE — 74011250636 HC RX REV CODE- 250/636: Performed by: SURGERY

## 2018-04-10 PROCEDURE — 77010033678 HC OXYGEN DAILY

## 2018-04-10 PROCEDURE — 99218 HC RM OBSERVATION: CPT

## 2018-04-10 PROCEDURE — 74011250637 HC RX REV CODE- 250/637: Performed by: SURGERY

## 2018-04-10 PROCEDURE — 80053 COMPREHEN METABOLIC PANEL: CPT | Performed by: SURGERY

## 2018-04-10 PROCEDURE — 85027 COMPLETE CBC AUTOMATED: CPT | Performed by: SURGERY

## 2018-04-10 PROCEDURE — 74011000250 HC RX REV CODE- 250: Performed by: SURGERY

## 2018-04-10 RX ORDER — IPRATROPIUM BROMIDE AND ALBUTEROL SULFATE 2.5; .5 MG/3ML; MG/3ML
3 SOLUTION RESPIRATORY (INHALATION)
Status: DISCONTINUED | OUTPATIENT
Start: 2018-04-10 | End: 2018-04-14 | Stop reason: HOSPADM

## 2018-04-10 RX ORDER — CIPROFLOXACIN 500 MG/1
500 TABLET ORAL EVERY 12 HOURS
Status: DISCONTINUED | OUTPATIENT
Start: 2018-04-10 | End: 2018-04-14 | Stop reason: HOSPADM

## 2018-04-10 RX ORDER — CIPROFLOXACIN 500 MG/1
500 TABLET ORAL EVERY 12 HOURS
Qty: 14 TAB | Refills: 0 | Status: SHIPPED | OUTPATIENT
Start: 2018-04-10 | End: 2018-04-17

## 2018-04-10 RX ORDER — HYDROMORPHONE HYDROCHLORIDE 2 MG/1
4 TABLET ORAL
Status: DISCONTINUED | OUTPATIENT
Start: 2018-04-10 | End: 2018-04-11

## 2018-04-10 RX ORDER — HYDROMORPHONE HYDROCHLORIDE 4 MG/1
4 TABLET ORAL
Qty: 30 TAB | Refills: 0 | Status: SHIPPED | OUTPATIENT
Start: 2018-04-10 | End: 2018-04-14

## 2018-04-10 RX ADMIN — POTASSIUM CHLORIDE 10 MEQ: 750 TABLET, EXTENDED RELEASE ORAL at 08:19

## 2018-04-10 RX ADMIN — HYDROMORPHONE HYDROCHLORIDE 0.5 MG: 2 INJECTION INTRAMUSCULAR; INTRAVENOUS; SUBCUTANEOUS at 13:34

## 2018-04-10 RX ADMIN — HYDROMORPHONE HYDROCHLORIDE 4 MG: 2 TABLET ORAL at 12:35

## 2018-04-10 RX ADMIN — OXYCODONE HYDROCHLORIDE AND ACETAMINOPHEN 2 TABLET: 5; 325 TABLET ORAL at 08:19

## 2018-04-10 RX ADMIN — HYDROMORPHONE HYDROCHLORIDE 0.5 MG: 2 INJECTION INTRAMUSCULAR; INTRAVENOUS; SUBCUTANEOUS at 22:39

## 2018-04-10 RX ADMIN — CIPROFLOXACIN HYDROCHLORIDE 500 MG: 500 TABLET, FILM COATED ORAL at 12:35

## 2018-04-10 RX ADMIN — Medication 5 ML: at 06:43

## 2018-04-10 RX ADMIN — PANTOPRAZOLE SODIUM 40 MG: 40 TABLET, DELAYED RELEASE ORAL at 08:19

## 2018-04-10 RX ADMIN — ESCITALOPRAM OXALATE 10 MG: 10 TABLET ORAL at 08:19

## 2018-04-10 RX ADMIN — HYDROMORPHONE HYDROCHLORIDE 4 MG: 2 TABLET ORAL at 16:45

## 2018-04-10 RX ADMIN — HYDROMORPHONE HYDROCHLORIDE 0.5 MG: 2 INJECTION INTRAMUSCULAR; INTRAVENOUS; SUBCUTANEOUS at 09:49

## 2018-04-10 RX ADMIN — CIPROFLOXACIN HYDROCHLORIDE 500 MG: 500 TABLET, FILM COATED ORAL at 21:13

## 2018-04-10 RX ADMIN — CHLORTHALIDONE 12.5 MG: 25 TABLET ORAL at 08:19

## 2018-04-10 RX ADMIN — ENOXAPARIN SODIUM 40 MG: 40 INJECTION SUBCUTANEOUS at 08:20

## 2018-04-10 RX ADMIN — Medication 10 ML: at 13:35

## 2018-04-10 RX ADMIN — IPRATROPIUM BROMIDE AND ALBUTEROL SULFATE 3 ML: .5; 3 SOLUTION RESPIRATORY (INHALATION) at 04:56

## 2018-04-10 RX ADMIN — IPRATROPIUM BROMIDE AND ALBUTEROL SULFATE 3 ML: .5; 3 SOLUTION RESPIRATORY (INHALATION) at 18:13

## 2018-04-10 RX ADMIN — HYDROMORPHONE HYDROCHLORIDE 4 MG: 2 TABLET ORAL at 21:13

## 2018-04-10 RX ADMIN — HYDROMORPHONE HYDROCHLORIDE 0.5 MG: 2 INJECTION INTRAMUSCULAR; INTRAVENOUS; SUBCUTANEOUS at 18:00

## 2018-04-10 RX ADMIN — ENOXAPARIN SODIUM 40 MG: 40 INJECTION SUBCUTANEOUS at 21:13

## 2018-04-10 NOTE — PROGRESS NOTES
4/10/2018  1:24 PM  CM met with pt for assessment. Demographics and PCP were confirmed. Pt is a 26year old,  female who lives in a private residence with her father (0 interior steps, 2 exterior steps). PTA, pt was able to complete ADLs without the use of DME. Pt has no prescription drug coverage, and prefers [facility] DILLAN JOSE on [address] OBS letters provided and explained (see chart). No discharge service needs indicated. Pt's father will provide transport upon discharge. Merry Mendez MA    Care Management Interventions  PCP Verified by CM: Yes Flakito Conteh  Palliative Care Criteria Met (RRAT>21 & CHF Dx)?: No  Mode of Transport at Discharge:  Other (see comment) (Father)  MyChart Signup: No  Discharge Durable Medical Equipment: No  Physical Therapy Consult: No  Occupational Therapy Consult: No  Speech Therapy Consult: No  Current Support Network: Relative's Home  Confirm Follow Up Transport: Family  Plan discussed with Pt/Family/Caregiver: Yes  Discharge Location  Discharge Placement: Home with family assistance

## 2018-04-10 NOTE — PROGRESS NOTES
1100: Patient complaining pf burning with urination. Notified Dr. Shayna Narayanan. Orders to collect UA and other orders to follow. 1330: Patient still with 8/10 pain hour after PO dilaudid. Complaining of feeling of pulled muscle to right side. Notified Dr. Shayna Narayanan. Keep patient overnight if pain continues uncontrolled. Orders to continue to give IV dilaudid for breakthrough PRN. Will continue to monitor. Bedside shift change report given to Joyce MCKEON (oncoming nurse) by Vonna Severs RN (offgoing nurse). Report included the following information SBAR, Kardex, Intake/Output, MAR and Recent Results.

## 2018-04-10 NOTE — PROGRESS NOTES
Problem: Falls - Risk of  Goal: *Absence of Falls  Document Shabbir Fall Risk and appropriate interventions in the flowsheet.    Outcome: Progressing Towards Goal  Fall Risk Interventions:            Medication Interventions: Patient to call before getting OOB, Teach patient to arise slowly    Elimination Interventions: Call light in reach, Patient to call for help with toileting needs, Toileting schedule/hourly rounds

## 2018-04-10 NOTE — PROGRESS NOTES
Pt c/o pain and tightness in chest with SOB. Dr. Jaycee Carrera notified, telephone verbal order provided for albuterol Duoneb. Will attempt to wean O2 and continue to monitor.

## 2018-04-10 NOTE — PROGRESS NOTES
SURGERY PROGRESS NOTE      Admit Date: 2018    POD 1 Day Post-Op    Procedure: Procedure(s):  ROBOTIC ASSISTED LAPAROSCOPIC CHOLECYSTECTOMY      Subjective:     Patient has some emesis overnight but, feels better this morning      Objective:     Visit Vitals    /61 (BP 1 Location: Right arm, BP Patient Position: Sitting)    Pulse 70    Temp 98.5 °F (36.9 °C)    Resp 18    Ht 5' 5\" (1.651 m)    Wt 300 lb (136.1 kg)    LMP Comment: No regular periods and does not know why    SpO2 98%    Breastfeeding No    BMI 49.92 kg/m2        Temp (24hrs), Av.6 °F (37 °C), Min:97.7 °F (36.5 °C), Max:99.8 °F (37.7 °C)          190 - 04/10 0700  In: 2100 [I.V.:2100]  Out: 10     Physical Exam:    General:  alert, cooperative, no distress, appears stated age   Abdomen: soft, bowel sounds active, non-tender   Incision:   healing well, no drainage, no erythema, no hernia, no seroma, no swelling, no dehiscence, incision well approximated           Lab Results  Component Value Date/Time   WBC 12.3 (H) 04/10/2018 04:24 AM   HGB 11.9 04/10/2018 04:24 AM   HCT 37.2 04/10/2018 04:24 AM   PLATELET 082 10/91/4112 04:24 AM   MCV 84.4 04/10/2018 04:24 AM     Lab Results  Component Value Date/Time   ALT (SGPT) 32 04/10/2018 04:24 AM   AST (SGOT) 30 04/10/2018 04:24 AM   Alk.  phosphatase 112 04/10/2018 04:24 AM   Bilirubin, total 0.3 04/10/2018 04:24 AM   Albumin 3.1 (L) 04/10/2018 04:24 AM   Protein, total 7.6 04/10/2018 04:24 AM   PLATELET 128  04:24 AM         Assessment:     Active Problems:    Cholecystitis (2018)      Ready for discharge   Plan:       D/C home

## 2018-04-11 PROCEDURE — 74011250636 HC RX REV CODE- 250/636: Performed by: SURGERY

## 2018-04-11 PROCEDURE — 74011250637 HC RX REV CODE- 250/637: Performed by: SURGERY

## 2018-04-11 PROCEDURE — 94640 AIRWAY INHALATION TREATMENT: CPT

## 2018-04-11 PROCEDURE — 77010033678 HC OXYGEN DAILY

## 2018-04-11 PROCEDURE — 74011000250 HC RX REV CODE- 250: Performed by: SURGERY

## 2018-04-11 PROCEDURE — 99218 HC RM OBSERVATION: CPT

## 2018-04-11 RX ORDER — HYDROCODONE BITARTRATE AND ACETAMINOPHEN 5; 325 MG/1; MG/1
2 TABLET ORAL
Status: DISCONTINUED | OUTPATIENT
Start: 2018-04-11 | End: 2018-04-12

## 2018-04-11 RX ADMIN — HYDROMORPHONE HYDROCHLORIDE 4 MG: 2 TABLET ORAL at 05:32

## 2018-04-11 RX ADMIN — CIPROFLOXACIN HYDROCHLORIDE 500 MG: 500 TABLET, FILM COATED ORAL at 20:09

## 2018-04-11 RX ADMIN — HYDROMORPHONE HYDROCHLORIDE 0.5 MG: 2 INJECTION INTRAMUSCULAR; INTRAVENOUS; SUBCUTANEOUS at 20:08

## 2018-04-11 RX ADMIN — HYDROMORPHONE HYDROCHLORIDE 4 MG: 2 TABLET ORAL at 01:41

## 2018-04-11 RX ADMIN — HYDROMORPHONE HYDROCHLORIDE 0.5 MG: 2 INJECTION INTRAMUSCULAR; INTRAVENOUS; SUBCUTANEOUS at 02:47

## 2018-04-11 RX ADMIN — Medication 10 ML: at 02:47

## 2018-04-11 RX ADMIN — HYDROCODONE BITARTRATE AND ACETAMINOPHEN 2 TABLET: 5; 325 TABLET ORAL at 11:24

## 2018-04-11 RX ADMIN — CIPROFLOXACIN HYDROCHLORIDE 500 MG: 500 TABLET, FILM COATED ORAL at 10:09

## 2018-04-11 RX ADMIN — Medication 10 ML: at 20:09

## 2018-04-11 RX ADMIN — IPRATROPIUM BROMIDE AND ALBUTEROL SULFATE 3 ML: .5; 3 SOLUTION RESPIRATORY (INHALATION) at 13:05

## 2018-04-11 RX ADMIN — IPRATROPIUM BROMIDE AND ALBUTEROL SULFATE 3 ML: .5; 3 SOLUTION RESPIRATORY (INHALATION) at 02:55

## 2018-04-11 RX ADMIN — PANTOPRAZOLE SODIUM 40 MG: 40 TABLET, DELAYED RELEASE ORAL at 10:12

## 2018-04-11 RX ADMIN — CHLORTHALIDONE 12.5 MG: 25 TABLET ORAL at 10:08

## 2018-04-11 RX ADMIN — HYDROMORPHONE HYDROCHLORIDE 0.5 MG: 2 INJECTION INTRAMUSCULAR; INTRAVENOUS; SUBCUTANEOUS at 08:43

## 2018-04-11 RX ADMIN — ENOXAPARIN SODIUM 40 MG: 40 INJECTION SUBCUTANEOUS at 20:09

## 2018-04-11 RX ADMIN — HYDROCODONE BITARTRATE AND ACETAMINOPHEN 2 TABLET: 5; 325 TABLET ORAL at 16:29

## 2018-04-11 RX ADMIN — HYDROCODONE BITARTRATE AND ACETAMINOPHEN 2 TABLET: 5; 325 TABLET ORAL at 23:23

## 2018-04-11 RX ADMIN — POTASSIUM CHLORIDE 10 MEQ: 750 TABLET, EXTENDED RELEASE ORAL at 10:11

## 2018-04-11 RX ADMIN — HYDROMORPHONE HYDROCHLORIDE 0.5 MG: 2 INJECTION INTRAMUSCULAR; INTRAVENOUS; SUBCUTANEOUS at 13:15

## 2018-04-11 RX ADMIN — ESCITALOPRAM OXALATE 10 MG: 10 TABLET ORAL at 10:10

## 2018-04-11 RX ADMIN — BENZOCAINE: 200 SPRAY DENTAL; ORAL; PERIODONTAL at 18:01

## 2018-04-11 RX ADMIN — ENOXAPARIN SODIUM 40 MG: 40 INJECTION SUBCUTANEOUS at 10:14

## 2018-04-11 RX ADMIN — IPRATROPIUM BROMIDE AND ALBUTEROL SULFATE 3 ML: .5; 3 SOLUTION RESPIRATORY (INHALATION) at 08:06

## 2018-04-11 NOTE — PROGRESS NOTES
Bedside and Verbal shift change report given to Linnea Dawn RN (oncoming nurse) by Meng Sanchez (offgoing nurse). Report included the following information SBAR, Kardex, Procedure Summary, Intake/Output, MAR, Accordion and Recent Results.

## 2018-04-11 NOTE — PROGRESS NOTES
SURGERY PROGRESS NOTE      Admit Date: 2018    POD 2 Days Post-Op    Procedure: Procedure(s):  ROBOTIC ASSISTED LAPAROSCOPIC CHOLECYSTECTOMY      Subjective:     Patient complains of poor pain control. States only IV pain medication is helping. Has some nausea with PO as well      Objective:     Visit Vitals    /79    Pulse 82    Temp 98.1 °F (36.7 °C)    Resp 18    Ht 5' 5\" (1.651 m)    Wt 300 lb (136.1 kg)    LMP Comment: No regular periods and does not know why    SpO2 98%    Breastfeeding No    BMI 49.92 kg/m2        Temp (24hrs), Av °F (36.7 °C), Min:97.6 °F (36.4 °C), Max:98.5 °F (36.9 °C)          1901 -  0700  In: -   Out: 2300 [Urine:2300]    Physical Exam:    General:  alert, cooperative, no distress, appears stated age   Abdomen: soft, bowel sounds active, non-tender   Incision:   healing well, no drainage, no erythema, no hernia, no seroma, no swelling, no dehiscence, incision well approximated             Assessment:     Active Problems:    Cholecystitis (2018)    poor pain control. Have tried PO percocet and dilaudid without success. Will try Norco next.   Will keep inpatient until can get off IV pain medication

## 2018-04-11 NOTE — PHYSICIAN ADVISORY
Letter of Status Determination:   Recommend hospitalization status upgraded from   OBSERVATION  to INPATIENT  Status     Pt Name:  Fabain Garza   MR#   [address] [facility] # [id number] /  [id number]   [facility]#  [id number]   [address] [place] [address]  @ [address] [facility]   Hospitalization date  4/9/2018  8:53 AM   Current Attending Physician  Mireya Irivng MD   Principal diagnosis  <principal problem not specified>   Gallstone, cholecystitis    Clinicals  26 y.o. y.o  female hospitalized with above diagnosis   The pt went through [address]"  Without any event. Her postoperative recovery has been slow due to significant pain, that didn't relieve with PO narcotics and IV dilaudid had to be used, delaying her discharge plan. [place] (MCG) criteria   Does  apply    STATUS DETERMINATION  Based on documented presenting clinical data, comorbid conditions, high risk of adverse events and deterioration, it is our recommendation that the patient's status should be upgraded from OBSERVATION to INPATIENT status. The final decision of the patient's hospitalization status depends on the attending physician's judgment. Additional comments     Payor: SELF PAY / Plan: [facility] SELF PAY / Product Type: Self Pay /         Devin Johnston MD MPH FACP     Physician Advisor    [address]   [facility] [employer], [address] [place] [address]    Cell  [phone]        [phone]    .

## 2018-04-12 PROCEDURE — 74011250637 HC RX REV CODE- 250/637: Performed by: SURGERY

## 2018-04-12 PROCEDURE — 99218 HC RM OBSERVATION: CPT

## 2018-04-12 PROCEDURE — 77010033678 HC OXYGEN DAILY

## 2018-04-12 PROCEDURE — 94640 AIRWAY INHALATION TREATMENT: CPT

## 2018-04-12 PROCEDURE — 65270000029 HC RM PRIVATE

## 2018-04-12 PROCEDURE — 74011250636 HC RX REV CODE- 250/636: Performed by: SURGERY

## 2018-04-12 PROCEDURE — 74011000250 HC RX REV CODE- 250: Performed by: SURGERY

## 2018-04-12 RX ORDER — HYDROMORPHONE HYDROCHLORIDE 2 MG/1
4 TABLET ORAL
Status: DISCONTINUED | OUTPATIENT
Start: 2018-04-12 | End: 2018-04-13

## 2018-04-12 RX ADMIN — CIPROFLOXACIN HYDROCHLORIDE 500 MG: 500 TABLET, FILM COATED ORAL at 08:53

## 2018-04-12 RX ADMIN — HYDROMORPHONE HYDROCHLORIDE 0.5 MG: 2 INJECTION INTRAMUSCULAR; INTRAVENOUS; SUBCUTANEOUS at 05:54

## 2018-04-12 RX ADMIN — BENZOCAINE: 200 SPRAY DENTAL; ORAL; PERIODONTAL at 05:54

## 2018-04-12 RX ADMIN — IPRATROPIUM BROMIDE AND ALBUTEROL SULFATE 3 ML: .5; 3 SOLUTION RESPIRATORY (INHALATION) at 18:38

## 2018-04-12 RX ADMIN — HYDROMORPHONE HYDROCHLORIDE 0.5 MG: 2 INJECTION INTRAMUSCULAR; INTRAVENOUS; SUBCUTANEOUS at 16:27

## 2018-04-12 RX ADMIN — HYDROMORPHONE HYDROCHLORIDE 0.5 MG: 2 INJECTION INTRAMUSCULAR; INTRAVENOUS; SUBCUTANEOUS at 20:21

## 2018-04-12 RX ADMIN — DICYCLOMINE HYDROCHLORIDE 20 MG: 20 TABLET ORAL at 10:01

## 2018-04-12 RX ADMIN — ONDANSETRON 4 MG: 4 TABLET, ORALLY DISINTEGRATING ORAL at 10:01

## 2018-04-12 RX ADMIN — Medication 10 ML: at 20:22

## 2018-04-12 RX ADMIN — DICYCLOMINE HYDROCHLORIDE 20 MG: 20 TABLET ORAL at 18:27

## 2018-04-12 RX ADMIN — ENOXAPARIN SODIUM 40 MG: 40 INJECTION SUBCUTANEOUS at 08:53

## 2018-04-12 RX ADMIN — HYDROMORPHONE HYDROCHLORIDE 4 MG: 2 TABLET ORAL at 22:59

## 2018-04-12 RX ADMIN — CHLORTHALIDONE 12.5 MG: 25 TABLET ORAL at 08:52

## 2018-04-12 RX ADMIN — HYDROMORPHONE HYDROCHLORIDE 4 MG: 2 TABLET ORAL at 09:00

## 2018-04-12 RX ADMIN — HYDROMORPHONE HYDROCHLORIDE 4 MG: 2 TABLET ORAL at 18:54

## 2018-04-12 RX ADMIN — ESCITALOPRAM OXALATE 10 MG: 10 TABLET ORAL at 08:53

## 2018-04-12 RX ADMIN — HYDROMORPHONE HYDROCHLORIDE 0.5 MG: 2 INJECTION INTRAMUSCULAR; INTRAVENOUS; SUBCUTANEOUS at 00:26

## 2018-04-12 RX ADMIN — ENOXAPARIN SODIUM 40 MG: 40 INJECTION SUBCUTANEOUS at 20:13

## 2018-04-12 RX ADMIN — HYDROMORPHONE HYDROCHLORIDE 0.5 MG: 2 INJECTION INTRAMUSCULAR; INTRAVENOUS; SUBCUTANEOUS at 11:49

## 2018-04-12 RX ADMIN — PANTOPRAZOLE SODIUM 40 MG: 40 TABLET, DELAYED RELEASE ORAL at 08:53

## 2018-04-12 RX ADMIN — HYDROMORPHONE HYDROCHLORIDE 4 MG: 2 TABLET ORAL at 14:53

## 2018-04-12 RX ADMIN — POTASSIUM CHLORIDE 10 MEQ: 750 TABLET, EXTENDED RELEASE ORAL at 08:52

## 2018-04-12 RX ADMIN — IPRATROPIUM BROMIDE AND ALBUTEROL SULFATE 3 ML: .5; 3 SOLUTION RESPIRATORY (INHALATION) at 21:55

## 2018-04-12 RX ADMIN — CIPROFLOXACIN HYDROCHLORIDE 500 MG: 500 TABLET, FILM COATED ORAL at 20:13

## 2018-04-12 RX ADMIN — Medication 10 ML: at 11:50

## 2018-04-12 RX ADMIN — Medication 10 ML: at 16:27

## 2018-04-12 RX ADMIN — IPRATROPIUM BROMIDE AND ALBUTEROL SULFATE 3 ML: .5; 3 SOLUTION RESPIRATORY (INHALATION) at 00:30

## 2018-04-12 NOTE — PROGRESS NOTES
Bedside and Verbal shift change report given to Melvin Thomas RN (oncoming nurse) by Niraj Regan RN (offgoing nurse). Report included the following information SBAR, Kardex, Procedure Summary, Intake/Output, MAR, Accordion and Recent Results.

## 2018-04-12 NOTE — PROGRESS NOTES
SURGERY PROGRESS NOTE      Admit Date: 2018    POD 3 Days Post-Op    Procedure: Procedure(s):  ROBOTIC ASSISTED LAPAROSCOPIC CHOLECYSTECTOMY      Subjective:     Patient complains of persistent pain only controlled with IV. Request switching back to PO dilaudid because PO Radford gave her less relief. Denies nausea. Tolerating PO.         Objective:     Visit Vitals    /75 (BP 1 Location: Right arm, BP Patient Position: At rest)    Pulse 80    Temp 97.9 °F (36.6 °C)    Resp 20    Ht 5' 5\" (1.651 m)    Wt 300 lb (136.1 kg)    LMP Comment: No regular periods and does not know why    SpO2 96%    Breastfeeding No    BMI 49.92 kg/m2        Temp (24hrs), Av.1 °F (36.7 °C), Min:97.7 °F (36.5 °C), Max:98.5 °F (36.9 °C)         04/10 1901 -  0700  In: -   Out: 1000 [Urine:1000]    Physical Exam:    General:  alert, cooperative, no distress, appears stated age   Abdomen: soft, bowel sounds active, non-tender   Incision:   healing well, no drainage, no erythema, no hernia, no seroma, no swelling, no dehiscence, incision well approximated             Assessment:     Active Problems:    Cholecystitis (2018)    23 year morbidly obese female with poor pain control after lap paras    Plan:       Try PO dilaudid to see if we can get her off IV and home later today

## 2018-04-12 NOTE — PROGRESS NOTES
Problem: Falls - Risk of  Goal: *Absence of Falls  Document Shabbir Fall Risk and appropriate interventions in the flowsheet.    Outcome: Progressing Towards Goal  Fall Risk Interventions:            Medication Interventions: Patient to call before getting OOB, Teach patient to arise slowly    Elimination Interventions: Call light in reach

## 2018-04-13 PROCEDURE — 94640 AIRWAY INHALATION TREATMENT: CPT

## 2018-04-13 PROCEDURE — 77010033678 HC OXYGEN DAILY

## 2018-04-13 PROCEDURE — 74011250637 HC RX REV CODE- 250/637: Performed by: SURGERY

## 2018-04-13 PROCEDURE — 74011000250 HC RX REV CODE- 250: Performed by: SURGERY

## 2018-04-13 PROCEDURE — 65270000029 HC RM PRIVATE

## 2018-04-13 PROCEDURE — 74011250636 HC RX REV CODE- 250/636: Performed by: SURGERY

## 2018-04-13 RX ORDER — MEPERIDINE HYDROCHLORIDE 50 MG/1
50 TABLET ORAL
Status: DISCONTINUED | OUTPATIENT
Start: 2018-04-13 | End: 2018-04-14

## 2018-04-13 RX ADMIN — POTASSIUM CHLORIDE 10 MEQ: 750 TABLET, EXTENDED RELEASE ORAL at 08:13

## 2018-04-13 RX ADMIN — HYDROMORPHONE HYDROCHLORIDE 4 MG: 2 TABLET ORAL at 08:13

## 2018-04-13 RX ADMIN — MEPERIDINE HYDROCHLORIDE 50 MG: 50 TABLET ORAL at 18:21

## 2018-04-13 RX ADMIN — CHLORTHALIDONE 12.5 MG: 25 TABLET ORAL at 08:13

## 2018-04-13 RX ADMIN — HYDROMORPHONE HYDROCHLORIDE 0.5 MG: 2 INJECTION INTRAMUSCULAR; INTRAVENOUS; SUBCUTANEOUS at 15:27

## 2018-04-13 RX ADMIN — CIPROFLOXACIN HYDROCHLORIDE 500 MG: 500 TABLET, FILM COATED ORAL at 20:35

## 2018-04-13 RX ADMIN — MEPERIDINE HYDROCHLORIDE 50 MG: 50 TABLET ORAL at 13:25

## 2018-04-13 RX ADMIN — CIPROFLOXACIN HYDROCHLORIDE 500 MG: 500 TABLET, FILM COATED ORAL at 08:13

## 2018-04-13 RX ADMIN — ONDANSETRON 4 MG: 4 TABLET, ORALLY DISINTEGRATING ORAL at 04:22

## 2018-04-13 RX ADMIN — HYDROMORPHONE HYDROCHLORIDE 0.5 MG: 2 INJECTION INTRAMUSCULAR; INTRAVENOUS; SUBCUTANEOUS at 20:35

## 2018-04-13 RX ADMIN — ENOXAPARIN SODIUM 40 MG: 40 INJECTION SUBCUTANEOUS at 20:35

## 2018-04-13 RX ADMIN — HYDROMORPHONE HYDROCHLORIDE 0.5 MG: 2 INJECTION INTRAMUSCULAR; INTRAVENOUS; SUBCUTANEOUS at 23:12

## 2018-04-13 RX ADMIN — HYDROMORPHONE HYDROCHLORIDE 4 MG: 2 TABLET ORAL at 03:49

## 2018-04-13 RX ADMIN — HYDROMORPHONE HYDROCHLORIDE 0.5 MG: 2 INJECTION INTRAMUSCULAR; INTRAVENOUS; SUBCUTANEOUS at 10:01

## 2018-04-13 RX ADMIN — ENOXAPARIN SODIUM 40 MG: 40 INJECTION SUBCUTANEOUS at 08:13

## 2018-04-13 RX ADMIN — IPRATROPIUM BROMIDE AND ALBUTEROL SULFATE 3 ML: .5; 3 SOLUTION RESPIRATORY (INHALATION) at 18:25

## 2018-04-13 RX ADMIN — Medication 10 ML: at 15:28

## 2018-04-13 RX ADMIN — PANTOPRAZOLE SODIUM 40 MG: 40 TABLET, DELAYED RELEASE ORAL at 08:13

## 2018-04-13 RX ADMIN — ESCITALOPRAM OXALATE 10 MG: 10 TABLET ORAL at 08:13

## 2018-04-13 RX ADMIN — Medication 10 ML: at 20:36

## 2018-04-13 RX ADMIN — HYDROMORPHONE HYDROCHLORIDE 0.5 MG: 2 INJECTION INTRAMUSCULAR; INTRAVENOUS; SUBCUTANEOUS at 00:37

## 2018-04-13 RX ADMIN — ONDANSETRON 4 MG: 4 TABLET, ORALLY DISINTEGRATING ORAL at 13:25

## 2018-04-13 NOTE — PROGRESS NOTES
Bedside and Verbal shift change report given to [employer] (oncoming nurse) by Rob Treadwell RN (offgoing nurse). Report included the following information SBAR, Kardex, Procedure Summary, Intake/Output, MAR and Recent Results.

## 2018-04-13 NOTE — PROGRESS NOTES
SURGERY PROGRESS NOTE      Admit Date: 2018    POD 4 Days Post-Op    Procedure: Procedure(s):  ROBOTIC ASSISTED LAPAROSCOPIC CHOLECYSTECTOMY      Subjective:     Patient still complains of severe RUQ pain only controlled with IV. PO pain medication does not relieve the pain. She is eating a general diet and is OOB ambulation. Objective:     Visit Vitals    /63 (BP 1 Location: Right arm, BP Patient Position: Head of bed elevated (Comment degrees))    Pulse 95    Temp 97.5 °F (36.4 °C)    Resp 18    Ht 5' 5\" (1.651 m)    Wt 300 lb (136.1 kg)    LMP Comment: No regular periods and does not know why    SpO2 96%    Breastfeeding No    BMI 49.92 kg/m2        Temp (24hrs), Av.1 °F (36.7 °C), Min:97.5 °F (36.4 °C), Max:99 °F (37.2 °C)       07 -  190  In: -   Out: 1150 [Urine:1150]  1901 -  0700  In: -   Out: 700 [Urine:700]    Physical Exam:    General:  alert, cooperative, no distress, appears stated age   Abdomen: soft, bowel sounds active, mildly tender in RUQ   Incision:   healing well, no drainage, no erythema, no hernia, no seroma, no swelling, no dehiscence, incision well approximated             Assessment:     Active Problems:    Cholecystitis (2018)    Persistent poor pain control. Will try Demerol PO and see if this works. I do not suspect any significaqnt post operative complications. Patient is non  Tender when distracted and appears well.      Plan:     Change PO formulation again today and hope she has a better response

## 2018-04-13 NOTE — PROGRESS NOTES
Patient continues to call out around every 2 hours complaining of 9/10 or 10/10 pain in the abdomen. Treated per EMAR. RN reminded the patient of the importance of walking in the seaman, which the patient can do independently. Patient up ad manuel to the bathroom. Patient had complaints of nausea early this morning. See EMAR. Patient states she has not yet had a bowel movement.

## 2018-04-14 VITALS
HEART RATE: 62 BPM | DIASTOLIC BLOOD PRESSURE: 70 MMHG | HEIGHT: 65 IN | BODY MASS INDEX: 48.82 KG/M2 | RESPIRATION RATE: 16 BRPM | SYSTOLIC BLOOD PRESSURE: 122 MMHG | OXYGEN SATURATION: 98 % | TEMPERATURE: 97.8 F | WEIGHT: 293 LBS

## 2018-04-14 PROCEDURE — 74011250637 HC RX REV CODE- 250/637: Performed by: SURGERY

## 2018-04-14 PROCEDURE — 74011250636 HC RX REV CODE- 250/636: Performed by: SURGERY

## 2018-04-14 RX ORDER — OXYCODONE AND ACETAMINOPHEN 7.5; 325 MG/1; MG/1
1-2 TABLET ORAL
Status: DISCONTINUED | OUTPATIENT
Start: 2018-04-14 | End: 2018-04-14 | Stop reason: HOSPADM

## 2018-04-14 RX ORDER — OXYCODONE AND ACETAMINOPHEN 7.5; 325 MG/1; MG/1
1-2 TABLET ORAL
Qty: 40 TAB | Refills: 0 | Status: SHIPPED | OUTPATIENT
Start: 2018-04-14 | End: 2018-07-06

## 2018-04-14 RX ORDER — POLYETHYLENE GLYCOL 3350 17 G/17G
17 POWDER, FOR SOLUTION ORAL
Status: DISCONTINUED | OUTPATIENT
Start: 2018-04-14 | End: 2018-04-14 | Stop reason: HOSPADM

## 2018-04-14 RX ADMIN — ONDANSETRON 4 MG: 4 TABLET, ORALLY DISINTEGRATING ORAL at 15:39

## 2018-04-14 RX ADMIN — CHLORTHALIDONE 12.5 MG: 25 TABLET ORAL at 09:12

## 2018-04-14 RX ADMIN — ESCITALOPRAM OXALATE 10 MG: 10 TABLET ORAL at 09:12

## 2018-04-14 RX ADMIN — ONDANSETRON 4 MG: 2 INJECTION INTRAMUSCULAR; INTRAVENOUS at 09:29

## 2018-04-14 RX ADMIN — POLYETHYLENE GLYCOL 3350 17 G: 17 POWDER, FOR SOLUTION ORAL at 13:40

## 2018-04-14 RX ADMIN — OXYCODONE HYDROCHLORIDE AND ACETAMINOPHEN 2 TABLET: 7.5; 325 TABLET ORAL at 15:39

## 2018-04-14 RX ADMIN — ENOXAPARIN SODIUM 40 MG: 40 INJECTION SUBCUTANEOUS at 09:17

## 2018-04-14 RX ADMIN — Medication 10 ML: at 09:16

## 2018-04-14 RX ADMIN — MEPERIDINE HYDROCHLORIDE 50 MG: 50 TABLET ORAL at 06:26

## 2018-04-14 RX ADMIN — PANTOPRAZOLE SODIUM 40 MG: 40 TABLET, DELAYED RELEASE ORAL at 09:12

## 2018-04-14 RX ADMIN — CIPROFLOXACIN HYDROCHLORIDE 500 MG: 500 TABLET, FILM COATED ORAL at 09:12

## 2018-04-14 RX ADMIN — POTASSIUM CHLORIDE 10 MEQ: 750 TABLET, EXTENDED RELEASE ORAL at 09:12

## 2018-04-14 RX ADMIN — OXYCODONE HYDROCHLORIDE AND ACETAMINOPHEN 2 TABLET: 7.5; 325 TABLET ORAL at 11:32

## 2018-04-14 RX ADMIN — Medication 10 ML: at 06:27

## 2018-04-14 RX ADMIN — HYDROMORPHONE HYDROCHLORIDE 0.5 MG: 2 INJECTION INTRAMUSCULAR; INTRAVENOUS; SUBCUTANEOUS at 09:14

## 2018-04-14 NOTE — PROGRESS NOTES
I have reviewed discharge instructions with the patient and parent: reviewed s/s to report, meds (Rx for Percocet given), f/up apptmt. The patient and parent verbalized understanding.

## 2018-04-14 NOTE — PROGRESS NOTES
Spiritual Care Partner Volunteer visited patient in 12 on 4.13.18.   Documented by:    [id number] Jennifer Parker M.Div M.S, Mariposa Pritchard available at [phone])

## 2018-04-14 NOTE — PROGRESS NOTES
[facility] General Surgery    POD #5    Subjective     Feeling a little better, tolerating diet    Objective     Patient Vitals for the past 24 hrs:   Temp Pulse Resp BP SpO2   04/14/18 0738 97.6 °F (36.4 °C) 99 16 117/65 97 %   04/14/18 0340 97.8 °F (36.6 °C) 93 16 134/79 97 %   04/13/18 2323 98.2 °F (36.8 °C) 95 17 119/83 99 %   04/13/18 2019 98.3 °F (36.8 °C) (!) 106 19 116/82 98 %   04/13/18 1825 - - - - 100 %   04/13/18 1610 97.9 °F (36.6 °C) 94 18 143/60 100 %   04/13/18 1235 97.5 °F (36.4 °C) 95 18 138/63 96 %         Date 04/13/18 0700 - 04/14/18 0659 04/14/18 0700 - 04/15/18 0659   Shift 4424-3132 1667-5598 24 Hour Total 0160-0090 1527-7010 24 Hour Total   I  N  T  A  K  E   Shift Total  (mL/kg)         O  U  T  P  U  T   Urine  (mL/kg/hr) 1650  (1)  1650  (0.5)         Urine Voided 1650  1650         Urine Occurrence(s) 2 x  2 x       Shift Total  (mL/kg) 1650  (12.1)  1650  (12.1)      NET -1650  -1650      Weight (kg) 136.1 136.1 136.1 136.1 136.1 136.1       PE  Pulm - CTAB  CV - RRR  Abd - soft, ND, BS present, incisions c/d/i    Labs  No results found for this or any previous visit (from the past 12 hour(s)). Pelon Leija is a 26 y. o.yr old female s/p robotic cholecystectomy    Plan     Plan for DC home today  Will try her on percocet 7.5/325 for DC home today    Sid Mann MD

## 2018-04-14 NOTE — PROGRESS NOTES
Orthopedic & Surgical Nursing Communication Tool      7:22 AM  4/14/2018     Bedside and Verbal shift change report given to Migdalia Bueno RN (incoming nurse) by Kranthi Soria RN (outgoing nurse) on Em Harrison a 26 y.o. female who was admitted on 4/9/2018  8:53 AM. Report included the following information SBAR, Kardex, Intake/Output, MAR, Recent Results and Med Rec Status. Hourly rounding completed during the shift. No acute distress noted         Opportunity for questions and clarifications were given to the incoming nurse. Patient's bed is in low position, side rails x2, door open PRN, call bell within reach and patient not in distress.     Kranthi Soria RN

## 2018-04-14 NOTE — DISCHARGE INSTRUCTIONS
Cholecystectomy: What to Expect at [address] [place] [address]  After your surgery, it is normal to feel weak and tired for several days after you return home. Your belly may be swollen. If you had laparoscopic surgery, you may also have pain in your shoulder for about 24 hours. You may have gas or need to burp a lot at first, and a few people get diarrhea. The diarrhea usually goes away in 2 to 4 weeks, but it may last longer. How quickly you recover depends on whether you had a laparoscopic or open surgery. · For a laparoscopic surgery, most people can go back to work or their normal routine in 1 to 2 weeks, but it may take longer, depending on the type of work you do. · For an open surgery, it will probably take 4 to 6 weeks before you get back to your normal routine. This care sheet gives you a general idea about how long it will take for you to recover. However, each person recovers at a different pace. Follow the steps below to get better as quickly as possible. How can you care for yourself at home? Activity  ? · Rest when you feel tired. Getting enough sleep will help you recover. ? · Try to walk each day. Start out by walking a little more than you did the day before. Gradually increase the amount you walk. Walking boosts blood flow and helps prevent pneumonia and constipation. ? · For about 2 to 4 weeks, avoid lifting anything that would make you strain. This may include a child, heavy grocery bags and milk containers, a heavy briefcase or backpack, cat litter or dog food bags, or a vacuum . ? · Avoid strenuous activities, such as biking, jogging, weightlifting, and aerobic exercise, until your doctor says it is okay. ? · You may shower 24 to 48 hours after surgery, if your doctor okays it. Pat the cut (incision) dry. Do not take a bath for the first 2 weeks, or until your doctor tells you it is okay.    ? · You may drive when you are no longer taking pain medicine and can quickly move your foot from the gas pedal to the brake. You must also be able to sit comfortably for a long period of time, even if you do not plan to go far. You might get caught in traffic. ? · For a laparoscopic surgery, most people can go back to work or their normal routine in 1 to 2 weeks, but it may take longer. For an open surgery, it will probably take 4 to 6 weeks before you get back to your normal routine. ? · Your doctor will tell you when you can have sex again. ? Diet  ? · Eat smaller meals more often instead of fewer larger meals. You can eat a normal diet, but avoid eating fatty foods for about 1 month. Fatty foods include hamburger, whole milk, cheese, and many snack foods. If your stomach is upset, try bland, low-fat foods like plain rice, broiled chicken, toast, and yogurt. ? · Drink plenty of fluids (unless your doctor tells you not to). ? · If you have diarrhea, try avoiding spicy foods, dairy products, fatty foods, and alcohol. You can also watch to see if specific foods cause it, and stop eating them. If the diarrhea continues for more than 2 weeks, talk to your doctor. ? · You may notice that your bowel movements are not regular right after your surgery. This is common. Try to avoid constipation and straining with bowel movements. You may want to take a fiber supplement every day. If you have not had a bowel movement after a couple of days, ask your doctor about taking a mild laxative. Medicines  ? · Your doctor will tell you if and when you can restart your medicines. He or she will also give you instructions about taking any new medicines. ? · If you take blood thinners, such as warfarin (Coumadin), clopidogrel (Plavix), or aspirin, be sure to talk to your doctor. He or she will tell you if and when to start taking those medicines again. Make sure that you understand exactly what your doctor wants you to do. ? · Take pain medicines exactly as directed.   ¨ If the doctor gave you a prescription medicine for pain, take it as prescribed. ¨ If you are not taking a prescription pain medicine, take an over-the-counter medicine such as acetaminophen (Tylenol), ibuprofen (Advil, Motrin), or naproxen (Aleve). Read and follow all instructions on the label. ¨ Do not take two or more pain medicines at the same time unless the doctor told you to. Many pain medicines contain acetaminophen, which is Tylenol. Too much Tylenol can be harmful. ? · If you think your pain medicine is making you sick to your stomach:  ¨ Take your medicine after meals (unless your doctor tells you not to). ¨ Ask your doctor for a different pain medicine. ? · If your doctor prescribed antibiotics, take them as directed. Do not stop taking them just because you feel better. You need to take the full course of antibiotics. Incision care  ? · If you have strips of tape on the incision, or cut, leave the tape on for a week or until it falls off.   ? · After 24 to 48 hours, wash the area daily with warm, soapy water, and pat it dry. ? · You may have staples to hold the cut together. Keep them dry until your doctor takes them out. This is usually in 7 to 10 days. ? · Keep the area clean and dry. You may cover it with a gauze bandage if it weeps or rubs against clothing. Change the bandage every day. ?Ice  ? · To reduce swelling and pain, put ice or a cold pack on your belly for 10 to 20 minutes at a time. Do this every 1 to 2 hours. Put a thin cloth between the ice and your skin. Follow-up care is a key part of your treatment and safety. Be sure to make and go to all appointments, and call your doctor if you are having problems. It's also a good idea to know your test results and keep a list of the medicines you take. When should you call for help? Call 911 anytime you think you may need emergency care. For example, call if:  ? · You passed out (lost consciousness). ? · You are short of breath. [place] ? Call your doctor now or seek immediate medical care if:  ? · You are sick to your stomach and cannot drink fluids. ? · You have pain that does not get better when you take your pain medicine. ? · You cannot pass stools or gas. ? · You have signs of infection, such as:  ¨ Increased pain, swelling, warmth, or redness. ¨ Red streaks leading from the incision. ¨ Pus draining from the incision. ¨ A fever. ? · Bright red blood has soaked through the bandage over your incision. ? · You have loose stitches, or your incision comes open. ? · You have signs of a blood clot in your leg (called a deep vein thrombosis), such as:  ¨ Pain in your calf, back of knee, thigh, or groin. ¨ Redness and swelling in your leg or groin. ? Watch closely for any changes in your health, and be sure to contact your doctor if you have any problems. Where can you learn more? Go to http://maryann-jm.info/. Enter 313 45 022 in the search box to learn more about \"Cholecystectomy: What to Expect at Home. \"  Current as of: May 12, 2017  Content Version: 11.4  © 9730-4856 [employer]. Care instructions adapted under license by [employer] (which disclaims liability or warranty for this information). If you have questions about a medical condition or this instruction, always ask your healthcare professional. [place] [address] any warranty or liability for your use of this information.       Aparna Damon MD, PhD, FACS  General Surgery at [address] [place] [address], [address] [place] [address] Frank Sandoval [address]  [facility]  [phone]  Fax [phone]

## 2018-04-23 ENCOUNTER — OFFICE VISIT (OUTPATIENT)
Dept: SURGERY | Age: 23
End: 2018-04-23

## 2018-04-23 VITALS
HEART RATE: 95 BPM | TEMPERATURE: 97.9 F | OXYGEN SATURATION: 98 % | DIASTOLIC BLOOD PRESSURE: 87 MMHG | HEIGHT: 65 IN | WEIGHT: 293 LBS | RESPIRATION RATE: 14 BRPM | BODY MASS INDEX: 48.82 KG/M2 | SYSTOLIC BLOOD PRESSURE: 143 MMHG

## 2018-04-23 DIAGNOSIS — Z09 POSTOPERATIVE EXAMINATION: Primary | ICD-10-CM

## 2018-04-23 NOTE — PROGRESS NOTES
1. Have you been to the ER, urgent care clinic since your last visit? Hospitalized since your last visit?no    2. Have you seen or consulted any other health care providers outside of the [address] [place] [address] since your last visit? Include any pap smears or colon screening.  no

## 2018-04-23 NOTE — PROGRESS NOTES
SUBJECTIVE: Aníbal Jc is a 26 y.o. female is seen for a routine postop check. Reports no problems with the wound or other issues. Activity, diet and bowels are normal. No pain. OBJECTIVE: Appears well. Wound is well healed without complications or infection. ASSESSMENT: normal postoperative course, doing well. PLAN: Return PRN.

## 2018-05-01 NOTE — DISCHARGE SUMMARY
DISCHARGE SUMMARY      Patient ID:  Shyam English  [id number]  76 y.o.  1995    Admit date: 4/9/2018    Discharge date and time: 4/14/2018  4:22 PM     Admitting Physician: Maxime Hart MD     Discharge Physician: Maxime Hart MD      Admission Diagnoses: GALLSTONES;Cholecystitis; Cholecystitis  Morbid obesity    Discharge Diagnoses: Active Problems:    Cholecystitis (4/9/2018)    Morbid obesity      Procedures: Procedure(s):  ROBOTIC ASSISTED LAPAROSCOPIC CHOLECYSTECTOMY    Consults: none        Hospital Course:   Patient underwent uneventful cholecystectomy. She tolerated advancement of her diet well. Her hospital stay was prolonged due to poor pain control. Multiple medications were tried. Eventually, her pain was controlled with oral medication and she was discharged home. D/C Disposition: home     Patient Instructions:   Cannot display discharge medications since this patient is not currently admitted.       Diet: resume pre-hospital diet    Follow-up with Maxime Hart MD in 2 weeks       Signed:  Maxime Hart MD  5/1/2018  11:15 AM

## 2018-05-14 ENCOUNTER — DOCUMENTATION ONLY (OUTPATIENT)
Dept: SLEEP MEDICINE | Age: 23
End: 2018-05-14

## 2018-05-14 ENCOUNTER — OFFICE VISIT (OUTPATIENT)
Dept: FAMILY MEDICINE CLINIC | Age: 23
End: 2018-05-14

## 2018-05-14 VITALS
BODY MASS INDEX: 48.82 KG/M2 | HEIGHT: 65 IN | RESPIRATION RATE: 20 BRPM | WEIGHT: 293 LBS | OXYGEN SATURATION: 99 % | HEART RATE: 95 BPM | SYSTOLIC BLOOD PRESSURE: 134 MMHG | TEMPERATURE: 97.5 F | DIASTOLIC BLOOD PRESSURE: 84 MMHG

## 2018-05-14 DIAGNOSIS — R19.7 DIARRHEA, UNSPECIFIED TYPE: Primary | ICD-10-CM

## 2018-05-14 DIAGNOSIS — F32.2 SEVERE DEPRESSION (HCC): ICD-10-CM

## 2018-05-14 DIAGNOSIS — R10.84 GENERALIZED ABDOMINAL PAIN: ICD-10-CM

## 2018-05-14 DIAGNOSIS — M79.662 PAIN OF LEFT CALF: ICD-10-CM

## 2018-05-14 DIAGNOSIS — Z90.49 S/P CHOLECYSTECTOMY: ICD-10-CM

## 2018-05-14 RX ORDER — ESCITALOPRAM OXALATE 10 MG/1
10 TABLET ORAL DAILY
Qty: 30 TAB | Refills: 1 | Status: SHIPPED | OUTPATIENT
Start: 2018-05-14 | End: 2018-05-14

## 2018-05-14 RX ORDER — FLUOXETINE HYDROCHLORIDE 20 MG/1
20 CAPSULE ORAL DAILY
Qty: 30 CAP | Refills: 1 | Status: SHIPPED | OUTPATIENT
Start: 2018-05-14 | End: 2018-07-06 | Stop reason: SDUPTHER

## 2018-05-14 NOTE — MR AVS SNAPSHOT
[address] [phone] Patient: Fabian Garza MRN: [id number] :1995 Visit Information Date & Time Provider Department Dept. Phone Encounter #  
 2018  1:30 PM Jair Bautista, [address] [facility] [phone] [id number] Upcoming Health Maintenance Date Due  
 HPV Age 9Y-34Y (1 of 3 - Female 3 Dose Series) 3/19/2008 DTaP/Tdap/Td series (1 - Tdap) 3/19/2018 PAP AKA CERVICAL CYTOLOGY 3/19/2018 Influenza Age 5 to Adult 2018 Allergies as of 2018  Review Complete On: 2018 By: Kristine Perez LPN Severity Noted Reaction Type Reactions Aspirin  2018    Itching Current Immunizations  Reviewed on 2017 Name Date Influenza Vaccine (Quad) PF 2017 Not reviewed this visit You Were Diagnosed With   
  
 Codes Comments Diarrhea, unspecified type    -  Primary ICD-10-CM: R19.7 ICD-9-CM: 787.91 Generalized abdominal pain     ICD-10-CM: R10.84 ICD-9-CM: 789.07 S/P cholecystectomy     ICD-10-CM: Z90.49 ICD-9-CM: V45.79 Pain of left calf     ICD-10-CM: A12.344 ICD-9-CM: 729.5 Severe depression ([address]     ICD-10-CM: F32.2 ICD-9-CM: 901 Vitals BP Pulse Temp Resp Height(growth percentile) Weight(growth percentile) 134/84 (BP 1 Location: Left arm, BP Patient Position: Sitting) 95 97.5 °F (36.4 °C) (Oral) 20 5' 5\" (1.651 m) 308 lb (139.7 kg) LMP SpO2 BMI OB Status Smoking Status 2018 99% 51.25 kg/m2 Having regular periods Former Smoker Vitals History BMI and BSA Data Body Mass Index Body Surface Area  
 51.25 kg/m 2 2.53 m 2 Preferred Pharmacy Pharmacy Name Phone [facility], [address] [place] [address] [address] [place] [address] [phone] Your Updated Medication List  
  
   
 This list is accurate as of 5/14/18  2:32 PM.  Always use your most recent med list.  
  
  
  
  
 chlorthalidone 25 mg tablet Commonly known as:  Kika Roles Take 0.5 Tabs by mouth daily. dicyclomine 20 mg tablet Commonly known as:  BENTYL Take 1 Tab by mouth every six (6) hours as needed for Pain for up to 20 doses. FLUoxetine 20 mg capsule Commonly known as:  PROzac Take 1 Cap by mouth daily. ondansetron 4 mg disintegrating tablet Commonly known as:  ZOFRAN ODT Take 1 Tab by mouth every eight (8) hours as needed for Nausea. oxyCODONE-acetaminophen 7.5-325 mg per tablet Commonly known as:  PERCOCET 7.5 Take 1-2 Tabs by mouth every four (4) hours as needed for Pain. Max Daily Amount: 12 Tabs. pantoprazole 40 mg tablet Commonly known as:  PROTONIX Take 1 Tab by mouth daily. potassium chloride 10 mEq tablet Commonly known as:  KLOR-CON Take 1 Tab by mouth daily. Prescriptions Printed Refills FLUoxetine (PROZAC) 20 mg capsule 1 Sig: Take 1 Cap by mouth daily. Class: Print Route: Oral  
  
We Performed the Following C DIFFICILE TOXIN A & B BY EIA [84656 CPT(R)] CBC W/O DIFF [70208 CPT(R)] METABOLIC PANEL, BASIC [81574 CPT(R)] To-Do List   
 05/14/2018 Imaging:  DUPLEX LOWER EXT VENOUS LEFT Patient Instructions GERD: 
Your Care Instructions Your child was seen in the emergency room for gastroesophageal reflux disease (GERD). GERD occurs when stomach acids back up into the esophagusthe tube that takes food from your throat to your stomach. It can happen in older children when the area between the lower end of the esophagus and the stomach does not close tightly enough. It also can happen in infants, because their digestive tracts are still growing. GERD can cause babies to vomit, cry, and act fussy. They may have trouble breast-feeding or taking a bottle.  Older children may have the same symptoms as adults. They may cough a lot and have a burning feeling in the chest and throat. GERD usually is not a sign that something is seriously wrong. It usually goes away by the end of an infant's first year. Symptoms in older children may go away with home treatment or medicines. Even though your child has been released from the emergency room, you still need to watch for any problems. The doctor carefully checked your child. But sometimes problems can develop later. If your child has new symptoms, or if your child's symptoms do not get better, return to the emergency room or call your doctor right away. A visit to the emergency room is only one step in your child's treatment. Even if your child feels better, you still need to do what your doctor recommends, such as going to all suggested follow-up appointments and giving medicines exactly as directed. This will help your child recover and help prevent future problems. How can you care for your child at home? Infants · Burp your baby several times during a feeding. · Hold your baby upright for 30 minutes after a feeding. Older children · Raise the head of your child's bed 6 to 8 inches by putting blocks under the frame or a foam wedge under the head of the mattress. · Have your child eat smaller meals, more often. · Limit foods and drinks that seem to make your child's condition worse. These foods may include chocolate, spicy foods, sodas that have caffeine, and high-acid foods such as oranges and tomatoes. · Try to feed your child at least 2 to 3 hours before bedtime to avoid having a lot of acid in the stomach when your child lies down. · Have your child take medicines exactly as prescribed. Call your doctor if you think your child is having a problem with his or her medicine. · Antacids such as children's versions of Rolaids, Tums, or Maalox may help.  Your doctor also may recommend over-the-counter acid reducers, such as famotidine (Pepcid AC), omeprazole (Prilosec), cimetidine (Tagamet HB), or ranitidine (Zantac 75). When should you call for help? Call 911 if: 
· Your child has severe trouble breathing. Signs may include the chest sinking in, using belly muscles to breathe, or nostrils flaring while your child is struggling to breathe. Return to the emergency room now if: 
· Your child has new or increasing trouble breathing. · Your child vomits blood or what looks like coffee grounds. · Your child has signs of needing more fluids. These signs include sunken eyes with few tears, a dry mouth with little or no spit, and little or no urine for 8 or more hours. Call your doctor today if: 
· Your baby is 12 months or older and still spits up. · Your child still has acid reflux even after taking medicine. Where can you learn more? Go to [employer].be Enter L133 in the search box to learn more about \"GERD: After Your Child's Visit to the Emergency Room. \"  
© 4174-1693 Healthwise, [employer]orporated. Care instructions adapted under license by [employer] (which disclaims liability or warranty for this information). This care instruction is for use with your licensed healthcare professional. If you have questions about a medical condition or this instruction, always ask your healthcare professional. [address] [address] any warranty or liability for your use of this information. Content Version: 9.4.67723; Last Revised: January 25, 2011 Gastroesophageal Reflux Disease (GERD): Care Instructions Your Care Instructions Gastroesophageal reflux disease (GERD) is the backward flow of stomach acid into the esophagus. The esophagus is the tube that leads from your throat to your stomach. A one-way valve prevents the stomach acid from moving up into this tube. When you have GERD, this valve does not close tightly enough. If you have mild GERD symptoms including heartburn, you may be able to control the problem with antacids or over-the-counter medicine. Changing your diet, losing weight, and making other lifestyle changes can also help reduce symptoms. Follow-up care is a key part of your treatment and safety. Be sure to make and go to all appointments, and call your doctor if you are having problems. It's also a good idea to know your test results and keep a list of the medicines you take. How can you care for yourself at home? · Take your medicines exactly as prescribed. Call your doctor if you think you are having a problem with your medicine. · Your doctor may recommend over-the-counter medicine. For mild or occasional indigestion, antacids, such as Tums, Gaviscon, Mylanta, or Maalox, may help. Your doctor also may recommend over-the-counter acid reducers, such as Pepcid AC, Tagamet HB, Zantac 75, or Prilosec. Read and follow all instructions on the label. If you use these medicines often, talk with your doctor. · Change your eating habits. ¨ It's best to eat several small meals instead of two or three large meals. ¨ After you eat, wait 2 to 3 hours before you lie down. ¨ Chocolate, mint, and alcohol can make GERD worse. ¨ Spicy foods, foods that have a lot of acid (like tomatoes and oranges), and coffee can make GERD symptoms worse in some people. If your symptoms are worse after you eat a certain food, you may want to stop eating that food to see if your symptoms get better. · Do not smoke or chew tobacco. Smoking can make GERD worse. If you need help quitting, talk to your doctor about stop-smoking programs and medicines. These can increase your chances of quitting for good. · If you have GERD symptoms at night, raise the head of your bed 6 to 8 inches by putting the frame on blocks or placing a foam wedge under the head of your mattress. (Adding extra pillows does not work.) · Do not wear tight clothing around your middle. · Lose weight if you need to. Losing just 5 to 10 pounds can help. When should you call for help? Call your doctor now or seek immediate medical care if: 
? · You have new or different belly pain. ? · Your stools are black and tarlike or have streaks of blood. ? Watch closely for changes in your health, and be sure to contact your doctor if: 
? · Your symptoms have not improved after 2 days. ? · Food seems to catch in your throat or chest.  
Where can you learn more? Go to http://maryann-jm.info/. Enter T251 in the search box to learn more about \"Gastroesophageal Reflux Disease (GERD): Care Instructions. \" Current as of: May 12, 2017 Content Version: 11.4 © 6153-7773 [employer]. Care instructions adapted under license by [employer] (which disclaims liability or warranty for this information). If you have questions about a medical condition or this instruction, always ask your healthcare professional. [address] [address] any warranty or liability for your use of this information. Diarrhea- kaopectate Introducing \[facility] [place] [facility]\"" & HEALTH SERVICES! [facility] introduces [email or web address] patient portal. Now you can access parts of your medical record, email your doctor's office, and request medication refills online. 1. In your internet browser, go to [email or web address] 2. Click on the First Time User? Click Here link in the Sign In box. You will see the New Member Sign Up page. 3. Enter your [email or web address] Access Code exactly as it appears below. You will not need to use this code after youve completed the sign-up process. If you do not sign up before the expiration date, you must request a new code. · [email or web address] Access Code: [email or web address] Expires: 8/12/2018  2:32 PM 
 
4.  Enter the last four digits of your Social Security Number (xxxx) and Date of Birth (mm/dd/yyyy) as indicated and click Submit. You will be taken to the next sign-up page. 5. Create a mSeller ID. This will be your mSeller login ID and cannot be changed, so think of one that is secure and easy to remember. 6. Create a mSeller password. You can change your password at any time. 7. Enter your Password Reset Question and Answer. This can be used at a later time if you forget your password. 8. Enter your e-mail address. You will receive e-mail notification when new information is available in [address] 9. Click Sign Up. You can now view and download portions of your medical record. 10. Click the Download Summary menu link to download a portable copy of your medical information. If you have questions, please visit the Frequently Asked Questions section of the mSeller website. Remember, mSeller is NOT to be used for urgent needs. For medical emergencies, dial 911. Now available from your iPhone and Android! Please provide this summary of care documentation to your next provider. Your primary care clinician is listed as Hailee Hinds. If you have any questions after today's visit, please call [phone].

## 2018-05-14 NOTE — PROGRESS NOTES
Marylene Redden is an 26 y.o. female who presents for diarrhea evaluation     Pt shares that she had glalbladder removed 4/9. She did well after surgery. Has changed her diet, no fatty foods, milk, or sweets. She notes that her mother was concerned about her frequent diarrhea. Pt states that she is having diarrhea 7 times a day. Not eating as much as she used to either as she does not have appetite. No N/V or fever. No recent abx use. No sick contacts. On chart review, pt has gained 1 lbs since last weight check. Depression/hx of SI: pt was seen in 3/2018, PHQ9 score was 23. Started on lexapro. Medication worked for her. States she cannot afford the medication therefore last dose of lexapro was given while she was in hospital. Had SI 2 weeks ago, but did not take action. Denies SI today. Willing to attempt another medication if it is cheaper. Sleep apnea: Dx by sleep medicine 2 months ago. Applied for financial service so she could afford the CPAP. Has not followed up. Plans to touch base with sleep medicine after leaving clinic today. Allergies - reviewed: Allergies   Allergen Reactions    Aspirin Itching         Medications - reviewed:   Current Outpatient Prescriptions   Medication Sig    FLUoxetine (PROZAC) 20 mg capsule Take 1 Cap by mouth daily.  oxyCODONE-acetaminophen (PERCOCET 7.5) 7.5-325 mg per tablet Take 1-2 Tabs by mouth every four (4) hours as needed for Pain. Max Daily Amount: 12 Tabs.  ondansetron (ZOFRAN ODT) 4 mg disintegrating tablet Take 1 Tab by mouth every eight (8) hours as needed for Nausea.  dicyclomine (BENTYL) 20 mg tablet Take 1 Tab by mouth every six (6) hours as needed for Pain for up to 20 doses.  chlorthalidone (HYGROTEN) 25 mg tablet Take 0.5 Tabs by mouth daily.  potassium chloride (KLOR-CON) 10 mEq tablet Take 1 Tab by mouth daily.  pantoprazole (PROTONIX) 40 mg tablet Take 1 Tab by mouth daily.      No current facility-administered medications for this visit. Past Medical History - reviewed:  Past Medical History:   Diagnosis Date    Kidney stones     Morbid obesity ([address]     Sleep apnea     Does not have CPAP as yet         Past Surgical History - reviewed:   Past Surgical History:   Procedure Laterality Date    HX UROLOGICAL      Stenting x 4 (Starting in 2016, developed severe infection)         Social History - reviewed:  Social History     Social History    Marital status: SINGLE     Spouse name: N/A    Number of children: N/A    Years of education: N/A     Occupational History          started in 3/17     Social History Main Topics    Smoking status: Former Smoker     Years: 1.00     Quit date: 1/16/2018    Smokeless tobacco: Never Used      Comment: 1/4 pack ppd for 6 months    Alcohol use No      Comment: rarely    Drug use: No      Comment: Has done [employer] Sexual activity: No     Other Topics Concern    Not on file     Social History Narrative         Family History - reviewed:  Family History   Problem Relation Age of Onset    Heart Disease Father 55    Hypertension Father     Sleep Apnea Father     No Known Problems Sister     Cancer Maternal Grandmother      Brain Cancer    Heart Disease Paternal Grandmother     Cancer Paternal Grandfather          Immunizations - reviewed:   Immunization History   Administered Date(s) Administered    Influenza Vaccine (Quad) PF 11/16/2017       ROS  General/Constitutional:   No headache, fever, fatigue    Neck:   No swelling, masses, stiffness, pain, or limited movement     Cardiac:    No chest pain      Respiratory:   No cough or shortness of breath     GI: abdominal pain.  diarrhea No nausea/vomiting, bloody or dark stools       :   No dysuria or  hematuria    Neurological:   No loss of consciousness, dizziness, seizures, dysarthria, cognitive changes, memory changes,  problems with balance, or unilateral   Depression     Physical Exam  Visit Vitals  /84 (BP 1 Location: Left arm, BP Patient Position: Sitting)    Pulse 95    Temp 97.5 °F (36.4 °C) (Oral)    Resp 20    Ht 5' 5\" (1.651 m)    Wt 308 lb (139.7 kg)    LMP 04/13/2018    SpO2 99%    BMI 51.25 kg/m2       Constitutional: She appears well-developed and morbidly obese. No distress. HENT:   Head: Normocephalic and atraumatic. Eyes: Conjunctivae are normal.   Neck: Normal range of motion. Neck supple. Cardiovascular: Normal rate, regular rhythm, normal heart sounds and intact distal pulses.  Exam reveals no gallop and no friction rub.  No murmur heard. No tenderness to palpation in the chest.   Pulmonary/Chest: Effort normal and breath sounds normal. No respiratory distress. She has no wheezes. She has no rales. Abdominal: Soft. Bowel sounds are normal. She exhibits no distension and no mass. Generalized pain. There is no obvious rebound and guarding. Surgical site noted and have healed  Musculoskeletal: She exhibits no edema. No CVA tenderness. Ext: left calf tenderness. No edema. Unequivocal homen's sign. Psychiatric: She has a normal mood and affect. Her behavior is normal. Judgment and thought content normal. PHQ9 18 (FROM 23)      Assessment/Plan    ICD-10-CM ICD-9-CM    1. Diarrhea, unspecified type R19.7 787.91 C DIFFICILE TOXIN A & B BY EIA      CBC W/O DIFF   2. Generalized abdominal pain R10.84 789.07 CBC W/O DIFF      METABOLIC PANEL, BASIC   3. S/P cholecystectomy Z90.49 V45.79 CBC W/O DIFF      METABOLIC PANEL, BASIC   4. Pain of left calf M79.662 729.5 DUPLEX LOWER EXT VENOUS LEFT   5. Severe depression (HCC) F32.2 311 FLUoxetine (PROZAC) 20 mg capsule      DISCONTINUED: escitalopram oxalate (LEXAPRO) 10 mg tablet     Diarrhea: s/p cholecystomy. No acute abdomen on exam. No weight loss. Discussed modification of diet in great detail. Will check cbc and c diff today. Precautions given    Calf tenderness: will order left venous doppler    Depression: PHQ9 18 today. Will switch pt to prozac from lexapro as this medication is on the 4 dollar list. Pt to [facility]-establish with [facility]. No SI today. Precautions given. Pt is aware of the [address] [facility] Phone Number [phone]    I have reviewed/discussed the above normal BMI with the patient. I have recommended the following interventions: dietary management education, guidance, and counseling, encourage exercise and monitor weight . Gavin Him Follow-up Disposition:  Return in about 3 weeks (around 6/4/2018). for depression      I have discussed the diagnosis with the patient and the intended plan as seen in the above orders. Patient verbalized understanding of the plan and agrees with the plan. The patient has received an after-visit summary and questions were answered concerning future plans. I have discussed medication side effects and warnings with the patient as well. Informed patient to return to the office if new symptoms arise.         Lisa Jenkins, DO  Family Medicine Resident

## 2018-05-14 NOTE — PATIENT INSTRUCTIONS
GERD:  Your Care Instructions  Your child was seen in the emergency room for gastroesophageal reflux disease (GERD). GERD occurs when stomach acids back up into the esophagus--the tube that takes food from your throat to your stomach. It can happen in older children when the area between the lower end of the esophagus and the stomach does not close tightly enough. It also can happen in infants, because their digestive tracts are still growing. GERD can cause babies to vomit, cry, and act fussy. They may have trouble breast-feeding or taking a bottle. Older children may have the same symptoms as adults. They may cough a lot and have a burning feeling in the chest and throat. GERD usually is not a sign that something is seriously wrong. It usually goes away by the end of an infant's first year. Symptoms in older children may go away with home treatment or medicines. Even though your child has been released from the emergency room, you still need to watch for any problems. The doctor carefully checked your child. But sometimes problems can develop later. If your child has new symptoms, or if your child's symptoms do not get better, return to the emergency room or call your doctor right away. A visit to the emergency room is only one step in your child's treatment. Even if your child feels better, you still need to do what your doctor recommends, such as going to all suggested follow-up appointments and giving medicines exactly as directed. This will help your child recover and help prevent future problems. How can you care for your child at home? Infants  · Burp your baby several times during a feeding. · Hold your baby upright for 30 minutes after a feeding. Older children  · Raise the head of your child's bed 6 to 8 inches by putting blocks under the frame or a foam wedge under the head of the mattress. · Have your child eat smaller meals, more often.   · Limit foods and drinks that seem to make your child's condition worse. These foods may include chocolate, spicy foods, sodas that have caffeine, and high-acid foods such as oranges and tomatoes. · Try to feed your child at least 2 to 3 hours before bedtime to avoid having a lot of acid in the stomach when your child lies down. · Have your child take medicines exactly as prescribed. Call your doctor if you think your child is having a problem with his or her medicine. · Antacids such as children's versions of Rolaids, Tums, or Maalox may help. Your doctor also may recommend over-the-counter acid reducers, such as famotidine (Pepcid AC), omeprazole (Prilosec), cimetidine (Tagamet HB), or ranitidine (Zantac 75). When should you call for help? Call 911 if:  · Your child has severe trouble breathing. Signs may include the chest sinking in, using belly muscles to breathe, or nostrils flaring while your child is struggling to breathe. Return to the emergency room now if:  · Your child has new or increasing trouble breathing. · Your child vomits blood or what looks like coffee grounds. · Your child has signs of needing more fluids. These signs include sunken eyes with few tears, a dry mouth with little or no spit, and little or no urine for 8 or more hours. Call your doctor today if:  · Your baby is 12 months or older and still spits up. · Your child still has acid reflux even after taking medicine. Where can you learn more? Go to SpeechTrans.be  Enter L133 in the search box to learn more about \"GERD: After Your Child's Visit to the Emergency Room. \"   © 5127-8032 Healthwise, Incorporated. Care instructions adapted under license by [facility] (which disclaims liability or warranty for this information).  This care instruction is for use with your licensed healthcare professional. If you have questions about a medical condition or this instruction, always ask your healthcare professional. Vanessa Antonio disclaims any warranty or liability for your use of this information. Content Version: 9.4.37642; Last Revised: January 25, 2011               Gastroesophageal Reflux Disease (GERD): Care Instructions  Your Care Instructions    Gastroesophageal reflux disease (GERD) is the backward flow of stomach acid into the esophagus. The esophagus is the tube that leads from your throat to your stomach. A one-way valve prevents the stomach acid from moving up into this tube. When you have GERD, this valve does not close tightly enough. If you have mild GERD symptoms including heartburn, you may be able to control the problem with antacids or over-the-counter medicine. Changing your diet, losing weight, and making other lifestyle changes can also help reduce symptoms. Follow-up care is a key part of your treatment and safety. Be sure to make and go to all appointments, and call your doctor if you are having problems. It's also a good idea to know your test results and keep a list of the medicines you take. How can you care for yourself at home? · Take your medicines exactly as prescribed. Call your doctor if you think you are having a problem with your medicine. · Your doctor may recommend over-the-counter medicine. For mild or occasional indigestion, antacids, such as Tums, Gaviscon, Mylanta, or Maalox, may help. Your doctor also may recommend over-the-counter acid reducers, such as Pepcid AC, Tagamet HB, Zantac 75, or Prilosec. Read and follow all instructions on the label. If you use these medicines often, talk with your doctor. · Change your eating habits. ¨ It's best to eat several small meals instead of two or three large meals. ¨ After you eat, wait 2 to 3 hours before you lie down. ¨ Chocolate, mint, and alcohol can make GERD worse. ¨ Spicy foods, foods that have a lot of acid (like tomatoes and oranges), and coffee can make GERD symptoms worse in some people.  If your symptoms are worse after you eat a certain food, you may want to stop eating that food to see if your symptoms get better. · Do not smoke or chew tobacco. Smoking can make GERD worse. If you need help quitting, talk to your doctor about stop-smoking programs and medicines. These can increase your chances of quitting for good. · If you have GERD symptoms at night, raise the head of your bed 6 to 8 inches by putting the frame on blocks or placing a foam wedge under the head of your mattress. (Adding extra pillows does not work.)  · Do not wear tight clothing around your middle. · Lose weight if you need to. Losing just 5 to 10 pounds can help. When should you call for help? Call your doctor now or seek immediate medical care if:  ? · You have new or different belly pain. ? · Your stools are black and tarlike or have streaks of blood. ? Watch closely for changes in your health, and be sure to contact your doctor if:  ? · Your symptoms have not improved after 2 days. ? · Food seems to catch in your throat or chest.   Where can you learn more? Go to http://maryann-jm.info/. Enter R188 in the search box to learn more about \"Gastroesophageal Reflux Disease (GERD): Care Instructions. \"  Current as of: May 12, 2017  Content Version: 11.4  © 3502-2205 Healthwise, Incorporated. Care instructions adapted under license by [employer] (which disclaims liability or warranty for this information). If you have questions about a medical condition or this instruction, always ask your healthcare professional. [place] [address] any warranty or liability for your use of this information.       Diarrhea- kaopectate

## 2018-05-15 LAB
BUN SERPL-MCNC: 7 MG/DL (ref 6–20)
BUN/CREAT SERPL: 13 (ref 9–23)
CALCIUM SERPL-MCNC: 9.6 MG/DL (ref 8.7–10.2)
CHLORIDE SERPL-SCNC: 99 MMOL/L (ref 96–106)
CO2 SERPL-SCNC: 23 MMOL/L (ref 18–29)
CREAT SERPL-MCNC: 0.53 MG/DL (ref 0.57–1)
ERYTHROCYTE [DISTWIDTH] IN BLOOD BY AUTOMATED COUNT: 16.1 % (ref 12.3–15.4)
GFR SERPLBLD CREATININE-BSD FMLA CKD-EPI: 136 ML/MIN/1.73
GFR SERPLBLD CREATININE-BSD FMLA CKD-EPI: 157 ML/MIN/1.73
GLUCOSE SERPL-MCNC: 82 MG/DL (ref 65–99)
HCT VFR BLD AUTO: 38.6 % (ref 34–46.6)
HGB BLD-MCNC: 12.3 G/DL (ref 11.1–15.9)
MCH RBC QN AUTO: 26.8 PG (ref 26.6–33)
MCHC RBC AUTO-ENTMCNC: 31.9 G/DL (ref 31.5–35.7)
MCV RBC AUTO: 84 FL (ref 79–97)
PLATELET # BLD AUTO: 339 X10E3/UL (ref 150–379)
POTASSIUM SERPL-SCNC: 4.2 MMOL/L (ref 3.5–5.2)
RBC # BLD AUTO: 4.59 X10E6/UL (ref 3.77–5.28)
SODIUM SERPL-SCNC: 138 MMOL/L (ref 134–144)
WBC # BLD AUTO: 13.3 X10E3/UL (ref 3.4–10.8)

## 2018-06-13 ENCOUNTER — HOSPITAL ENCOUNTER (EMERGENCY)
Age: 23
Discharge: HOME | End: 2018-06-13
Attending: STUDENT IN AN ORGANIZED HEALTH CARE EDUCATION/TRAINING PROGRAM
Payer: SUBSIDIZED

## 2018-06-13 VITALS
SYSTOLIC BLOOD PRESSURE: 133 MMHG | RESPIRATION RATE: 18 BRPM | DIASTOLIC BLOOD PRESSURE: 85 MMHG | HEART RATE: 98 BPM | OXYGEN SATURATION: 98 % | HEIGHT: 63 IN | TEMPERATURE: 98.9 F | BODY MASS INDEX: 51.91 KG/M2 | WEIGHT: 293 LBS

## 2018-06-13 DIAGNOSIS — M54.30 ACUTE SCIATICA: Primary | ICD-10-CM

## 2018-06-13 LAB
GLUCOSE BLD STRIP.AUTO-MCNC: 86 MG/DL (ref 65–100)
HCG UR QL: NEGATIVE
SERVICE CMNT-IMP: NORMAL

## 2018-06-13 PROCEDURE — 96372 THER/PROPH/DIAG INJ SC/IM: CPT

## 2018-06-13 PROCEDURE — 99283 EMERGENCY DEPT VISIT LOW MDM: CPT

## 2018-06-13 PROCEDURE — 74011250636 HC RX REV CODE- 250/636: Performed by: PHYSICIAN ASSISTANT

## 2018-06-13 PROCEDURE — 82962 GLUCOSE BLOOD TEST: CPT

## 2018-06-13 PROCEDURE — 81025 URINE PREGNANCY TEST: CPT

## 2018-06-13 RX ORDER — PREDNISONE 5 MG/1
TABLET ORAL
Qty: 21 TAB | Refills: 0 | Status: ON HOLD | OUTPATIENT
Start: 2018-06-13 | End: 2018-08-03

## 2018-06-13 RX ORDER — KETOROLAC TROMETHAMINE 30 MG/ML
60 INJECTION, SOLUTION INTRAMUSCULAR; INTRAVENOUS
Status: COMPLETED | OUTPATIENT
Start: 2018-06-13 | End: 2018-06-13

## 2018-06-13 RX ADMIN — KETOROLAC TROMETHAMINE 60 MG: 30 INJECTION, SOLUTION INTRAMUSCULAR at 18:23

## 2018-06-13 NOTE — DISCHARGE INSTRUCTIONS
Sciatica: Care Instructions  Your Care Instructions    Sciatica (say \"gwl-YB-cy-kuh\") is an irritation of one of the sciatic nerves, which come from the spinal cord in the lower back. The sciatic nerves and their branches extend down through the buttock to the foot. Sciatica can develop when an injured disc in the back presses against a spinal nerve root. Its main symptom is pain, numbness, or weakness that is often worse in the leg or foot than in the back. Sciatica often will improve and go away with time. Early treatment usually includes medicines and exercises to relieve pain. Follow-up care is a key part of your treatment and safety. Be sure to make and go to all appointments, and call your doctor if you are having problems. It's also a good idea to know your test results and keep a list of the medicines you take. How can you care for yourself at home? · Take pain medicines exactly as directed. ¨ If the doctor gave you a prescription medicine for pain, take it as prescribed. ¨ If you are not taking a prescription pain medicine, ask your doctor if you can take an over-the-counter medicine. · Use heat or ice to relieve pain. ¨ To apply heat, put a warm water bottle, heating pad set on low, or warm cloth on your back. Do not go to sleep with a heating pad on your skin. ¨ To use ice, put ice or a cold pack on the area for 10 to 20 minutes at a time. Put a thin cloth between the ice and your skin. · Avoid sitting if possible, unless it feels better than standing. · Alternate lying down with short walks. Increase your walking distance as you are able to without making your symptoms worse. · Do not do anything that makes your symptoms worse. When should you call for help? Call 911 anytime you think you may need emergency care. For example, call if:  · You are unable to move a leg at all.   Call your doctor now or seek immediate medical care if:  · You have new or worse symptoms in your legs or buttocks. Symptoms may include:  ¨ Numbness or tingling. ¨ Weakness. ¨ Pain. · You lose bladder or bowel control. Watch closely for changes in your health, and be sure to contact your doctor if:  · You are not getting better as expected. Where can you learn more? Go to http://maryann-jm.info/. Enter [phone] in the search box to learn more about \"Sciatica: Care Instructions. \"  Current as of: March 21, 2017  Content Version: 11.4  © 6871-7367 [employer]. Care instructions adapted under license by [employer] (which disclaims liability or warranty for this information). If you have questions about a medical condition or this instruction, always ask your healthcare professional. [address] any warranty or liability for your use of this information. Sciatica: Exercises  Your Care Instructions  Here are some examples of typical rehabilitation exercises for your condition. Start each exercise slowly. Ease off the exercise if you start to have pain. Your doctor or physical therapist will tell you when you can start these exercises and which ones will work best for you. When you are not being active, find a comfortable position for rest. Some people are comfortable on the floor or a medium-firm bed with a small pillow under their head and another under their knees. Some people prefer to lie on their side with a pillow between their knees. Don't stay in one position for too long. Take short walks (10 to 20 minutes) every 2 to 3 hours. Avoid slopes, hills, and stairs until you feel better. Walk only distances you can manage without pain, especially leg pain. How to do the exercises  Back stretches    1. Get down on your hands and knees on the floor. 2. Relax your head and allow it to droop. Round your back up toward the ceiling until you feel a nice stretch in your upper, middle, and lower back.  Hold this stretch for as long as it feels comfortable, or about 15 to 30 seconds. 3. Return to the starting position with a flat back while you are on your hands and knees. 4. Let your back sway by pressing your stomach toward the floor. Lift your buttocks toward the ceiling. 5. Hold this position for 15 to 30 seconds. 6. Repeat 2 to 4 times. Follow-up care is a key part of your treatment and safety. Be sure to make and go to all appointments, and call your doctor if you are having problems. It's also a good idea to know your test results and keep a list of the medicines you take. Where can you learn more? Go to http://maryann-jm.info/. Enter G438 in the search box to learn more about \"Sciatica: Exercises. \"  Current as of: March 21, 2017  Content Version: 11.4  © 8032-1934 Healthwise, Incorporated. Care instructions adapted under license by [employer] (which disclaims liability or warranty for this information). If you have questions about a medical condition or this instruction, always ask your healthcare professional. [place] [address] any warranty or liability for your use of this information.

## 2018-06-13 NOTE — LETTER
[address] 
[facility] EMERGENCY DEPT 
[address] [facility] [place] [id number] [phone] Work/School Note Date: 6/13/2018 To Whom It May concern: 
 
Candis Johns was seen and treated today in the emergency room by the following provider(s): 
Attending Provider: Abdelrahman Belle MD 
Physician Assistant: Palomo Osborn PA-C. Candis Johns may return to work on Friday 6/15/18. Sincerely, Palomo Osborn PA-C

## 2018-06-13 NOTE — ED PROVIDER NOTES
HPI Comments: Beto Higuera is a 26 y.o. female with PMH of kidney stones, morbid obesity presents to emergency room ambulatory for evaluation of tingling in right posterior leg which began 1 month ago. States whenever she sits she feels tingling in the back of her leg. Denies weakness. No hx of similar sx's. Has tried hot water soaks which helps. Takes ibuprofen or aleve sometimes after work, nothing today. No saddle anesthesia. No falls, F/C, N/V/D, CP, SOB. Does housekeeping for a job. States losing 25 pounds in the past few months. LMP- 4/16/18    PCP: Adrian Lee MD    Surgical hx- cholecystectomy  Social hx- former smoker, rare ETOH    The patient has no other complaints at this time. Patient is a 26 y.o. female presenting with leg pain. The history is provided by the patient. Leg Pain    Pertinent negatives include no back pain and no neck pain.         Past Medical History:   Diagnosis Date    Kidney stones     Morbid obesity ([address]     Sleep apnea     Does not have CPAP as yet       Past Surgical History:   Procedure Laterality Date    HX UROLOGICAL      Stenting x 4 (Starting in 2016, developed severe infection)         Family History:   Problem Relation Age of Onset    Heart Disease Father 55    Hypertension Father     Sleep Apnea Father     No Known Problems Sister     Cancer Maternal Grandmother      Brain Cancer    Heart Disease Paternal Grandmother     Cancer Paternal Grandfather        Social History     Social History    Marital status: SINGLE     Spouse name: N/A    Number of children: N/A    Years of education: N/A     Occupational History          started in 3/17     Social History Main Topics    Smoking status: Former Smoker     Years: 1.00     Quit date: 1/16/2018    Smokeless tobacco: Never Used      Comment: 1/4 pack ppd for 6 months    Alcohol use No      Comment: rarely    Drug use: No      Comment: Has done [employer] Sexual activity: No     Other Topics Concern    Not on file     Social History Narrative         ALLERGIES: Aspirin    Review of Systems   Constitutional: Negative. Negative for activity change, chills, fatigue and unexpected weight change. Respiratory: Negative for cough, chest tightness, shortness of breath and wheezing. Cardiovascular: Negative. Negative for chest pain and palpitations. Gastrointestinal: Negative. Negative for abdominal pain, diarrhea, nausea and vomiting. Genitourinary: Negative. Negative for dysuria, flank pain, frequency and hematuria. Musculoskeletal: Negative. Negative for arthralgias, back pain, neck pain and neck stiffness. Skin: Negative. Negative for color change and rash. Neurological: Negative. Negative for dizziness and headaches. Tingling   Psychiatric/Behavioral: Negative. Negative for confusion. All other systems reviewed and are negative. Vitals:    06/13/18 1724   BP: 133/85   Pulse: 98   Resp: 18   Temp: 98.9 °F (37.2 °C)   SpO2: 98%   Weight: 138.3 kg (304 lb 14.3 oz)   Height: 5' 3\" (1.6 m)            Physical Exam   Constitutional: She is oriented to person, place, and time. She appears well-developed and well-nourished. She is active. Non-toxic appearance. No distress. Elevated BMI   HENT:   Head: Normocephalic and atraumatic. Eyes: Conjunctivae are normal. Pupils are equal, round, and reactive to light. Right eye exhibits no discharge. Left eye exhibits no discharge. Neck: Normal range of motion and full passive range of motion without pain. Neck supple. No tracheal tenderness present. Cardiovascular: Normal rate, regular rhythm, normal heart sounds, intact distal pulses and normal pulses. Exam reveals no gallop and no friction rub. No murmur heard. Pulmonary/Chest: Effort normal and breath sounds normal. No respiratory distress. She has no wheezes. She has no rales. She exhibits no tenderness. Abdominal: Soft.  Bowel sounds are normal. She exhibits no distension. There is no tenderness. There is no rebound and no guarding. Musculoskeletal: Normal range of motion. She exhibits tenderness. She exhibits no edema. TTP R deep gluteal muscles. NVI throughout. Capp refill brisk. Neurological: She is alert and oriented to person, place, and time. She has normal strength. No cranial nerve deficit or sensory deficit. Coordination normal.   Skin: Skin is warm, dry and intact. No abrasion and no rash noted. She is not diaphoretic. No erythema. Psychiatric: She has a normal mood and affect. Her speech is normal and behavior is normal. Cognition and memory are normal.   Nursing note and vitals reviewed. MDM  Number of Diagnoses or Management Options  Acute sciatica:   Diagnosis management comments:   Ddx: sciatica, DJD, muscle spasm       Amount and/or Complexity of Data Reviewed  Review and summarize past medical records: yes  Discuss the patient with other providers: yes    Patient Progress  Patient progress: stable        ED Course       Procedures      DISCHARGE NOTE:  7:05 PM  The patient's results have been reviewed with them and/or available family. Patient and/or family verbally conveyed their understanding and agreement of the patient's signs, symptoms, diagnosis, treatment and prognosis and additionally agree to follow up as recommended in the discharge instructions or to return to the Emergency Room should their condition change prior to their follow-up appointment. The patient/family verbally agrees with the care-plan and verbally conveys that all of their questions have been answered. The discharge instructions have also been provided to the patient and/or family with some educational information regarding the patient's diagnosis as well a list of reasons why the patient would want to return to the ER prior to their follow-up appointment, should their condition change. Plan:  1. F/U with PCP  2.  Rx sterapred dose loraine; side effects discussed, told to take with food  3.  Weight loss discussed  Return precautions discussed and advised to return to ER if worse

## 2018-06-13 NOTE — ED NOTES
Pt discharged by Roxi Torres, [address]. Pt ambulatory off the unit with a slow gait with her family member and in NAD.

## 2018-06-25 ENCOUNTER — HOSPITAL ENCOUNTER (OUTPATIENT)
Dept: VASCULAR SURGERY | Age: 23
Discharge: HOME | End: 2018-06-25
Attending: FAMILY MEDICINE
Payer: SUBSIDIZED

## 2018-06-25 ENCOUNTER — HOSPITAL ENCOUNTER (OUTPATIENT)
Dept: CT IMAGING | Age: 23
Discharge: HOME | End: 2018-06-25
Attending: FAMILY MEDICINE
Payer: SUBSIDIZED

## 2018-06-25 ENCOUNTER — OFFICE VISIT (OUTPATIENT)
Dept: FAMILY MEDICINE CLINIC | Age: 23
End: 2018-06-25

## 2018-06-25 VITALS
RESPIRATION RATE: 16 BRPM | HEIGHT: 63 IN | BODY MASS INDEX: 51.91 KG/M2 | WEIGHT: 293 LBS | SYSTOLIC BLOOD PRESSURE: 123 MMHG | HEART RATE: 83 BPM | OXYGEN SATURATION: 100 % | DIASTOLIC BLOOD PRESSURE: 77 MMHG | TEMPERATURE: 97.9 F

## 2018-06-25 DIAGNOSIS — M79.662 BILATERAL CALF PAIN: ICD-10-CM

## 2018-06-25 DIAGNOSIS — R30.0 DYSURIA: ICD-10-CM

## 2018-06-25 DIAGNOSIS — M79.661 BILATERAL CALF PAIN: ICD-10-CM

## 2018-06-25 DIAGNOSIS — Z87.442 HISTORY OF KIDNEY STONES: ICD-10-CM

## 2018-06-25 DIAGNOSIS — E66.01 OBESITY, MORBID (HCC): ICD-10-CM

## 2018-06-25 DIAGNOSIS — G47.30 SLEEP APNEA, UNSPECIFIED TYPE: ICD-10-CM

## 2018-06-25 DIAGNOSIS — F32.A DEPRESSION, UNSPECIFIED DEPRESSION TYPE: ICD-10-CM

## 2018-06-25 DIAGNOSIS — R30.0 DYSURIA: Primary | ICD-10-CM

## 2018-06-25 LAB

## 2018-06-25 PROCEDURE — 93970 EXTREMITY STUDY: CPT

## 2018-06-25 PROCEDURE — 74150 CT ABDOMEN W/O CONTRAST: CPT

## 2018-06-25 NOTE — MR AVS SNAPSHOT
[address] 
[phone] Patient: Phil Sewell MRN: [id number] :1995 Visit Information Date & Time Provider Department Dept. Phone Encounter #  
 2018  1:30 PM Chauncey Hopkins, [address] [facility] [phone] [id number] Upcoming Health Maintenance Date Due  
 HPV Age 9Y-34Y (1 of 3 - Female 3 Dose Series) 3/19/2008 DTaP/Tdap/Td series (1 - Tdap) 3/19/2018 PAP AKA CERVICAL CYTOLOGY 3/19/2018 Influenza Age 5 to Adult 2018 Allergies as of 2018  Review Complete On: 2018 By: Chauncey Hopkins DO Severity Noted Reaction Type Reactions Aspirin  2018    Itching Current Immunizations  Reviewed on 2017 Name Date Influenza Vaccine (Quad) PF 2017 Not reviewed this visit You Were Diagnosed With   
  
 Codes Comments Dysuria    -  Primary ICD-10-CM: R30.0 ICD-9-CM: 788.1 Obesity, morbid ([address]     ICD-10-CM: E66.01 
ICD-9-CM: 278.01 Depression, unspecified depression type     ICD-10-CM: F32.9 ICD-9-CM: 655 Bilateral calf pain     ICD-10-CM: M79.661, S89.758 ICD-9-CM: 729.5 History of kidney stones     ICD-10-CM: Z87.442 ICD-9-CM: V13.01 Vitals BP Pulse Temp Resp Height(growth percentile) Weight(growth percentile) 123/77 (BP 1 Location: Right arm, BP Patient Position: Sitting) 83 97.9 °F (36.6 °C) (Oral) 16 5' 3\" (1.6 m) 304 lb 3.2 oz (138 kg) LMP SpO2 BMI OB Status Smoking Status 2018 100% 53.89 kg/m2 Unknown Former Smoker Vitals History BMI and BSA Data Body Mass Index Body Surface Area  
 53.89 kg/m 2 2.48 m 2 Preferred Pharmacy Pharmacy Name Phone [facility], [address] [place] [address] Street [address] [place] [address] [phone] Your Updated Medication List  
  
   
 This list is accurate as of 6/25/18  2:27 PM.  Always use your most recent med list.  
  
  
  
  
 chlorthalidone 25 mg tablet Commonly known as:  Ulyess Cirri Take 0.5 Tabs by mouth daily. dicyclomine 20 mg tablet Commonly known as:  BENTYL Take 1 Tab by mouth every six (6) hours as needed for Pain for up to 20 doses. FLUoxetine 20 mg capsule Commonly known as:  PROzac Take 1 Cap by mouth daily. ondansetron 4 mg disintegrating tablet Commonly known as:  ZOFRAN ODT Take 1 Tab by mouth every eight (8) hours as needed for Nausea. oxyCODONE-acetaminophen 7.5-325 mg per tablet Commonly known as:  PERCOCET 7.5 Take 1-2 Tabs by mouth every four (4) hours as needed for Pain. Max Daily Amount: 12 Tabs. pantoprazole 40 mg tablet Commonly known as:  PROTONIX Take 1 Tab by mouth daily. potassium chloride 10 mEq tablet Commonly known as:  KLOR-CON Take 1 Tab by mouth daily. predniSONE 5 mg dose pack Commonly known as:  STERAPRED Take as directed with food x 6 days. See administration instruction per 5mg dose pack We Performed the Following AMB POC URINALYSIS DIP STICK AUTO W/O MICRO [42172 CPT(R)] AMB POC URINE PREGNANCY TEST, VISUAL COLOR COMPARISON [90153 CPT(R)] CULTURE, URINE [id number] CPT(R)] REFERRAL TO DIETITIAN [[id number] Custom] Comments: Morbid obese. Please call pt and set up appt to place pt on weight loss plan REFERRAL TO WEIGHT LOSS [[id number] Custom] Comments:  
 Weight loss To-Do List   
 06/25/2018 Imaging:  CT ABD WO CONT   
  
 06/25/2018 Imaging:  DUPLEX LOWER EXT VENOUS BILAT Referral Information Referral ID Referred By Referred To  
  
 [id number] Bjorn Conte MD   
   [address] [place] [address], [address] [place] [address] Street [facility] Phone: [phone] Fax: [phone] Visits Status Start Date End Date 1 New Request 6/25/18 6/25/19 If your referral has a status of pending review or denied, additional information will be sent to support the outcome of this decision. Referral ID Referred By Referred To  
 [id number] Kiley Branham Not Available Visits Status Start Date End Date 1 New Request 6/25/18 6/25/19 If your referral has a status of pending review or denied, additional information will be sent to support the outcome of this decision. Patient Instructions Kidney Stone: Care Instructions Your Care Instructions Kidney stones are formed when salts, minerals, and other substances normally found in the urine clump together. They can be as small as grains of sand or, rarely, as large as golf balls. While the stone is traveling through the ureter, which is the tube that carries urine from the kidney to the bladder, you will probably feel pain. The pain may be mild or very severe. You may also have some blood in your urine. As soon as the stone reaches the bladder, any intense pain should go away. If a stone is too large to pass on its own, you may need a medical procedure to help you pass the stone. The doctor has checked you carefully, but problems can develop later. If you notice any problems or new symptoms, get medical treatment right away. Follow-up care is a key part of your treatment and safety. Be sure to make and go to all appointments, and call your doctor if you are having problems. It's also a good idea to know your test results and keep a list of the medicines you take. How can you care for yourself at home? · Drink plenty of fluids, enough so that your urine is light yellow or clear like water. If you have kidney, heart, or liver disease and have to limit fluids, talk with your doctor before you increase the amount of fluids you drink. · Take pain medicines exactly as directed. Call your doctor if you think you are having a problem with your medicine. ¨ If the doctor gave you a prescription medicine for pain, take it as prescribed. ¨ If you are not taking a prescription pain medicine, ask your doctor if you can take an over-the-counter medicine. Read and follow all instructions on the label. · Your doctor may ask you to strain your urine so that you can collect your kidney stone when it passes. You can use a kitchen strainer or a tea strainer to catch the stone. Store it in a plastic bag until you see your doctor again. Preventing future kidney stones Some changes in your diet may help prevent kidney stones. Depending on the cause of your stones, your doctor may recommend that you: · Drink plenty of fluids, enough so that your urine is light yellow or clear like water. If you have kidney, heart, or liver disease and have to limit fluids, talk with your doctor before you increase the amount of fluids you drink. · Limit coffee, tea, and alcohol. Also avoid grapefruit juice. · Do not take more than the recommended daily dose of vitamins C and D. 
· Avoid antacids such as Gaviscon, Maalox, Mylanta, or Tums. · Limit the amount of salt (sodium) in your diet. · Eat a balanced diet that is not too high in protein. · Limit foods that are high in a substance called oxalate, which can cause kidney stones. These foods include dark green vegetables, rhubarb, chocolate, wheat bran, nuts, cranberries, and beans. When should you call for help? Call your doctor now or seek immediate medical care if: 
? · You cannot keep down fluids. ? · Your pain gets worse. ? · You have a fever or chills. ? · You have new or worse pain in your back just below your rib cage (the flank area). ? · You have new or more blood in your urine. ? Watch closely for changes in your health, and be sure to contact your doctor if: 
? · You do not get better as expected. Where can you learn more? Go to http://maryann-jm.info/. Enter Y070 in the search box to learn more about \"Kidney Stone: Care Instructions. \" Current as of: May 12, 2017 Content Version: 11.4 © 3875-6979 Sample6. Care instructions adapted under license by [employer] (which disclaims liability or warranty for this information). If you have questions about a medical condition or this instruction, always ask your healthcare professional. [address] any warranty or liability for your use of this information. Introducing [facility]! Constanza Lakhani introduces [employer] patient portal. Now you can access parts of your medical record, email your doctor's office, and request medication refills online. 1. In your internet browser, go to [email or web address] 2. Click on the First Time User? Click Here link in the Sign In box. You will see the New Member Sign Up page. 3. Enter your [employer] Access Code exactly as it appears below. You will not need to use this code after youve completed the sign-up process. If you do not sign up before the expiration date, you must request a new code. · [employer] Access Code: [email or web address] Expires: 8/12/2018  2:32 PM 
 
4. Enter the last four digits of your Social Security Number (xxxx) and Date of Birth (mm/dd/yyyy) as indicated and click Submit. You will be taken to the next sign-up page. 5. Create a [employer] ID. This will be your [employer] login ID and cannot be changed, so think of one that is secure and easy to remember. 6. Create a [employer] password. You can change your password at any time. 7. Enter your Password Reset Question and Answer. This can be used at a later time if you forget your password. 8. Enter your e-mail address. You will receive e-mail notification when new information is available in [address] 9. Click Sign Up. You can now view and download portions of your medical record. 10. Click the Download Summary menu link to download a portable copy of your medical information. If you have questions, please visit the Frequently Asked Questions section of the [employer] website. Remember, [employer] is NOT to be used for urgent needs. For medical emergencies, dial 911. Now available from your iPhone and Android! Please provide this summary of care documentation to your next provider. Your primary care clinician is listed as Phys Other. If you have any questions after today's visit, please call [phone].

## 2018-06-25 NOTE — PROGRESS NOTES
Identified Patient with two Patient identifiers (Name and ). Two Patient Identifiers confirmed. Reviewed record in preparation for visit and have obtained necessary documentation. Chief Complaint   Patient presents with    Depression     Started on Prozac on 2018    Urinary Pain     c/o Urgency, Urinary burning/discomfort and bilateral flank, states symptoms begin 2018        Visit Vitals    /77 (BP 1 Location: Right arm, BP Patient Position: Sitting)    Pulse 83    Temp 97.9 °F (36.6 °C) (Oral)    Resp 16    Ht 5' 3\" (1.6 m)    Wt 304 lb 3.2 oz (138 kg)    SpO2 100%    BMI 53.89 kg/m2       1. Have you been to the ER, urgent care clinic since your last visit? Hospitalized since your last visit? Yes When: 2018 [facility] due to Leg Pain     2. Have you seen or consulted any other health care providers outside of the [address] [place] [address] since your last visit? Include any pap smears or colon screening. No     CT of the Abdomen scheduled Today Monday, 2018 at 4:30 PM,  At [facility]. Patient advised to arrive 30 minutes prior to appointment at Outpatient Registration . No prep required. Patient verbalized understanding.

## 2018-06-25 NOTE — PATIENT INSTRUCTIONS

## 2018-06-25 NOTE — PROGRESS NOTES
Doris Gordon is an 26 y.o. female who presents for concern of kidney stones and clot in legs. Pt shares that dysuria and back pain on the right started last Thursday. Now has developed suprapubic tenderness and urinary urge. No blood in urine. Of note, pt was hospitalized ([facility]) in 7/2017 2/2 to hydronephrosis on the left 2/2 to kidney stone. Required stent. With Obstructing Kidney stone was a constant pain not relieved with any positioning or activity.  Initial stone in 2016 with prolonged hospital stay related to bacteremia and reported \"effected my heart\". 11/2017 had CT that showed nonobstructing left intrarenal calculus. Pt states she is followed by [facility] and has not seen since 3/2018. Pt has been having calf pain bilaterally, worse on the left. Pt denies any trauma. No personal or familial hx of blood clots. No smoking. No swelling. Pt states she recently went to beach and was in car seat for 6 hour straight. Depression: PHQ9 score was 18. Started on prozac. Medication worked for her. Denies SI today. 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     Sleep apnea: Dx by sleep medicine 4 months ago. Applied for financial service so she could afford the CPAP. Has not followed up. Plans to touch base with sleep medicine after leaving clinic today. Continued to overeat at times. No exercise plan. Has gained weight. Allergies - reviewed: Allergies   Allergen Reactions    Aspirin Itching         Medications - reviewed:   Current Outpatient Prescriptions   Medication Sig    FLUoxetine (PROZAC) 20 mg capsule Take 1 Cap by mouth daily.  dicyclomine (BENTYL) 20 mg tablet Take 1 Tab by mouth every six (6) hours as needed for Pain for up to 20 doses.  chlorthalidone (HYGROTEN) 25 mg tablet Take 0.5 Tabs by mouth daily.  pantoprazole (PROTONIX) 40 mg tablet Take 1 Tab by mouth daily.     predniSONE (STERAPRED) 5 mg dose pack Take as directed with food x 6 days. See administration instruction per 5mg dose pack    oxyCODONE-acetaminophen (PERCOCET 7.5) 7.5-325 mg per tablet Take 1-2 Tabs by mouth every four (4) hours as needed for Pain. Max Daily Amount: 12 Tabs.  ondansetron (ZOFRAN ODT) 4 mg disintegrating tablet Take 1 Tab by mouth every eight (8) hours as needed for Nausea.  potassium chloride (KLOR-CON) 10 mEq tablet Take 1 Tab by mouth daily. No current facility-administered medications for this visit.           Past Medical History - reviewed:  Past Medical History:   Diagnosis Date    Kidney stones     Morbid [facility] ([facility] [address]     Sleep apnea     Does not have CPAP as yet         Past Surgical History - reviewed:   Past Surgical History:   Procedure Laterality Date    HX UROLOGICAL      Stenting x 4 (Starting in 2016, developed severe infection)         Social History - reviewed:  Social History     Social History    Marital status: SINGLE     Spouse name: N/A    Number of children: N/A    Years of education: N/A     Occupational History          started in 3/17     Social History Main Topics    Smoking status: Former Smoker     Years: 1.00     Quit date: 1/16/2018    Smokeless tobacco: Never Used      Comment: 1/4 pack ppd for 6 months    Alcohol use No      Comment: rarely    Drug use: No      Comment: Has done [employer] Sexual activity: No     Other Topics Concern    Not on file     Social History Narrative         Family History - reviewed:  Family History   Problem Relation Age of Onset    Heart Disease Father 55    Hypertension Father     Sleep Apnea Father     No Known Problems Sister     Cancer Maternal Grandmother      Brain Cancer    Heart Disease Paternal Grandmother     Cancer Paternal Grandfather          Immunizations - reviewed:   Immunization History   Administered Date(s) Administered    Influenza Vaccine (Quad) PF 11/16/2017       ROS  General/Constitutional:   No headache, fever, fatigue    Neck:   No swelling, masses, stiffness, pain, or limited movement     Cardiac:    No chest pain      Respiratory:   No cough or shortness of breath     GI: No nausea/vomiting, bloody or dark stools       :   dysuria increased frequency back pain. No hematuria    MSK: calf pain. Neurological:   No loss of consciousness, dizziness, seizures, dysarthria, cognitive changes, memory changes,  problems with balance, or unilateral   Depression       Physical Exam  Visit Vitals    /77 (BP 1 Location: Right arm, BP Patient Position: Sitting)    Pulse 83    Temp 97.9 °F (36.6 °C) (Oral)    Resp 16    Ht 5' 3\" (1.6 m)    Wt 304 lb 3.2 oz (138 kg)    LMP 04/20/2018  Comment: Irregular Menses    SpO2 100%    BMI 53.89 kg/m2       Constitutional: She appears well-developed and morbidly obese. No distress. HENT:   Head: Normocephalic and atraumatic. Eyes: Conjunctivae are normal.   Neck: Normal range of motion. Neck supple. Cardiovascular: Normal rate, regular rhythm, normal heart sounds and intact distal pulses.  Exam reveals no gallop and no friction rub.  No murmur heard. No tenderness to palpation in the chest.   Pulmonary/Chest: Effort normal and breath sounds normal. No respiratory distress. She has no wheezes. She has no rales. Abdominal: Soft. Bowel sounds are normal. She exhibits no distension and no mass. Generalized pain. There is no obvious rebound and guarding. Surgical site noted and have healed  Musculoskeletal: She exhibits no edema. No CVA tenderness. Ext: left calf tenderness. No edema. Unequivocal homen's sign. Psychiatric: She has a normal mood and affect. Her behavior is normal. Judgment and thought content normal. PHQ9 8 FROM 18    Assessment/Plan    ICD-10-CM ICD-9-CM    1. Dysuria R30.0 788.1 AMB POC URINALYSIS DIP STICK AUTO W/O MICRO      CULTURE, URINE      CT ABD WO CONT      AMB POC URINE PREGNANCY TEST, VISUAL COLOR COMPARISON   2.  Depression, unspecified depression type F32.9 311    3. Bilateral calf pain M79.661 729.5 DUPLEX LOWER EXT VENOUS BILAT    M79.662     4. History of kidney stones Z87.442 V13.01    5. Sleep apnea, unspecified type G47.30 780.57    6. Obesity, morbid ([facility] [address] E66.01 278.01 REFERRAL TO WEIGHT LOSS      REFERRAL TO DIETITIAN     UA today without obvious UTI. Will send Ucx. With her hx of kidney stones, a CT abd has been ordered and scheduled for this pt prior to leaving clinic. Precautions given. Stressed hydration. Pt understanding     No obvious signs of DVT today but will send for duplex doppler US given pt's hx and concern. Depression: controlled. Continue medication. I have reviewed/discussed the above normal BMI with the patient. I have recommended the following interventions: dietary management education, guidance, and counseling, encourage exercise and monitor weight . Buster Vang Will refer to weight loss clinic and dietitian. Follow-up Disposition:  Return in about 4 weeks (around 7/23/2018). I have discussed the diagnosis with the patient and the intended plan as seen in the above orders. Patient verbalized understanding of the plan and agrees with the plan. The patient has received an after-visit summary and questions were answered concerning future plans. I have discussed medication side effects and warnings with the patient as well. Informed patient to return to the office if new symptoms arise.         Trish Odom DO  Family Medicine Resident

## 2018-06-25 NOTE — PROCEDURES
[facility]  *** FINAL REPORT ***    Name: Alexis Baltazar  MRN: [id number]    Outpatient  : 19 Mar 1995  HIS Order #: [id number]  [address] [facility] Visit #: [id number]  Date: 2018    TYPE OF TEST: Peripheral Venous Testing    REASON FOR TEST  Pain in limb    Right Leg:-  Deep venous thrombosis:           No  Superficial venous thrombosis:    No  Deep venous insufficiency:        No  Superficial venous insufficiency: No    Left Leg:-  Deep venous thrombosis:           No  Superficial venous thrombosis:    No  Deep venous insufficiency:        No  Superficial venous insufficiency: No      INTERPRETATION/FINDINGS  PROCEDURE:  BILATERAL LE VENOUS DUPLEX. Evaluation of lower extremity veins with ultrasound (B-mode imaging,  pulsed Doppler, color Doppler). Includes the common femoral, deep  femoral, femoral, popliteal, posterior tibial, peroneal, and great  saphenous veins. FINDINGS:  Lauren Neigh scale and color flow duplex images of the veins in  both lower extremities demonstrate normal compressibility, spontaneous   and augmented flow profiles, and absence of filling defects  throughout the deep and superficial veins in both lower extremities. CONCLUSION: Bilateral lower extremity venous duplex negative for deep  venous thrombosis or thrombophlebitis. Technically difficult study due   to body habitus vs. small caliber vessels. ADDITIONAL COMMENTS    I have personally reviewed the data relevant to the interpretation of  this  study. TECHNOLOGIST: MARICARMEN Tanner  Signed: 2018 05:38 PM    PHYSICIAN: Shelly Enrique.  Kirk Gage MD  Signed: 2018 04:01 PM

## 2018-06-27 LAB
BACTERIA UR CULT: ABNORMAL
BACTERIA UR CULT: ABNORMAL

## 2018-06-27 NOTE — PROGRESS NOTES
Pt called regarding CT abd which showed no renal stone, LE doppler duplex which did not show thrombi, and UCx which did not show obvious infection. Pt is feeling ok today when called. Denies fever, hematuria, dysuria, and calf pain. Plan is to monitor symptoms return to clinic in 1 wk to review how she is doing. For now establish with weight loss clinic and obtain CPAP.

## 2018-07-06 ENCOUNTER — OFFICE VISIT (OUTPATIENT)
Dept: FAMILY MEDICINE CLINIC | Age: 23
End: 2018-07-06

## 2018-07-06 VITALS
BODY MASS INDEX: 51.91 KG/M2 | TEMPERATURE: 97.7 F | HEART RATE: 87 BPM | HEIGHT: 63 IN | DIASTOLIC BLOOD PRESSURE: 82 MMHG | RESPIRATION RATE: 16 BRPM | SYSTOLIC BLOOD PRESSURE: 123 MMHG | WEIGHT: 293 LBS | OXYGEN SATURATION: 99 %

## 2018-07-06 DIAGNOSIS — Z87.442 HISTORY OF KIDNEY STONES: Primary | ICD-10-CM

## 2018-07-06 DIAGNOSIS — F32.2 SEVERE DEPRESSION (HCC): ICD-10-CM

## 2018-07-06 LAB
BILIRUB UR QL STRIP: NEGATIVE
GLUCOSE UR-MCNC: NEGATIVE MG/DL
KETONES P FAST UR STRIP-MCNC: NEGATIVE MG/DL
PH UR STRIP: 6 [PH] (ref 4.6–8)
PROT UR QL STRIP: NORMAL
SP GR UR STRIP: 1.03 (ref 1–1.03)
UA UROBILINOGEN AMB POC: NORMAL (ref 0.2–1)
URINALYSIS CLARITY POC: NORMAL
URINALYSIS COLOR POC: YELLOW
URINE BLOOD POC: NEGATIVE
URINE LEUKOCYTES POC: NEGATIVE
URINE NITRITES POC: NEGATIVE

## 2018-07-06 RX ORDER — FLUOXETINE HYDROCHLORIDE 20 MG/1
20 CAPSULE ORAL DAILY
Qty: 30 CAP | Refills: 1 | Status: SHIPPED | OUTPATIENT
Start: 2018-07-06 | End: 2018-08-06

## 2018-07-06 NOTE — PATIENT INSTRUCTIONS
Kidney Stone: Care Instructions  Your Care Instructions    Kidney stones are formed when salts, minerals, and other substances normally found in the urine clump together. They can be as small as grains of sand or, rarely, as large as golf balls. While the stone is traveling through the ureter, which is the tube that carries urine from the kidney to the bladder, you will probably feel pain. The pain may be mild or very severe. You may also have some blood in your urine. As soon as the stone reaches the bladder, any intense pain should go away. If a stone is too large to pass on its own, you may need a medical procedure to help you pass the stone. The doctor has checked you carefully, but problems can develop later. If you notice any problems or new symptoms, get medical treatment right away. Follow-up care is a key part of your treatment and safety. Be sure to make and go to all appointments, and call your doctor if you are having problems. It's also a good idea to know your test results and keep a list of the medicines you take. How can you care for yourself at home? · Drink plenty of fluids, enough so that your urine is light yellow or clear like water. If you have kidney, heart, or liver disease and have to limit fluids, talk with your doctor before you increase the amount of fluids you drink. · Take pain medicines exactly as directed. Call your doctor if you think you are having a problem with your medicine. ¨ If the doctor gave you a prescription medicine for pain, take it as prescribed. ¨ If you are not taking a prescription pain medicine, ask your doctor if you can take an over-the-counter medicine. Read and follow all instructions on the label. · Your doctor may ask you to strain your urine so that you can collect your kidney stone when it passes. You can use a kitchen strainer or a tea strainer to catch the stone. Store it in a plastic bag until you see your doctor again.   Preventing future kidney stones  Some changes in your diet may help prevent kidney stones. Depending on the cause of your stones, your doctor may recommend that you:  · Drink plenty of fluids, enough so that your urine is light yellow or clear like water. If you have kidney, heart, or liver disease and have to limit fluids, talk with your doctor before you increase the amount of fluids you drink. · Limit coffee, tea, and alcohol. Also avoid grapefruit juice. · Do not take more than the recommended daily dose of vitamins C and D.  · Avoid antacids such as Gaviscon, Maalox, Mylanta, or Tums. · Limit the amount of salt (sodium) in your diet. · Eat a balanced diet that is not too high in protein. · Limit foods that are high in a substance called oxalate, which can cause kidney stones. These foods include dark green vegetables, rhubarb, chocolate, wheat bran, nuts, cranberries, and beans. When should you call for help? Call your doctor now or seek immediate medical care if:  ? · You cannot keep down fluids. ? · Your pain gets worse. ? · You have a fever or chills. ? · You have new or worse pain in your back just below your rib cage (the flank area). ? · You have new or more blood in your urine. ? Watch closely for changes in your health, and be sure to contact your doctor if:  ? · You do not get better as expected. Where can you learn more? Go to http://maryann-jm.info/. Enter M873 in the search box to learn more about \"Kidney Stone: Care Instructions. \"  Current as of: May 12, 2017  Content Version: 11.4  © 2335-5442 [employer]. Care instructions adapted under license by [employer] (which disclaims liability or warranty for this information). If you have questions about a medical condition or this instruction, always ask your healthcare professional. [address] any warranty or liability for your use of this information.        Starting a Weight Loss Plan: Care Instructions  Your Care Instructions    If you are thinking about losing weight, it can be hard to know where to start. Your doctor can help you set up a weight loss plan that best meets your needs. You may want to take a class on nutrition or exercise, or join a weight loss support group. If you have questions about how to make changes to your eating or exercise habits, ask your doctor about seeing a registered dietitian or an exercise specialist.  It can be a big challenge to lose weight. But you do not have to make huge changes at once. Make small changes, and stick with them. When those changes become habit, add a few more changes. If you do not think you are ready to make changes right now, try to pick a date in the future. Make an appointment to see your doctor to discuss whether the time is right for you to start a plan. Follow-up care is a key part of your treatment and safety. Be sure to make and go to all appointments, and call your doctor if you are having problems. It's also a good idea to know your test results and keep a list of the medicines you take. How can you care for yourself at home? · Set realistic goals. Many people expect to lose much more weight than is likely. A weight loss of 5% to 10% of your body weight may be enough to improve your health. · Get family and friends involved to provide support. Talk to them about why you are trying to lose weight, and ask them to help. They can help by participating in exercise and having meals with you, even if they may be eating something different. · Find what works best for you. If you do not have time or do not like to cook, a program that offers meal replacement bars or shakes may be better for you. Or if you like to prepare meals, finding a plan that includes daily menus and recipes may be best.  · Ask your doctor about other health professionals who can help you achieve your weight loss goals.   ¨ A dietitian can help you make healthy changes in your diet. ¨ An exercise specialist or  can help you develop a safe and effective exercise program.  ¨ A counselor or psychiatrist can help you cope with issues such as depression, anxiety, or family problems that can make it hard to focus on weight loss. · Consider joining a support group for people who are trying to lose weight. Your doctor can suggest groups in your area. Where can you learn more? Go to http://maryann-jm.info/. Enter A141 in the search box to learn more about \"Starting a Weight Loss Plan: Care Instructions. \"  Current as of: October 13, 2016  Content Version: 11.4  © 4928-5554 [employer]. Care instructions adapted under license by [employer] (which disclaims liability or warranty for this information). If you have questions about a medical condition or this instruction, always ask your healthcare professional. [address] any warranty or liability for your use of this information.

## 2018-07-06 NOTE — PROGRESS NOTES
Nadeen Seip is an 26 y.o. female who presents for follow-up for concern of kidney stone and calf pain. Pt was seen last week after 5 days of back pain and dysuria. With her hx of kidney stone. CT abd was done 6/25 and was unremarkable. Pt has been hydrating well. Has not had symptoms of back pain or urinary pain. At the same visit pt was also concerned about DVT. With hx she gave and unequivocal PE. LE doppler US was negative b/l on 6/25. No calf pain or swelling today.      Depression: Tolerating prozac. Medication worked for her. Denies SI today. Feels happier. Allergies - reviewed: Allergies   Allergen Reactions    Aspirin Itching         Medications - reviewed:   Current Outpatient Prescriptions   Medication Sig    FLUoxetine (PROZAC) 20 mg capsule Take 1 Cap by mouth daily.  predniSONE (STERAPRED) 5 mg dose pack Take as directed with food x 6 days. See administration instruction per 5mg dose pack    chlorthalidone (HYGROTEN) 25 mg tablet Take 0.5 Tabs by mouth daily.  potassium chloride (KLOR-CON) 10 mEq tablet Take 1 Tab by mouth daily. No current facility-administered medications for this visit.           Past Medical History - reviewed:  Past Medical History:   Diagnosis Date    Kidney stones     Morbid obesity ([address]     Sleep apnea     Does not have CPAP as yet         Past Surgical History - reviewed:   Past Surgical History:   Procedure Laterality Date    HX UROLOGICAL      Stenting x 4 (Starting in 2016, developed severe infection)         Social History - reviewed:  Social History     Social History    Marital status: SINGLE     Spouse name: N/A    Number of children: N/A    Years of education: N/A     Occupational History          started in 3/17     Social History Main Topics    Smoking status: Former Smoker     Years: 1.00     Quit date: 1/16/2018    Smokeless tobacco: Never Used      Comment: 1/4 pack ppd for 6 months    Alcohol use No Comment: rarely    Drug use: No      Comment: Has done Marijuan    Sexual activity: No     Other Topics Concern    Not on file     Social History Narrative         Family History - reviewed:  Family History   Problem Relation Age of Onset    Heart Disease Father 55    Hypertension Father     Sleep Apnea Father     No Known Problems Sister     Cancer Maternal Grandmother      Brain Cancer    Heart Disease Paternal Grandmother     Cancer Paternal Grandfather          Immunizations - reviewed:   Immunization History   Administered Date(s) Administered    Influenza Vaccine (Quad) PF 11/16/2017       ROS  General/Constitutional:   No headache, fever, fatigue    Neck:   No swelling, masses, stiffness, pain, or limited movement     Cardiac:    No chest pain      Respiratory:   No cough or shortness of breath     GI: No nausea/vomiting, bloody or dark stools       :   No hematuria, dysuria, urgency  MSK: No calf pain. Physical Exam  Visit Vitals    /82    Pulse 87    Temp 97.7 °F (36.5 °C) (Oral)    Resp 16    Ht 5' 3\" (1.6 m)    Wt 299 lb (135.6 kg)    SpO2 99%    BMI 52.97 kg/m2       Constitutional: She appears well-developed and morbidly obese. No distress. HENT:   Head: Normocephalic and atraumatic. Eyes: Conjunctivae are normal.   Neck: Normal range of motion. Neck supple. Cardiovascular: Normal rate, regular rhythm, normal heart sounds and intact distal pulses.  Exam reveals no gallop and no friction rub.  No murmur heard. No tenderness to palpation in the chest.   Pulmonary/Chest: Effort normal and breath sounds normal. No respiratory distress. She has no wheezes. She has no rales. Abdominal: Soft. Bowel sounds are normal. She exhibits no distension and no mass. No tenderness. There is no rebound and guarding. Surgical site noted and have healed  Musculoskeletal: No CVA tenderness. Ext: left calf tenderness. No edema.  Negative homen's sign.    Psychiatric: She has a normal mood and affect. Her behavior is normal. Judgment and thought content normal. PHQ9 5 FROM 8    UA: 1+ protein, SG 1.030. Neg LE, Nit, blood    Assessment/Plan    ICD-10-CM ICD-9-CM    1. History of kidney stones Z87.442 V13.01 AMB POC URINALYSIS DIP STICK AUTO W/O MICRO   2. Severe depression (HCC) F32.2 311 FLUoxetine (PROZAC) 20 mg capsule   3. BMI 50.0-59.9, adult ([facility] Z68.43 V85.43      Pt feels improved. Reviewed imaging, unremarkable findings. PE today reassuring. Precautions given. Hydration. PHQ9 improved. Refilled Prozac. No SE. I have reviewed/discussed the above normal BMI with the patient. I have recommended the following interventions: dietary management education, guidance, and counseling, encourage exercise and monitor weight . Follow-up Disposition:  Return in about 3 months (around 10/6/2018) for follow-up. I have discussed the diagnosis with the patient and the intended plan as seen in the above orders. Patient verbalized understanding of the plan and agrees with the plan. The patient has received an after-visit summary and questions were answered concerning future plans. I have discussed medication side effects and warnings with the patient as well. Informed patient to return to the office if new symptoms arise.         Esteban Chong DO  Family Medicine Resident

## 2018-07-06 NOTE — MR AVS SNAPSHOT
[address] [id number] Patient: David Nguyen MRN: [id number] :1995 Visit Information Date & Time Provider Department Dept. Phone Encounter #  
 2018  2:30 PM Esteban Chong, [address] [phone] [id number] Upcoming Health Maintenance Date Due  
 HPV Age 9Y-34Y (1 of 3 - Female 3 Dose Series) 3/19/2008 DTaP/Tdap/Td series (1 - Tdap) 3/19/2018 PAP AKA CERVICAL CYTOLOGY 3/19/2018 Influenza Age 5 to Adult 2018 Allergies as of 2018  Review Complete On: 2018 By: Brooklyn Villafana LPN Severity Noted Reaction Type Reactions Aspirin  2018    Itching Current Immunizations  Reviewed on 2017 Name Date Influenza Vaccine (Quad) PF 2017 Not reviewed this visit You Were Diagnosed With   
  
 Codes Comments History of kidney stones    -  Primary ICD-10-CM: W35.916 ICD-9-CM: V13.01 Severe [facility] ([facility]     ICD-10-CM: F32.2 ICD-9-CM: 218 Vitals BP Pulse Temp Resp Height(growth percentile) Weight(growth percentile) 123/82 87 97.7 °F (36.5 °C) (Oral) 16 5' 3\" (1.6 m) 299 lb (135.6 kg) SpO2 BMI OB Status Smoking Status 99% 52.97 kg/m2 Unknown Former Smoker Vitals History BMI and BSA Data Body Mass Index Body Surface Area 52.97 kg/m 2 2.46 m 2 Preferred Pharmacy Pharmacy Name Phone [facility], [address] [place] [address] [phone] Your Updated Medication List  
  
   
This list is accurate as of 18  2:56 PM.  Always use your most recent med list.  
  
  
  
  
 chlorthalidone 25 mg tablet Commonly known as:  Carlyn Citizen Take 0.5 Tabs by mouth daily. FLUoxetine 20 mg capsule Commonly known as:  PROzac Take 1 Cap by mouth daily. potassium chloride 10 mEq tablet Commonly known as:  KLOR-CON Take 1 Tab by mouth daily. predniSONE 5 mg dose pack Commonly known as:  STERAPRED Take as directed with food x 6 days. See administration instruction per 5mg dose pack Prescriptions Sent to Pharmacy Refills FLUoxetine (PROZAC) 20 mg capsule 1 Sig: Take 1 Cap by mouth daily. Class: Normal  
 Pharmacy: [facility] [address], [address] Ph #: [phone] Route: Oral  
  
We Performed the Following AMB POC URINALYSIS DIP STICK AUTO W/O MICRO [04891 CPT(R)] Patient Instructions Kidney Stone: Care Instructions Your Care Instructions Kidney stones are formed when salts, minerals, and other substances normally found in the urine clump together. They can be as small as grains of sand or, rarely, as large as golf balls. While the stone is traveling through the ureter, which is the tube that carries urine from the kidney to the bladder, you will probably feel pain. The pain may be mild or very severe. You may also have some blood in your urine. As soon as the stone reaches the bladder, any intense pain should go away. If a stone is too large to pass on its own, you may need a medical procedure to help you pass the stone. The doctor has checked you carefully, but problems can develop later. If you notice any problems or new symptoms, get medical treatment right away. Follow-up care is a key part of your treatment and safety. Be sure to make and go to all appointments, and call your doctor if you are having problems. It's also a good idea to know your test results and keep a list of the medicines you take. How can you care for yourself at home? · Drink plenty of fluids, enough so that your urine is light yellow or clear like water. If you have kidney, heart, or liver disease and have to limit fluids, talk with your doctor before you increase the amount of fluids you drink. · Take pain medicines exactly as directed. Call your doctor if you think you are having a problem with your medicine. ¨ If the doctor gave you a prescription medicine for pain, take it as prescribed. ¨ If you are not taking a prescription pain medicine, ask your doctor if you can take an over-the-counter medicine. Read and follow all instructions on the label. · Your doctor may ask you to strain your urine so that you can collect your kidney stone when it passes. You can use a kitchen strainer or a tea strainer to catch the stone. Store it in a plastic bag until you see your doctor again. Preventing future kidney stones Some changes in your diet may help prevent kidney stones. Depending on the cause of your stones, your doctor may recommend that you: · Drink plenty of fluids, enough so that your urine is light yellow or clear like water. If you have kidney, heart, or liver disease and have to limit fluids, talk with your doctor before you increase the amount of fluids you drink. · Limit coffee, tea, and alcohol. Also avoid grapefruit juice. · Do not take more than the recommended daily dose of vitamins C and D. 
· Avoid antacids such as Gaviscon, Maalox, Mylanta, or Tums. · Limit the amount of salt (sodium) in your diet. · Eat a balanced diet that is not too high in protein. · Limit foods that are high in a substance called oxalate, which can cause kidney stones. These foods include dark green vegetables, rhubarb, chocolate, wheat bran, nuts, cranberries, and beans. When should you call for help? Call your doctor now or seek immediate medical care if: 
? · You cannot keep down fluids. ? · Your pain gets worse. ? · You have a fever or chills. ? · You have new or worse pain in your back just below your rib cage (the flank area). ? · You have new or more blood in your urine. ? Watch closely for changes in your health, and be sure to contact your doctor if: 
? · You do not get better as expected. Where can you learn more? Go to http://maryann-jm.info/. Enter G267 in the search box to learn more about \"Kidney Stone: Care Instructions. \" Current as of: May 12, 2017 Content Version: 11.4 © 2484-9686 [employer]icket[employer]. Care instructions adapted under license by [employer] (which disclaims liability or warranty for this information). If you have questions about a medical condition or this instruction, always ask your healthcare professional. [address] any warranty or liability for your use of this information. Starting a Weight Loss Plan: Care Instructions Your Care Instructions If you are thinking about losing weight, it can be hard to know where to start. Your doctor can help you set up a weight loss plan that best meets your needs. You may want to take a class on nutrition or exercise, or join a weight loss support group. If you have questions about how to make changes to your eating or exercise habits, ask your doctor about seeing a registered dietitian or an exercise specialist. 
It can be a big challenge to lose weight. But you do not have to make huge changes at once. Make small changes, and stick with them. When those changes become habit, add a few more changes. If you do not think you are ready to make changes right now, try to pick a date in the future. Make an appointment to see your doctor to discuss whether the time is right for you to start a plan. Follow-up care is a key part of your treatment and safety. Be sure to make and go to all appointments, and call your doctor if you are having problems. It's also a good idea to know your test results and keep a list of the medicines you take. How can you care for yourself at home? · Set realistic goals. Many people expect to lose much more weight than is likely. A weight loss of 5% to 10% of your body weight may be enough to improve your health. · Get family and friends involved to provide support. Talk to them about why you are trying to lose weight, and ask them to help. They can help by participating in exercise and having meals with you, even if they may be eating something different. · Find what works best for you. If you do not have time or do not like to cook, a program that offers meal replacement bars or shakes may be better for you. Or if you like to prepare meals, finding a plan that includes daily menus and recipes may be best. 
· Ask your doctor about other health professionals who can help you achieve your weight loss goals. ¨ A dietitian can help you make healthy changes in your diet. ¨ An exercise specialist or  can help you develop a safe and effective exercise program. 
¨ A counselor or psychiatrist can help you cope with issues such as depression, anxiety, or family problems that can make it hard to focus on weight loss. · Consider joining a support group for people who are trying to lose weight. Your doctor can suggest groups in your area. Where can you learn more? Go to http://maryann-jm.info/. Enter B931 in the search box to learn more about \"Starting a Weight Loss Plan: Care Instructions. \" Current as of: October 13, 2016 Content Version: 11.4 © 1630-4983 [employer]. Care instructions adapted under license by [employer] (which disclaims liability or warranty for this information). If you have questions about a medical condition or this instruction, always ask your healthcare professional. [address] [address] any warranty or liability for your use of this information. Introducing [facility]! [address] introduces [employer] patient portal. Now you can access parts of your medical record, email your doctor's office, and request medication refills online. 1. In your internet browser, go to [email or web address] 2. Click on the First Time User? Click Here link in the Sign In box. You will see the New Member Sign Up page. 3. Enter your invino Access Code exactly as it appears below. You will not need to use this code after youve completed the sign-up process. If you do not sign up before the expiration date, you must request a new code. · invino Access Code: [email or web address] Expires: 8/12/2018  2:32 PM 
 
4. Enter the last four digits of your Social Security Number (xxxx) and Date of Birth (mm/dd/yyyy) as indicated and click Submit. You will be taken to the next sign-up page. 5. Create a invino ID. This will be your invino login ID and cannot be changed, so think of one that is secure and easy to remember. 6. Create a invino password. You can change your password at any time. 7. Enter your Password Reset Question and Answer. This can be used at a later time if you forget your password. 8. Enter your e-mail address. You will receive e-mail notification when new information is available in [address] 9. Click Sign Up. You can now view and download portions of your medical record. 10. Click the Download Summary menu link to download a portable copy of your medical information. If you have questions, please visit the Frequently Asked Questions section of the invino website. Remember, invino is NOT to be used for urgent needs. For medical emergencies, dial 911. Now available from your iPhone and Android! Please provide this summary of care documentation to your next provider. Your primary care clinician is listed as Job Byes. If you have any questions after today's visit, please call [phone].

## 2018-08-02 ENCOUNTER — HOSPITAL ENCOUNTER (EMERGENCY)
Age: 23
Discharge: ANOTHER HOSPITAL | End: 2018-08-02
Attending: EMERGENCY MEDICINE
Payer: COMMERCIAL

## 2018-08-02 ENCOUNTER — HOSPITAL ENCOUNTER (INPATIENT)
Age: 23
LOS: 4 days | Discharge: HOME | DRG: 881 | End: 2018-08-06
Attending: PSYCHIATRY & NEUROLOGY | Admitting: PSYCHIATRY & NEUROLOGY
Payer: COMMERCIAL

## 2018-08-02 VITALS
BODY MASS INDEX: 50.02 KG/M2 | RESPIRATION RATE: 22 BRPM | WEIGHT: 293 LBS | SYSTOLIC BLOOD PRESSURE: 154 MMHG | HEIGHT: 64 IN | TEMPERATURE: 98.2 F | HEART RATE: 78 BPM | DIASTOLIC BLOOD PRESSURE: 71 MMHG | OXYGEN SATURATION: 98 %

## 2018-08-02 DIAGNOSIS — T50.902A INTENTIONAL DRUG OVERDOSE, INITIAL ENCOUNTER (HCC): ICD-10-CM

## 2018-08-02 DIAGNOSIS — T14.91XA SUICIDE ATTEMPT (HCC): Primary | ICD-10-CM

## 2018-08-02 PROBLEM — F32.9 MAJOR DEPRESSIVE DISORDER: Status: ACTIVE | Noted: 2018-08-02

## 2018-08-02 LAB
ALBUMIN SERPL-MCNC: 3.4 G/DL (ref 3.5–5)
ALBUMIN/GLOB SERPL: 0.7 {RATIO} (ref 1.1–2.2)
ALP SERPL-CCNC: 115 U/L (ref 45–117)
ALT SERPL-CCNC: 15 U/L (ref 12–78)
AMPHET UR QL SCN: NEGATIVE
ANION GAP SERPL CALC-SCNC: 11 MMOL/L (ref 5–15)
APAP SERPL-MCNC: <2 UG/ML (ref 10–30)
APPEARANCE UR: ABNORMAL
AST SERPL-CCNC: 13 U/L (ref 15–37)
ATRIAL RATE: 87 BPM
BACTERIA URNS QL MICRO: ABNORMAL /HPF
BARBITURATES UR QL SCN: NEGATIVE
BASOPHILS # BLD: 0 K/UL (ref 0–0.1)
BASOPHILS NFR BLD: 0 % (ref 0–1)
BENZODIAZ UR QL: NEGATIVE
BILIRUB SERPL-MCNC: 0.3 MG/DL (ref 0.2–1)
BILIRUB UR QL: NEGATIVE
BUN SERPL-MCNC: 6 MG/DL (ref 6–20)
BUN/CREAT SERPL: 7 (ref 12–20)
CALCIUM SERPL-MCNC: 9 MG/DL (ref 8.5–10.1)
CALCULATED P AXIS, ECG09: 32 DEGREES
CALCULATED R AXIS, ECG10: 21 DEGREES
CALCULATED T AXIS, ECG11: 20 DEGREES
CANNABINOIDS UR QL SCN: NEGATIVE
CHLORIDE SERPL-SCNC: 100 MMOL/L (ref 97–108)
CO2 SERPL-SCNC: 22 MMOL/L (ref 21–32)
COCAINE UR QL SCN: NEGATIVE
COLOR UR: ABNORMAL
CREAT SERPL-MCNC: 0.81 MG/DL (ref 0.55–1.02)
DIAGNOSIS, 93000: NORMAL
DIFFERENTIAL METHOD BLD: ABNORMAL
DRUG SCRN COMMENT,DRGCM: NORMAL
EOSINOPHIL # BLD: 0.1 K/UL (ref 0–0.4)
EOSINOPHIL NFR BLD: 1 % (ref 0–7)
EPITH CASTS URNS QL MICRO: ABNORMAL /LPF
ERYTHROCYTE [DISTWIDTH] IN BLOOD BY AUTOMATED COUNT: 15.8 % (ref 11.5–14.5)
ETHANOL SERPL-MCNC: <10 MG/DL
GLOBULIN SER CALC-MCNC: 4.9 G/DL (ref 2–4)
GLUCOSE SERPL-MCNC: 133 MG/DL (ref 65–100)
GLUCOSE UR STRIP.AUTO-MCNC: NEGATIVE MG/DL
HCG UR QL: NEGATIVE
HCT VFR BLD AUTO: 39.7 % (ref 35–47)
HGB BLD-MCNC: 12.9 G/DL (ref 11.5–16)
HGB UR QL STRIP: NEGATIVE
IMM GRANULOCYTES # BLD: 0 K/UL (ref 0–0.04)
IMM GRANULOCYTES NFR BLD AUTO: 0 % (ref 0–0.5)
KETONES UR QL STRIP.AUTO: NEGATIVE MG/DL
LEUKOCYTE ESTERASE UR QL STRIP.AUTO: NEGATIVE
LYMPHOCYTES # BLD: 2.1 K/UL (ref 0.8–3.5)
LYMPHOCYTES NFR BLD: 21 % (ref 12–49)
MCH RBC QN AUTO: 27.5 PG (ref 26–34)
MCHC RBC AUTO-ENTMCNC: 32.5 G/DL (ref 30–36.5)
MCV RBC AUTO: 84.6 FL (ref 80–99)
METHADONE UR QL: NEGATIVE
MONOCYTES # BLD: 0.5 K/UL (ref 0–1)
MONOCYTES NFR BLD: 5 % (ref 5–13)
NEUTS SEG # BLD: 7.1 K/UL (ref 1.8–8)
NEUTS SEG NFR BLD: 73 % (ref 32–75)
NITRITE UR QL STRIP.AUTO: NEGATIVE
NRBC # BLD: 0 K/UL (ref 0–0.01)
NRBC BLD-RTO: 0 PER 100 WBC
OPIATES UR QL: NEGATIVE
P-R INTERVAL, ECG05: 142 MS
PCP UR QL: NEGATIVE
PH UR STRIP: 6.5 [PH] (ref 5–8)
PLATELET # BLD AUTO: 358 K/UL (ref 150–400)
PMV BLD AUTO: 9.8 FL (ref 8.9–12.9)
POTASSIUM SERPL-SCNC: 3.7 MMOL/L (ref 3.5–5.1)
PROT SERPL-MCNC: 8.3 G/DL (ref 6.4–8.2)
PROT UR STRIP-MCNC: NEGATIVE MG/DL
Q-T INTERVAL, ECG07: 360 MS
QRS DURATION, ECG06: 78 MS
QTC CALCULATION (BEZET), ECG08: 433 MS
RBC # BLD AUTO: 4.69 M/UL (ref 3.8–5.2)
RBC #/AREA URNS HPF: ABNORMAL /HPF (ref 0–5)
RBC MORPH BLD: ABNORMAL
SALICYLATES SERPL-MCNC: <1.7 MG/DL (ref 2.8–20)
SODIUM SERPL-SCNC: 133 MMOL/L (ref 136–145)
SP GR UR REFRACTOMETRY: 1.02 (ref 1–1.03)
UR CULT HOLD, URHOLD: NORMAL
UROBILINOGEN UR QL STRIP.AUTO: 0.2 EU/DL (ref 0.2–1)
VENTRICULAR RATE, ECG03: 87 BPM
WBC # BLD AUTO: 9.8 K/UL (ref 3.6–11)
WBC URNS QL MICRO: ABNORMAL /HPF (ref 0–4)

## 2018-08-02 PROCEDURE — 80307 DRUG TEST PRSMV CHEM ANLYZR: CPT | Performed by: EMERGENCY MEDICINE

## 2018-08-02 PROCEDURE — 74011250636 HC RX REV CODE- 250/636: Performed by: EMERGENCY MEDICINE

## 2018-08-02 PROCEDURE — 90791 PSYCH DIAGNOSTIC EVALUATION: CPT

## 2018-08-02 PROCEDURE — 81001 URINALYSIS AUTO W/SCOPE: CPT | Performed by: EMERGENCY MEDICINE

## 2018-08-02 PROCEDURE — 74011250637 HC RX REV CODE- 250/637: Performed by: PSYCHIATRY & NEUROLOGY

## 2018-08-02 PROCEDURE — 85025 COMPLETE CBC W/AUTO DIFF WBC: CPT | Performed by: EMERGENCY MEDICINE

## 2018-08-02 PROCEDURE — 74011000250 HC RX REV CODE- 250: Performed by: EMERGENCY MEDICINE

## 2018-08-02 PROCEDURE — 99285 EMERGENCY DEPT VISIT HI MDM: CPT

## 2018-08-02 PROCEDURE — 94762 N-INVAS EAR/PLS OXIMTRY CONT: CPT

## 2018-08-02 PROCEDURE — 93005 ELECTROCARDIOGRAM TRACING: CPT

## 2018-08-02 PROCEDURE — 96374 THER/PROPH/DIAG INJ IV PUSH: CPT

## 2018-08-02 PROCEDURE — 81025 URINE PREGNANCY TEST: CPT

## 2018-08-02 PROCEDURE — 96361 HYDRATE IV INFUSION ADD-ON: CPT

## 2018-08-02 PROCEDURE — 65220000003 HC RM SEMIPRIVATE PSYCH

## 2018-08-02 PROCEDURE — 80053 COMPREHEN METABOLIC PANEL: CPT | Performed by: EMERGENCY MEDICINE

## 2018-08-02 PROCEDURE — 36415 COLL VENOUS BLD VENIPUNCTURE: CPT | Performed by: EMERGENCY MEDICINE

## 2018-08-02 RX ORDER — OLANZAPINE 5 MG/1
5 TABLET ORAL
Status: DISCONTINUED | OUTPATIENT
Start: 2018-08-02 | End: 2018-08-06 | Stop reason: HOSPADM

## 2018-08-02 RX ORDER — LORAZEPAM 1 MG/1
1 TABLET ORAL
Status: DISCONTINUED | OUTPATIENT
Start: 2018-08-02 | End: 2018-08-03

## 2018-08-02 RX ORDER — ZOLPIDEM TARTRATE 10 MG/1
10 TABLET ORAL
Status: DISCONTINUED | OUTPATIENT
Start: 2018-08-02 | End: 2018-08-02 | Stop reason: DRUGHIGH

## 2018-08-02 RX ORDER — ZOLPIDEM TARTRATE 5 MG/1
5 TABLET ORAL
Status: DISCONTINUED | OUTPATIENT
Start: 2018-08-02 | End: 2018-08-06 | Stop reason: HOSPADM

## 2018-08-02 RX ORDER — LORAZEPAM 2 MG/ML
2 INJECTION INTRAMUSCULAR
Status: DISCONTINUED | OUTPATIENT
Start: 2018-08-02 | End: 2018-08-06 | Stop reason: HOSPADM

## 2018-08-02 RX ORDER — ONDANSETRON 2 MG/ML
4 INJECTION INTRAMUSCULAR; INTRAVENOUS
Status: COMPLETED | OUTPATIENT
Start: 2018-08-02 | End: 2018-08-02

## 2018-08-02 RX ORDER — BENZTROPINE MESYLATE 1 MG/1
2 TABLET ORAL
Status: DISCONTINUED | OUTPATIENT
Start: 2018-08-02 | End: 2018-08-06 | Stop reason: HOSPADM

## 2018-08-02 RX ORDER — ADHESIVE BANDAGE
30 BANDAGE TOPICAL DAILY PRN
Status: DISCONTINUED | OUTPATIENT
Start: 2018-08-02 | End: 2018-08-06 | Stop reason: HOSPADM

## 2018-08-02 RX ORDER — ACETAMINOPHEN 325 MG/1
650 TABLET ORAL
Status: DISCONTINUED | OUTPATIENT
Start: 2018-08-02 | End: 2018-08-06 | Stop reason: HOSPADM

## 2018-08-02 RX ORDER — IBUPROFEN 200 MG
1 TABLET ORAL
Status: DISCONTINUED | OUTPATIENT
Start: 2018-08-02 | End: 2018-08-06 | Stop reason: HOSPADM

## 2018-08-02 RX ORDER — BENZTROPINE MESYLATE 1 MG/ML
2 INJECTION INTRAMUSCULAR; INTRAVENOUS
Status: DISCONTINUED | OUTPATIENT
Start: 2018-08-02 | End: 2018-08-06 | Stop reason: HOSPADM

## 2018-08-02 RX ADMIN — SODIUM CHLORIDE 1000 ML: 900 INJECTION, SOLUTION INTRAVENOUS at 12:08

## 2018-08-02 RX ADMIN — ZOLPIDEM TARTRATE 5 MG: 5 TABLET ORAL at 21:22

## 2018-08-02 RX ADMIN — POISON ADSORBENT 50 G: 50 SUSPENSION ORAL at 10:34

## 2018-08-02 RX ADMIN — ONDANSETRON 4 MG: 2 INJECTION INTRAMUSCULAR; INTRAVENOUS at 13:01

## 2018-08-02 RX ADMIN — ACETAMINOPHEN 650 MG: 325 TABLET, FILM COATED ORAL at 21:22

## 2018-08-02 NOTE — IP AVS SNAPSHOT
[address] 
[phone] Patient: Carleen Coats MRN: [id number] :1995 A check mario indicates which time of day the medication should be taken. My Medications START taking these medications Instructions Each Dose to Equal  
 Morning Noon Evening Bedtime  
 hydrOXYzine HCl 50 mg tablet Commonly known as:  ATARAX Your last dose was: Your next dose is: Take 1 Tab by mouth every four (4) hours as needed (anxiety (1st line)) for up to 10 days. Indications: anxiety 50 mg CONTINUE taking these medications Instructions Each Dose to Equal  
 Morning Noon Evening Bedtime FLUoxetine 20 mg capsule Commonly known as:  PROzac Start taking on:  2018 Your last dose was: Your next dose is: Take 1 Cap by mouth daily. Indications: ANXIETY WITH DEPRESSION  
 20 mg Where to Get Your Medications Information on where to get these meds will be given to you by the nurse or doctor. ! Ask your nurse or doctor about these medications FLUoxetine 20 mg capsule  
 hydrOXYzine HCl 50 mg tablet

## 2018-08-02 NOTE — ED PROVIDER NOTES
HPI Comments: 26 y.o. female with past medical history significant for kidney stones, morbid obesity, sleep apnea, and depression who presents from home with chief complaint of drug overdose. Pt states that she \"hasn't been feeling well\" physically and mentally. She has been expeirencing increased urinary frequency, dysuria, lower back pain, and while at work today became nauseaous and diaphoretic. She then began experiencing chest pain and lightheadedness. Once at home ~1 hour ago (0930), she reports that she took a \"handful\" of her Prozac because she \"likes the way they make (her) feel\" and she \"wanted to sleep. \" Pt states that she was started on an unknown anti-depressants 3 months ago and then was switched to Prozac ~1 month ago. She reports a hx of SI but no prior admissions. Pt also reports a hx of kidney stones and pre-DM. There are no other acute medical concerns at this time. Social hx: former tobacco smoker (quit 8 months ago); rare EtOH use; hx of marijuana use PCP: Nuha Smith DO Note written by Lizzy Olguin, as dictated by Nena Mcgrath MD 10:44 AM 
 
The history is provided by the patient. No  was used. Past Medical History:  
Diagnosis Date  Kidney stones  Morbid obesity ([address]  Psychiatric disorder   
 depression  Sleep apnea Does not have CPAP as yet Past Surgical History:  
Procedure Laterality Date  HX UROLOGICAL Stenting x 4 (Starting in 2016, developed severe infection) Family History:  
Problem Relation Age of Onset  Heart Disease Father 55  Hypertension Father  Sleep Apnea Father  No Known Problems Sister  Cancer Maternal Grandmother Brain Cancer  Heart Disease Paternal Grandmother  Cancer Paternal Grandfather Social History Social History  Marital status: SINGLE Spouse name: N/A  
 Number of children: N/A  
 Years of education: N/A Occupational History     
  started in 3/17 Social History Main Topics  Smoking status: Former Smoker Years: 1.00 Quit date: 1/16/2018  Smokeless tobacco: Never Used Comment: 1/4 pack ppd for 6 months  Alcohol use No  
   Comment: rarely  Drug use: No  
   Comment: Has done [facility]  Sexual activity: No  
 
Other Topics Concern  Not on file Social History Narrative ALLERGIES: Aspirin Review of Systems Constitutional: Positive for diaphoresis. Negative for chills and fever. Respiratory: Negative for shortness of breath. Cardiovascular: Positive for chest pain. Gastrointestinal: Positive for nausea. Genitourinary: Positive for dysuria and frequency. Musculoskeletal: Positive for back pain. Neurological: Positive for light-headedness. Psychiatric/Behavioral: Positive for suicidal ideas. All other systems reviewed and are negative. Vitals:  
 08/02/18 1300 08/02/18 1301 08/02/18 1400 08/02/18 1445 BP: 101/54  129/77 139/62 Pulse: 82 77 78 82 Resp: 28 26 18 21 Temp:      
SpO2:  96% (!) 83% 100% Weight:      
Height:      
      
 
Physical Exam  
Constitutional: She is oriented to person, place, and time. She appears well-developed and well-nourished. No distress. obese HENT:  
Head: Normocephalic and atraumatic. Eyes: Conjunctivae and EOM are normal. Pupils are equal, round, and reactive to light. Neck: Normal range of motion. Neck supple. Cardiovascular: Normal rate, regular rhythm, normal heart sounds and intact distal pulses. Exam reveals no friction rub. No murmur heard. Pulmonary/Chest: Effort normal and breath sounds normal. No respiratory distress. She has no wheezes. She has no rales. She exhibits no tenderness. Abdominal: Soft. Bowel sounds are normal. She exhibits no distension. There is no tenderness. There is no rebound and no guarding. Musculoskeletal: Normal range of motion.  She exhibits no edema or tenderness. Neurological: She is alert and oriented to person, place, and time. No cranial nerve deficit. Coordination normal.  
Skin: Skin is warm and dry. She is not diaphoretic. No pallor. Psychiatric: She has a normal mood and affect. Her behavior is normal.  
Nursing note and vitals reviewed. MDM Number of Diagnoses or Management Options Intentional drug overdose, initial encounter [facility]):  
Suicide attempt [facility]):  
Diagnosis management comments: Pt with overdose- ? Amount given dad reports 16 pills missing but pt reports only taking a handful and swallowing all at once. Check for co ingestants. , lft ekg , monitor, charcoal 
 
Other symptoms prior- check ekg, glucose, for uti, monitor could have been anxiety or panic Amount and/or Complexity of Data Reviewed Clinical lab tests: reviewed and ordered Obtain history from someone other than the patient: yes (dad) Independent visualization of images, tracings, or specimens: yes (ekg) Patient Progress Patient progress: stable ED Course Procedures PROGRESS NOTE: 
11:40 AM 
Per dad, the pt had 30 tabs in her bottle that was filled the 6th. She had not yet started taking from this bottle as she was finishing an old bottle. There are now only 14 tablets left in the bottle so the pt probably took 16 pills this morning. ED EKG interpretation: 
Rhythm: normal sinus rhythm; and regular . Rate (approx.): 87; Axis: normal; ST/T wave: normal. 
Note written by Lizzy Leonard, as dictated by Javier Fowler MD 10:38 AM 
 
1:21 PM 
Spoke with poison center. Monitor for 8 hour maximum is their recommendation but I can use clinical judgment . Pt currently with no sx of ingestion 4 hours post ingestion. At this point I will have [facility] see here PROGRESS NOTE: 
1:40 PM 
[facility] is on the way to see the pt. CONSULT NOTE: 
2:59 PM Javier Fowler MD spoke with Es Menes[facility] for [facility].   Discussed available diagnostic tests and clinical findings. She has seen the pt. The pt will be admitted given she has no mental health services and a hx of SI in the past. Pt is agreeable. PROGRESS NOTE: 
3:54 PM 
Re-examined the pt and she still shows no signs of serotonin syndrome. 5:54 PM 
Pt remains asymptomatic. Smiling not tachycardiac no tremor, n/v, diarrhea. Awaiting bed at [facility]. She is medically stable

## 2018-08-02 NOTE — ED NOTES
S/[facility] Poison Control [place]) recommendations as follows: Minimal observation time is 8 hrs. CBC, CMP, Tylenol level, ASA level, UDS, UPT, EKG and monitor for Serotonin Syndrome symptoms.

## 2018-08-02 NOTE — ED NOTES
The patient is resting comfortably  With her family at the bedside. Medicated for mild nausea as directed.

## 2018-08-02 NOTE — IP AVS SNAPSHOT
[address] [facility] [address] [facility] 
[phone] Patient: Dennys Mock MRN: [id number] :1995 About your hospitalization You were admitted on:  2018 You last received care in the:  [address] [place] [address] You were discharged on:  2018 Why you were hospitalized Your primary diagnosis was: Major Depressive Disorder Your diagnoses also included:  Acute Hyponatremia Follow-up Information Follow up With Details Comments Contact Info Nany Pyle DO On 2018 You have a 3:30pm hospital discharge follow-up appointment with your primary care provider. The nurse navigator in this office will help link you to a therapist who accepts your insurance. Caprice Clancysus 569 [address] [facility] 
[phone] [address] [place] [address] to Walk-in Monday through Thursday between 8:30am and 5:00pm or Friday between 8:30am and 2:00pm to enroll in mental health follow-up services if the nurse navigator is unable to link you to a provider. Remember to bring your photo ID and proof of income. [facility] [address] [place] [address] Gus Currie [address] [address] [phone] Your Scheduled Appointments 2018  2:45 PM EDT  
BARIATRICS with MD Maria Elena Tyler. Arturo Perez [address] [place] [address]) [facility]  
[phone] 2018  3:30 PM EDT TRANSITIONAL CARE MANAGEMENT with Nany Pyle DO [address] [facility] ([address] [place] [address])  
 [address] [place] [address] [address] [place] [address]  
[phone] Discharge Orders None A check mario indicates which time of day the medication should be taken. My Medications START taking these medications Instructions Each Dose to Equal  
 Morning Noon Evening Bedtime  
 hydrOXYzine HCl 50 mg tablet Commonly known as:  ATARAX Your last dose was: Your next dose is: Take 1 Tab by mouth every four (4) hours as needed (anxiety (1st line)) for up to 10 days. Indications: anxiety 50 mg CONTINUE taking these medications Instructions Each Dose to Equal  
 Morning Noon Evening Bedtime FLUoxetine 20 mg capsule Commonly known as:  PROzac Start taking on:  2018 Your last dose was: Your next dose is: Take 1 Cap by mouth daily. Indications: ANXIETY WITH DEPRESSION  
 20 mg Where to Get Your Medications Information on where to get these meds will be given to you by the nurse or doctor. ! Ask your nurse or doctor about these medications FLUoxetine 20 mg capsule  
 hydrOXYzine HCl 50 mg tablet Discharge Instructions DISCHARGE SUMMARY 
 
NAME:Karson Stokes : 1995 MRN: [id number] The patient Daneil Ormond exhibits the ability to control behavior in a less restrictive environment. Patient's level of functioning is improving. No assaultive/destructive behavior has been observed for the past 24 hours. No suicidal/homicidal threat or behavior has been observed for the past 24 hours. There is no evidence of serious medication side effects. Patient has not been in physical or protective restraints for at least the past 24 hours. If weapons involved, how are they secured? No weapons involved. Is patient aware of and in agreement with discharge plan? Yes Arrangements for medication:  Prescriptions filled for patient. Referral for substance abuse treatment? Patient is not using substances/Not applicable. Referral for smoking cessation needed? Patient is not a smoker/Not applicable. Copy of discharge instructions to provider?:  Dr. Veronica Back Arrangements for transportation home:  Parents to . Keep all follow up appointments as scheduled, continue to take prescribed medications per physician instructions. Mental health crisis number:  061 or your local mental health crisis line number at [phone]. DISCHARGE SUMMARY from Nurse PATIENT INSTRUCTIONS: 
 
What to do at Home: 
Recommended activity: Activity as tolerated. If you experience any of the following symptoms:  Overwhelming anxiety or depression, thoughts of hurting yourself or others, please follow up with 911 or your local mental health crisis line number at [phone]. *  Please give a list of your current medications to your Primary Care Provider. *  Please update this list whenever your medications are discontinued, doses are 
    changed, or new medications (including over-the-counter products) are added. *  Please carry medication information at all times in case of emergency situations. These are general instructions for a healthy lifestyle: No smoking/ No tobacco products/ Avoid exposure to second hand smoke Surgeon General's Warning:  Quitting smoking now greatly reduces serious risk to your health. Obesity, smoking, and sedentary lifestyle greatly increases your risk for illness A healthy diet, regular physical exercise & weight monitoring are important for maintaining a healthy lifestyle You may be retaining fluid if you have a history of heart failure or if you experience any of the following symptoms:  Weight gain of 3 pounds or more overnight or 5 pounds in a week, increased swelling in our hands or feet or shortness of breath while lying flat in bed. Please call your doctor as soon as you notice any of these symptoms; do not wait until your next office visit. Recognize signs and symptoms of STROKE: 
 
F-face looks uneven A-arms unable to move or move unevenly S-speech slurred or non-existent T-time-call 911 as soon as signs and symptoms begin-DO NOT go  
 Back to bed or wait to see if you get better-TIME IS BRAIN. Warning Signs of HEART ATTACK Call 911 if you have these symptoms: 
? Chest discomfort. Most heart attacks involve discomfort in the center of the chest that lasts more than a few minutes, or that goes away and comes back. It can feel like uncomfortable pressure, squeezing, fullness, or pain. ? Discomfort in other areas of the upper body. Symptoms can include pain or discomfort in one or both arms, the back, neck, jaw, or stomach. ? Shortness of breath with or without chest discomfort. ? Other signs may include breaking out in a cold sweat, nausea, or lightheadedness. Don't wait more than five minutes to call [address]! Fast action can save your life. Calling 911 is almost always the fastest way to get lifesaving treatment. Emergency Medical Services staff can begin treatment when they arrive  up to an hour sooner than if someone gets to the hospital by car. The discharge information has been reviewed with the patient. The patient verbalized understanding. Discharge medications reviewed with the patient and appropriate educational materials and side effects teaching were provided. ___________________________________________________________________________________________________________________________________ [employer] Announcement We are excited to announce that we are making your provider's discharge notes available to you in [employer]. You will see these notes when they are completed and signed by the physician that discharged you from your recent hospital stay. If you have any questions or concerns about any information you see in [employer], please call the Health Information Department where you were seen or reach out to your Primary Care Provider for more information about your plan of care. Introducing [facility] SERVICES!    
 Robert Carreon introduces [employer] patient portal. Now you can access parts of your medical record, email your doctor's office, and request medication refills online. 1. In your internet browser, go to [email or web address] 2. Click on the First Time User? Click Here link in the Sign In box. You will see the New Member Sign Up page. 3. Enter your [email or web address] Access Code exactly as it appears below. You will not need to use this code after youve completed the sign-up process. If you do not sign up before the expiration date, you must request a new code. · [email or web address] Access Code: [email or web address] Expires: 8/12/2018  2:32 PM 
 
4. Enter the last four digits of your Social Security Number (xxxx) and Date of Birth (mm/dd/yyyy) as indicated and click Submit. You will be taken to the next sign-up page. 5. Create a [email or web address] ID. This will be your [email or web address] login ID and cannot be changed, so think of one that is secure and easy to remember. 6. Create a [email or web address] password. You can change your password at any time. 7. Enter your Password Reset Question and Answer. This can be used at a later time if you forget your password. 8. Enter your e-mail address. You will receive e-mail notification when new information is available in [address] 9. Click Sign Up. You can now view and download portions of your medical record. 10. Click the Download Summary menu link to download a portable copy of your medical information. If you have questions, please visit the Frequently Asked Questions section of the [email or web address] website. Remember, [email or web address] is NOT to be used for urgent needs. For medical emergencies, dial 911. Now available from your iPhone and Android! Introducing Mayco [employer] As a [employer] patient, I wanted to make you aware of our electronic visit tool called aMyco Joshua. [employer] 24/7 allows you to connect within minutes with a medical provider 24 hours a day, seven days a week via a mobile device or tablet or logging into a secure website from your computer. You can access [employer] from anywhere in the [place]. A virtual visit might be right for you when you have a simple condition and feel like you just dont want to get out of bed, or cant get away from work for an appointment, when your regular [facility] provider is not available (evenings, weekends or holidays), or when youre out of town and need minor care. Electronic visits cost only $49 and if the [employer] provider determines a prescription is needed to treat your condition, one can be electronically transmitted to a nearby pharmacy*. Please take a moment to enroll today if you have not already done so. The enrollment process is free and takes just a few minutes. To enroll, please download the [employer] marline to your tablet or phone, or visit [email or web address] to enroll on your computer. And, as an [address] [place] [address] patient with a [employer] account, the results of your visits will be scanned into your electronic medical record and your primary care provider will be able to view the scanned results. We urge you to continue to see your regular [facility] provider for your ongoing medical care. And while your primary care provider may not be the one available when you seek a [employer] virtual visit, the peace of mind you get from getting a real diagnosis real time can be priceless. For more information on [employer], view our Frequently Asked Questions (FAQs) at [email or web address]. Sincerely, 
 
Delicia Irving MD 
Chief Medical Officer León Santos *:  certain medications cannot be prescribed via [employer] Providers Seen During Your Hospitalization Provider Specialty Primary office phone Amari Mckenna MD Psychiatry [phone] Josue Elliott MD Psychiatry [phone] Your Primary Care Physician (PCP) Primary Care Physician Office Phone Office Fax Bowen Paget [phone] You are allergic to the following Allergen Reactions Aspirin Itching Recent Documentation Breastfeeding? OB Status Smoking Status No Unknown Former Smoker Emergency Contacts Name Discharge Info Relation Home Work Mobile Aviva Marie DISCHARGE CAREGIVER [3] Parent [1] [phone] [address] [place] [address],[address] CAREGIVER [3] Father [15] [phone] Juan Green  Parent [1] [phone] Patient Belongings The following personal items are in your possession at time of discharge: 
  Dental Appliances: None  Visual Aid: None      Home Medications: None   Jewelry: None  Clothing: Shirt, Pants    Other Valuables: Personal toiletries (Tyrell)  Personal Items Sent to Safe: None Please provide this summary of care documentation to your next provider. Signatures-by signing, you are acknowledging that this After Visit Summary has been reviewed with you and you have received a copy. Patient Signature:  ____________________________________________________________ Date:  ____________________________________________________________  
  
Pablo Robbins Provider Signature:  ____________________________________________________________ Date:  ____________________________________________________________

## 2018-08-02 NOTE — ED TRIAGE NOTES
\"I'm having chest pains (center, substernal) and my throat burns. I've been having really bad pains in my sides and I'm going pee a lot and sweating really bad. \" Pt reports hx of GERD and reports burning with urination. Patient now states she took a \"handfull of antidepressants\" about an hour ago. She says she is prescribed the medication but cannot recall the name. She [facility]. She denies trying to take her life, states she \"likes the way they make me feel\" and she took them \"to sleep. \"
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
## 2018-08-02 NOTE — ED NOTES
Patient ambulated to restroom with Jean Blakely EMT as escort to ensure safety. Ambulates without difficulty.

## 2018-08-02 NOTE — ED NOTES
The patient is sitting in bed resting comfortably. The patient states she has no desire to harm herself at this time. C/O mold nausea, Dr. Martha Estrada notified.

## 2018-08-03 PROBLEM — E87.1 ACUTE HYPONATREMIA: Status: RESOLVED | Noted: 2018-08-03 | Resolved: 2018-08-03

## 2018-08-03 PROBLEM — E87.1 ACUTE HYPONATREMIA: Status: ACTIVE | Noted: 2018-08-03

## 2018-08-03 LAB
ANION GAP SERPL CALC-SCNC: 6 MMOL/L (ref 5–15)
BUN SERPL-MCNC: 6 MG/DL (ref 6–20)
BUN/CREAT SERPL: 9 (ref 12–20)
CALCIUM SERPL-MCNC: 9 MG/DL (ref 8.5–10.1)
CHLORIDE SERPL-SCNC: 105 MMOL/L (ref 97–108)
CHOLEST SERPL-MCNC: 120 MG/DL
CO2 SERPL-SCNC: 25 MMOL/L (ref 21–32)
CREAT SERPL-MCNC: 0.66 MG/DL (ref 0.55–1.02)
GLUCOSE P FAST SERPL-MCNC: 85 MG/DL (ref 65–100)
GLUCOSE SERPL-MCNC: 85 MG/DL (ref 65–100)
HDLC SERPL-MCNC: 42 MG/DL
HDLC SERPL: 2.9 {RATIO} (ref 0–5)
LDLC SERPL CALC-MCNC: 57.6 MG/DL (ref 0–100)
LIPID PROFILE,FLP: NORMAL
POTASSIUM SERPL-SCNC: 3.8 MMOL/L (ref 3.5–5.1)
SODIUM SERPL-SCNC: 136 MMOL/L (ref 136–145)
TRIGL SERPL-MCNC: 102 MG/DL (ref ?–150)
TSH SERPL DL<=0.05 MIU/L-ACNC: 0.89 UIU/ML (ref 0.36–3.74)
VLDLC SERPL CALC-MCNC: 20.4 MG/DL

## 2018-08-03 PROCEDURE — 74011250637 HC RX REV CODE- 250/637: Performed by: PSYCHIATRY & NEUROLOGY

## 2018-08-03 PROCEDURE — 65220000003 HC RM SEMIPRIVATE PSYCH

## 2018-08-03 PROCEDURE — 80048 BASIC METABOLIC PNL TOTAL CA: CPT | Performed by: FAMILY MEDICINE

## 2018-08-03 PROCEDURE — 80061 LIPID PANEL: CPT | Performed by: PSYCHIATRY & NEUROLOGY

## 2018-08-03 PROCEDURE — 82947 ASSAY GLUCOSE BLOOD QUANT: CPT | Performed by: PSYCHIATRY & NEUROLOGY

## 2018-08-03 PROCEDURE — 84443 ASSAY THYROID STIM HORMONE: CPT | Performed by: PSYCHIATRY & NEUROLOGY

## 2018-08-03 PROCEDURE — 36415 COLL VENOUS BLD VENIPUNCTURE: CPT | Performed by: PSYCHIATRY & NEUROLOGY

## 2018-08-03 RX ORDER — FLUOXETINE HYDROCHLORIDE 20 MG/1
20 CAPSULE ORAL DAILY
Status: DISCONTINUED | OUTPATIENT
Start: 2018-08-04 | End: 2018-08-06 | Stop reason: HOSPADM

## 2018-08-03 RX ORDER — CHLORTHALIDONE 25 MG/1
12.5 TABLET ORAL DAILY
Status: DISCONTINUED | OUTPATIENT
Start: 2018-08-04 | End: 2018-08-03

## 2018-08-03 RX ORDER — HYDROXYZINE 50 MG/1
50 TABLET, FILM COATED ORAL
Status: DISCONTINUED | OUTPATIENT
Start: 2018-08-03 | End: 2018-08-06 | Stop reason: HOSPADM

## 2018-08-03 RX ORDER — LORAZEPAM 0.5 MG/1
0.5 TABLET ORAL
Status: DISCONTINUED | OUTPATIENT
Start: 2018-08-03 | End: 2018-08-06 | Stop reason: HOSPADM

## 2018-08-03 RX ADMIN — ZOLPIDEM TARTRATE 5 MG: 5 TABLET ORAL at 21:16

## 2018-08-03 RX ADMIN — ACETAMINOPHEN 650 MG: 325 TABLET, FILM COATED ORAL at 17:46

## 2018-08-03 RX ADMIN — ACETAMINOPHEN 650 MG: 325 TABLET, FILM COATED ORAL at 21:16

## 2018-08-03 RX ADMIN — ACETAMINOPHEN 650 MG: 325 TABLET, FILM COATED ORAL at 14:17

## 2018-08-03 NOTE — PROGRESS NOTES
Problem: Depressed Mood (Adult/Pediatric)  Goal: *STG: Participates in treatment plan  Outcome: Progressing Towards Goal  Pt oriented to unit, cooperative with skin assessment, pleasant and cooperative
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
## 2018-08-03 NOTE — PROGRESS NOTES
Problem: Depressed Mood (Adult/Pediatric)    These goals will be met by 8/6/18. Goal: *STG: Participates in treatment plan  Outcome: Progressing Towards Goal  Pt is passively engaged. Pt will interact, but does not initiate interaction. Pt's affect is sad and mood is lonely and rejected. Goal: *STG: Verbalizes anger, guilt, and other feelings in a constructive manor  Outcome: Progressing Towards Goal  Pt verbalized reservations to seeking therapy and talking about events of her past. Pt was accepting of therapy to learn coping skills. Goal: *STG: Attends activities and groups  Outcome: Progressing Towards Goal  Pt has been attending groups and participating, but requires prompting. Goal: *STG: Demonstrates reduction in symptoms and increase in insight into coping skills/future focused  Outcome: Progressing Towards Goal  Pt stated her willingness to participate in therapy after discharge and her need to acquire better coping skills. Goal: Interventions  Outcome: Progressing Towards Goal  Talked with pt about coping skills and medication education. [address] Treatment Plan for Kyaw Arevalo Treatment Plan Initiated: 8/3/18    Treatment Plan Modalities:  Type of Modality Amount  (x minutes) Frequency (x/week) Duration (x days) Name of Responsible Staff   Community & wrap-up meetings to encourage peer interactions 15 7 1   Bartolo ADAM    Group psychotherapy to assist in building coping skills and internal controls 60 7 1 Yannick Anthony   Therapeutic activity groups to build coping skills 60 7 1 Yannick Anthony   Psychoeducation in group setting to address:   Medication education   15 7 1 [id number]  [facility] skills         Relaxation techniques         Symptom management         Discharge planning   [facility] 298   60 1 1 volunteer   Recovery/AA/NA      volunteer   Physician medication management   15 7 1 Dr. Nga Ariza meeting/discharge planning   15 2 1 Lillian Vidal and Domenic Chan

## 2018-08-03 NOTE — BH NOTES
GROUP THERAPY PROGRESS NOTE Dennys Mock is participating in Reflections Group time: 30 minutes Personal goal for participation: daily progress Goal orientation: social 
 
Group therapy participation: minimal 
 
Therapeutic interventions reviewed and discussed:  
 
Impression of participation: In that the patient had just arrived on the unit, she did not have much input during the session.

## 2018-08-03 NOTE — BH NOTES
PSYCHOSOCIAL ASSESSMENT  :Patient identifying info:  Idania Brumfield is a 26 y.o., female admitted 8/2/2018  8:07 PM     Presenting problem and precipitating factors:  Patient was transferred to [address] [place] [address] from [facility] ED after arriving c/o chest pain, flank pain, urinary pain, and taking a \"handful\" of Prozac. Pt denied an attempt to take her life, stating she took the medication because \"I like the way it makes me feel. \"  Pt reported that she became distressed after a conversation with her aunt, didn't have her father who immediately speak to, and took the handful of medications because she believed it would make her feel better and help her sleep. Pt reported a history of suicide attempts via overdose on Motrin 2-3 months ago. Pt has never been psychiatrically hospitalized, nor seen a psychiatrist or therapist on the outpatient. Mental status assessment:  Shy, calm, cooperative, immature    Current psychiatric providers and contact info: Dr. Krysta Rojo (PCP)    Previous psychiatric services/providers and response to treatment: Prescribed antidepressants by PCP    Family history of mental illness:  Father suffers from depression    Substance abuse history:  None  Social History   Substance Use Topics    Smoking status: Former Smoker     Years: 1.00     Quit date: 1/16/2018    Smokeless tobacco: Never Used      Comment: 1/4 pack ppd for 6 months    Alcohol use No      Comment: rarely       Family constellation: Mom, Dad, sister    Is significant other involved? No      Describe support system: Family    Describe living arrangements and home environment:  Lives with family. Health issues:   Hospital Problems  Date Reviewed: 8/3/2018          Codes Class Noted POA    Acute hyponatremia ICD-10-CM: E87.1  ICD-9-CM: 276.1  8/3/2018 Unknown        * (Principal)Major depressive disorder ICD-10-CM: F32.9  ICD-9-CM: 296.20  8/2/2018 Unknown              Trauma history: None indicated.     Legal issues: None indicated. History of  service: No    Financial status: Income from employment    Congregation/cultural factors: None indicated    Education/work history: Currently employed as a  at a nursing home    Have you been licensed as a tim care professional (current or ): No  Leisure and recreation preferences:  Working, spending time with family  Describe coping skills: Limited and poor judgement    Vu President  8/3/2018

## 2018-08-03 NOTE — PROGRESS NOTES
Patient admitted voluntarily to [address] [place] [address] Psychiatry, under the services of . Patient currently admits suicidal ideation. Patient currently denies homicidal ideation. Patient verbally contracts for safety. Patient denies psychotic symptoms. Pt admits to ETOH use, on a social basis. .  Pt denies drug use.

## 2018-08-03 NOTE — BH NOTES
GROUP THERAPY PROGRESS NOTE    Josh Dorsey did not participate in a 65 minute Process Group on the General Unit with a focus identifying feelings, planning for the day, and reviewing DBT coping skills related to distress management.

## 2018-08-03 NOTE — PROGRESS NOTES
Problem: Falls - Risk of  Goal: *Absence of Falls  Document Shabbir Fall Risk and appropriate interventions in the flowsheet. Outcome: Progressing Towards Goal  Fall Risk Interventions:        received pt in bed resting, appeared to be sleeping. Pt remains on q15 min safety checks.  Will continue to monitor and maintain safety    Medication Interventions: Teach patient to arise slowly

## 2018-08-03 NOTE — BH NOTES
TRANSFER - IN REPORT:    Verbal report received from Mario Means on [employer]  being received from [facility](unit) for routine progression of care      Report consisted of patients Situation, Background, Assessment and   Recommendations(SBAR). Information from the following report(s) SBAR, Kardex and MAR was reviewed with the receiving nurse. Opportunity for questions and clarification was provided. Assessment completed upon patients arrival to unit and care assumed.

## 2018-08-03 NOTE — PROGRESS NOTES
Problem: Discharge Planning  Goal: *Discharge to safe environment  Outcome: Progressing Towards Goal  Patient identifies home as safe place to discharge. Goal: *Knowledge of medication management  Outcome: Progressing Towards Goal  Patient receptive to education regarding medication regimen. Goal: *Knowledge of discharge instructions  Outcome: Progressing Towards Goal  Verbalized understanding of goals for discharge.

## 2018-08-03 NOTE — H&P
INITIAL PSYCHIATRIC EVALUATION            IDENTIFICATION:    Patient Name  Didi Calvo   Date of Birth 1995   [facility] [id number]   Medical Record Number  [id number]      Age  26 y.o. PCP Kary Ramírez DO   Admit date:  8/2/2018    Room Number  727/01  @ [facility]   Date of Service  8/3/2018            HISTORY         REASON FOR HOSPITALIZATION:  CC: \"OD\". Pt admitted under a voluntary basis for severe depression with suicidal ideations  proving to be an imminent danger to self and others and an inability to care for self. HISTORY OF PRESENT ILLNESS:    The patient, Didi Calvo, is a 26 y.o. BLACK OR  female with a past psychiatric history significant for depression , who presents at this time with complaints of (and/or evidence of) the following emotional symptoms: depression. Additional symptomatology include anxiety. The above symptoms have been present for years. These symptoms are of severe severity. These symptoms are constant  in nature. The patient's condition has been precipitated by social stressors and psychosocial stressors (poor coping skills  ). Patient's condition made worse by declining psychotherapist . UDS: negative; BAL=0. ALLERGIES:   Allergies   Allergen Reactions    Aspirin Itching      MEDICATIONS PRIOR TO ADMISSION:   Prescriptions Prior to Admission   Medication Sig    FLUoxetine (PROZAC) 20 mg capsule Take 1 Cap by mouth daily.  predniSONE (STERAPRED) 5 mg dose pack Take as directed with food x 6 days. See administration instruction per 5mg dose pack    chlorthalidone (HYGROTEN) 25 mg tablet Take 0.5 Tabs by mouth daily.  potassium chloride (KLOR-CON) 10 mEq tablet Take 1 Tab by mouth daily.       PAST MEDICAL HISTORY:   Past Medical History:   Diagnosis Date    Depression     Kidney stones     Mood disorder ([address]     Morbid obesity ([facility] [place] [address]     Psychiatric disorder     depression    Sleep apnea     Does not have CPAP as yet    Suicidal thoughts      Past Surgical History:   Procedure Laterality Date    HX UROLOGICAL      Stenting x 4 (Starting in 2016, developed severe infection)      SOCIAL HISTORY:    Social History     Social History    Marital status: SINGLE     Spouse name: N/A    Number of children: N/A    Years of education: N/A     Occupational History          started in 3/17     Social History Main Topics    Smoking status: Former Smoker     Years: 1.00     Quit date: 1/16/2018    Smokeless tobacco: Never Used      Comment: 1/4 pack ppd for 6 months    Alcohol use No      Comment: rarely    Drug use: No      Comment: Has done [employer] Sexual activity: No     Other Topics Concern    Not on file     Social History Narrative    26year old AA female admitted for reportedly taking a handful of prozac. Pt has a past suicide attempt and this is her first psychiatriatric admission. Pt lives with her parents. She is a  . She has a 12th grade education. FAMILY HISTORY:    Family History   Problem Relation Age of Onset    Heart Disease Father 55    Hypertension Father     Sleep Apnea Father     No Known Problems Sister     Cancer Maternal Grandmother      Brain Cancer    Heart Disease Paternal Grandmother     Cancer Paternal Grandfather        REVIEW OF SYSTEMS:   Psychological ROS: positive for - behavioral disorder  Respiratory ROS: no cough, shortness of breath, or wheezing  Cardiovascular ROS: no chest pain or dyspnea on exertion  Pertinent items are noted in the History of Present Illness. All other Systems reviewed and are considered negative.            MENTAL STATUS EXAM & VITALS     MENTAL STATUS EXAM (MSE):    MSE FINDINGS ARE WITHIN NORMAL LIMITS (WNL) UNLESS OTHERWISE STATED BELOW. ( ALL OF THE BELOW CATEGORIES OF THE MSE HAVE BEEN REVIEWED (reviewed 8/3/2018) AND UPDATED AS DEEMED APPROPRIATE )  General Presentation age appropriate, cooperative   Orientation oriented to time, place and person   Vital Signs  See below (reviewed 8/3/2018); Vital Signs (BP, Pulse, & Temp) are within normal limits if not listed below.    Gait and Station Stable/steady, no ataxia   Musculoskeletal System No extrapyramidal symptoms (EPS); no abnormal muscular movements or Tardive Dyskinesia (TD); muscle strength and tone are within normal limits   Language No aphasia or dysarthria   Speech:  monotone   Thought Processes logical; normal rate of thoughts; poor abstraction   Thought Associations goal directed   Thought Content free of delusions   Suicidal Ideations none   Homicidal Ideations none   Mood:  sad   Affect:  mood-congruent   Memory recent  fair   Memory remote:  fair   Concentration/Attention:  hypervigilance   Fund of Knowledge below average   Insight:  limited   Reliability poor   Judgment:  limited          VITALS:     Patient Vitals for the past 24 hrs:   Temp Pulse Resp BP SpO2   08/03/18 0803 98 °F (36.7 °C) 87 16 125/83 100 %   08/02/18 2015 98.4 °F (36.9 °C) 76 18 128/73 98 %     Wt Readings from Last 3 Encounters:   08/02/18 139.7 kg (308 lb)   07/06/18 135.6 kg (299 lb)   06/25/18 138 kg (304 lb 3.2 oz)     Temp Readings from Last 3 Encounters:   08/03/18 98 °F (36.7 °C)   08/02/18 98.2 °F (36.8 °C)   07/06/18 97.7 °F (36.5 °C) (Oral)     BP Readings from Last 3 Encounters:   08/03/18 125/83   08/02/18 154/71   07/06/18 123/82     Pulse Readings from Last 3 Encounters:   08/03/18 87   08/02/18 78   07/06/18 87            DATA     LABORATORY DATA:  Labs Reviewed   METABOLIC PANEL, BASIC - Abnormal; Notable for the following:        Result Value    BUN/Creatinine ratio 9 (*)     All other components within normal limits   GLUCOSE, FASTING   TSH 3RD GENERATION   LIPID PANEL     Admission on 08/02/2018   Component Date Value Ref Range Status    Glucose 08/03/2018 85  65 - 100 MG/DL Final    TSH 08/03/2018 0.89  0.36 - 3.74 uIU/mL Final    LIPID PROFILE 08/03/2018        Final    Cholesterol, total 08/03/2018 120  <200 MG/DL Final    Triglyceride 08/03/2018 102  <150 MG/DL Final    HDL Cholesterol 08/03/2018 42  MG/DL Final    LDL, calculated 08/03/2018 57.6  0 - 100 MG/DL Final    VLDL, calculated 08/03/2018 20.4  MG/DL Final    CHOL/HDL Ratio 08/03/2018 2.9  0 - 5.0   Final    Sodium 08/03/2018 136  136 - 145 mmol/L Final    Potassium 08/03/2018 3.8  3.5 - 5.1 mmol/L Final    Chloride 08/03/2018 105  97 - 108 mmol/L Final    CO2 08/03/2018 25  21 - 32 mmol/L Final    Anion gap 08/03/2018 6  5 - 15 mmol/L Final    Glucose 08/03/2018 85  65 - 100 mg/dL Final    BUN 08/03/2018 6  6 - 20 MG/DL Final    Creatinine 08/03/2018 0.66  0.55 - 1.02 MG/DL Final    BUN/Creatinine ratio 08/03/2018 9* 12 - 20   Final    GFR est AA 08/03/2018 >60  >60 ml/min/1.73m2 Final    GFR est non-AA 08/03/2018 >60  >60 ml/min/1.73m2 Final    Calcium 08/03/2018 9.0  8.5 - 10.1 MG/DL Final   Admission on 08/02/2018, Discharged on 08/02/2018   Component Date Value Ref Range Status    WBC 08/02/2018 9.8  3.6 - 11.0 K/uL Final    RBC 08/02/2018 4.69  3.80 - 5.20 M/uL Final    HGB 08/02/2018 12.9  11.5 - 16.0 g/dL Final    HCT 08/02/2018 39.7  35.0 - 47.0 % Final    MCV 08/02/2018 84.6  80.0 - 99.0 FL Final    MCH 08/02/2018 27.5  26.0 - 34.0 PG Final    MCHC 08/02/2018 32.5  30.0 - 36.5 g/dL Final    RDW 08/02/2018 15.8* 11.5 - 14.5 % Final    PLATELET 96/94/4395 955  150 - 400 K/uL Final    MPV 08/02/2018 9.8  8.9 - 12.9 FL Final    NRBC 08/02/2018 0.0  0  WBC Final    ABSOLUTE NRBC 08/02/2018 0.00  0.00 - 0.01 K/uL Final    NEUTROPHILS 08/02/2018 73  32 - 75 % Final    LYMPHOCYTES 08/02/2018 21  12 - 49 % Final    MONOCYTES 08/02/2018 5  5 - 13 % Final    EOSINOPHILS 08/02/2018 1  0 - 7 % Final    BASOPHILS 08/02/2018 0  0 - 1 % Final    IMMATURE GRANULOCYTES 08/02/2018 0  0.0 - 0.5 % Final    ABS. NEUTROPHILS 08/02/2018 7.1  1.8 - 8.0 K/UL Final    ABS.  LYMPHOCYTES 08/02/2018 2.1  0.8 - 3.5 K/UL Final    ABS. MONOCYTES 08/02/2018 0.5  0.0 - 1.0 K/UL Final    ABS. EOSINOPHILS 08/02/2018 0.1  0.0 - 0.4 K/UL Final    ABS. BASOPHILS 08/02/2018 0.0  0.0 - 0.1 K/UL Final    ABS. IMM. GRANS. 08/02/2018 0.0  0.00 - 0.04 K/UL Final    DF 08/02/2018 SMEAR SCANNED    Final    RBC COMMENTS 08/02/2018 NORMOCYTIC, NORMOCHROMIC    Final    Sodium 08/02/2018 133* 136 - 145 mmol/L Final    Potassium 08/02/2018 3.7  3.5 - 5.1 mmol/L Final    Chloride 08/02/2018 100  97 - 108 mmol/L Final    CO2 08/02/2018 22  21 - 32 mmol/L Final    Anion gap 08/02/2018 11  5 - 15 mmol/L Final    Glucose 08/02/2018 133* 65 - 100 mg/dL Final    BUN 08/02/2018 6  6 - 20 MG/DL Final    Creatinine 08/02/2018 0.81  0.55 - 1.02 MG/DL Final    BUN/Creatinine ratio 08/02/2018 7* 12 - 20   Final    GFR est AA 08/02/2018 >60  >60 ml/min/1.73m2 Final    GFR est non-AA 08/02/2018 >60  >60 ml/min/1.73m2 Final    Calcium 08/02/2018 9.0  8.5 - 10.1 MG/DL Final    Bilirubin, total 08/02/2018 0.3  0.2 - 1.0 MG/DL Final    ALT (SGPT) 08/02/2018 15  12 - 78 U/L Final    AST (SGOT) 08/02/2018 13* 15 - 37 U/L Final    Alk.  phosphatase 08/02/2018 115  45 - 117 U/L Final    Protein, total 08/02/2018 8.3* 6.4 - 8.2 g/dL Final    Albumin 08/02/2018 3.4* 3.5 - 5.0 g/dL Final    Globulin 08/02/2018 4.9* 2.0 - 4.0 g/dL Final    A-G Ratio 08/02/2018 0.7* 1.1 - 2.2   Final    Salicylate level 05/45/5874 <1.7* 2.8 - 20.0 MG/DL Final    Acetaminophen level 08/02/2018 <2* 10 - 30 ug/mL Final    ALCOHOL(ETHYL),SERUM 08/02/2018 <10  <10 MG/DL Final    AMPHETAMINES 08/02/2018 NEGATIVE   NEG   Final    BARBITURATES 08/02/2018 NEGATIVE   NEG   Final    BENZODIAZEPINES 08/02/2018 NEGATIVE   NEG   Final    COCAINE 08/02/2018 NEGATIVE   NEG   Final    METHADONE 08/02/2018 NEGATIVE   NEG   Final    OPIATES 08/02/2018 NEGATIVE   NEG   Final    PCP(PHENCYCLIDINE) 08/02/2018 NEGATIVE   NEG   Final    THC (TH-CANNABINOL) 08/02/2018 NEGATIVE   NEG   Final    Drug screen comment 08/02/2018 (NOTE)   Final    Color 08/02/2018 YELLOW/STRAW    Final    Appearance 08/02/2018 CLOUDY* CLEAR   Final    Specific gravity 08/02/2018 1.016  1.003 - 1.030   Final    pH (UA) 08/02/2018 6.5  5.0 - 8.0   Final    Protein 08/02/2018 NEGATIVE   NEG mg/dL Final    Glucose 08/02/2018 NEGATIVE   NEG mg/dL Final    Ketone 08/02/2018 NEGATIVE   NEG mg/dL Final    Bilirubin 08/02/2018 NEGATIVE   NEG   Final    Blood 08/02/2018 NEGATIVE   NEG   Final    Urobilinogen 08/02/2018 0.2  0.2 - 1.0 EU/dL Final    Nitrites 08/02/2018 NEGATIVE   NEG   Final    Leukocyte Esterase 08/02/2018 NEGATIVE   NEG   Final    WBC 08/02/2018 5-10  0 - 4 /hpf Final    RBC 08/02/2018 5-10  0 - 5 /hpf Final    Epithelial cells 08/02/2018 FEW  FEW /lpf Final    Bacteria 08/02/2018 1+* NEG /hpf Final    Urine culture hold 08/02/2018 URINE ON HOLD IN MICROBIOLOGY DEPT FOR 3 DAYS. IF UNPRESERVED URINE IS SUBMITTED, IT CANNOT BE USED FOR ADDITIONAL TESTING AFTER 24 HRS, RECOLLECTION WILL BE REQUIRED. Final    Pregnancy test,urine (POC) 08/02/2018 NEGATIVE   NEG   Final    Ventricular Rate 08/02/2018 87  BPM Final    Atrial Rate 08/02/2018 87  BPM Final    P-R Interval 08/02/2018 142  ms Final    QRS Duration 08/02/2018 78  ms Final    Q-T Interval 08/02/2018 360  ms Final    QTC Calculation (Bezet) 08/02/2018 433  ms Final    Calculated P Axis 08/02/2018 32  degrees Final    Calculated R Axis 08/02/2018 21  degrees Final    Calculated T Axis 08/02/2018 20  degrees Final    Diagnosis 08/02/2018    Final                    Value:Normal sinus rhythm  Normal ECG  Confirmed by Mitch Coffey MD., Puja Lara ([id number]) on 8/2/2018 8:48:58 PM          RADIOLOGY REPORTS:    Results from Hospital Encounter encounter on 11/09/17   XR ABD FLAT/ ERECT   Narrative EXAM:  XR ABD FLAT/ ERECT    INDICATION:   abdominal pain, ? Constipation    COMPARISON: None.     FINDINGS: Supine and upright views of the abdomen demonstrate a normal gas  pattern. There is no free intraperitoneal air. No soft tissue masses or  pathologic calcifications are seen. The bones and soft tissues are within normal  limits. Impression IMPRESSION: Normal abdomen. Ct Abd Wo Cont    Result Date: 6/25/2018  EXAM:  CT ABD WO CONT INDICATION:  Dysuria. Evaluation for kidney stones COMPARISON: None. CONTRAST:  None. TECHNIQUE: Unenhanced multislice helical CT was performed from the diaphragm to the pelvic inlet without oral or intravenous contrast administration. Contiguous 5 mm axial images were reconstructed and lung and soft tissue windows were generated. Coronal and sagittal reformations were generated. CT dose reduction was achieved through use of a standardized protocol tailored for this examination and automatic exposure control for dose modulation. FINDINGS: The visualized portions of the lung bases are clear. The absence of intravenous contrast material reduces the sensitivity for evaluation of the solid parenchymal organs of the abdomen. No focal abnormalities are seen within the liver, spleen, pancreas or adrenal glands or kidneys. No renal or ureteral calculus or obstruction is identified. The aorta tapers without aneurysm. There is no retroperitoneal adenopathy or mass. The bowel is not obstructed. There is no ascites or free intraperitoneal air. At T11-T12, there is disc desiccation with ossification of the PLL (sagittal image 96). IMPRESSION: No renal stone seen.               MEDICATIONS       ALL MEDICATIONS  Current Facility-Administered Medications   Medication Dose Route Frequency    ziprasidone (GEODON) 20 mg in sterile water (preservative free) 1 mL injection  20 mg IntraMUSCular BID PRN    OLANZapine (ZyPREXA) tablet 5 mg  5 mg Oral Q6H PRN    benztropine (COGENTIN) tablet 2 mg  2 mg Oral BID PRN    benztropine (COGENTIN) injection 2 mg  2 mg IntraMUSCular BID PRN    LORazepam (ATIVAN) injection 2 mg  2 mg IntraMUSCular Q4H PRN    LORazepam (ATIVAN) tablet 1 mg  1 mg Oral Q4H PRN    acetaminophen (TYLENOL) tablet 650 mg  650 mg Oral Q4H PRN    magnesium hydroxide (MILK OF MAGNESIA) 400 mg/5 mL oral suspension 30 mL  30 mL Oral DAILY PRN    nicotine (NICODERM CQ) 21 mg/24 hr patch 1 Patch  1 Patch TransDERmal DAILY PRN    zolpidem (AMBIEN) tablet 5 mg  5 mg Oral QHS PRN      SCHEDULED MEDICATIONS  Current Facility-Administered Medications   Medication Dose Route Frequency                ASSESSMENT & PLAN        The patient, Luke Crowley, is a 26 y.o.  female who presents at this time for treatment of the following diagnoses:  Patient C/[address] Problem List:   Major depressive disorder (8/2/2018)    Assessment: sadness, hopelessness, helplessness, poor energy and insomnia     Plan: adjust antidepressant medications                   A coordinated, multidisplinary treatment team (includes the nurse, unit pharmcist,  and writer) round was conducted for this initial evaluation with the patient present. The following regarding medications was addressed during rounds with patient:prozac   the risks and benefits of the proposed medication. The patient was given the opportunity to ask questions. Informed consent given to the use of the above medications. I will continue to adjust psychiatric and non-psychiatric medications (see above \"medication\" section and orders section for details) as deemed appropriate & based upon diagnoses and response to treatment. I have reviewed admission (and previous/old) labs and medical tests in the EHR and or transferring hospital documents. I will continue to order blood tests/labs and diagnostic tests as deemed appropriate and review results as they become available (see orders for details). I have reviewed old psychiatric and medical records available in the EHR.  I Will order additional psychiatric records from other institutions to further elucidate the nature of patient's psychopathology and review once available. I will gather additional collateral information from friends, family and o/p treatment team to further elucidate the nature of patient's psychopathology and baselline level of psychiatric functioning.       ESTIMATED LENGTH OF STAY:    3-5 days        STRENGTHS:  Exercising self-direction/Resourceful, Access to housing/residential stability and Interpersonal/supportive relationships (family, friends, peers)                                        SIGNED:    Ella Acevedo MD  8/3/2018

## 2018-08-03 NOTE — BH NOTES
2050-Received  protocol orders from Dr. Omid Vitale  2052- [facility] paged for H and P  2135- [facility] paged for H and P  2215- [facility] returned page aware of H and P

## 2018-08-03 NOTE — PROGRESS NOTES
Admission Medication Reconciliation:    Information obtained from:  [facility] interview/[facility]    Comments/Recommendations: Updated PTA meds/reviewed patient's allergies. 1)  Patient states that she only takes fluoxetine. She was put on BP med (chlorthalidone 12.5mg daily) and potassium chloride in 4/2018 during a hospitalization for gallbladder. She states that she stopped taking the medications due to financial stressors and not being able to afford them. Her blood pressures look normal but we will monitor closely. If BP medication indicated will recommend HCTZ (on $4 [employer] list). 2)  Medication changes (since last review): Added  - none    Adjusted  - none    Removed  - chlorthalidone  - potassium chloride  - prednisone     Allergies:  Aspirin    Significant PMH/Disease States:   Past Medical History:   Diagnosis Date    Depression     Kidney stones     Mood disorder ([facility] [address]     Morbid obesity ([facility] [address]     Psychiatric disorder     depression    Sleep apnea     Does not have CPAP as yet    Suicidal thoughts      Chief Complaint for this Admission:  No chief complaint on file. Prior to Admission Medications:   Prior to Admission Medications   Prescriptions Last Dose Informant Patient Reported? Taking? FLUoxetine (PROZAC) 20 mg capsule 8/2/2018 at 0900  No Yes   Sig: Take 1 Cap by mouth daily.       Facility-Administered Medications: None     Lidia Brenner, PharmD, BCPP, [facility] Specialist, [facility]

## 2018-08-03 NOTE — BH NOTES
PRN Medication Documentation    Specific patient behavior that led to need for PRN medication: Patient requesting medication to assist with a headache. Rates headache 8/10.   Staff interventions attempted prior to PRN being given: stimulus reduction  PRN medication given: Acetaminophen 650mg PO  Patient response/effectiveness of PRN medication:

## 2018-08-03 NOTE — PROGRESS NOTES
Hospitalist Progress Note  Jimmy Cerda MD  Answering service: [phone] from in house phone      Date of Service:  8/3/2018  NAME:  Jose Valenzuela  :  1995  MRN:  [id number]      Admission Summary:    Patient is admitted for inpatient major depression treatment, by psychiatry,    Interval history / Subjective:     Feeling better     Assessment & Plan:     1. Major depression. Admitted to psychiatry service for continued evaluation and treatments. 2.   Mild hyponatremia. Repeat sodium level in a.m.   3.   Drug overdose - on Prozac. Medically cleared from [facility] ED. 4.   Mood disorder. Plan as above. 5.  Obstructive sleep apnea. Will provide order for CPAP. 6.  Morbid obesity. Would recommend heart healthy cardiac diet/ eventual exercise/ weight loss/ and lifestyle modification. 7. VTE prophylaxis. Ambulate ad lid. Code status: Full  DVT prophylaxis: [facility] [address] Problems  Date Reviewed: 8/3/2018          Codes Class Noted POA    * (Principal)Major depressive disorder ICD-10-CM: F32.9  ICD-9-CM: 296.20  2018 Unknown                Review of Systems:   A comprehensive review of systems was negative except for that written in the HPI. Vital Signs:    Last 24hrs VS reviewed since prior progress note. Most recent are:  Visit Vitals    /83 (BP 1 Location: Left arm, BP Patient Position: At rest)    Pulse 87    Temp 98 °F (36.7 °C)    Resp 16    SpO2 100%    Breastfeeding No       No intake or output data in the 24 hours ending 18 1542     Physical Examination:             Constitutional:  No acute distress, cooperative, pleasant        Resp:  CTA bilaterally. No wheezing/rhonchi/rales. No accessory muscle use   CV:  Regular rhythm, normal rate, no murmurs, gallops, rubs    GI:  Soft, non distended, non tender.  normoactive bowel sounds, no hepatosplenomegaly                   Data Review: I personally reviewed  Image      Labs:     Recent Labs      08/02/18   1106   WBC  9.8   HGB  12.9   HCT  39.7   PLT  358     Recent Labs      08/03/18   0630  08/02/18   1106   NA  136  133*   K  3.8  3.7   CL  105  100   CO2  25  22   BUN  6  6   CREA  0.66  0.81   GLU  85  85  133*   CA  9.0  9.0     Recent Labs      08/02/18   1106   SGOT  13*   ALT  15   AP  115   TBILI  0.3   TP  8.3*   ALB  3.4*   GLOB  4.9*     No results for input(s): INR, PTP, APTT in the last 72 hours. No lab exists for component: INREXT   No results for input(s): FE, TIBC, PSAT, FERR in the last 72 hours. No results found for: FOL, RBCF   No results for input(s): PH, PCO2, PO2 in the last 72 hours. No results for input(s): CPK, CKNDX, TROIQ in the last 72 hours.     No lab exists for component: CPKMB  Lab Results   Component Value Date/Time    Cholesterol, total 120 08/03/2018 06:30 AM    HDL Cholesterol 42 08/03/2018 06:30 AM    LDL, calculated 57.6 08/03/2018 06:30 AM    Triglyceride 102 08/03/2018 06:30 AM    CHOL/HDL Ratio 2.9 08/03/2018 06:30 AM     Lab Results   Component Value Date/Time    Glucose (POC) 86 06/13/2018 06:18 PM     Lab Results   Component Value Date/Time    Color YELLOW/STRAW 08/02/2018 11:06 AM    Appearance CLOUDY (A) 08/02/2018 11:06 AM    Specific gravity 1.016 08/02/2018 11:06 AM    Specific gravity 1.025 11/03/2017 11:48 PM    pH (UA) 6.5 08/02/2018 11:06 AM    Protein NEGATIVE  08/02/2018 11:06 AM    Glucose NEGATIVE  08/02/2018 11:06 AM    Ketone NEGATIVE  08/02/2018 11:06 AM    Bilirubin NEGATIVE  08/02/2018 11:06 AM    Urobilinogen 0.2 08/02/2018 11:06 AM    Nitrites NEGATIVE  08/02/2018 11:06 AM    Leukocyte Esterase NEGATIVE  08/02/2018 11:06 AM    Epithelial cells FEW 08/02/2018 11:06 AM    Bacteria 1+ (A) 08/02/2018 11:06 AM    WBC 5-10 08/02/2018 11:06 AM    RBC 5-10 08/02/2018 11:06 AM         Medications Reviewed:     Current Facility-Administered Medications   Medication Dose Route Frequency  [START ON 8/4/2018] FLUoxetine (PROzac) capsule 20 mg  20 mg Oral DAILY    hydrOXYzine HCl (ATARAX) tablet 50 mg  50 mg Oral Q4H PRN    LORazepam (ATIVAN) tablet 0.5 mg  0.5 mg Oral BID PRN    ziprasidone (GEODON) 20 mg in sterile water (preservative free) 1 mL injection  20 mg IntraMUSCular BID PRN    OLANZapine (ZyPREXA) tablet 5 mg  5 mg Oral Q6H PRN    benztropine (COGENTIN) tablet 2 mg  2 mg Oral BID PRN    benztropine (COGENTIN) injection 2 mg  2 mg IntraMUSCular BID PRN    LORazepam (ATIVAN) injection 2 mg  2 mg IntraMUSCular Q4H PRN    acetaminophen (TYLENOL) tablet 650 mg  650 mg Oral Q4H PRN    magnesium hydroxide (MILK OF MAGNESIA) 400 mg/5 mL oral suspension 30 mL  30 mL Oral DAILY PRN    nicotine (NICODERM CQ) 21 mg/24 hr patch 1 Patch  1 Patch TransDERmal DAILY PRN    zolpidem (AMBIEN) tablet 5 mg  5 mg Oral QHS PRN     ______________________________________________________________________  EXPECTED LENGTH OF STAY: - - -  ACTUAL LENGTH OF STAY:          1                 Les Lechuga MD

## 2018-08-03 NOTE — PROGRESS NOTES
.Skin Assessment performed by: MIKE HEADLEY and Jam Evans, RN    Skin is intact. JPressure Ulcer Documentation  (COMPLETE ONE LABEL PER PRESSURE ULCER)  For further information, please review corresponding Wound Care flowsheet. Berlin Welch has:    No pressure injury noted and pressure ulcer prevention initiated.

## 2018-08-03 NOTE — BH NOTES
PRN Medication Documentation    Specific patient behavior that led to need for PRN medication: foot pain 7/10  Staff interventions attempted prior to PRN being given: elevate feet. PRN medication given: Tylenol given PO   Patient response/effectiveness of PRN medication: Will re-assess pain    1845: Pt verbalizes pain is 0/10.  Medication effective

## 2018-08-03 NOTE — BH NOTES
GROUP THERAPY PROGRESS NOTE    Servando Parks is participating in Discharge Planning Group     Group time: 1.5 hour    Personal goal for participation: Developing Tools for Recovery    Goal orientation: Managing Dual Disorders    Group therapy participation: active    Therapeutic interventions reviewed and discussed:     Impression of participation: Pt reveled a serious dis-connect with her parents regarding their choice of agustín. Pt said she has had to listen to her parents try to get her to endorsee their agustín. She is also struggling with various issues: loss of her job (all though recently hired), moving in with her [parents and not being able to live for own life. US asked how was going to mange while living in her parents home? Pt said that is what I need to figure out and begin to formulate a plan so that when I do leave every thing will be in order.

## 2018-08-04 PROCEDURE — 65220000003 HC RM SEMIPRIVATE PSYCH

## 2018-08-04 PROCEDURE — 74011250637 HC RX REV CODE- 250/637: Performed by: PSYCHIATRY & NEUROLOGY

## 2018-08-04 RX ADMIN — ACETAMINOPHEN 650 MG: 325 TABLET, FILM COATED ORAL at 21:18

## 2018-08-04 RX ADMIN — ACETAMINOPHEN 650 MG: 325 TABLET, FILM COATED ORAL at 17:06

## 2018-08-04 RX ADMIN — FLUOXETINE 20 MG: 20 CAPSULE ORAL at 08:23

## 2018-08-04 RX ADMIN — ZOLPIDEM TARTRATE 5 MG: 5 TABLET ORAL at 21:18

## 2018-08-04 RX ADMIN — ACETAMINOPHEN 650 MG: 325 TABLET, FILM COATED ORAL at 08:25

## 2018-08-04 RX ADMIN — HYDROXYZINE HYDROCHLORIDE 50 MG: 50 TABLET, FILM COATED ORAL at 17:14

## 2018-08-04 NOTE — PROGRESS NOTES
Problem: Depressed Mood (Adult/Pediatric)  Goal: *STG: Participates in treatment plan  Outcome: Progressing Towards Goal  Mood is subdued. Mood is brighter, when visiting with her parents. She attends all therapeutic activities.

## 2018-08-04 NOTE — BH NOTES
PRN Medication Documentation    Specific patient behavior that led to need for PRN medication: Patient presented this morning with complaints of a headache. Staff interventions attempted prior to PRN being given: suggested decreased stimulation  PRN medication given: Acetaminophen 650mg PO  Patient response/effectiveness of PRN medication: Medication effective.   Patient rates headache 0/10

## 2018-08-04 NOTE — BH NOTES
PSYCHIATRIC PROGRESS NOTE            IDENTIFICATION:    Patient Name  Elliott Newberry   Date of Birth 1995   [facility] [id number]   Medical Record Number  [id number]      Age  26 y.o. PCP Kisha Stephen DO   Admit date:  8/2/2018    Room Number  727/01  @ [address]   Date of Service  8/4/2018            HISTORY         REASON FOR HOSPITALIZATION:  CC: \"OD\". Pt admitted under a voluntary basis for severe depression with suicidal ideations  proving to be an imminent danger to self and others and an inability to care for self. HISTORY OF PRESENT ILLNESS:    The patient, Elliott Newberry, is a 26 y.o. BLACK OR  female with a past psychiatric history significant for depression , who presents at this time with complaints of (and/or evidence of) the following emotional symptoms: depression. Additional symptomatology include anxiety. The above symptoms have been present for years. These symptoms are of severe severity. These symptoms are constant  in nature. The patient's condition has been precipitated by social stressors and psychosocial stressors (poor coping skills  ). Patient's condition made worse by declining psychotherapist . Elsy Santos: negative; BAL=0.   8/4/18 she is feeling better and mood is better and no anger issues and not  Anger issues and no SI or HI and she slept well last night and no psychotic features      ALLERGIES:   Allergies   Allergen Reactions    Aspirin Itching      MEDICATIONS PRIOR TO ADMISSION:   Prescriptions Prior to Admission   Medication Sig    FLUoxetine (PROZAC) 20 mg capsule Take 1 Cap by mouth daily.       PAST MEDICAL HISTORY:   Past Medical History:   Diagnosis Date    Depression     Kidney stones     Mood disorder ([address]     Morbid obesity ([address]     Psychiatric disorder     depression    Sleep apnea     Does not have CPAP as yet    Suicidal thoughts      Past Surgical History:   Procedure Laterality Date    HX UROLOGICAL      Stenting x 4 (Starting in 2016, developed severe infection)      SOCIAL HISTORY:    Social History     Social History    Marital status: SINGLE     Spouse name: N/A    Number of children: N/A    Years of education: N/A     Occupational History          started in 3/17     Social History Main Topics    Smoking status: Former Smoker     Years: 1.00     Quit date: 1/16/2018    Smokeless tobacco: Never Used      Comment: 1/4 pack ppd for 6 months    Alcohol use No      Comment: rarely    Drug use: No      Comment: Has done [employer] Sexual activity: No     Other Topics Concern    Not on file     Social History Narrative    26year old AA female admitted for reportedly taking a handful of prozac. Pt has a past suicide attempt and this is her first psychiatriatric admission. Pt lives with her parents. She is a  . She has a 12th grade education. FAMILY HISTORY:    Family History   Problem Relation Age of Onset    Heart Disease Father 55    Hypertension Father     Sleep Apnea Father     No Known Problems Sister     Cancer Maternal Grandmother      Brain Cancer    Heart Disease Paternal Grandmother     Cancer Paternal Grandfather        REVIEW OF SYSTEMS:   Psychological ROS: positive for - behavioral disorder  Respiratory ROS: no cough, shortness of breath, or wheezing  Cardiovascular ROS: no chest pain or dyspnea on exertion  Pertinent items are noted in the History of Present Illness. All other Systems reviewed and are considered negative. MENTAL STATUS EXAM & VITALS     MENTAL STATUS EXAM (MSE):    MSE FINDINGS ARE WITHIN NORMAL LIMITS (WNL) UNLESS OTHERWISE STATED BELOW. ( ALL OF THE BELOW CATEGORIES OF THE MSE HAVE BEEN REVIEWED (reviewed 8/4/2018) AND UPDATED AS DEEMED APPROPRIATE )  General Presentation age appropriate, cooperative   Orientation oriented to time, place and person   Vital Signs  See below (reviewed 8/4/2018);  Vital Signs (BP, Pulse, & Temp) are within normal limits if not listed below.    Gait and Station Stable/steady, no ataxia   Musculoskeletal System No extrapyramidal symptoms (EPS); no abnormal muscular movements or Tardive Dyskinesia (TD); muscle strength and tone are within normal limits   Language No aphasia or dysarthria   Speech:  monotone   Thought Processes logical; normal rate of thoughts; poor abstraction   Thought Associations goal directed   Thought Content free of delusions   Suicidal Ideations none   Homicidal Ideations none   Mood:  sad   Affect:  mood-congruent   Memory recent  fair   Memory remote:  fair   Concentration/Attention:  hypervigilance   Fund of Knowledge below average   Insight:  limited   Reliability poor   Judgment:  limited          VITALS:     Patient Vitals for the past 24 hrs:   Temp Pulse Resp BP SpO2   08/04/18 0810 98.2 °F (36.8 °C) 80 16 141/83 99 %   08/03/18 2018 98.2 °F (36.8 °C) 80 20 125/72 99 %   08/03/18 1549 97.6 °F (36.4 °C) 80 16 123/68 98 %     Wt Readings from Last 3 Encounters:   08/02/18 139.7 kg (308 lb)   07/06/18 135.6 kg (299 lb)   06/25/18 138 kg (304 lb 3.2 oz)     Temp Readings from Last 3 Encounters:   08/04/18 98.2 °F (36.8 °C)   08/02/18 98.2 °F (36.8 °C)   07/06/18 97.7 °F (36.5 °C) (Oral)     BP Readings from Last 3 Encounters:   08/04/18 141/83   08/02/18 154/71   07/06/18 123/82     Pulse Readings from Last 3 Encounters:   08/04/18 80   08/02/18 78   07/06/18 87            DATA     LABORATORY DATA:  Labs Reviewed   METABOLIC PANEL, BASIC - Abnormal; Notable for the following:        Result Value    BUN/Creatinine ratio 9 (*)     All other components within normal limits   GLUCOSE, FASTING   TSH 3RD GENERATION   LIPID PANEL     Admission on 08/02/2018   Component Date Value Ref Range Status    Glucose 08/03/2018 85  65 - 100 MG/DL Final    TSH 08/03/2018 0.89  0.36 - 3.74 uIU/mL Final    LIPID PROFILE 08/03/2018        Final    Cholesterol, total 08/03/2018 120  <200 MG/DL Final    Triglyceride 08/03/2018 102  <150 MG/DL Final    HDL Cholesterol 08/03/2018 42  MG/DL Final    LDL, calculated 08/03/2018 57.6  0 - 100 MG/DL Final    VLDL, calculated 08/03/2018 20.4  MG/DL Final    CHOL/HDL Ratio 08/03/2018 2.9  0 - 5.0   Final    Sodium 08/03/2018 136  136 - 145 mmol/L Final    Potassium 08/03/2018 3.8  3.5 - 5.1 mmol/L Final    Chloride 08/03/2018 105  97 - 108 mmol/L Final    CO2 08/03/2018 25  21 - 32 mmol/L Final    Anion gap 08/03/2018 6  5 - 15 mmol/L Final    Glucose 08/03/2018 85  65 - 100 mg/dL Final    BUN 08/03/2018 6  6 - 20 MG/DL Final    Creatinine 08/03/2018 0.66  0.55 - 1.02 MG/DL Final    BUN/Creatinine ratio 08/03/2018 9* 12 - 20   Final    GFR est AA 08/03/2018 >60  >60 ml/min/1.73m2 Final    GFR est non-AA 08/03/2018 >60  >60 ml/min/1.73m2 Final    Calcium 08/03/2018 9.0  8.5 - 10.1 MG/DL Final        RADIOLOGY REPORTS:    Results from Hospital Encounter encounter on 11/09/17   XR ABD FLAT/ ERECT   Narrative EXAM:  XR ABD FLAT/ ERECT    INDICATION:   abdominal pain, ? Constipation    COMPARISON: None. FINDINGS: Supine and upright views of the abdomen demonstrate a normal gas  pattern. There is no free intraperitoneal air. No soft tissue masses or  pathologic calcifications are seen. The bones and soft tissues are within normal  limits. Impression IMPRESSION: Normal abdomen. Ct Abd Wo Cont    Result Date: 6/25/2018  EXAM:  CT ABD WO CONT INDICATION:  Dysuria. Evaluation for kidney stones COMPARISON: None. CONTRAST:  None. TECHNIQUE: Unenhanced multislice helical CT was performed from the diaphragm to the pelvic inlet without oral or intravenous contrast administration. Contiguous 5 mm axial images were reconstructed and lung and soft tissue windows were generated. Coronal and sagittal reformations were generated.   CT dose reduction was achieved through use of a standardized protocol tailored for this examination and automatic exposure control for dose modulation. FINDINGS: The visualized portions of the lung bases are clear. The absence of intravenous contrast material reduces the sensitivity for evaluation of the solid parenchymal organs of the abdomen. No focal abnormalities are seen within the liver, spleen, pancreas or adrenal glands or kidneys. No renal or ureteral calculus or obstruction is identified. The aorta tapers without aneurysm. There is no retroperitoneal adenopathy or mass. The bowel is not obstructed. There is no ascites or free intraperitoneal air. At T11-T12, there is disc desiccation with ossification of the PLL (sagittal image 96). IMPRESSION: No renal stone seen.               MEDICATIONS       ALL MEDICATIONS  Current Facility-Administered Medications   Medication Dose Route Frequency    FLUoxetine (PROzac) capsule 20 mg  20 mg Oral DAILY    hydrOXYzine HCl (ATARAX) tablet 50 mg  50 mg Oral Q4H PRN    LORazepam (ATIVAN) tablet 0.5 mg  0.5 mg Oral BID PRN    ziprasidone (GEODON) 20 mg in sterile water (preservative free) 1 mL injection  20 mg IntraMUSCular BID PRN    OLANZapine (ZyPREXA) tablet 5 mg  5 mg Oral Q6H PRN    benztropine (COGENTIN) tablet 2 mg  2 mg Oral BID PRN    benztropine (COGENTIN) injection 2 mg  2 mg IntraMUSCular BID PRN    LORazepam (ATIVAN) injection 2 mg  2 mg IntraMUSCular Q4H PRN    acetaminophen (TYLENOL) tablet 650 mg  650 mg Oral Q4H PRN    magnesium hydroxide (MILK OF MAGNESIA) 400 mg/5 mL oral suspension 30 mL  30 mL Oral DAILY PRN    nicotine (NICODERM CQ) 21 mg/24 hr patch 1 Patch  1 Patch TransDERmal DAILY PRN    zolpidem (AMBIEN) tablet 5 mg  5 mg Oral QHS PRN      SCHEDULED MEDICATIONS  Current Facility-Administered Medications   Medication Dose Route Frequency    FLUoxetine (PROzac) capsule 20 mg  20 mg Oral DAILY                ASSESSMENT & PLAN        The patient, Kyaw Murray, is a 26 y.o.  female who presents at this time for treatment of the following diagnoses:  Patient Active Hospital Problem List:   Major depressive disorder (8/2/2018)    Assessment: sadness, hopelessness, helplessness, poor energy and insomnia     Plan: adjust antidepressant medications  8/4/18 continue same medications                     A coordinated, multidisplinary treatment team (includes the nurse, unit pharmcist,  and writer) round was conducted for this initial evaluation with the patient present. The following regarding medications was addressed during rounds with patient:prozac   the risks and benefits of the proposed medication. The patient was given the opportunity to ask questions. Informed consent given to the use of the above medications. I will continue to adjust psychiatric and non-psychiatric medications (see above \"medication\" section and orders section for details) as deemed appropriate & based upon diagnoses and response to treatment. I have reviewed admission (and previous/old) labs and medical tests in the EHR and or transferring hospital documents. I will continue to order blood tests/labs and diagnostic tests as deemed appropriate and review results as they become available (see orders for details). I have reviewed old psychiatric and medical records available in the EHR. I Will order additional psychiatric records from other institutions to further elucidate the nature of patient's psychopathology and review once available. I will gather additional collateral information from friends, family and o/p treatment team to further elucidate the nature of patient's psychopathology and baselline level of psychiatric functioning.       ESTIMATED LENGTH OF STAY:    3-5 days        STRENGTHS:  Exercising self-direction/Resourceful, Access to housing/residential stability and Interpersonal/supportive relationships (family, friends, peers)                                        SIGNED:    Kodak Rae MD  8/4/2018

## 2018-08-04 NOTE — PROGRESS NOTES
Problem: Depressed Mood (Adult/Pediatric)  Goal: *STG: Participates in treatment plan  Outcome: Progressing Towards Goal  Patient participates in treatment plan. Patient stated, \"I'm better, I'm thinking more clearly. \"  Patient's thoughts are organized, good insight, patient is out on the unit, attending groups, smiling, cooperative, med and meal compliant, denies si.

## 2018-08-04 NOTE — PROGRESS NOTES
Problem: Depressed Mood (Adult/Pediatric)  Goal: *STG: Participates in treatment plan  Outcome: Progressing Towards Goal  Pt is smiling out in the DR   Calm cooperative and appropriate with staff and peers   Med/meal compliant   Attends groups   No signs of distress noted   Will continue to monitor

## 2018-08-04 NOTE — BH NOTES
GROUP THERAPY PROGRESS NOTE    Rehana Ayon is participating in Discharge Planning Group    Group time: 1.5 hour    Personal goal for participation:  Tools For Recovery    Goal orientation: Introduction Wellness Recovery Action Plan    Group therapy participation: active    Therapeutic interventions reviewed and discussed: Yes     Impression of participation: Pt was very engaged, talkative , expressed having hope for her future ad glad that she has family support.  Pt endorses WRAP as tool to be utilized  in her recovery

## 2018-08-04 NOTE — INTERDISCIPLINARY ROUNDS
Behavioral Health Interdisciplinary Rounds     Patient Name: Luisa Hilton  Age: 26 y.o. Room/Bed:  727/  Primary Diagnosis: Major depressive disorder   Admission Status: Voluntary     Readmission within 30 days: no  Power of  in place: no  Patient requires a blocked bed: no          Reason for blocked bed:     VTE Prophylaxis: Not indicated    Mobility needs/Fall risk: no    Nutritional Plan: no  Consults:          Labs/Testing due today?: no    Sleep hours:  7.75      Participation in Care/Groups:  yes  Medication Compliant?:no scheduled meds  PRNS (last 24 hours): Pain    Restraints (last 24 hours):  no     CIWA (range last 24 hours): CIWA-Ar Total: 0   COWS (range last 24 hours): COWS Total: 0    Alcohol screening (AUDIT) completed -   AUDIT Score: 3     If applicable, date SBIRT discussed in treatment team AND documented:     Tobacco - patient is a smoker: Have You Used Tobacco in the Past 30 Days: No  Illegal Drugs use: Have You Used Any Illegal Substances Over the Past 12 Months: No    24 hour chart check complete: yes     Patient goal(s) for today: Attend groups   Treatment team focus/goals: Eval meds for effectiveness   Progress note: Alert, orient x3, good mood and feeling better. LOS:  2  Expected LOS:     Financial concerns/prescription coverage:    Date of last family contact: Mother visited on last evening. Pt reported visit went well.    Family requesting physician contact today:    Discharge plan: Return home to live her parents  Guns in the home:  N/A      Outpatient provider(s): Link to  Therapist     Participating treatment team members: Garland Fleming Dr Warden Redman and Joann Pond RN

## 2018-08-04 NOTE — BH NOTES
GROUP THERAPY PROGRESS NOTE Vanessa Nunez is participating in [place]. Group time: 30 minutes Personal goal for participation: daily progress Goal orientation: social 
 
Group therapy participation: active Therapeutic interventions reviewed and discussed:  
 
Impression of participation: The patient is commended for her input.

## 2018-08-04 NOTE — BH NOTES
PRN Medication Documentation    Specific patient behavior that led to need for PRN medication: pt c/o 9 of 10 headache. Staff interventions attempted prior to PRN being given: relaxation encouraged  PRN medication given: tylenol 650 mg po  Patient response/effectiveness of PRN medication: on reassessment, pt reports pain has subsided. Prn effective. PRN Medication Documentation    Specific patient behavior that led to need for PRN medication: promotion of rest  Staff interventions attempted prior to PRN being given: lights dim, milieu quiet  PRN medication given: Ambien 5 mg po  Patient response/effectiveness of PRN medication: on reassessment, pt resting comfortably in bed with even and unlabored respirations. Prn effective.

## 2018-08-04 NOTE — PROGRESS NOTES
Problem: Falls - Risk of  Goal: *Absence of Falls  Document Shabbir Fall Risk and appropriate interventions in the flowsheet. Outcome: Progressing Towards Goal  Fall Risk Interventions: In bed asleep with respirations noted as even and unlabored as chest was rising and falling. Will continue to monitor for safety and 15 minute checks throughout shift.          Medication Interventions: Teach patient to arise slowly

## 2018-08-05 PROCEDURE — 65220000003 HC RM SEMIPRIVATE PSYCH

## 2018-08-05 PROCEDURE — 74011250637 HC RX REV CODE- 250/637: Performed by: PSYCHIATRY & NEUROLOGY

## 2018-08-05 RX ADMIN — ACETAMINOPHEN 650 MG: 325 TABLET, FILM COATED ORAL at 16:34

## 2018-08-05 RX ADMIN — ZOLPIDEM TARTRATE 5 MG: 5 TABLET ORAL at 21:22

## 2018-08-05 RX ADMIN — FLUOXETINE 20 MG: 20 CAPSULE ORAL at 08:09

## 2018-08-05 RX ADMIN — ACETAMINOPHEN 650 MG: 325 TABLET, FILM COATED ORAL at 08:09

## 2018-08-05 RX ADMIN — HYDROXYZINE HYDROCHLORIDE 50 MG: 50 TABLET, FILM COATED ORAL at 09:57

## 2018-08-05 RX ADMIN — HYDROXYZINE HYDROCHLORIDE 50 MG: 50 TABLET, FILM COATED ORAL at 16:33

## 2018-08-05 NOTE — PROGRESS NOTES
Problem: Depressed Mood (Adult/Pediatric)  Goal: *STG: Participates in treatment plan  Outcome: Progressing Towards Goal  Mood is bright. Pt socializes with peers. She is hopeful to be discharged tomorrow.

## 2018-08-05 NOTE — PROGRESS NOTES
Problem: Falls - Risk of  Goal: *Absence of Falls  Document Shabbir Fall Risk and appropriate interventions in the flowsheet. Outcome: Progressing Towards Goal  Fall Risk Interventions: In bed asleep with respirations noted as even and unlabored as chest was rising and falling. Will continue to monitor for safety and 15 minute checks throughout shift         Medication Interventions: Teach patient to arise slowly

## 2018-08-05 NOTE — BH NOTES
PRN Medication Documentation    Specific patient behavior that led to need for PRN medication: pt reporting increasing anxiety, and increasing headache pain that began after lunch  Staff interventions attempted prior to PRN being given: reassurance listening presence  PRN medication given: atarax po prn at 1633 and tylenol po prn at 1634  Patient response/effectiveness of PRN medication: 30 minutes post administration pt reports anxiety has lessened and head no longer hurts

## 2018-08-05 NOTE — BH NOTES
PSYCHIATRIC PROGRESS NOTE            IDENTIFICATION:    Patient Name  Tristan Savage   Date of Birth 1995   [facility] [id number]   Medical Record Number  [id number]      Age  26 y.o. PCP Alize Carney DO   Admit date:  8/2/2018    Room Number  727/01  @ [facility]   Date of Service  8/5/2018            HISTORY         REASON FOR HOSPITALIZATION:  CC: \"OD\". Pt admitted under a voluntary basis for severe depression with suicidal ideations  proving to be an imminent danger to self and others and an inability to care for self. HISTORY OF PRESENT ILLNESS:    The patient, Tristan Savage, is a 26 y.o. BLACK OR  female with a past psychiatric history significant for depression , who presents at this time with complaints of (and/or evidence of) the following emotional symptoms: depression. Additional symptomatology include anxiety. The above symptoms have been present for years. These symptoms are of severe severity. These symptoms are constant  in nature. The patient's condition has been precipitated by social stressors and psychosocial stressors (poor coping skills  ). Patient's condition made worse by declining psychotherapist . Classie Siad: negative; BAL=0.   8/4/18 she is feeling better and mood is better and no anger issues and not  Anger issues and no SI or HI and she slept well last night and no psychotic features   8/5/18 she is feeling better regarding her mood and she still has headache and she is sleeping better and no angre issues and no aggressive behavior        ALLERGIES:   Allergies   Allergen Reactions    Aspirin Itching      MEDICATIONS PRIOR TO ADMISSION:   Prescriptions Prior to Admission   Medication Sig    FLUoxetine (PROZAC) 20 mg capsule Take 1 Cap by mouth daily.       PAST MEDICAL HISTORY:   Past Medical History:   Diagnosis Date    Depression     Kidney stones     Mood disorder ([facility]     Morbid obesity ([facility]     Psychiatric disorder     depression    Sleep apnea     Does not have CPAP as yet    Suicidal thoughts      Past Surgical History:   Procedure Laterality Date    HX UROLOGICAL      Stenting x 4 (Starting in 2016, developed severe infection)      SOCIAL HISTORY:    Social History     Social History    Marital status: SINGLE     Spouse name: N/A    Number of children: N/A    Years of education: N/A     Occupational History          started in 3/17     Social History Main Topics    Smoking status: Former Smoker     Years: 1.00     Quit date: 1/16/2018    Smokeless tobacco: Never Used      Comment: 1/4 pack ppd for 6 months    Alcohol use No      Comment: rarely    Drug use: No      Comment: Has done [employer] Sexual activity: No     Other Topics Concern    Not on file     Social History Narrative    26year old AA female admitted for reportedly taking a handful of prozac. Pt has a past suicide attempt and this is her first psychiatriatric admission. Pt lives with her parents. She is a  . She has a 12th grade education. FAMILY HISTORY:    Family History   Problem Relation Age of Onset    Heart Disease Father 55    Hypertension Father     Sleep Apnea Father     No Known Problems Sister     Cancer Maternal Grandmother      Brain Cancer    Heart Disease Paternal Grandmother     Cancer Paternal Grandfather        REVIEW OF SYSTEMS:   Psychological ROS: positive for - behavioral disorder  Respiratory ROS: no cough, shortness of breath, or wheezing  Cardiovascular ROS: no chest pain or dyspnea on exertion  Pertinent items are noted in the History of Present Illness. All other Systems reviewed and are considered negative.            MENTAL STATUS EXAM & VITALS     MENTAL STATUS EXAM (MSE):    MSE FINDINGS ARE WITHIN NORMAL LIMITS (WNL) UNLESS OTHERWISE STATED BELOW. ( ALL OF THE BELOW CATEGORIES OF THE MSE HAVE BEEN REVIEWED (reviewed 8/5/2018) AND UPDATED AS DEEMED APPROPRIATE )  General Presentation age appropriate, cooperative   Orientation oriented to time, place and person   Vital Signs  See below (reviewed 8/5/2018); Vital Signs (BP, Pulse, & Temp) are within normal limits if not listed below.    Gait and Station Stable/steady, no ataxia   Musculoskeletal System No extrapyramidal symptoms (EPS); no abnormal muscular movements or Tardive Dyskinesia (TD); muscle strength and tone are within normal limits   Language No aphasia or dysarthria   Speech:  monotone   Thought Processes logical; normal rate of thoughts; poor abstraction   Thought Associations goal directed   Thought Content free of delusions   Suicidal Ideations none   Homicidal Ideations none   Mood:  sad   Affect:  mood-congruent   Memory recent  fair   Memory remote:  fair   Concentration/Attention:  hypervigilance   Fund of Knowledge below average   Insight:  limited   Reliability poor   Judgment:  limited          VITALS:     Patient Vitals for the past 24 hrs:   Temp Pulse Resp BP SpO2   08/05/18 0750 97.5 °F (36.4 °C) 80 16 106/63 100 %   08/04/18 2002 98 °F (36.7 °C) 87 20 141/82 -   08/04/18 1600 98.4 °F (36.9 °C) 84 16 137/75 94 %   08/04/18 1200 97.9 °F (36.6 °C) 85 16 124/77 97 %     Wt Readings from Last 3 Encounters:   08/02/18 139.7 kg (308 lb)   07/06/18 135.6 kg (299 lb)   06/25/18 138 kg (304 lb 3.2 oz)     Temp Readings from Last 3 Encounters:   08/05/18 97.5 °F (36.4 °C)   08/02/18 98.2 °F (36.8 °C)   07/06/18 97.7 °F (36.5 °C) (Oral)     BP Readings from Last 3 Encounters:   08/05/18 106/63   08/02/18 154/71   07/06/18 123/82     Pulse Readings from Last 3 Encounters:   08/05/18 80   08/02/18 78   07/06/18 87            DATA     LABORATORY DATA:  Labs Reviewed   METABOLIC PANEL, BASIC - Abnormal; Notable for the following:        Result Value    BUN/Creatinine ratio 9 (*)     All other components within normal limits   GLUCOSE, FASTING   TSH 3RD GENERATION   LIPID PANEL     Admission on 08/02/2018   Component Date Value Ref Range Status    Glucose 08/03/2018 85  65 - 100 MG/DL Final    TSH 08/03/2018 0.89  0.36 - 3.74 uIU/mL Final    LIPID PROFILE 08/03/2018        Final    Cholesterol, total 08/03/2018 120  <200 MG/DL Final    Triglyceride 08/03/2018 102  <150 MG/DL Final    HDL Cholesterol 08/03/2018 42  MG/DL Final    LDL, calculated 08/03/2018 57.6  0 - 100 MG/DL Final    VLDL, calculated 08/03/2018 20.4  MG/DL Final    CHOL/HDL Ratio 08/03/2018 2.9  0 - 5.0   Final    Sodium 08/03/2018 136  136 - 145 mmol/L Final    Potassium 08/03/2018 3.8  3.5 - 5.1 mmol/L Final    Chloride 08/03/2018 105  97 - 108 mmol/L Final    CO2 08/03/2018 25  21 - 32 mmol/L Final    Anion gap 08/03/2018 6  5 - 15 mmol/L Final    Glucose 08/03/2018 85  65 - 100 mg/dL Final    BUN 08/03/2018 6  6 - 20 MG/DL Final    Creatinine 08/03/2018 0.66  0.55 - 1.02 MG/DL Final    BUN/Creatinine ratio 08/03/2018 9* 12 - 20   Final    GFR est AA 08/03/2018 >60  >60 ml/min/1.73m2 Final    GFR est non-AA 08/03/2018 >60  >60 ml/min/1.73m2 Final    Calcium 08/03/2018 9.0  8.5 - 10.1 MG/DL Final        RADIOLOGY REPORTS:    Results from Hospital Encounter encounter on 11/09/17   XR ABD FLAT/ ERECT   Narrative EXAM:  XR ABD FLAT/ ERECT    INDICATION:   abdominal pain, ? Constipation    COMPARISON: None. FINDINGS: Supine and upright views of the abdomen demonstrate a normal gas  pattern. There is no free intraperitoneal air. No soft tissue masses or  pathologic calcifications are seen. The bones and soft tissues are within normal  limits. Impression IMPRESSION: Normal abdomen. Ct Abd Wo Cont    Result Date: 6/25/2018  EXAM:  CT ABD WO CONT INDICATION:  Dysuria. Evaluation for kidney stones COMPARISON: None. CONTRAST:  None. TECHNIQUE: Unenhanced multislice helical CT was performed from the diaphragm to the pelvic inlet without oral or intravenous contrast administration.  Contiguous 5 mm axial images were reconstructed and lung and soft tissue windows were generated. Coronal and sagittal reformations were generated. CT dose reduction was achieved through use of a standardized protocol tailored for this examination and automatic exposure control for dose modulation. FINDINGS: The visualized portions of the lung bases are clear. The absence of intravenous contrast material reduces the sensitivity for evaluation of the solid parenchymal organs of the abdomen. No focal abnormalities are seen within the liver, spleen, pancreas or adrenal glands or kidneys. No renal or ureteral calculus or obstruction is identified. The aorta tapers without aneurysm. There is no retroperitoneal adenopathy or mass. The bowel is not obstructed. There is no ascites or free intraperitoneal air. At T11-T12, there is disc desiccation with ossification of the PLL (sagittal image 96). IMPRESSION: No renal stone seen.               MEDICATIONS       ALL MEDICATIONS  Current Facility-Administered Medications   Medication Dose Route Frequency    FLUoxetine (PROzac) capsule 20 mg  20 mg Oral DAILY    hydrOXYzine HCl (ATARAX) tablet 50 mg  50 mg Oral Q4H PRN    LORazepam (ATIVAN) tablet 0.5 mg  0.5 mg Oral BID PRN    ziprasidone (GEODON) 20 mg in sterile water (preservative free) 1 mL injection  20 mg IntraMUSCular BID PRN    OLANZapine (ZyPREXA) tablet 5 mg  5 mg Oral Q6H PRN    benztropine (COGENTIN) tablet 2 mg  2 mg Oral BID PRN    benztropine (COGENTIN) injection 2 mg  2 mg IntraMUSCular BID PRN    LORazepam (ATIVAN) injection 2 mg  2 mg IntraMUSCular Q4H PRN    acetaminophen (TYLENOL) tablet 650 mg  650 mg Oral Q4H PRN    magnesium hydroxide (MILK OF MAGNESIA) 400 mg/5 mL oral suspension 30 mL  30 mL Oral DAILY PRN    nicotine (NICODERM CQ) 21 mg/24 hr patch 1 Patch  1 Patch TransDERmal DAILY PRN    zolpidem (AMBIEN) tablet 5 mg  5 mg Oral QHS PRN      SCHEDULED MEDICATIONS  Current Facility-Administered Medications   Medication Dose Route Frequency    FLUoxetine (PROzac) capsule 20 mg  20 mg Oral DAILY                ASSESSMENT & PLAN        The patient, Josh Dorsey, is a 26 y.o.  female who presents at this time for treatment of the following diagnoses:  Patient C/[facility] [address] Problem List:   Major depressive disorder (8/2/2018)    Assessment: sadness, hopelessness, helplessness, poor energy and insomnia     Plan: adjust antidepressant medications  8/4/18 continue same medications    8/5/18 continue same medications                    A coordinated, multidisplinary treatment team (includes the nurse, unit pharmcist,  and writer) round was conducted for this initial evaluation with the patient present. The following regarding medications was addressed during rounds with patient:prozac   the risks and benefits of the proposed medication. The patient was given the opportunity to ask questions. Informed consent given to the use of the above medications. I will continue to adjust psychiatric and non-psychiatric medications (see above \"medication\" section and orders section for details) as deemed appropriate & based upon diagnoses and response to treatment. I have reviewed admission (and previous/old) labs and medical tests in the EHR and or transferring hospital documents. I will continue to order blood tests/labs and diagnostic tests as deemed appropriate and review results as they become available (see orders for details). I have reviewed old psychiatric and medical records available in the EHR. I Will order additional psychiatric records from other institutions to further elucidate the nature of patient's psychopathology and review once available. I will gather additional collateral information from friends, family and o/p treatment team to further elucidate the nature of patient's psychopathology and baselline level of psychiatric functioning.       ESTIMATED LENGTH OF STAY:    3-5 days        STRENGTHS:  Exercising self-direction/Resourceful, Access to housing/residential stability and Interpersonal/supportive relationships (family, friends, peers)                                        SIGNED:    Jammie Grant MD  8/5/2018

## 2018-08-05 NOTE — BH NOTES
0809  PRN Tylenol given for Pt complaint of headache 10/10    0957  PRN Medication Documentation    Specific patient behavior that led to need for PRN medication: Pt complaint of anxiety  Staff interventions attempted prior to PRN being given: Redirection, distraction  PRN medication given: Atarax  Patient response/effectiveness of PRN medication: Will monitor

## 2018-08-05 NOTE — PROGRESS NOTES
Problem: Depressed Mood (Adult/Pediatric)  Goal: *STG: Participates in treatment plan  Outcome: Progressing Towards Goal  Pt appropriately reported anxiety and headache pain, and received atarax and tylenol. Pt reports on a scale of 1-10, she is rates her readiness for discharge 10/10. Pt visited with patients this afternoon. Pleasant, cooperative, soft spoken.

## 2018-08-05 NOTE — PROGRESS NOTES
Problem: Depressed Mood (Adult/Pediatric)  Goal: *STG: Participates in treatment plan  Outcome: Progressing Towards Goal  Pt participated in treatment team. Stated \"my morning has been rough. I have a headache\". Complaining of anxiety. Received medication for anxiety this morning. Goal: *STG: Verbalizes anger, guilt, and other feelings in a constructive manor  Outcome: Progressing Towards Goal  Stated \"I do feel better today\". Able to verbalize feelings in a constructive manor. Plan for pt to be discharge tomorrow. Goal: *STG: Remains safe in hospital  Outcome: Progressing Towards Goal  Remains safe on the unit and continued on Q 15 minute safety checks.

## 2018-08-05 NOTE — PROGRESS NOTES
08/04/18 2118   Pain Screen   Pain Scale 1 Numeric (0 - 10)   Pain Intensity 1 10   Pain Onset 1 immediate   Pain Location 1 Head   Pain Orientation 1 Anterior   Pain Description 1 Aching   Pain Intervention(s) 1 Medication (see MAR)   Patient Stated Pain Goal 0   PRN tylenol given for c/o \"HA. \"    2118: PRN ambien given per pt request.

## 2018-08-05 NOTE — INTERDISCIPLINARY ROUNDS
Behavioral Health Interdisciplinary Rounds     Patient Name: Servando Parks  Age: 26 y.o.   Room/Bed:  72/  Primary Diagnosis: Major depressive disorder   Admission Status: Voluntary     Readmission within 30 days: no  Power of  in place: no  Patient requires a blocked bed: no          Reason for blocked bed:     VTE Prophylaxis: Not indicated    Mobility needs/Fall risk: no    Nutritional Plan: no  Consults:          Labs/Testing due today?: no    Sleep hours:  7.50      Participation in Care/Groups:  yes  Medication Compliant?: no scheduled medications  PRNS (last 24 hours): Sleep Aid and Pain    Restraints (last 24 hours):  no     CIWA (range last 24 hours): CIWA-Ar Total: 0   COWS (range last 24 hours): COWS Total: 0    Alcohol screening (AUDIT) completed -   AUDIT Score: 3     If applicable, date SBIRT discussed in treatment team AND documented:     Tobacco - patient is a smoker: Have You Used Tobacco in the Past 30 Days: No  Illegal Drugs use: Have You Used Any Illegal Substances Over the Past 12 Months: No    24 hour chart check complete: yes     Patient goal(s) for today:   Treatment team focus/goals:   Progress note     LOS:  3  Expected LOS:     Financial concerns/prescription coverage:    Date of last family contact:       Family requesting physician contact today:    Discharge plan:   Guns in the home:         Outpatient provider(s):     Participating treatment team members: Servando Parks, * (assigned SW),

## 2018-08-06 VITALS
RESPIRATION RATE: 18 BRPM | OXYGEN SATURATION: 98 % | HEART RATE: 98 BPM | TEMPERATURE: 98.3 F | SYSTOLIC BLOOD PRESSURE: 133 MMHG | DIASTOLIC BLOOD PRESSURE: 84 MMHG

## 2018-08-06 PROCEDURE — 74011250637 HC RX REV CODE- 250/637: Performed by: PSYCHIATRY & NEUROLOGY

## 2018-08-06 RX ORDER — HYDROXYZINE 50 MG/1
50 TABLET, FILM COATED ORAL
Qty: 60 TAB | Refills: 0 | Status: SHIPPED | OUTPATIENT
Start: 2018-08-06 | End: 2018-08-16

## 2018-08-06 RX ORDER — FLUOXETINE HYDROCHLORIDE 20 MG/1
20 CAPSULE ORAL DAILY
Qty: 30 CAP | Refills: 0 | Status: SHIPPED | OUTPATIENT
Start: 2018-08-07 | End: 2018-10-25 | Stop reason: SDUPTHER

## 2018-08-06 RX ADMIN — FLUOXETINE 20 MG: 20 CAPSULE ORAL at 08:26

## 2018-08-06 NOTE — DISCHARGE SUMMARY
PSYCHIATRIC DISCHARGE SUMMARY         IDENTIFICATION:    Patient Name  Didi Calvo   Date of Birth 1995   [facility] [id number]   Medical Record Number  [id number]      Age  26 y.o. PCP Kary Ramírez DO   Admit date:  8/2/2018    Discharge date: 8/6/2018   Room Number  727/01  @ [facility]   Date of Service  8/6/2018               TYPE OF DISCHARGE: REGULAR               CONDITION AT DISCHARGE: good       PROVISIONAL & DISCHARGE DIAGNOSES:    Problem List  Date Reviewed: 8/3/2018          Codes Class    * (Principal)Major depressive disorder ICD-10-CM: F32.9  ICD-9-CM: 296.20         Cholecystitis ICD-10-CM: K81.9  ICD-9-CM: 575.10         Obesity, morbid ([address] ICD-10-CM: E66.01  ICD-9-CM: 278.01         SOB (shortness of breath) ICD-10-CM: R06.02  ICD-9-CM: 786.05         Chest pain ICD-10-CM: R07.9  ICD-9-CM: 786.50         Essential hypertension ICD-10-CM: I10  ICD-9-CM: 401.9               Active Hospital Problems    *Major depressive disorder        DISCHARGE DIAGNOSIS:   Axis I:  SEE ABOVE  Axis II: SEE ABOVE  Axis III: SEE ABOVE  Axis IV:  lack of structure  Axis V:  60 on admission, 70 on discharge 70(baseline)       CC & HISTORY OF PRESENT ILLNESS:  26year old AA female admitted for depressiojn. Pt responded well to group therapy and prozac. At discharge she denied SI/HI intent and plan and did not meet TDO criteria.      SOCIAL HISTORY:    Social History     Social History    Marital status: SINGLE     Spouse name: N/A    Number of children: N/A    Years of education: N/A     Occupational History          started in 3/17     Social History Main Topics    Smoking status: Former Smoker     Years: 1.00     Quit date: 1/16/2018    Smokeless tobacco: Never Used      Comment: 1/4 pack ppd for 6 months    Alcohol use No      Comment: rarely    Drug use: No      Comment: Has done [employer] Sexual activity: No     Other Topics Concern    Not on file     Social History Narrative    26year old AA female admitted for reportedly taking a handful of prozac. Pt has a past suicide attempt and this is her first psychiatriatric admission. Pt lives with her parents. She is a  . She has a 12th grade education. FAMILY HISTORY:   Family History   Problem Relation Age of Onset    Heart Disease Father 55    Hypertension Father     Sleep Apnea Father     No Known Problems Sister     Cancer Maternal Grandmother      Brain Cancer    Heart Disease Paternal Grandmother     Cancer Paternal Grandfather              HOSPITALIZATION COURSE:    Simona Perez was admitted to the inpatient psychiatric unit [facility] for acute psychiatric stabilization in regards to symptomatology as described in the HPI above. \The differential diagnosis at time of admission included: depression . While on the unit Simona Perez was involved in individual, group, occupational and milieu therapy. Psychiatric medications were adjusted during this hospitalization including prozac. Simona Perez demonstrated a slow, but progressive improvement in overall condition. Much of patient's depression appeared to be related to situational stressors and psychological factors. Please see individual progress notes for more specific details regarding patient's hospitalization course. At time of dc, Simona Perez was without significant problems with depression psychosis  wayne. Overall presentation at time of discharge is most consistent with the diagnosis of depression Patient with request for discharge today. There are no grounds to seek a TDO. Patient has maximized benefit to be derived from acute inpatient psychiatric treatment.   All members of the treatment team concur with each other in regards to plans for discharge today per patient's request.          LABS AND IMAGAING:    Labs Reviewed   METABOLIC PANEL, BASIC - Abnormal; Notable for the following:        Result Value BUN/Creatinine ratio 9 (*)     All other components within normal limits   GLUCOSE, FASTING   TSH 3RD GENERATION   LIPID PANEL     Admission on 08/02/2018   Component Date Value Ref Range Status    Glucose 08/03/2018 85  65 - 100 MG/DL Final    TSH 08/03/2018 0.89  0.36 - 3.74 uIU/mL Final    LIPID PROFILE 08/03/2018        Final    Cholesterol, total 08/03/2018 120  <200 MG/DL Final    Triglyceride 08/03/2018 102  <150 MG/DL Final    HDL Cholesterol 08/03/2018 42  MG/DL Final    LDL, calculated 08/03/2018 57.6  0 - 100 MG/DL Final    VLDL, calculated 08/03/2018 20.4  MG/DL Final    CHOL/HDL Ratio 08/03/2018 2.9  0 - 5.0   Final    Sodium 08/03/2018 136  136 - 145 mmol/L Final    Potassium 08/03/2018 3.8  3.5 - 5.1 mmol/L Final    Chloride 08/03/2018 105  97 - 108 mmol/L Final    CO2 08/03/2018 25  21 - 32 mmol/L Final    Anion gap 08/03/2018 6  5 - 15 mmol/L Final    Glucose 08/03/2018 85  65 - 100 mg/dL Final    BUN 08/03/2018 6  6 - 20 MG/DL Final    Creatinine 08/03/2018 0.66  0.55 - 1.02 MG/DL Final    BUN/Creatinine ratio 08/03/2018 9* 12 - 20   Final    GFR est AA 08/03/2018 >60  >60 ml/min/1.73m2 Final    GFR est non-AA 08/03/2018 >60  >60 ml/min/1.73m2 Final    Calcium 08/03/2018 9.0  8.5 - 10.1 MG/DL Final   Admission on 08/02/2018, Discharged on 08/02/2018   Component Date Value Ref Range Status    WBC 08/02/2018 9.8  3.6 - 11.0 K/uL Final    RBC 08/02/2018 4.69  3.80 - 5.20 M/uL Final    HGB 08/02/2018 12.9  11.5 - 16.0 g/dL Final    HCT 08/02/2018 39.7  35.0 - 47.0 % Final    MCV 08/02/2018 84.6  80.0 - 99.0 FL Final    MCH 08/02/2018 27.5  26.0 - 34.0 PG Final    MCHC 08/02/2018 32.5  30.0 - 36.5 g/dL Final    RDW 08/02/2018 15.8* 11.5 - 14.5 % Final    PLATELET 50/96/9579 049  150 - 400 K/uL Final    MPV 08/02/2018 9.8  8.9 - 12.9 FL Final    NRBC 08/02/2018 0.0  0  WBC Final    ABSOLUTE NRBC 08/02/2018 0.00  0.00 - 0.01 K/uL Final    NEUTROPHILS 08/02/2018 73 32 - 75 % Final    LYMPHOCYTES 08/02/2018 21  12 - 49 % Final    MONOCYTES 08/02/2018 5  5 - 13 % Final    EOSINOPHILS 08/02/2018 1  0 - 7 % Final    BASOPHILS 08/02/2018 0  0 - 1 % Final    IMMATURE GRANULOCYTES 08/02/2018 0  0.0 - 0.5 % Final    ABS. NEUTROPHILS 08/02/2018 7.1  1.8 - 8.0 K/UL Final    ABS. LYMPHOCYTES 08/02/2018 2.1  0.8 - 3.5 K/UL Final    ABS. MONOCYTES 08/02/2018 0.5  0.0 - 1.0 K/UL Final    ABS. EOSINOPHILS 08/02/2018 0.1  0.0 - 0.4 K/UL Final    ABS. BASOPHILS 08/02/2018 0.0  0.0 - 0.1 K/UL Final    ABS. IMM. GRANS. 08/02/2018 0.0  0.00 - 0.04 K/UL Final    DF 08/02/2018 SMEAR SCANNED    Final    RBC COMMENTS 08/02/2018 NORMOCYTIC, NORMOCHROMIC    Final    Sodium 08/02/2018 133* 136 - 145 mmol/L Final    Potassium 08/02/2018 3.7  3.5 - 5.1 mmol/L Final    Chloride 08/02/2018 100  97 - 108 mmol/L Final    CO2 08/02/2018 22  21 - 32 mmol/L Final    Anion gap 08/02/2018 11  5 - 15 mmol/L Final    Glucose 08/02/2018 133* 65 - 100 mg/dL Final    BUN 08/02/2018 6  6 - 20 MG/DL Final    Creatinine 08/02/2018 0.81  0.55 - 1.02 MG/DL Final    BUN/Creatinine ratio 08/02/2018 7* 12 - 20   Final    GFR est AA 08/02/2018 >60  >60 ml/min/1.73m2 Final    GFR est non-AA 08/02/2018 >60  >60 ml/min/1.73m2 Final    Calcium 08/02/2018 9.0  8.5 - 10.1 MG/DL Final    Bilirubin, total 08/02/2018 0.3  0.2 - 1.0 MG/DL Final    ALT (SGPT) 08/02/2018 15  12 - 78 U/L Final    AST (SGOT) 08/02/2018 13* 15 - 37 U/L Final    Alk.  phosphatase 08/02/2018 115  45 - 117 U/L Final    Protein, total 08/02/2018 8.3* 6.4 - 8.2 g/dL Final    Albumin 08/02/2018 3.4* 3.5 - 5.0 g/dL Final    Globulin 08/02/2018 4.9* 2.0 - 4.0 g/dL Final    A-G Ratio 08/02/2018 0.7* 1.1 - 2.2   Final    Salicylate level 50/70/9680 <1.7* 2.8 - 20.0 MG/DL Final    Acetaminophen level 08/02/2018 <2* 10 - 30 ug/mL Final    ALCOHOL(ETHYL),SERUM 08/02/2018 <10  <10 MG/DL Final    AMPHETAMINES 08/02/2018 NEGATIVE   NEG Final    BARBITURATES 08/02/2018 NEGATIVE   NEG   Final    BENZODIAZEPINES 08/02/2018 NEGATIVE   NEG   Final    COCAINE 08/02/2018 NEGATIVE   NEG   Final    METHADONE 08/02/2018 NEGATIVE   NEG   Final    OPIATES 08/02/2018 NEGATIVE   NEG   Final    PCP(PHENCYCLIDINE) 08/02/2018 NEGATIVE   NEG   Final    THC (TH-CANNABINOL) 08/02/2018 NEGATIVE   NEG   Final    Drug screen comment 08/02/2018 (NOTE)   Final    Color 08/02/2018 YELLOW/STRAW    Final    Appearance 08/02/2018 CLOUDY* CLEAR   Final    Specific gravity 08/02/2018 1.016  1.003 - 1.030   Final    pH (UA) 08/02/2018 6.5  5.0 - 8.0   Final    Protein 08/02/2018 NEGATIVE   NEG mg/dL Final    Glucose 08/02/2018 NEGATIVE   NEG mg/dL Final    Ketone 08/02/2018 NEGATIVE   NEG mg/dL Final    Bilirubin 08/02/2018 NEGATIVE   NEG   Final    Blood 08/02/2018 NEGATIVE   NEG   Final    Urobilinogen 08/02/2018 0.2  0.2 - 1.0 EU/dL Final    Nitrites 08/02/2018 NEGATIVE   NEG   Final    Leukocyte Esterase 08/02/2018 NEGATIVE   NEG   Final    WBC 08/02/2018 5-10  0 - 4 /hpf Final    RBC 08/02/2018 5-10  0 - 5 /hpf Final    Epithelial cells 08/02/2018 FEW  FEW /lpf Final    Bacteria 08/02/2018 1+* NEG /hpf Final    Urine culture hold 08/02/2018 URINE ON HOLD IN MICROBIOLOGY DEPT FOR 3 DAYS. IF UNPRESERVED URINE IS SUBMITTED, IT CANNOT BE USED FOR ADDITIONAL TESTING AFTER 24 HRS, RECOLLECTION WILL BE REQUIRED.     Final    Pregnancy test,urine (POC) 08/02/2018 NEGATIVE   NEG   Final    Ventricular Rate 08/02/2018 87  BPM Final    Atrial Rate 08/02/2018 87  BPM Final    P-R Interval 08/02/2018 142  ms Final    QRS Duration 08/02/2018 78  ms Final    Q-T Interval 08/02/2018 360  ms Final    QTC Calculation (Bezet) 08/02/2018 433  ms Final    Calculated P Axis 08/02/2018 32  degrees Final    Calculated R Axis 08/02/2018 21  degrees Final    Calculated T Axis 08/02/2018 20  degrees Final    Diagnosis 08/02/2018    Final Value:Normal sinus rhythm  Normal ECG  Confirmed by Enoch Ba MD., [place] ([id number]) on 8/2/2018 8:48:58 PM       No results found. DISPOSITION:    Home. Patient to f/u with o/p psychiatric, and psychotherapy appointments. Patient is to f/u with internist as directed. FOLLOW-UP CARE:    Activity as tolerated  Regular Diet  Wound Care: none needed. Follow-up Information     Follow up With Details Comments [address], [address]  [phone]                   PROGNOSIS:  Greatly dependent upon patient's ability to remain sober and to f/u with o/p psychiatric/psychotherapy appointments as well as to comply with psychiatric medications as prescribed. Patient denies suicidal or homicidal ideations. Nancyjamaal Tomekareese fully contracts for safety. Patient reports many positive predictive factors in terms of not attempting suicide or homicide. Patient appears to be at low risk of suicide or homicide. Patient and family are aware and in agreement with discharge and discharge plan. DISCHARGE MEDICATIONS: (no changes made). Informed consent given for the use of following psychotropic medications:  Current Discharge Medication List      START taking these medications    Details   hydrOXYzine HCl (ATARAX) 50 mg tablet Take 1 Tab by mouth every four (4) hours as needed (anxiety (1st line)) for up to 10 days. Indications: anxiety  Qty: 60 Tab, Refills: 0         CONTINUE these medications which have CHANGED    Details   FLUoxetine (PROZAC) 20 mg capsule Take 1 Cap by mouth daily. Indications: ANXIETY WITH DEPRESSION  Qty: 30 Cap, Refills: 0                    A coordinated, multidisplinary treatment team round was conducted with Swetha Marie---this is done daily here at [facility] . This team consists of the nurse, psychiatric unit pharmcist,  and writer.      I have spent greater than 35 minutes on discharge work.    Signed:  Ella Acevedo MD  8/6/2018

## 2018-08-06 NOTE — PROGRESS NOTES
Problem: Depressed Mood (Adult/Pediatric)  Goal: *STG: Attends activities and groups  Outcome: Progressing Towards Goal  Patient attends activities and groups. Patient is smiling, out on the unit, cooperative, appropriate. Patient is participating in treatment plan, discharge and outpatient appointments discussed. Patient has fair insight, organized thoughts, is med and meal compliant.

## 2018-08-06 NOTE — BH NOTES
PRN Medication Documentation    Specific patient behavior that led to need for PRN medication: pt reporting inability to sleep, requesting ambien  Staff interventions attempted prior to PRN being given: reassurance, foods and fluids, lights dimmed  PRN medication given: ambien 5mg po prn at 2122  Patient response/effectiveness of PRN medication: 30 minutes post administration, pt lay quietly in bed NAD

## 2018-08-06 NOTE — DISCHARGE INSTRUCTIONS
DISCHARGE SUMMARY    NAME:Karson Staples  : 1995  MRN: [id number]    The patient Greg Broussard exhibits the ability to control behavior in a less restrictive environment. Patient's level of functioning is improving. No assaultive/destructive behavior has been observed for the past 24 hours. No suicidal/homicidal threat or behavior has been observed for the past 24 hours. There is no evidence of serious medication side effects. Patient has not been in physical or protective restraints for at least the past 24 hours. If weapons involved, how are they secured? No weapons involved. Is patient aware of and in agreement with discharge plan? Yes    Arrangements for medication:  Prescriptions filled for patient. Referral for substance abuse treatment? Patient is not using substances/Not applicable. Referral for smoking cessation needed? Patient is not a smoker/Not applicable. Copy of discharge instructions to provider?:  Dr. Teofilo Roman; [address] for transportation home:  Parents to . Keep all follow up appointments as scheduled, continue to take prescribed medications per physician instructions. Mental health crisis number:  708 or your local mental health crisis line number at [phone]. DISCHARGE SUMMARY from Nurse    PATIENT INSTRUCTIONS:    What to do at Home:  Recommended activity: Activity as tolerated. If you experience any of the following symptoms:  Overwhelming anxiety or depression, thoughts of hurting yourself or others, please follow up with 91 or your local mental health crisis line number at [phone]. *  Please give a list of your current medications to your Primary Care Provider. *  Please update this list whenever your medications are discontinued, doses are      changed, or new medications (including over-the-counter products) are added.     *  Please carry medication information at all times in case of emergency situations. These are general instructions for a healthy lifestyle:    No smoking/ No tobacco products/ Avoid exposure to second hand smoke  Surgeon General's Warning:  Quitting smoking now greatly reduces serious risk to your health. Obesity, smoking, and sedentary lifestyle greatly increases your risk for illness    A healthy diet, regular physical exercise & weight monitoring are important for maintaining a healthy lifestyle    You may be retaining fluid if you have a history of heart failure or if you experience any of the following symptoms:  Weight gain of 3 pounds or more overnight or 5 pounds in a week, increased swelling in our hands or feet or shortness of breath while lying flat in bed. Please call your doctor as soon as you notice any of these symptoms; do not wait until your next office visit. Recognize signs and symptoms of STROKE:    F-face looks uneven    A-arms unable to move or move unevenly    S-speech slurred or non-existent    T-time-call 911 as soon as signs and symptoms begin-DO NOT go       Back to bed or wait to see if you get better-TIME IS BRAIN. Warning Signs of HEART ATTACK     Call 911 if you have these symptoms:   Chest discomfort. Most heart attacks involve discomfort in the center of the chest that lasts more than a few minutes, or that goes away and comes back. It can feel like uncomfortable pressure, squeezing, fullness, or pain.  Discomfort in other areas of the upper body. Symptoms can include pain or discomfort in one or both arms, the back, neck, jaw, or stomach.  Shortness of breath with or without chest discomfort.  Other signs may include breaking out in a cold sweat, nausea, or lightheadedness. Don't wait more than five minutes to call 911 - MINUTES MATTER! Fast action can save your life. Calling 911 is almost always the fastest way to get lifesaving treatment.  Emergency Medical Services staff can begin treatment when they arrive -- up to an hour sooner than if someone gets to the hospital by car. The discharge information has been reviewed with the patient. The patient verbalized understanding. Discharge medications reviewed with the patient and appropriate educational materials and side effects teaching were provided.   ___________________________________________________________________________________________________________________________________

## 2018-08-06 NOTE — BH NOTES
GROUP THERAPY PROGRESS NOTE    Armaan Hoffman participated in a morning Process Group on the General Unit with a focus identifying   feelings, planning for the day, and learning more about DBT concepts on \"Emotion Regulation. \"    .  Group time: 60 minutes. Personal goal for participation: To increase the capacity to improve ones mood, set personal   goals, and understand more about basic activities to help regulate emotions. Goal orientation: The patients will be able to identify their feelings and develop a plan for   structuring their day. They were also presented with a summary sheet on emotional regulation,   in regards to focusing on one goal per day, taking physical care of oneself, and recognizing   and/or building positive experiences. The didactic portion of the session focused on these three  concepts; 1) defining and focusing on one goal per day; 2) taking care of ones physical   maintenance and basic needs - sleep, nutrition, and exercise; and 3) finding and building on   positive experiences. Group therapy participation: With prompting, this patient participated in the group. Therapeutic interventions reviewed and discussed: The group members were asked to   identify an emotion they are having and/or let the group know what they want to focus on for the   day as they continue to make discharge plans. The group members reviewed three DBT   suggestions regarding emotional regulation, in regards to focusing on one goal per day, taking   physical care of oneself, and recognizing and/or building positive experiences. It was suggested   that these three concepts can be seen as headings for their list of coping skills. The group   members were also provided worksheets on the topic discussed for their review and use on   their own time. Impression of participation: The patient said she was feeling, \"Good. ..a little anxious because  I was told I would be leaving today, but I haven't had it confirmed. \" She expressed no current SI/HI,  but did share that she had taken \"16 pills\" before coming into the hospital.\" She displayed no overt   psychotic symptoms in this group. She shared that she has been feeling \"anxious and depressed\"  because she has \"trouble saying 'No'. \" She spoke about working during the day and taking care  of a cousin's three young children in the evenings. She was encouraged to consider that she  might not be focusing enough on her own needs. Her affect was slightly anxious and mildly  euphoric. Her mood reflected her affect. She seemed to be refining her needs and plans  for her aftercare and discharge.

## 2018-08-06 NOTE — BH NOTES
Behavioral Health Transition Record to Provider    Patient Name: Usha Quinn  YOB: 1995  Medical Record Number: [id number]  Date of Admission: 8/2/2018  Date of Discharge: 8/6/2018    Attending Provider: Jonn Palomino MD  Discharging Provider: Jonn Palomino MD  To contact this individual call [phone] and ask the  to page. If unavailable, ask to be transferred to [facility] Provider on call. [facility] Provider will be available on call 24/7 and during holidays. Primary Care Provider: Ute Rascon DO    Allergies   Allergen Reactions    Aspirin Itching       Reason for Admission: Pt admitted under a voluntary basis for severe depression with suicidal ideations  proving to be an imminent danger to self and others and an inability to care for self.      Admission Diagnosis: Major Depressive Disorder  Major depressive disorder    * No surgery found *    Results for orders placed or performed during the hospital encounter of 08/02/18   GLUCOSE, FASTING   Result Value Ref Range    Glucose 85 65 - 100 MG/DL   TSH 3RD GENERATION   Result Value Ref Range    TSH 0.89 0.36 - 3.74 uIU/mL   LIPID PANEL   Result Value Ref Range    LIPID PROFILE          Cholesterol, total 120 <200 MG/DL    Triglyceride 102 <150 MG/DL    HDL Cholesterol 42 MG/DL    LDL, calculated 57.6 0 - 100 MG/DL    VLDL, calculated 20.4 MG/DL    CHOL/HDL Ratio 2.9 0 - 5.0     METABOLIC PANEL, BASIC   Result Value Ref Range    Sodium 136 136 - 145 mmol/L    Potassium 3.8 3.5 - 5.1 mmol/L    Chloride 105 97 - 108 mmol/L    CO2 25 21 - 32 mmol/L    Anion gap 6 5 - 15 mmol/L    Glucose 85 65 - 100 mg/dL    BUN 6 6 - 20 MG/DL    Creatinine 0.66 0.55 - 1.02 MG/DL    BUN/Creatinine ratio 9 (L) 12 - 20      GFR est AA >60 >60 ml/min/1.73m2    GFR est non-AA >60 >60 ml/min/1.73m2    Calcium 9.0 8.5 - 10.1 MG/DL       Immunizations administered during this encounter:   Immunization History   Administered Date(s) Administered    Influenza Vaccine (Quad) PF 2017       Screening for Metabolic Disorders for Patients on Antipsychotic Medications  (Data obtained from the EMR)    Estimated Body Mass Index  Estimated body mass index is 52.87 kg/(m^2) as calculated from the following:    Height as of an earlier encounter on 18: 5' 4\" (1.626 m). Weight as of an earlier encounter on 18: 139.7 kg (308 lb). Vital Signs/Blood Pressure  Visit Vitals    /84 (BP 1 Location: Left arm, BP Patient Position: Sitting)    Pulse 98    Temp 98.3 °F (36.8 °C)    Resp 18    SpO2 98%    Breastfeeding No       Blood Glucose/Hemoglobin A1c  Lab Results   Component Value Date/Time    Glucose 85 2018 06:30 AM    Glucose 85 2018 06:30 AM    Glucose (POC) 86 2018 06:18 PM       No results found for: HBA1C, HGBE8, VLH5FKTZ     Lipid Panel  Lab Results   Component Value Date/Time    Cholesterol, total 120 2018 06:30 AM    HDL Cholesterol 42 2018 06:30 AM    LDL, calculated 57.6 2018 06:30 AM    Triglyceride 102 2018 06:30 AM    CHOL/HDL Ratio 2.9 2018 06:30 AM        Discharge Diagnosis:  Major depressive disorder (ICD-10-CM: F32.9)    Discharge Plan: Patient discharged home into the care of family. DISCHARGE SUMMARY    NAME:Karson Pickens  : 1995  MRN: [id number]    The patient [facility] exhibits the ability to control behavior in a less restrictive environment. Patient's level of functioning is improving. No assaultive/destructive behavior has been observed for the past 24 hours. No suicidal/homicidal threat or behavior has been observed for the past 24 hours. There is no evidence of serious medication side effects. Patient has not been in physical or protective restraints for at least the past 24 hours. If weapons involved, how are they secured? No weapons involved. Is patient aware of and in agreement with discharge plan? Yes    Arrangements for medication:  Prescriptions filled for patient. Referral for substance abuse treatment? Patient is not using substances/Not applicable. Referral for smoking cessation needed? Patient is not a smoker/Not applicable. Copy of discharge instructions to provider?:  [facility]  Parents to . Keep all follow up appointments as scheduled, continue to take prescribed medications per physician instructions. Mental health crisis number:  724 or your local mental health crisis line number at [phone]. Discharge Medication List and Instructions:   Current Discharge Medication List      START taking these medications    Details   hydrOXYzine HCl (ATARAX) 50 mg tablet Take 1 Tab by mouth every four (4) hours as needed (anxiety (1st line)) for up to 10 days. Indications: anxiety  Qty: 60 Tab, Refills: 0         CONTINUE these medications which have CHANGED    Details   FLUoxetine (PROZAC) 20 mg capsule Take 1 Cap by mouth daily. Indications: ANXIETY WITH DEPRESSION  Qty: 30 Cap, Refills: 0             Unresulted Labs     None        To obtain results of studies pending at discharge, please contact [phone]    Follow-up Information     Follow up With Details Comments [address] [facility] [address], DO On 8/21/2018 You have a 3:30pm hospital discharge follow-up appointment with your primary care provider. The nurse navigator in this office will help link you to a therapist who accepts your insurance. [address]  [phone]      [address] [place] [address] to Walk-in Monday through Thursday between 8:30am and 5:00pm or Friday between 8:30am and 2:00pm to enroll in mental health follow-up services if the nurse navigator is unable to link you to a provider. Remember to bring your photo ID and proof of income.  Shawanda [address] [place] [id number]  [phone]          Advanced Directive:   Does the patient have an appointed surrogate decision maker? No  Does the patient have a Medical Advance Directive? No  Does the patient have a Psychiatric Advance Directive? No  If the patient does not have a surrogate or Medical Advance Directive AND Psychiatric Advance Directive, the patient was offered information on these advance directives Yes and Patient declined to complete    Patient Instructions: Please continue all medications until otherwise directed by physician. Tobacco Cessation Discharge Plan:   Is the patient a smoker and needs referral for smoking cessation? No  Patient referred to the following for smoking cessation with an appointment? Refused     Patient was offered medication to assist with smoking cessation at discharge? Refused  Was education for smoking cessation added to the discharge instructions? Yes    Alcohol/Substance Abuse Discharge Plan:   Does the patient have a history of substance/alcohol abuse and requires a referral for treatment? No  Patient referred to the following for substance/alcohol abuse treatment with an appointment? Refused  Patient was offered medication to assist with alcohol cessation at discharge? Refused  Was education for substance/alcohol abuse added to discharge instructions? Yes    Patient discharged to Home; discussed with patient/caregiver and provided to the patient/caregiver either in hard copy or electronically.

## 2018-08-06 NOTE — PROGRESS NOTES
Problem: Depressed Mood (Adult/Pediatric)  Goal: *STG: Participates in treatment plan  Outcome: Resolved/Met Date Met: 08/06/18  Patient is scheduled for discharge on Aug. 6th. She has been participating in her treatment plan, working with groups and notifying staff if she has any issues she needs assistance with.

## 2018-08-07 ENCOUNTER — OFFICE VISIT (OUTPATIENT)
Dept: FAMILY MEDICINE CLINIC | Age: 23
End: 2018-08-07

## 2018-08-07 VITALS
RESPIRATION RATE: 22 BRPM | HEART RATE: 92 BPM | SYSTOLIC BLOOD PRESSURE: 116 MMHG | BODY MASS INDEX: 48.82 KG/M2 | WEIGHT: 293 LBS | TEMPERATURE: 98.3 F | DIASTOLIC BLOOD PRESSURE: 73 MMHG | OXYGEN SATURATION: 98 % | HEIGHT: 65 IN

## 2018-08-07 DIAGNOSIS — I10 ESSENTIAL HYPERTENSION: ICD-10-CM

## 2018-08-07 DIAGNOSIS — Z13.1 SCREENING FOR DIABETES MELLITUS: Primary | ICD-10-CM

## 2018-08-07 DIAGNOSIS — E66.01 OBESITY, MORBID, BMI 50 OR HIGHER (HCC): ICD-10-CM

## 2018-08-07 LAB — HBA1C MFR BLD HPLC: 6.2 %

## 2018-08-07 NOTE — PROGRESS NOTES
Weight Loss Progress Note: Initial Physician Visit      Aparna Meza is a 26 y.o. female with BMI   who is here for her Initial Evaluation for the medical bariatric care. CC: I want to be healthier  Her psychiatrist told her she would feel better if she lost weight    She was just released 5 days ago from hospital for depression  She is taking prozac   She also has colt, using the cpap each night  She just got a new job  She has a gym membership with her dad, she has not been in a month      Weight History  Current weight 305 and BMI is Body mass index is 50.75 kg/(m^2). Goal weight 199  Highest weight 305  (See weight gain time line scanned into media section of chart)    Weight loss History  How many weight loss attempts have you had?  0  Which program were you most successful doing? 0    Significant Medical History    Have you ever taken appetite suppressants? no   If yes: Rx or OTC? If yes; Any negative side effects? Ever diagnosed with sleep apnea or put on CPAP no    Ever had bariatric surgery: no    Pregnant or planning on becoming pregnant w/in 6 months: no        Significant Psychosocial History   Any history of drug abuse or dependence: no  Current Major Lifestyle Changes: no  Any potential unsupportive people: no      History of binge eating disorder or anorexia : no   If yes, are you currently being treated no    Social History  Social History   Substance Use Topics    Smoking status: Former Smoker     Years: 1.00     Quit date: 1/16/2018    Smokeless tobacco: Never Used      Comment: 1/4 pack ppd for 6 months    Alcohol use No      Comment: rarely     How many times a week do you eat out? 2      Do you drink any EtOH?  no   If so, how much? Do you have upcoming any travel in the next 6 weeks?  no   If so, what do you have planned? Exercise  How many days a week do you currently exercise?  0 days  Have you ever been told by a physician not to exercise? no      Objective  Visit Vitals    /73    Pulse 92    Temp 98.3 °F (36.8 °C) (Oral)    Resp 22    Ht 5' 5\" (1.651 m)    Wt 305 lb (138.3 kg)    SpO2 98%    BMI 50.75 kg/m2       Waist Circumference: See Weight Management Doc Flowsheet  Neck Circumference: See Weight Management Doc Flowsheet  Percent Body Fat: See Weight Management Doc Flowsheet  Weight Metrics 8/7/2018 8/7/2018 8/2/2018 7/6/2018 6/25/2018 6/13/2018 5/14/2018   Weight - 305 lb 308 lb 299 lb 304 lb 3.2 oz 304 lb 14.3 oz 308 lb   Neck Circ (inches) 18.25 - - - - - -   Waist Measure Inches 55 - - - - - -   BMI - 50.75 kg/m2 52.87 kg/m2 52.97 kg/m2 53.89 kg/m2 54.01 kg/m2 51.25 kg/m2         Labs: today    Review of Systems  Complete ROS negative except where noted above      Physical Exam    Vital Signs Reviewed  Weight Management Metrics Reviewed    Vital Signs Reviewed  Appearance: Obese, A&O x 3, NAD  HEENT:  NC/AT, EOMI, PERRL, No scleral icterus, malampatti score:4  Skin:    Skin tags - no   Acanthosis Nigricans - no  Neck:  No nodes, thyromegaly no  Heart:  RRR without M/R/G  Lungs:  CTAB, no rhonchi, rales, or wheezes with good air exchange   Abdomen:  Non-tender, pos bowel sounds; hepatomegaly - no  Ext:  No C/C/E        Assessment & Plan  Diagnoses and all orders for this visit:    1. Screening for diabetes mellitus  -     AMB POC HEMOGLOBIN A1C    2. Obesity, morbid, BMI 50 or higher ([facility] [address]  -     REFERRAL TO PSYCHOLOGY    3. Essential hypertension      Based on his history and exam, Thuy Byrne is a good candidate for the Non-[facility] Weight Loss Program     Diet Plan: day 1-4  For 2 weeks then decide if she wants to move forward      Activity Plan: start moving daily aiming for 150 mins a week of cardio and 2 days of resistance work    Medication Plan  None yet. Assess need in 2 weeks      There are no Patient Instructions on file for this visit.     Follow-up Disposition: Not on File    Over 50% of the 30 minutes face to face time with Karson consisted of counseling & coordinating and/or discussing treatment plans in reference to her obesity The primary encounter diagnosis was Screening for diabetes mellitus. Diagnoses of Obesity, morbid, BMI 50 or higher ([address] and Essential hypertension were also pertinent to this visit.

## 2018-08-07 NOTE — MR AVS SNAPSHOT
[address] [address] 
[phone] Patient: Jose Valenzuela MRN: [id number] :1995 Visit Information Date & Time Provider Department Dept. Phone Encounter #  
 2018  2:45 PM Mu Tavares MD Roxie Khalil [phone] [id number]  
  
 2018  3:30 PM  
TRANSITIONAL CARE MANAGEMENT with Brent Rose DO [address] [facility] ([address]) Appt Note: [facility] discharge 18 for [address] per  from [facility] [address] [facility]  
[phone]  
  
   
 [address] [facility] [address] Upcoming Health Maintenance Date Due  
 HPV Age 9Y-34Y (1 of 3 - Female 3 Dose Series) 3/19/2008 DTaP/Tdap/Td series (1 - Tdap) 3/19/2018 PAP AKA CERVICAL CYTOLOGY 3/19/2018 Influenza Age 5 to Adult 2018 Allergies as of 2018  Review Complete On: 2018 By: Mu Tavares MD  
  
 Severity Noted Reaction Type Reactions Aspirin  2018    Itching Current Immunizations  Reviewed on 2017 Name Date Influenza Vaccine (Quad) PF 2017 Not reviewed this visit You Were Diagnosed With   
  
 Codes Comments Screening for diabetes mellitus    -  Primary ICD-10-CM: Z13.1 ICD-9-CM: V77.1 Obesity, morbid, BMI 50 or higher (HCC)     ICD-10-CM: E66.01 
ICD-9-CM: 278.01 Essential hypertension     ICD-10-CM: I10 
ICD-9-CM: 401.9 Vitals BP Pulse Temp Resp Height(growth percentile) Weight(growth percentile) 116/73 92 98.3 °F (36.8 °C) (Oral) 22 5' 5\" (1.651 m) 305 lb (138.3 kg) SpO2 BMI OB Status Smoking Status 98% 50.75 kg/m2 Unknown Former Smoker Vitals History BMI and BSA Data Body Mass Index Body Surface Area 50.75 kg/m 2 2.52 m 2 Preferred Pharmacy Pharmacy Name Phone [address] [facility], [address] [place] [address] [address] [phone] Your Updated Medication List  
  
   
This list is accurate as of 8/7/18  3:59 PM.  Always use your most recent med list.  
  
  
  
  
 FLUoxetine 20 mg capsule Commonly known as:  PROzac Take 1 Cap by mouth daily. Indications: ANXIETY WITH DEPRESSION  
  
 hydrOXYzine HCl 50 mg tablet Commonly known as:  ATARAX Take 1 Tab by mouth every four (4) hours as needed (anxiety (1st line)) for up to 10 days. Indications: anxiety We Performed the Following AMB POC HEMOGLOBIN A1C [57973 CPT(R)] REFERRAL TO PSYCHOLOGY [SVK70 Custom] Comments:  
 Dr Phu Christie [phone] [facility] 
eval and treat for colt Referral Information Referral ID Referred By Referred To  
  
 [id number] Dillon Mirza Not Available Visits Status Start Date End Date 1 New Request 8/7/18 8/7/19 If your referral has a status of pending review or denied, additional information will be sent to support the outcome of this decision. Introducing [facility]! [employer] introduces [employer] patient portal. Now you can access parts of your medical record, email your doctor's office, and request medication refills online. 1. In your internet browser, go to [email or web address] 2. Click on the First Time User? Click Here link in the Sign In box. You will see the New Member Sign Up page. 3. Enter your [employer] Access Code exactly as it appears below. You will not need to use this code after youve completed the sign-up process. If you do not sign up before the expiration date, you must request a new code. · [employer] Access Code: [email or web address] Expires: 8/12/2018  2:32 PM 
 
4. Enter the last four digits of your Social Security Number (xxxx) and Date of Birth (mm/dd/yyyy) as indicated and click Submit. You will be taken to the next sign-up page. 5. Create a VIVA ID. This will be your VIVA login ID and cannot be changed, so think of one that is secure and easy to remember. 6. Create a VIVA password. You can change your password at any time. 7. Enter your Password Reset Question and Answer. This can be used at a later time if you forget your password. 8. Enter your e-mail address. You will receive e-mail notification when new information is available in [address] 9. Click Sign Up. You can now view and download portions of your medical record. 10. Click the Download Summary menu link to download a portable copy of your medical information. If you have questions, please visit the Frequently Asked Questions section of the VIVA website. Remember, VIVA is NOT to be used for urgent needs. For medical emergencies, dial 911. Now available from your iPhone and Android! Please provide this summary of care documentation to your next provider. Your primary care clinician is listed as Wally Akins. If you have any questions after today's visit, please call [phone].

## 2018-08-07 NOTE — PROGRESS NOTES
1. Have you been to the ER, urgent care clinic since your last visit? Hospitalized since your last visit? [address] [place] [address] 8/2/18 for major depressive disorder/ OD on depression medication    2. Have you seen or consulted any other health care providers outside of the [facility] since your last visit? Include any pap smears or colon screening.  No         Chief Complaint   Patient presents with    Weight Management

## 2018-08-16 NOTE — PROGRESS NOTES
The blood sugar is in the prediabetes range.   The changes we discussed will help you get this back down to normal  We will discuss it more when you come back

## 2018-08-21 ENCOUNTER — OFFICE VISIT (OUTPATIENT)
Dept: FAMILY MEDICINE CLINIC | Age: 23
End: 2018-08-21

## 2018-08-21 DIAGNOSIS — G47.33 OSA (OBSTRUCTIVE SLEEP APNEA): ICD-10-CM

## 2018-08-21 DIAGNOSIS — Z09 HOSPITAL DISCHARGE FOLLOW-UP: ICD-10-CM

## 2018-08-21 DIAGNOSIS — F32.1 CURRENT MODERATE EPISODE OF MAJOR DEPRESSIVE DISORDER WITHOUT PRIOR EPISODE (HCC): Primary | ICD-10-CM

## 2018-08-21 DIAGNOSIS — F41.9 ANXIETY: ICD-10-CM

## 2018-08-21 NOTE — PROGRESS NOTES
Jarrell Marvin is an 26 y.o. female who presents for hospital follow-up for depression    Pt states that she was admitted to [facility] inpatient unit from 8/2-8/6. Pt states she was having a bad day on 8/2. She was overwhelmed and took 17 prozac pills. She told father. Was taken to ED and admitted. Prozac dose was kept the same and atarax was added. Since discharge she has been doing well. No SI/HI. Tolerating medications. Has not seen psyc outpatient yet, as she was waiting for insurance coverage to begin. She plans to walk in to [facility] clinic in 2 days. Asking for names of psyc and counselors to make appt with. OWEN: still has not obtained a CPAP machine yet. Plans to follow-up with sleep medicine today. Sleep has not been optimized. Not exercising daily. Allergies - reviewed: Allergies   Allergen Reactions    Aspirin Itching         Medications - reviewed:   Current Outpatient Prescriptions   Medication Sig    FLUoxetine (PROZAC) 20 mg capsule Take 1 Cap by mouth daily. Indications: ANXIETY WITH DEPRESSION     No current facility-administered medications for this visit.           Past Medical History - reviewed:  Past Medical History:   Diagnosis Date    Depression     Kidney stones     Mood disorder ([address]     Morbid obesity ([address]     Psychiatric disorder     depression    Sleep apnea     Does not have CPAP as yet    Suicidal thoughts          Past Surgical History - reviewed:   Past Surgical History:   Procedure Laterality Date    HX UROLOGICAL      Stenting x 4 (Starting in 2016, developed severe infection)         Social History - reviewed:  Social History     Social History    Marital status: SINGLE     Spouse name: N/A    Number of children: N/A    Years of education: N/A     Occupational History          started in 3/17     Social History Main Topics    Smoking status: Former Smoker     Years: 1.00     Quit date: 1/16/2018    Smokeless tobacco: Never Used      Comment: 1/4 pack ppd for 6 months    Alcohol use No      Comment: rarely    Drug use: No      Comment: Has done Marijuan    Sexual activity: No     Other Topics Concern    Not on file     Social History Narrative    26year old AA female admitted for reportedly taking a handful of prozac. Pt has a past suicide attempt and this is her first psychiatriatric admission. Pt lives with her parents. She is a  . She has a 12th grade education. Family History - reviewed:  Family History   Problem Relation Age of Onset    Heart Disease Father 55    Hypertension Father     Sleep Apnea Father     No Known Problems Sister     Cancer Maternal Grandmother      Brain Cancer    Heart Disease Paternal Grandmother     Cancer Paternal Grandfather          Immunizations - reviewed:   Immunization History   Administered Date(s) Administered    Influenza Vaccine (Quad) PF 11/16/2017     ROS  General/Constitutional:   No headache, fever, fatigue    Neck:   No swelling, masses, stiffness, pain, or limited movement     Cardiac:    No chest pain      Respiratory:   No cough or shortness of breath     GI: No nausea/vomiting, bloody or dark stools       Neurological:   No loss of consciousness, dizziness, seizures, dysarthria, cognitive changes, memory changes,  problems with balance, or unilateral   Depression. No SI/HI. Physical Exam  Visit Vitals    /78    Pulse 81    Temp 98.4 °F (36.9 °C)    Resp 16    Ht 5' 5\" (1.651 m)    Wt 304 lb (137.9 kg)    LMP 08/11/2018    SpO2 98%    BMI 50.59 kg/m2       Constitutional: She appears well-developed and morbidly obese. No distress. Mouth: hypertrophy of adenoids. Cardiovascular: Normal rate, regular rhythm, normal heart sounds and intact distal pulses.  Exam reveals no gallop and no friction rub.  No murmur heard. No tenderness to palpation in the chest.   Pulmonary/Chest: Effort normal and breath sounds normal. No respiratory distress. She has no wheezes. She has no rales. Abdominal: Soft. Bowel sounds are normal. She exhibits no distension and no mass. No tenderness. There is no obvious rebound and guarding. Musculoskeletal: She exhibits no edema. No CVA tenderness. Ext: No calf tenderness. No edema.    Psychiatric: She has a normal mood and affect. Her behavior is normal. Judgment and thought content normal.    PHQ9 score 5  GAD7 score 4    Assessment/Plan    ICD-10-CM ICD-9-CM    1. Current moderate episode of major depressive disorder without prior episode ([facility] F32.1 296.22 REFERRAL TO BEHAVIORAL HEALTH      REFERRAL TO PSYCHIATRY      AK PATIENT SCREENED FOR DEPRESSION   2. Anxiety F41.9 300.00 REFERRAL TO BEHAVIORAL HEALTH      REFERRAL TO PSYCHIATRY   3. Hospital discharge follow-up Z09 V67.59    4. OWEN (obstructive sleep apnea) G47.33 327.23    5. BMI 50.0-59.9, adult ([facility] Z68.43 V85.43      Depression: controlled. Continue medication. No refills needed. No SI/HI. Referrals made. Precautions given. OWEN: follow-up with sleep medicine to ensure she obtain CPAP    Pt is aware of the [address] [facility] Phone Number [phone]    I have reviewed/discussed the above normal BMI with the patient. I have recommended the following interventions: dietary management education, guidance, and counseling, encourage exercise and monitor weight . Marianne Valderrama Follow-up Disposition:  Return in about 4 weeks (around 9/18/2018) for f/u on depression and make sure she has est with psyc. I have discussed the diagnosis with the patient and the intended plan as seen in the above orders. Patient verbalized understanding of the plan and agrees with the plan. The patient has received an after-visit summary and questions were answered concerning future plans. I have discussed medication side effects and warnings with the patient as well. Informed patient to return to the office if new symptoms arise.         Toñito Ruiz Brittany Nichole DO  Family Medicine Resident

## 2018-08-21 NOTE — MR AVS SNAPSHOT
[address] 
[phone] Patient: Alfonzo Daley MRN: [id number] :1995 Visit Information Date & Time Provider Department Dept. Phone Encounter #  
 2018  3:30 PM Kristin Hernández, [address] [facility] [phone] [id number] Upcoming Health Maintenance Date Due  
 HPV Age 9Y-34Y (1 of 3 - Female 3 Dose Series) 3/19/2008 DTaP/Tdap/Td series (1 - Tdap) 3/19/2018 PAP AKA CERVICAL CYTOLOGY 3/19/2018 Influenza Age 5 to Adult 2018 Allergies as of 2018  Review Complete On: 2018 By: Samara José Severity Noted Reaction Type Reactions Aspirin  2018    Itching Current Immunizations  Reviewed on 2017 Name Date Influenza Vaccine (Quad) PF 2017 Not reviewed this visit You Were Diagnosed With   
  
 Codes Comments Current moderate episode of major depressive disorder without prior episode ([facility]    -  Primary ICD-10-CM: F32.1 ICD-9-CM: 296.22 Anxiety     ICD-10-CM: F41.9 ICD-9-CM: 300.00 Vitals BP Pulse Temp Resp Height(growth percentile) Weight(growth percentile) 115/78 98 98.4 °F (36.9 °C) 16 5' 5\" (1.651 m) 304 lb (137.9 kg) LMP SpO2 BMI OB Status Smoking Status 2018 98% 50.59 kg/m2 Unknown Former Smoker BMI and BSA Data Body Mass Index Body Surface Area 50.59 kg/m 2 2.51 m 2 Preferred Pharmacy Pharmacy Name Phone [address] [facility], [address] [facility] [address] [place] [address] [phone] Your Updated Medication List  
  
   
This list is accurate as of 18  4:27 PM.  Always use your most recent med list.  
  
  
  
  
 FLUoxetine 20 mg capsule Commonly known as:  PROzac Take 1 Cap by mouth daily. Indications: ANXIETY WITH DEPRESSION We Performed the Following REFERRAL TO BEHAVIORAL HEALTH [REF8 Custom] Comments:  
 Bianca Johnson, [address] [facility] [address] [place], [address] 
[phone] [facility] [address], [place], [address] [phone] [facility] Breanna Sosa, Ph.D. 
350 [facility] Phone: [phone] FAX: [phone] 
[address], [facility] Phone: [phone] FAX: [phone] 
201 [facility], [address] [facility] 7 [address] [facility] [phone] FAX: [phone] [address] [facility] [address] [facility] [phone] FAX: [address] [facility], [address] [facility], [address] [place], [place] [address]way 13 South [facility] [place] Victor Hugo Harper, Ph.D 
1111 [facility] [address] 102 [facility], 103 [facility]  
[phone] [facility] [address] 10[address] [facility] [address], [address] 
[phone] [facility] Office 551 [facility] [address], 15 [facility] 
[phone] [place] [address], 102 [facility], [address] [phone] [facility] [place] Duana Hodgkin ([place] near [facility]) [phone] [address] Location [address] [place], [address] 47 [facility] [address], [address] [place], [address] 
[phone] [facility] (for residents of [place]) Street Address [address] 102 [facility] Phone Numbers 
[phone] TDD [phone] FAX [phone] Crisis Intervention 24 Hours/Day  
[phone] [facility] (for residents of [facility], [place], [facility], and [address] [place] [address]) Referral/Intake Services [phone] Emergency Services (24 hrs 365 days) [phone] (all clinics during business hours & 201 [facility] location for after hours) REFERRAL TO PSYCHIATRY [REF91 Custom] Comments:  
 Please evaluate patient for depression and anxiety [address] [facility] [place] [address], [address] [facility], [address] Phone: [phone] Fax: [phone] [address] [facility], [address] [facility], [address] Phone: [phone] Fax: [phone] [place], [address] Odalis Dorsey M.D. [facility] [phone] [facility] 301 [place] [address] [phone] [address] [place] [address], [address] [place], [facility] [address] 
[phone] Referral Information Referral ID Referred By Referred To  
  
 [id number] Mitchell Holly Not Available Visits Status Start Date End Date 1 New Request 8/21/18 8/21/19 If your referral has a status of pending review or denied, additional information will be sent to support the outcome of this decision. Referral ID Referred By Referred To  
 [id number] Mitchell Holly Not Available Visits Status Start Date End Date 1 New Request 8/21/18 8/21/19 If your referral has a status of pending review or denied, additional information will be sent to support the outcome of this decision. Introducing [facility]! Asia Restrepo introduces [employer] patient portal. Now you can access parts of your medical record, email your doctor's office, and request medication refills online. 1. In your internet browser, go to [email or web address] 2. Click on the First Time User? Click Here link in the Sign In box. You will see the New Member Sign Up page. 3. Enter your [employer] Access Code exactly as it appears below. You will not need to use this code after youve completed the sign-up process. If you do not sign up before the expiration date, you must request a new code. · [employer] Access Code: [email or web address] Expires: 11/19/2018  4:27 PM 
 
4. Enter the last four digits of your Social Security Number (xxxx) and Date of Birth (mm/dd/yyyy) as indicated and click Submit. You will be taken to the next sign-up page. 5. Create a TruVitals ID. This will be your TruVitals login ID and cannot be changed, so think of one that is secure and easy to remember. 6. Create a [employer] password. You can change your password at any time. 7. Enter your Password Reset Question and Answer. This can be used at a later time if you forget your password. 8. Enter your e-mail address. You will receive e-mail notification when new information is available in [address] 9. Click Sign Up. You can now view and download portions of your medical record. 10. Click the Download Summary menu link to download a portable copy of your medical information. If you have questions, please visit the Frequently Asked Questions section of the [employer]itals website. Remember, [employer]itals is NOT to be used for urgent needs. For medical emergencies, dial 911. Now available from your iPhone and Android! Please provide this summary of care documentation to your next provider. Your primary care clinician is listed as Layman Keen. If you have any questions after today's visit, please call [phone].

## 2018-08-21 NOTE — PROGRESS NOTES
Chief Complaint   Patient presents with   [facility][1] Follow Up     Pt is being dseen to follow-up on hospital visit.  Pt had an anxiety attack and overdosed on deppression medications

## 2018-08-23 VITALS
BODY MASS INDEX: 48.82 KG/M2 | HEART RATE: 81 BPM | HEIGHT: 65 IN | WEIGHT: 293 LBS | SYSTOLIC BLOOD PRESSURE: 115 MMHG | TEMPERATURE: 98.4 F | DIASTOLIC BLOOD PRESSURE: 78 MMHG | RESPIRATION RATE: 16 BRPM | OXYGEN SATURATION: 98 %

## 2018-08-24 NOTE — PATIENT INSTRUCTIONS
Suicidal Thoughts and Behavior: Care Instructions  Your Care Instructions  You have been seen by a doctor because you've had thoughts about killing yourself. Maybe you have tried to do it. This is much different from having fleeting thoughts of death, which many people have from time to time. Your doctor and support team will work hard to help keep you safe. Your team may include a , a , and a counselor. Most people who think about suicide don't want to die. They think suicide will end their problems and pain. People who consider suicide often feel hopeless, helpless, and worthless. These thoughts can make a person feel that there is no other choice. But you do have a choice. Help is always available. The doctor and staff members are taking you and your pain very seriously. It is important to remember that there are people who are willing and able to talk with you about your suicidal thoughts. Treatment and close follow-up care can help you feel better about life. Thoughts of hopelessness and suicide may come from being depressed. Depression is a medical condition. When you have depression, there may be problems with activity levels in certain parts of your brain. Chemicals in your brain called neurotransmitters may be out of balance. But depression can be treated. Treatment for depression includes counseling, medicines, and lifestyle changes. With treatment, you can feel better. Your doctor doesn't want you to hurt yourself. He or she may ask you to sign a \"no harm\" agreement or contract. This contract is your promise that you will not hurt yourself between now and your next visit. Be completely honest with your doctor if you feel that you can't sign it. You will get help. Follow-up care is a key part of your treatment and safety. Be sure to make and go to all appointments, and call your doctor if you are having problems.  It's also a good idea to know your test results and keep a list of the medicines you take. How can you care for yourself at home? · Talk to someone. Reach out to family members, friends, your doctor, or a counselor. Be open and honest with them about your thoughts and feelings. · Be safe with medicines. Take your medicines exactly as prescribed. Call your doctor if you think you are having a problem with your medicine. · Avoid illegal drugs and alcohol. · Attend all counseling sessions recommended by your doctor. · Have someone take away sharp or dangerous objects, guns, and drugs from your home. · Keep the numbers for these national suicide hotlines: 1-912-151-TALK (8-604.643.8116) and 4-895-RIVCMWL (7-230.791.9535). When should you call for help? Call 911 anytime you think you may need emergency care. For example, call if:    · You feel you cannot stop from hurting yourself or someone else.   [facility] your doctor now or seek immediate medical care if:    · You have one or more warning signs of suicide. For example, call if:  ¨ You feel like giving away your possessions. ¨ You use illegal drugs or drink alcohol heavily. ¨ You talk or write about death. This may include writing suicide notes and talking about guns, knives, or pills. ¨ You start to spend a lot of time alone or spend more time alone than usual.     · You hear voices.     · You start acting in an aggressive way that's not normal for you.    Watch closely for changes in your health, and be sure to contact your doctor if you have any problems. Where can you learn more? Go to http://maryann-jm.info/. Enter U214 in the search box to learn more about \"Suicidal Thoughts and Behavior: Care Instructions. \"  Current as of: December 7, 2017  Content Version: 11.7  © 1881-7064 [employer]. Care instructions adapted under license by [employer] (which disclaims liability or warranty for this information).  If you have questions about a medical condition or this instruction, always ask your healthcare professional. Willy Ariza any warranty or liability for your use of this information. Depression and Chronic Disease: Care Instructions  Your Care Instructions    A chronic disease is one that you have for a long time. Some chronic diseases can be controlled, but they usually cannot be cured. Depression is common in people with chronic diseases, but it often goes unnoticed. Many people have concerns about seeking treatment for a mental health problem. You may think it's a sign of weakness, or you don't want people to know about it. It's important to overcome these reasons for not seeking treatment. Treating depression or anxiety is good for your health. Follow-up care is a key part of your treatment and safety. Be sure to make and go to all appointments, and call your doctor if you are having problems. It's also a good idea to know your test results and keep a list of the medicines you take. How can you care for yourself at home? Watch for symptoms of depression  The symptoms of depression are often subtle at first. You may think they are caused by your disease rather than depression. Or you may think it is normal to be depressed when you have a chronic disease. If you are depressed you may:  · Feel sad or hopeless. · Feel guilty or worthless. · Not enjoy the things you used to enjoy. · Feel hopeless, as though life is not worth living. · Have trouble thinking or remembering. · Have low energy, and you may not eat or sleep well. · Pull away from others. · Think often about death or killing yourself. (Keep the numbers for these national suicide hotlines: 2-987-495-TALK [1-794.215.9425] and 3-301-FGXFETN [1-438.739.2732]. )  Get treatment  By treating your depression, you can feel more hopeful and have more energy. If you feel better, you may take better care of yourself, so your health may improve.   · Talk to your doctor if you have any changes in mood during treatment for your disease. · Ask your doctor for help. Counseling, antidepressant medicine, or a combination of the two can help most people with depression. Often a combination works best. Counseling can also help you cope with having a chronic disease. When should you call for help? Call 911 anytime you think you may need emergency care. For example, call if:    · You feel like hurting yourself or someone else.     · Someone you know has depression and is about to attempt or is attempting suicide.   [facility] your doctor now or seek immediate medical care if:    · You hear voices.     · Someone you know has depression and:  ¨ Starts to give away his or her possessions. ¨ Uses illegal drugs or drinks alcohol heavily. ¨ Talks or writes about death, including writing suicide notes or talking about guns, knives, or pills. ¨ Starts to spend a lot of time alone. ¨ Acts very aggressively or suddenly appears calm.    Watch closely for changes in your health, and be sure to contact your doctor if:    · You do not get better as expected. Where can you learn more? Go to http://maryann-jm.info/. Enter Z879 in the search box to learn more about \"Depression and Chronic Disease: Care Instructions. \"  Current as of: December 7, 2017  Content Version: 11.7  © 5097-3237 [employer]. Care instructions adapted under license by [employer] (which disclaims liability or warranty for this information). If you have questions about a medical condition or this instruction, always ask your healthcare professional. [place] [address] any warranty or liability for your use of this information.

## 2018-10-05 ENCOUNTER — HOSPITAL ENCOUNTER (EMERGENCY)
Age: 23
Discharge: HOME | End: 2018-10-05
Attending: STUDENT IN AN ORGANIZED HEALTH CARE EDUCATION/TRAINING PROGRAM
Payer: COMMERCIAL

## 2018-10-05 ENCOUNTER — APPOINTMENT (OUTPATIENT)
Dept: GENERAL RADIOLOGY | Age: 23
End: 2018-10-05
Attending: NURSE PRACTITIONER
Payer: COMMERCIAL

## 2018-10-05 ENCOUNTER — TELEPHONE (OUTPATIENT)
Dept: FAMILY MEDICINE CLINIC | Age: 23
End: 2018-10-05

## 2018-10-05 VITALS
DIASTOLIC BLOOD PRESSURE: 68 MMHG | OXYGEN SATURATION: 97 % | WEIGHT: 290 LBS | HEART RATE: 89 BPM | SYSTOLIC BLOOD PRESSURE: 129 MMHG | BODY MASS INDEX: 48.32 KG/M2 | TEMPERATURE: 97.7 F | HEIGHT: 65 IN | RESPIRATION RATE: 16 BRPM

## 2018-10-05 DIAGNOSIS — M54.2 NECK PAIN: Primary | ICD-10-CM

## 2018-10-05 DIAGNOSIS — R53.83 MALAISE AND FATIGUE: ICD-10-CM

## 2018-10-05 DIAGNOSIS — R53.81 MALAISE AND FATIGUE: ICD-10-CM

## 2018-10-05 DIAGNOSIS — M62.838 MUSCLE SPASM: ICD-10-CM

## 2018-10-05 LAB
ALBUMIN SERPL-MCNC: 3.6 G/DL (ref 3.5–5)
ALBUMIN/GLOB SERPL: 0.7 {RATIO} (ref 1.1–2.2)
ALP SERPL-CCNC: 124 U/L (ref 45–117)
ALT SERPL-CCNC: 19 U/L (ref 12–78)
ANION GAP SERPL CALC-SCNC: 7 MMOL/L (ref 5–15)
APPEARANCE UR: CLEAR
AST SERPL-CCNC: 14 U/L (ref 15–37)
ATRIAL RATE: 110 BPM
BACTERIA URNS QL MICRO: ABNORMAL /HPF
BASOPHILS # BLD: 0 K/UL (ref 0–0.1)
BASOPHILS NFR BLD: 0 % (ref 0–1)
BILIRUB SERPL-MCNC: 0.2 MG/DL (ref 0.2–1)
BILIRUB UR QL: NEGATIVE
BUN SERPL-MCNC: 11 MG/DL (ref 6–20)
BUN/CREAT SERPL: 13 (ref 12–20)
CALCIUM SERPL-MCNC: 9.4 MG/DL (ref 8.5–10.1)
CALCULATED P AXIS, ECG09: 51 DEGREES
CALCULATED R AXIS, ECG10: 41 DEGREES
CALCULATED T AXIS, ECG11: 31 DEGREES
CHLORIDE SERPL-SCNC: 103 MMOL/L (ref 97–108)
CO2 SERPL-SCNC: 29 MMOL/L (ref 21–32)
COLOR UR: ABNORMAL
CREAT SERPL-MCNC: 0.85 MG/DL (ref 0.55–1.02)
DIAGNOSIS, 93000: NORMAL
DIFFERENTIAL METHOD BLD: ABNORMAL
EOSINOPHIL # BLD: 0.1 K/UL (ref 0–0.4)
EOSINOPHIL NFR BLD: 1 % (ref 0–7)
EPITH CASTS URNS QL MICRO: ABNORMAL /LPF
ERYTHROCYTE [DISTWIDTH] IN BLOOD BY AUTOMATED COUNT: 15.9 % (ref 11.5–14.5)
GLOBULIN SER CALC-MCNC: 5.2 G/DL (ref 2–4)
GLUCOSE SERPL-MCNC: 95 MG/DL (ref 65–100)
GLUCOSE UR STRIP.AUTO-MCNC: NEGATIVE MG/DL
HCG UR QL: NEGATIVE
HCT VFR BLD AUTO: 42.1 % (ref 35–47)
HGB BLD-MCNC: 13.5 G/DL (ref 11.5–16)
HGB UR QL STRIP: ABNORMAL
IMM GRANULOCYTES # BLD: 0.1 K/UL (ref 0–0.04)
IMM GRANULOCYTES NFR BLD AUTO: 0 % (ref 0–0.5)
KETONES UR QL STRIP.AUTO: NEGATIVE MG/DL
LEUKOCYTE ESTERASE UR QL STRIP.AUTO: NEGATIVE
LYMPHOCYTES # BLD: 3 K/UL (ref 0.8–3.5)
LYMPHOCYTES NFR BLD: 23 % (ref 12–49)
MCH RBC QN AUTO: 27.1 PG (ref 26–34)
MCHC RBC AUTO-ENTMCNC: 32.1 G/DL (ref 30–36.5)
MCV RBC AUTO: 84.4 FL (ref 80–99)
MONOCYTES # BLD: 0.7 K/UL (ref 0–1)
MONOCYTES NFR BLD: 5 % (ref 5–13)
MUCOUS THREADS URNS QL MICRO: ABNORMAL /LPF
NEUTS SEG # BLD: 9 K/UL (ref 1.8–8)
NEUTS SEG NFR BLD: 70 % (ref 32–75)
NITRITE UR QL STRIP.AUTO: NEGATIVE
NRBC # BLD: 0 K/UL (ref 0–0.01)
NRBC BLD-RTO: 0 PER 100 WBC
P-R INTERVAL, ECG05: 136 MS
PH UR STRIP: 5 [PH] (ref 5–8)
PLATELET # BLD AUTO: 401 K/UL (ref 150–400)
PMV BLD AUTO: 9.5 FL (ref 8.9–12.9)
POTASSIUM SERPL-SCNC: 3.7 MMOL/L (ref 3.5–5.1)
PROT SERPL-MCNC: 8.8 G/DL (ref 6.4–8.2)
PROT UR STRIP-MCNC: NEGATIVE MG/DL
Q-T INTERVAL, ECG07: 312 MS
QRS DURATION, ECG06: 76 MS
QTC CALCULATION (BEZET), ECG08: 422 MS
RBC # BLD AUTO: 4.99 M/UL (ref 3.8–5.2)
RBC #/AREA URNS HPF: ABNORMAL /HPF (ref 0–5)
SODIUM SERPL-SCNC: 139 MMOL/L (ref 136–145)
SP GR UR REFRACTOMETRY: >1.03 (ref 1–1.03)
UR CULT HOLD, URHOLD: NORMAL
UROBILINOGEN UR QL STRIP.AUTO: 0.2 EU/DL (ref 0.2–1)
VENTRICULAR RATE, ECG03: 110 BPM
WBC # BLD AUTO: 12.8 K/UL (ref 3.6–11)
WBC URNS QL MICRO: ABNORMAL /HPF (ref 0–4)

## 2018-10-05 PROCEDURE — 72050 X-RAY EXAM NECK SPINE 4/5VWS: CPT

## 2018-10-05 PROCEDURE — 93005 ELECTROCARDIOGRAM TRACING: CPT

## 2018-10-05 PROCEDURE — 99284 EMERGENCY DEPT VISIT MOD MDM: CPT

## 2018-10-05 PROCEDURE — 85025 COMPLETE CBC W/AUTO DIFF WBC: CPT | Performed by: STUDENT IN AN ORGANIZED HEALTH CARE EDUCATION/TRAINING PROGRAM

## 2018-10-05 PROCEDURE — 81025 URINE PREGNANCY TEST: CPT

## 2018-10-05 PROCEDURE — 36415 COLL VENOUS BLD VENIPUNCTURE: CPT | Performed by: STUDENT IN AN ORGANIZED HEALTH CARE EDUCATION/TRAINING PROGRAM

## 2018-10-05 PROCEDURE — 87086 URINE CULTURE/COLONY COUNT: CPT | Performed by: NURSE PRACTITIONER

## 2018-10-05 PROCEDURE — 80053 COMPREHEN METABOLIC PANEL: CPT | Performed by: STUDENT IN AN ORGANIZED HEALTH CARE EDUCATION/TRAINING PROGRAM

## 2018-10-05 PROCEDURE — 87147 CULTURE TYPE IMMUNOLOGIC: CPT | Performed by: NURSE PRACTITIONER

## 2018-10-05 PROCEDURE — 81001 URINALYSIS AUTO W/SCOPE: CPT | Performed by: STUDENT IN AN ORGANIZED HEALTH CARE EDUCATION/TRAINING PROGRAM

## 2018-10-05 RX ORDER — METHOCARBAMOL 750 MG/1
750 TABLET, FILM COATED ORAL 4 TIMES DAILY
Qty: 15 TAB | Refills: 0 | Status: SHIPPED | OUTPATIENT
Start: 2018-10-05 | End: 2020-07-25

## 2018-10-05 NOTE — TELEPHONE ENCOUNTER
Spoke with Ms. Audrey Schneider, stated that she has not been feeling well, complaints of SOB, chest pain, shoulder pain, lower back pain, and tingling going down left arm. This nurse asked if patient has someone to drive her to ER, stated that her father would. Then instructed patient to go to the ER to be evaluated. Gave instructions for patient that if everything okay, to give office a call to set up appointment at [facility]. Patient verbalized understanding.

## 2018-10-05 NOTE — ED TRIAGE NOTES
Patient c/o left sided neck pain that radiates to the let arm x 1 week, episode of chest tightness with lightheadedness and bilateral tingling in arms that has resolved, and low back pain.

## 2018-10-05 NOTE — Clinical Note
Thank you for allowing us to care for you today. Please follow-up with your Primary Care provider in the next 2-3 days if your symptoms do not improve. Plan for home:  
 
Robaxin up to 4 times daily as needed for muscle spasm. No driving while on th is medication. Gentle stretching of the neck

## 2018-10-05 NOTE — ED PROVIDER NOTES
HPI Comments: Patient is a 21-year-old morbidly obese female with a past history significant for depression, kidney stones, or disorder, depression, anxiety, sleep apnea and a history of suicidal ideation. Patient states earlier this week she began having nausea, feeling tired. She states she soaked all day yesterday. The last week and a half she complains of neck pain with tingling down both arms, chest and upper back tightness, abdominal pain that generalized in the last month and low back pain. Patient also states that she gets \"hot really fast\". Patient states that she called her PCP prior to coming in today and they sent her to the ER for a workup. Patient denies any fevers, chills, hematuria patient states she's had nausea, vomiting 2 days ago, chest pain shortness of breath. Patient takes fluoxetine and a \"anxiety pill\" daily she has allergies to aspirin and ibuprofen cause itching and rash. Of note patient had a laparoscopic cholecystectomy earlier this year. Patient has no further complaints at this time. Primary care provider: Breanna Louis DO The history is provided by the patient. No  was used. Past Medical History:  
Diagnosis Date  Depression  Kidney stones  Mood disorder ([address]  Morbid obesity ([address]  Psychiatric disorder   
 depression  Sleep apnea Does not have CPAP as yet  Suicidal thoughts Past Surgical History:  
Procedure Laterality Date  HX UROLOGICAL Stenting x 4 (Starting in 2016, developed severe infection) Family History:  
Problem Relation Age of Onset  Heart Disease Father 55  Hypertension Father  Sleep Apnea Father  No Known Problems Sister  Cancer Maternal Grandmother Brain Cancer  Heart Disease Paternal Grandmother  Cancer Paternal Grandfather Social History Social History  Marital status: SINGLE   Spouse name: N/A  
 Number of children: N/A  
  Years of education: N/A Occupational History     
  started in 3/17 Social History Main Topics  Smoking status: Former Smoker Years: 1.00 Quit date: 1/16/2018  Smokeless tobacco: Never Used Comment: 1/4 pack ppd for 6 months  Alcohol use No  
   Comment: rarely  Drug use: No  
   Comment: Has done [employer]  Sexual activity: No  
 
Other Topics Concern  Not on file Social History Narrative 26year old AA female admitted for reportedly taking a handful of prozac. Pt has a past suicide attempt and this is her first psychiatriatric admission. Pt lives with her parents. She is a  . She has a 12th grade education. ALLERGIES: Aspirin Review of Systems Constitutional: Negative for appetite change, chills and fever. HENT: Negative. Respiratory: Negative for cough, shortness of breath and wheezing. Cardiovascular: Negative for chest pain. Gastrointestinal: Negative for abdominal pain, constipation, diarrhea, nausea and vomiting. Genitourinary: Negative for dysuria and urgency. Musculoskeletal: Positive for neck pain. Negative for back pain. Skin: Negative for color change and rash. Neurological: Positive for dizziness. Negative for headaches. Psychiatric/Behavioral: Negative. All other systems reviewed and are negative. Vitals:  
 10/05/18 1457 BP: (!) 124/98 Pulse: (!) 112 Resp: 16 Temp: 97.7 °F (36.5 °C) SpO2: 97% Weight: 131.5 kg (290 lb) Height: 5' 5\" (1.651 m) Physical Exam  
Constitutional: She is oriented to person, place, and time. She appears well-developed and well-nourished. HENT:  
Head: Normocephalic and atraumatic. Neck: Normal range of motion. Neck supple. Cardiovascular: Normal rate, regular rhythm, normal heart sounds and intact distal pulses.    
Pulmonary/Chest: Effort normal and breath sounds normal. No respiratory distress. She has no wheezes. She has no rales. She exhibits no tenderness. Abdominal: Soft. Bowel sounds are normal. There is no tenderness. There is no guarding. Musculoskeletal: Normal range of motion. Cervical back: She exhibits tenderness, pain and spasm. She exhibits normal range of motion, no bony tenderness, no swelling, no edema, no deformity, no laceration and normal pulse. Relief of pain and tingling when she places her left hand to the top. TTP to left lateral neck. No bony midline tenderness. Full ROM. Obese neck. Neurological: She is alert and oriented to person, place, and time. Skin: Skin is warm and dry. No erythema. Psychiatric: She has a normal mood and affect. Her behavior is normal. Judgment and thought content normal.  
Nursing note and vitals reviewed. [facility] 
 
 
ED Course Assessment & Plan:  
 
Orders Placed This Encounter  URINE CULTURE HOLD SAMPLE  XR SPINE CERVICAL COMPLETE WITH OBLIQUES  CBC WITH AUTOMATED DIFF  
 COMPREHENSIVE METABOLIC PANEL  
 URINALYSIS W/MICROSCOPIC  POC URINE PREGNANCY TEST  
 EKG, 12 LEAD, INITIAL Discussed with Jabier Blankenship MD,ED Provider Agustina Prescott NP 
10/05/18 
3:32 PM 
 
 
Procedures WBC slightly elevated. C-spine films with some straightening but otherwise without acute findings. Waiting for patient to produce a urine sample. Remained of labs otherwise without concern. Agustina Prescott NP 
10/05/18 
5:19 PM 
 
 
Add on Urine culture. Holding treatment until culture returns. Follow-up with PCP. States she was recently on steroids for sciatica. This could be cause of her elevated WBC. Robaxin as muscle relaxer. Discussed no driving while on this medication. Discussed return precautions. 6:29 PM 
The patient has been reevaluated. The patient is ready for discharge.  The patient's signs, symptoms, diagnosis, and discharge instructions have been discussed and the patient/ family has conveyed their understanding. The patient is to follow up as recommended or return to the ED should their symptoms worsen. Plan has been discussed and the patient is in agreement. LABORATORY TESTS: 
Labs Reviewed CBC WITH AUTOMATED DIFF - Abnormal; Notable for the following:   
    Result Value WBC 12.8 (*)   
 RDW 15.9 (*) PLATELET 472 (*)   
 ABS. NEUTROPHILS 9.0 (*)   
 ABS. IMM. GRANS. 0.1 (*) All other components within normal limits METABOLIC PANEL, COMPREHENSIVE - Abnormal; Notable for the following:   
 AST (SGOT) 14 (*) Alk. phosphatase 124 (*) Protein, total 8.8 (*) Globulin 5.2 (*) A-G Ratio 0.7 (*) All other components within normal limits URINALYSIS W/MICROSCOPIC - Abnormal; Notable for the following:   
 Specific gravity >1.030 (*) Blood TRACE (*) Epithelial cells MODERATE (*) Bacteria 1+ (*) Mucus 1+ (*) All other components within normal limits URINE CULTURE HOLD SAMPLE  
CULTURE, URINE  
HCG URINE, QL. - POC IMAGING RESULTS: 
Xr Spine Cerv 4 Or 5 V Result Date: 10/5/2018 EXAM: XR SPINE CERV 4 OR 5 V INDICATION: Left lateral neck pain with numbness and tingling to both hand COMPARISON: None. FINDINGS: AP, lateral, swimmers lateral, bilateral oblique and open mouth odontoid views of the cervical spine were obtained. The alignment is normal with straightening of expected cervical lordosis. The vertebral body heights and disc spaces are well-preserved. There is no fracture or subluxation. The prevertebral soft tissues are normal. The odontoid process is intact and the C1-C2 relationship is normal.   The neural foramina are symmetrical.  
 
IMPRESSION: Straightening of cervical lordosis which can be positional. Normal exam otherwise MEDICATIONS GIVEN: 
Medications - No data to display IMPRESSION: 
1. Neck pain 2. Malaise and fatigue 3. Muscle spasm PLAN: 
1. Current Discharge Medication List  
  
START taking these medications Details  
methocarbamol (ROBAXIN-750) 750 mg tablet Take 1 Tab by mouth four (4) times daily. As needed for muscle spasm. No driving while taking this medication. Indications: Muscle Spasm 
Qty: 15 Tab, Refills: 0  
  
  
 
2. Follow-up Information Follow up With Details Comments Contact Info Mario Oliva DO Schedule an appointment as soon as possible for a visit to follow-up from the [address] [place] [address] 
[phone] [facility] EMERGENCY [facility]PT   [address] 
[phone] 3. Return to ED for new or worsening symptoms Bg Fox NP

## 2018-10-05 NOTE — DISCHARGE INSTRUCTIONS
Learning About How to Have a Healthy Back  What causes back pain? Back pain is often caused by overuse, strain, or injury. For example, people often hurt their backs playing sports or working in the yard, being jolted in a car accident, or lifting something too heavy. Aging plays a part too. Your bones and muscles tend to lose strength as you age, which makes injury more likely. The spongy discs between the bones of the spine (vertebrae) may suffer from wear and tear and no longer provide enough cushion between the bones. A disc that bulges or breaks open (herniated disc) can press on nerves, causing back pain. In some people, back pain is the result of arthritis, broken vertebrae caused by bone loss (osteoporosis), illness, or a spine problem. Although most people have back pain at one time or another, there are steps you can take to make it less likely. How can you have a healthy back? Reduce stress on your back through good posture  Slumping or slouching alone may not cause low back pain. But after the back has been strained or injured, bad posture can make pain worse. · Sleep in a position that maintains your back's normal curves and on a mattress that feels comfortable. Sleep on your side with a pillow between your knees, or sleep on your back with a pillow under your knees. These positions can reduce strain on your back. · Stand and sit up straight. \"Good posture\" generally means your ears, shoulders, and hips are in a straight line. · If you must stand for a long time, put one foot on a stool, ledge, or box. Switch feet every now and then. · Sit in a chair that is low enough to let you place both feet flat on the floor with both knees nearly level with your hips. If your chair or desk is too high, use a footrest to raise your knees. Place a small pillow, a rolled-up towel, or a lumbar roll in the curve of your back if you need extra support.   · Try a kneeling chair, which helps tilt your hips forward. This takes pressure off your lower back. · Try sitting on an exercise ball. It can rock from side to side, which helps keep your back loose. · When driving, keep your knees nearly level with your hips. Sit straight, and drive with both hands on the steering wheel. Your arms should be in a slightly bent position. Reduce stress on your back through careful lifting  · Squat down, bending at the hips and knees only. If you need to, put one knee to the floor and extend your other knee in front of you, bent at a right angle (half kneeling). · Press your chest straight forward. This helps keep your upper back straight while keeping a slight arch in your low back. · Hold the load as close to your body as possible, at the level of your belly button (navel). · Use your feet to change direction, taking small steps. · Lead with your hips as you change direction. Keep your shoulders in line with your hips as you move. · Set down your load carefully, squatting with your knees and hips only. Exercise and stretch your back  · Do some exercise on most days of the week, if your doctor says it is okay. You can walk, run, swim, or cycle. · Stretch your back muscles. Here are a few exercises to try:  Jessica Mundo on your back, and gently pull one bent knee to your chest. Put that foot back on the floor, and then pull the other knee to your chest.  ¨ Do pelvic tilts. Lie on your back with your knees bent. Tighten your stomach muscles. Pull your belly button (navel) in and up toward your ribs. You should feel like your back is pressing to the floor and your hips and pelvis are slightly lifting off the floor. Hold for 6 seconds while breathing smoothly. ¨ Sit with your back flat against a wall. · Keep your core muscles strong. The muscles of your back, belly (abdomen), and buttocks support your spine. ¨ Pull in your belly and imagine pulling your navel toward your spine. Hold this for 6 seconds, then relax.  Remember to keep breathing normally as you tense your muscles. ¨ Do curl-ups. Always do them with your knees bent. Keep your low back on the floor, and curl your shoulders toward your knees using a smooth, slow motion. Keep your arms folded across your chest. If this bothers your neck, try putting your hands behind your neck (not your head), with your elbows spread apart. ¨ Lie on your back with your knees bent and your feet flat on the floor. Tighten your belly muscles, and then push with your feet and raise your buttocks up a few inches. Hold this position 6 seconds as you continue to breathe normally, then lower yourself slowly to the floor. Repeat 8 to 12 times. ¨ If you like group exercise, try Pilates or yoga. These classes have poses that strengthen the core muscles. Lead a healthy lifestyle  · Stay at a healthy weight to avoid strain on your back. · Do not smoke. Smoking increases the risk of osteoporosis, which weakens the spine. If you need help quitting, talk to your doctor about stop-smoking programs and medicines. These can increase your chances of quitting for good. Where can you learn more? Go to http://maryann-jm.info/. Enter L315 in the search box to learn more about \"Learning About How to Have a Healthy Back. \"  Current as of: November 29, 2017  Content Version: 11.8  © 4076-5366 [employer]. Care instructions adapted under license by [employer] (which disclaims liability or warranty for this information). If you have questions about a medical condition or this instruction, always ask your healthcare professional. [place] [address] any warranty or liability for your use of this information.

## 2018-10-05 NOTE — TELEPHONE ENCOUNTER
Call transferred from [facility] to say the patient I having chest tightness, chest pain, both arms hurt, and abdominal pain. Spoke with patient and asked her to hold for nurse advice.   [phone]

## 2018-10-07 LAB
BACTERIA SPEC CULT: ABNORMAL
BACTERIA SPEC CULT: ABNORMAL
CC UR VC: ABNORMAL
SERVICE CMNT-IMP: ABNORMAL

## 2018-10-25 ENCOUNTER — OFFICE VISIT (OUTPATIENT)
Dept: FAMILY MEDICINE CLINIC | Age: 23
End: 2018-10-25

## 2018-10-25 DIAGNOSIS — Z23 ENCOUNTER FOR IMMUNIZATION: ICD-10-CM

## 2018-10-25 DIAGNOSIS — R10.84 GENERALIZED ABDOMINAL PAIN: ICD-10-CM

## 2018-10-25 DIAGNOSIS — F32.A DEPRESSION, UNSPECIFIED DEPRESSION TYPE: Primary | ICD-10-CM

## 2018-10-25 DIAGNOSIS — G47.33 OSA (OBSTRUCTIVE SLEEP APNEA): ICD-10-CM

## 2018-10-25 RX ORDER — FLUOXETINE HYDROCHLORIDE 20 MG/1
20 CAPSULE ORAL DAILY
Qty: 90 CAP | Refills: 0 | Status: SHIPPED | OUTPATIENT
Start: 2018-10-25 | End: 2021-04-08 | Stop reason: SDUPTHER

## 2018-10-25 NOTE — PROGRESS NOTES
Chief Complaint   Patient presents with    Follow-up     Sinus issues.  States that there has been some improvements but symptoms are still present

## 2018-10-25 NOTE — PROGRESS NOTES
Vika Amos is an 26 y.o. female who presents for depression follow-up. Depression: tolerating Prozac. She has been doing well. No SI/HI. Has not seen psyc outpatient. Need refills. Abdominal pain: over area of surgical incision sites from glalbladder removed 4/9/18. Has changed her diet, no fatty foods, milk, or sweets. Has on/off diarrhea. Has not lost weight. No N/V or fever. No recent abx use. No sick contacts.     OWEN: still has not obtained a CPAP machine yet. Plans to follow-up with sleep medicine. Sleep has not been optimized. Not exercising daily.     Allergies - reviewed: Allergies   Allergen Reactions    Aspirin Itching         Medications - reviewed:   Current Outpatient Medications   Medication Sig    FLUoxetine (PROZAC) 20 mg capsule Take 1 Cap by mouth daily.  methocarbamol (ROBAXIN-750) 750 mg tablet Take 1 Tab by mouth four (4) times daily. As needed for muscle spasm. No driving while taking this medication. Indications: Muscle Spasm     No current facility-administered medications for this visit.           Past Medical History - reviewed:  Past Medical History:   Diagnosis Date    Depression     Kidney stones     [address] disorder ([facility] [address]     Morbid obesity ([facility]     Psychiatric disorder     depression    Sleep apnea     Does not have CPAP as yet    Suicidal thoughts          Past Surgical History - reviewed:   Past Surgical History:   Procedure Laterality Date    HX UROLOGICAL      Stenting x 4 (Starting in 2016, developed severe infection)         Social History - reviewed:  Social History     Socioeconomic History    Marital status: SINGLE     Spouse name: Not on file    Number of children: Not on file    Years of education: Not on file    Highest education level: Not on file   Social Needs    Financial resource strain: Not on file    Food insecurity - worry: Not on file    Food insecurity - inability: Not on file    Transportation needs - medical: Not on file   [place] needs - non-medical: Not on file   Occupational History    Occupation:      Comment: started in 3/17   Tobacco Use    Smoking status: Former Smoker     Years: 1.00     Last attempt to quit: 2018     Years since quittin.7    Smokeless tobacco: Never Used    Tobacco comment: 1/4 pack ppd for 6 months   Substance and Sexual Activity    Alcohol use: No     Comment: rarely    Drug use: No     Comment: Has done Marijuan    Sexual activity: No   Other Topics Concern    Not on file   Social History Narrative    26year old AA female admitted for reportedly taking a handful of prozac. Pt has a past suicide attempt and this is her first psychiatriatric admission. Pt lives with her parents. She is a  . She has a 12th grade education. Family History - reviewed:  Family History   Problem Relation Age of Onset    Heart Disease Father 55    Hypertension Father     Sleep Apnea Father     No Known Problems Sister     Cancer Maternal Grandmother         Brain Cancer    Heart Disease Paternal Grandmother     Cancer Paternal Grandfather          Immunizations - reviewed:   Immunization History   Administered Date(s) Administered    Influenza Vaccine (Quad) PF 2017, 10/25/2018       ROS  General/Constitutional:   No headache, fever, fatigue    Neck:   No swelling, masses, stiffness, pain, or limited movement     Cardiac:    No chest pain      Respiratory:   No cough or shortness of breath     GI: No nausea/vomiting, bloody or dark stools       Neurological:   No loss of consciousness, dizziness, seizures, dysarthria, cognitive changes, memory changes,  problems with balance, or unilateral   Depression. No SI/HI. Physical Exam  Visit Vitals  /78   Pulse 84   Temp 97.7 °F (36.5 °C)   Resp 16   Ht 5' 5\" (1.651 m)   Wt 317 lb (143.8 kg)   SpO2 98%   BMI 52.75 kg/m²     Constitutional: She appears well-developed and morbidly obese. No distress. Mouth: hypertrophy of adenoids. Cardiovascular: Normal rate, regular rhythm, normal heart sounds and intact distal pulses.  Exam reveals no gallop and no friction rub.  No murmur heard. No tenderness to palpation in the chest.   Pulmonary/Chest: Effort normal and breath sounds normal. No respiratory distress. She has no wheezes. She has no rales. Abdominal: Soft. Bowel sounds are normal. She exhibits no distension and no mass. Mild tenderness over old surgical scars and umbilicus. There is no obvious rebound and guarding. No hernia. Musculoskeletal: She exhibits no edema. No CVA tenderness. Ext: No calf tenderness. No edema.    Psychiatric: She has a normal mood and affect. Her behavior is normal. Judgment and thought content normal.     PHQ9 score 3  GAD7 score 2    Assessment/Plan    ICD-10-CM ICD-9-CM    1. Depression, unspecified depression type F32.9 311    2. Encounter for immunization Z23 V03.89 INFLUENZA VIRUS VAC QUAD,SPLIT,PRESV FREE SYRINGE IM      MI IMMUNIZ ADMIN,1 SINGLE/COMB VAC/TOXOID   3. Generalized abdominal pain R10.84 789.07    4. OWEN (obstructive sleep apnea) G47.33 327.23    5. BMI 50.0-59.9, adult ([address] Z68.43 V85.43      Depression: well controlled. PHQ9 and GAD7 reviewed and controlled. Will refill. No SI. Precautions given. Abdominal pain likely 2/2 scar tissue. Unremarkable abdominal exam today. Pt to keep food log. Precautions given. Pt to obtain CPAP from sleep medicine for know OWEN. Flu vaccine today. Pt tolerated well. Follow-up Disposition:  Return in about 4 weeks (around 11/22/2018). I have discussed the diagnosis with the patient and the intended plan as seen in the above orders. Patient verbalized understanding of the plan and agrees with the plan. The patient has received an after-visit summary and questions were answered concerning future plans. I have discussed medication side effects and warnings with the patient as well.  Informed patient to return to the office if new symptoms arise.         Lorrie Mcintyre, DO  Family Medicine Resident

## 2018-10-25 NOTE — PATIENT INSTRUCTIONS
Abdominal Pain: Care Instructions  Your Care Instructions    Abdominal pain has many possible causes. Some aren't serious and get better on their own in a few days. Others need more testing and treatment. If your pain continues or gets worse, you need to be rechecked and may need more tests to find out what is wrong. You may need surgery to correct the problem. Don't ignore new symptoms, such as fever, nausea and vomiting, urination problems, pain that gets worse, and dizziness. These may be signs of a more serious problem. Your doctor may have recommended a follow-up visit in the next 8 to 12 hours. If you are not getting better, you may need more tests or treatment. The doctor has checked you carefully, but problems can develop later. If you notice any problems or new symptoms, get medical treatment right away. Follow-up care is a key part of your treatment and safety. Be sure to make and go to all appointments, and call your doctor if you are having problems. It's also a good idea to know your test results and keep a list of the medicines you take. How can you care for yourself at home? · Rest until you feel better. · To prevent dehydration, drink plenty of fluids, enough so that your urine is light yellow or clear like water. Choose water and other caffeine-free clear liquids until you feel better. If you have kidney, heart, or liver disease and have to limit fluids, talk with your doctor before you increase the amount of fluids you drink. · If your stomach is upset, eat mild foods, such as rice, dry toast or crackers, bananas, and applesauce. Try eating several small meals instead of two or three large ones. · Wait until 48 hours after all symptoms have gone away before you have spicy foods, alcohol, and drinks that contain caffeine. · Do not eat foods that are high in fat. · Avoid anti-inflammatory medicines such as aspirin, ibuprofen (Advil, Motrin), and naproxen (Aleve).  These can cause stomach upset. Talk to your doctor if you take daily aspirin for another health problem. When should you call for help? Call 911 anytime you think you may need emergency care. For example, call if:    · You passed out (lost consciousness).     · You pass maroon or very bloody stools.     · You vomit blood or what looks like coffee grounds.     · You have new, severe belly pain.    Call your doctor now or seek immediate medical care if:    · Your pain gets worse, especially if it becomes focused in one area of your belly.     · You have a new or higher fever.     · Your stools are black and look like tar, or they have streaks of blood.     · You have unexpected vaginal bleeding.     · You have symptoms of a urinary tract infection. These may include:  ? Pain when you urinate. ? Urinating more often than usual.  ? Blood in your urine.     · You are dizzy or lightheaded, or you feel like you may faint.    Watch closely for changes in your health, and be sure to contact your doctor if:    · You are not getting better after 1 day (24 hours). Where can you learn more? Go to http://maryann-jm.info/. Enter E809 in the search box to learn more about \"Abdominal Pain: Care Instructions. \"  Current as of: November 20, 2017  Content Version: 11.8  © 8815-3459 [employer]. Care instructions adapted under license by [employer] (which disclaims liability or warranty for this information). If you have questions about a medical condition or this instruction, always ask your healthcare professional. [place] [address] any warranty or liability for your use of this information. Depression and Chronic Disease: Care Instructions  Your Care Instructions    A chronic disease is one that you have for a long time. Some chronic diseases can be controlled, but they usually cannot be cured.  Depression is common in people with chronic diseases, but it often goes unnoticed. Many people have concerns about seeking treatment for a mental health problem. You may think it's a sign of weakness, or you don't want people to know about it. It's important to overcome these reasons for not seeking treatment. Treating depression or anxiety is good for your health. Follow-up care is a key part of your treatment and safety. Be sure to make and go to all appointments, and call your doctor if you are having problems. It's also a good idea to know your test results and keep a list of the medicines you take. How can you care for yourself at home? Watch for symptoms of depression  The symptoms of depression are often subtle at first. You may think they are caused by your disease rather than depression. Or you may think it is normal to be depressed when you have a chronic disease. If you are depressed you may:  · Feel sad or hopeless. · Feel guilty or worthless. · Not enjoy the things you used to enjoy. · Feel hopeless, as though life is not worth living. · Have trouble thinking or remembering. · Have low energy, and you may not eat or sleep well. · Pull away from others. · Think often about death or killing yourself. (Keep the numbers for these national suicide hotlines: 6-501-084-TALK [1-918.869.1549] and 7-782-ZYPOXRC [1-456.355.4290]. )  Get treatment  By treating your depression, you can feel more hopeful and have more energy. If you feel better, you may take better care of yourself, so your health may improve. · Talk to your doctor if you have any changes in mood during treatment for your disease. · Ask your doctor for help. Counseling, antidepressant medicine, or a combination of the two can help most people with depression. Often a combination works best. Counseling can also help you cope with having a chronic disease. When should you call for help? Call 911 anytime you think you may need emergency care.  For example, call if:    · You feel like hurting yourself or someone else.     · Someone you know has depression and is about to attempt or is attempting suicide.    Call your doctor now or seek immediate medical care if:    · You hear voices.     · Someone you know has depression and:  ? Starts to give away his or her possessions. ? Uses illegal drugs or drinks alcohol heavily. ? Talks or writes about death, including writing suicide notes or talking about guns, knives, or pills. ? Starts to spend a lot of time alone. ? Acts very aggressively or suddenly appears calm.    Watch closely for changes in your health, and be sure to contact your doctor if:    · You do not get better as expected. Where can you learn more? Go to http://maryann-jm.info/. Enter E448 in the search box to learn more about \"Depression and Chronic Disease: Care Instructions. \"  Current as of: December 7, 2017  Content Version: 11.8  © 8333-0832 [employer]. Care instructions adapted under license by [employer] (which disclaims liability or warranty for this information). If you have questions about a medical condition or this instruction, always ask your healthcare professional. [address] any warranty or liability for your use of this information.

## 2018-10-28 VITALS
TEMPERATURE: 97.7 F | DIASTOLIC BLOOD PRESSURE: 78 MMHG | HEART RATE: 84 BPM | WEIGHT: 293 LBS | HEIGHT: 65 IN | BODY MASS INDEX: 48.82 KG/M2 | RESPIRATION RATE: 16 BRPM | OXYGEN SATURATION: 98 % | SYSTOLIC BLOOD PRESSURE: 134 MMHG

## 2018-12-06 ENCOUNTER — DOCUMENTATION ONLY (OUTPATIENT)
Dept: SLEEP MEDICINE | Age: 23
End: 2018-12-06

## 2018-12-06 NOTE — PROGRESS NOTES
Patient has insurance now. She did not have one when she saw us last time. We had ordered cpap through [facility] Assistance Program.      She said she has insurance now and would like to check how much it would cost her and will call us back to let us know about her decision.

## 2019-01-15 ENCOUNTER — OFFICE VISIT (OUTPATIENT)
Dept: FAMILY MEDICINE CLINIC | Age: 24
End: 2019-01-15

## 2019-01-15 VITALS
DIASTOLIC BLOOD PRESSURE: 79 MMHG | WEIGHT: 293 LBS | HEIGHT: 65 IN | RESPIRATION RATE: 16 BRPM | OXYGEN SATURATION: 98 % | SYSTOLIC BLOOD PRESSURE: 136 MMHG | HEART RATE: 85 BPM | BODY MASS INDEX: 48.82 KG/M2 | TEMPERATURE: 97.3 F

## 2019-01-15 DIAGNOSIS — R52 BODY ACHES: ICD-10-CM

## 2019-01-15 DIAGNOSIS — R11.2 NAUSEA AND VOMITING, INTRACTABILITY OF VOMITING NOT SPECIFIED, UNSPECIFIED VOMITING TYPE: ICD-10-CM

## 2019-01-15 DIAGNOSIS — J06.9 ACUTE URI: ICD-10-CM

## 2019-01-15 LAB
FLUAV+FLUBV AG NOSE QL IA.RAPID: NEGATIVE POS/NEG
FLUAV+FLUBV AG NOSE QL IA.RAPID: NEGATIVE POS/NEG
HCG URINE, QL. (POC): NEGATIVE
VALID INTERNAL CONTROL?: YES
VALID INTERNAL CONTROL?: YES

## 2019-01-15 NOTE — PROGRESS NOTES
Subjective:      Christian Armstrong is a 26 y.o. female who presents for evaluation of possible gastroenteritis. Pt reports that symptoms started a day ago with cough, nasal congestion/rhinorrhea, nausea and body ache. Pt states that she had one episode of NBNB emesis and about 3 episodes of non-bloody diarrhea today. She also report having a headache. Pt denies any sore throat,  fever, sneezing, wheezing or abd pain. Symptoms started shortly after she ate at [employer] with a friend. The friend has similar symptoms. LMP: Irregular, last period was \"long time ago\" around 2015. Initially started on birth control which stimulated bleeding but discontinued because of side effects. Pt states that she will set up appointment to follow on the mensual irregularity. Allergies- reviewed: Allergies   Allergen Reactions    Aspirin Itching       Medications- reviewed:   Current Outpatient Medications   Medication Sig    FLUoxetine (PROZAC) 20 mg capsule Take 1 Cap by mouth daily.  methocarbamol (ROBAXIN-750) 750 mg tablet Take 1 Tab by mouth four (4) times daily. As needed for muscle spasm. No driving while taking this medication. Indications: Muscle Spasm     No current facility-administered medications for this visit. Review of Systems  General: No fevers or chills  HEENT: Positive for headaches, cough and nasal congestion. No ear pain, ear discharge, sore throat  Neck: No swollen lymph nodes  Respiratory: Positive cough    Objective:     Visit Vitals  /79 (BP 1 Location: Left arm, BP Patient Position: Sitting)   Pulse 85   Temp 97.3 °F (36.3 °C) (Oral)   Resp 16   Ht 5' 5\" (1.651 m)   Wt 315 lb (142.9 kg)   SpO2 98%   BMI 52.42 kg/m²       General: Alert and oriented and in no acute distress. Responds to all questions                   appropriately. SKIN: No rash.   HEAD: Normocephalic, atraumatic, PERRL, non-tender frontal and maxillary sinuses  EYES: Sclera and conjunctiva clear; pupils round and reactive to light. EARS: External normal, canals clear, tympanic membranes normal.     NOSE:  Congested nasal mucosa       OROPHARYNX: No tonsillar erythema or exudates  NECK: Supple; no masses; no lymphadenopathy. LUNGS: Clear to auscultation bilaterally, no wheezes, rales and rhonchi. CARDIOVASCULAR: Regular rate and rhythm without murmurs, gallops or rubs. NEUROLOGIC: Speech intact; face symmetrical; moves all extremities equally. Labs:   -Flu negative  - UPT negative         Assessment/Plan:       ICD-10-CM ICD-9-CM    1. Viral gastroenteritis A08.4 008.8    2. Acute URI J06.9 465.9 AMB POC ANNETTA INFLUENZA A/B TEST   3. Body aches R52 780.96 AMB POC ANNETTA INFLUENZA A/B TEST   4. Food poisoning, accidental or unintentional, initial encounter T62. 91XA 988.9      E865.9    5. Nausea and vomiting, intractability of vomiting not specified, unspecified vomiting type R11.2 787.01 AMB POC URINE PREGNANCY TEST, VISUAL COLOR COMPARISON     Patient presents with symptoms likely due to food poisoning.    - Urine preg is negative  - Explained that symptoms will improve on its own and may last few more days. GENERAL INSTRUCTIONS:  1. Plenty of fluids: Water, Gatorade, decaffeinated defizzed soda, ice chips, etc.    Wait 30-60 min after vomiting to try again. 2. As nausea subsides, advance food as tolerated to bananas, rice, applesauce, toast, tea, and then to a regular diet or full-strength formula  3. No milk, meat, fried/fatty products, aspirin or ibuprofen for several days. 4. Return to clinic or go to the Emergency Room if you have increased diarrhea, vomiting, fever, pain, swelling of the abdomen, bloody stool or vomitus, or signs of dehydration. I have discussed the diagnosis with the patient and the intended plan as seen in the above orders. The patient has received an after-visit summary and questions were answered concerning future plans.        Karyn Dudley MD

## 2019-01-15 NOTE — LETTER
NOTIFICATION RETURN TO WORK  
 
1/15/2019 4:40 PM 
 
Ms. Shaye Malone [address] [facility] [id number] To Whom It May Concern: 
 
Shaye Malone is currently under the care of [address] [facility]. She will return to work on: 1/16/2019 If there are questions or concerns please have the patient contact our office. Sincerely, Sergey Mckeon MD

## 2019-01-17 NOTE — PROGRESS NOTES
[address] Medicine Residency Attending Addendum:  Patient encounter was discussed on the day of the encounter with Shania Thornton MD, performing the key elements of the service. I discussed the findings, assessment and plan with the resident and agree with the resident's findings and plan as documented in the resident's note.       Amandeep Lazcano MD, CAQSM, RMSK

## 2019-02-18 ENCOUNTER — HOSPITAL ENCOUNTER (EMERGENCY)
Age: 24
Discharge: HOME | End: 2019-02-18
Attending: EMERGENCY MEDICINE
Payer: COMMERCIAL

## 2019-02-18 VITALS
RESPIRATION RATE: 18 BRPM | SYSTOLIC BLOOD PRESSURE: 118 MMHG | TEMPERATURE: 97.8 F | OXYGEN SATURATION: 96 % | HEART RATE: 72 BPM | DIASTOLIC BLOOD PRESSURE: 90 MMHG | BODY MASS INDEX: 53.08 KG/M2 | WEIGHT: 293 LBS

## 2019-02-18 DIAGNOSIS — R10.9 ABDOMINAL CRAMPS: ICD-10-CM

## 2019-02-18 DIAGNOSIS — N39.0 URINARY TRACT INFECTION WITH HEMATURIA, SITE UNSPECIFIED: Primary | ICD-10-CM

## 2019-02-18 DIAGNOSIS — R31.9 URINARY TRACT INFECTION WITH HEMATURIA, SITE UNSPECIFIED: Primary | ICD-10-CM

## 2019-02-18 LAB
ALBUMIN SERPL-MCNC: 3.5 G/DL (ref 3.5–5)
ALBUMIN/GLOB SERPL: 0.8 {RATIO} (ref 1.1–2.2)
ALP SERPL-CCNC: 111 U/L (ref 45–117)
ALT SERPL-CCNC: 17 U/L (ref 12–78)
ANION GAP SERPL CALC-SCNC: 10 MMOL/L (ref 5–15)
APPEARANCE UR: ABNORMAL
AST SERPL-CCNC: 11 U/L (ref 15–37)
BACTERIA URNS QL MICRO: ABNORMAL /HPF
BASOPHILS # BLD: 0 K/UL (ref 0–0.1)
BASOPHILS NFR BLD: 0 % (ref 0–1)
BILIRUB SERPL-MCNC: 0.2 MG/DL (ref 0.2–1)
BILIRUB UR QL: NEGATIVE
BUN SERPL-MCNC: 5 MG/DL (ref 6–20)
BUN/CREAT SERPL: 8 (ref 12–20)
CALCIUM SERPL-MCNC: 8.9 MG/DL (ref 8.5–10.1)
CHLORIDE SERPL-SCNC: 103 MMOL/L (ref 97–108)
CO2 SERPL-SCNC: 25 MMOL/L (ref 21–32)
COLOR UR: ABNORMAL
CREAT SERPL-MCNC: 0.62 MG/DL (ref 0.55–1.02)
DIFFERENTIAL METHOD BLD: ABNORMAL
EOSINOPHIL # BLD: 0.4 K/UL (ref 0–0.4)
EOSINOPHIL NFR BLD: 3 % (ref 0–7)
EPITH CASTS URNS QL MICRO: ABNORMAL /LPF
ERYTHROCYTE [DISTWIDTH] IN BLOOD BY AUTOMATED COUNT: 15.8 % (ref 11.5–14.5)
GLOBULIN SER CALC-MCNC: 4.5 G/DL (ref 2–4)
GLUCOSE SERPL-MCNC: 83 MG/DL (ref 65–100)
GLUCOSE UR STRIP.AUTO-MCNC: NEGATIVE MG/DL
HCG UR QL: NEGATIVE
HCT VFR BLD AUTO: 39.5 % (ref 35–47)
HGB BLD-MCNC: 13 G/DL (ref 11.5–16)
HGB UR QL STRIP: ABNORMAL
HYALINE CASTS URNS QL MICRO: ABNORMAL /LPF (ref 0–5)
IMM GRANULOCYTES # BLD AUTO: 0.1 K/UL (ref 0–0.04)
IMM GRANULOCYTES NFR BLD AUTO: 1 % (ref 0–0.5)
KETONES UR QL STRIP.AUTO: NEGATIVE MG/DL
LEUKOCYTE ESTERASE UR QL STRIP.AUTO: ABNORMAL
LIPASE SERPL-CCNC: 117 U/L (ref 73–393)
LYMPHOCYTES # BLD: 3 K/UL (ref 0.8–3.5)
LYMPHOCYTES NFR BLD: 25 % (ref 12–49)
MCH RBC QN AUTO: 27.5 PG (ref 26–34)
MCHC RBC AUTO-ENTMCNC: 32.9 G/DL (ref 30–36.5)
MCV RBC AUTO: 83.7 FL (ref 80–99)
MONOCYTES # BLD: 0.6 K/UL (ref 0–1)
MONOCYTES NFR BLD: 5 % (ref 5–13)
NEUTS SEG # BLD: 7.9 K/UL (ref 1.8–8)
NEUTS SEG NFR BLD: 66 % (ref 32–75)
NITRITE UR QL STRIP.AUTO: NEGATIVE
NRBC # BLD: 0 K/UL (ref 0–0.01)
NRBC BLD-RTO: 0 PER 100 WBC
PH UR STRIP: 7 [PH] (ref 5–8)
PLATELET # BLD AUTO: 371 K/UL (ref 150–400)
PMV BLD AUTO: 9.5 FL (ref 8.9–12.9)
POTASSIUM SERPL-SCNC: 3.9 MMOL/L (ref 3.5–5.1)
PROT SERPL-MCNC: 8 G/DL (ref 6.4–8.2)
PROT UR STRIP-MCNC: 30 MG/DL
RBC # BLD AUTO: 4.72 M/UL (ref 3.8–5.2)
RBC #/AREA URNS HPF: >100 /HPF (ref 0–5)
SODIUM SERPL-SCNC: 138 MMOL/L (ref 136–145)
SP GR UR REFRACTOMETRY: 1.02 (ref 1–1.03)
UR CULT HOLD, URHOLD: NORMAL
UROBILINOGEN UR QL STRIP.AUTO: 1 EU/DL (ref 0.2–1)
WBC # BLD AUTO: 12.1 K/UL (ref 3.6–11)
WBC URNS QL MICRO: ABNORMAL /HPF (ref 0–4)

## 2019-02-18 PROCEDURE — 36415 COLL VENOUS BLD VENIPUNCTURE: CPT

## 2019-02-18 PROCEDURE — 80053 COMPREHEN METABOLIC PANEL: CPT

## 2019-02-18 PROCEDURE — 81025 URINE PREGNANCY TEST: CPT

## 2019-02-18 PROCEDURE — 85025 COMPLETE CBC W/AUTO DIFF WBC: CPT

## 2019-02-18 PROCEDURE — 81001 URINALYSIS AUTO W/SCOPE: CPT

## 2019-02-18 PROCEDURE — 99284 EMERGENCY DEPT VISIT MOD MDM: CPT

## 2019-02-18 PROCEDURE — 83690 ASSAY OF LIPASE: CPT

## 2019-02-18 RX ORDER — CEPHALEXIN 500 MG/1
500 CAPSULE ORAL 4 TIMES DAILY
Qty: 28 CAP | Refills: 0 | Status: SHIPPED | OUTPATIENT
Start: 2019-02-18 | End: 2019-02-25

## 2019-02-18 RX ORDER — DICYCLOMINE HYDROCHLORIDE 20 MG/1
20 TABLET ORAL EVERY 6 HOURS
Qty: 20 TAB | Refills: 0 | Status: SHIPPED | OUTPATIENT
Start: 2019-02-18 | End: 2019-02-23

## 2019-02-18 NOTE — ED PROVIDER NOTES
1:55 PM 
I have evaluated the patient as the Provider in Triage. I have reviewed Her vital signs and the triage nurse assessment. I have talked with the patient and any available family and advised that I am the provider in triage and have ordered the appropriate study to initiate their work up based on the clinical presentation during my assessment. I have advised that the patient will be accommodated in the Main ED as soon as possible. I have also requested to contact the triage nurse or myself immediately if the patient experiences any changes in their condition during this brief waiting period. Started 3 days ago with diffuse abdominal pain after eating [employer]. Exacerbated by bending/movement, coughing, sneezing, laughing, described as \"sharp, stabbing\" rated 5/10 at rest, 10/10 with palpation. Accompanying nausea, one episode of vomiting this morning. Tried Tylenol, Aleve, and Ibuprofen without relief. Since this morning, has pain after voiding. Denies diarrhea, constipation, LBM was just PTA, normal. Notes she was given a medication by her GYN as she has not had a menstrual cycle in 4 years, she takes the medication for 12 days every 3 months, doesn't remember what it is. H/o cholecystectomy and kidney stones, not similar. Note written by Lizzy Sullivan, as dictated by Delfina Francois MD 1:59 PM 
 
 
The history is provided by the patient. No  was used. Past Medical History:  
Diagnosis Date  Depression  Kidney stones  Mood disorder ([address]  Morbid obesity ([address]  Psychiatric disorder   
 depression  Sleep apnea Does not have CPAP as yet  Suicidal thoughts Past Surgical History:  
Procedure Laterality Date  HX UROLOGICAL Stenting x 4 (Starting in 2016, developed severe infection) Family History:  
Problem Relation Age of Onset  Heart Disease Father 55  Hypertension Father  Sleep Apnea Father  No Known Problems Sister  Cancer Maternal Grandmother Brain Cancer  Heart Disease Paternal Grandmother  Cancer Paternal Grandfather Social History Socioeconomic History  Marital status: SINGLE Spouse name: Not on file  Number of children: Not on file  Years of education: Not on file  Highest education level: Not on file Social Needs  Financial resource strain: Not on file  Food insecurity - worry: Not on file  Food insecurity - inability: Not on file  Transportation needs - medical: Not on file  Transportation needs - non-medical: Not on file Occupational History  Occupation:  Comment: started in 3/17 Tobacco Use  Smoking status: Former Smoker Years: 1.00 Last attempt to quit: 2018 Years since quittin.0  Smokeless tobacco: Never Used  Tobacco comment:  pack ppd for 6 months Substance and Sexual Activity  Alcohol use: No  
  Comment: rarely  Drug use: No  
  Comment: Has done TheraVid  Sexual activity: No  
Other Topics Concern  Not on file Social History Narrative 26year old AA female admitted for reportedly taking a handful of prozac. Pt has a past suicide attempt and this is her first psychiatriatric admission. Pt lives with her parents. She is a  . She has a 12th grade education. ALLERGIES: Aspirin Review of Systems Constitutional: Negative for appetite change, chills, diaphoresis, fatigue and fever. HENT: Negative for congestion, ear discharge, ear pain, sinus pressure, sinus pain, sore throat and trouble swallowing. Eyes: Negative for photophobia, pain, redness and visual disturbance. Respiratory: Negative for chest tightness, shortness of breath and wheezing. Cardiovascular: Negative for chest pain and palpitations. Gastrointestinal: Positive for nausea and vomiting. Negative for abdominal distention and abdominal pain. Endocrine: Negative. Genitourinary: Negative for difficulty urinating, flank pain, frequency and urgency. Musculoskeletal: Negative for back pain, neck pain and neck stiffness. Skin: Negative for color change, pallor, rash and wound. Allergic/Immunologic: Negative. Neurological: Negative for dizziness, speech difficulty, weakness and headaches. Hematological: Does not bruise/bleed easily. Psychiatric/Behavioral: Negative for behavioral problems. The patient is not nervous/anxious. There were no vitals filed for this visit. Physical Exam  
Constitutional: She is oriented to person, place, and time. She appears well-developed and well-nourished. No distress. HENT:  
Head: Normocephalic and atraumatic. Right Ear: External ear normal.  
Left Ear: External ear normal.  
Nose: Nose normal.  
Mouth/Throat: Oropharynx is clear and moist.  
Eyes: Conjunctivae and EOM are normal. Pupils are equal, round, and reactive to light. Right eye exhibits no discharge. Left eye exhibits no discharge. Neck: Normal range of motion. Neck supple. No JVD present. No tracheal deviation present. Cardiovascular: Normal rate, regular rhythm, normal heart sounds and intact distal pulses. Exam reveals no gallop. No murmur heard. Pulmonary/Chest: Effort normal and breath sounds normal. No respiratory distress. She has no wheezes. She has no rales. She exhibits no tenderness. Abdominal: Soft. Bowel sounds are normal. She exhibits no distension. There is no tenderness. There is no rebound and no guarding. Genitourinary:  
Genitourinary Comments: Stated urinary urgency and frequency Musculoskeletal: Normal range of motion. She exhibits no edema or tenderness. Neurological: She is alert and oriented to person, place, and time. Skin: Skin is warm and dry. No rash noted. No erythema. No pallor. Psychiatric: She has a normal mood and affect.  Her behavior is normal. Judgment and thought content normal.  
Nursing note and vitals reviewed. MDM Number of Diagnoses or Management Options Abdominal cramps: new and does not require workup Urinary tract infection with hematuria, site unspecified: new and requires workup Diagnosis management comments: Plan: 
Discharge to home and follow up with PCP, follow up with GI. Educated on weight loss. Amount and/or Complexity of Data Reviewed Clinical lab tests: ordered and reviewed Discuss the patient with other providers: yes (Discussed plan of care with Dr. Gurdeep Foster 
) 
 
 
  
3:53 PM 
Pt has been reexamined. Pt has no new complaints, changes or physical findings. Care plan outlined and precautions discussed. All available results were reviewed with pt. All medications were reviewed with pt. All of pt's questions and concerns were addressed. Pt agrees to F/U as instructed and agrees to return to ED upon further deterioration. Pt is ready to go home. Nik Liao NP Procedures

## 2019-02-18 NOTE — DISCHARGE INSTRUCTIONS

## 2019-02-18 NOTE — ED TRIAGE NOTES
Patient with complains of generalized abdominal pain that is worse with pressure since Friday that got worse today. Denies any nausea or diarrhea, last BM today. History of cholecystectomy.

## 2019-04-02 ENCOUNTER — DOCUMENTATION ONLY (OUTPATIENT)
Dept: FAMILY MEDICINE CLINIC | Age: 24
End: 2019-04-02

## 2019-04-03 ENCOUNTER — TELEPHONE (OUTPATIENT)
Dept: FAMILY MEDICINE CLINIC | Age: 24
End: 2019-04-03

## 2019-04-03 ENCOUNTER — OFFICE VISIT (OUTPATIENT)
Dept: FAMILY MEDICINE CLINIC | Age: 24
End: 2019-04-03

## 2019-04-03 VITALS
OXYGEN SATURATION: 98 % | TEMPERATURE: 97.8 F | BODY MASS INDEX: 48.82 KG/M2 | HEART RATE: 108 BPM | DIASTOLIC BLOOD PRESSURE: 81 MMHG | HEIGHT: 65 IN | SYSTOLIC BLOOD PRESSURE: 135 MMHG | WEIGHT: 293 LBS | RESPIRATION RATE: 16 BRPM

## 2019-04-03 DIAGNOSIS — R10.9 FLANK PAIN: Primary | ICD-10-CM

## 2019-04-03 DIAGNOSIS — R39.198 ABNORMAL URINATION: ICD-10-CM

## 2019-04-03 DIAGNOSIS — R31.9 HEMATURIA, UNSPECIFIED TYPE: ICD-10-CM

## 2019-04-03 LAB

## 2019-04-03 NOTE — TELEPHONE ENCOUNTER
Late Documentation of yesterday about 4:27pm.    Patient called to report blood in urine with clots and burning sensation. Called taken by nurse Reese Solomon for triage        Patient Call     LONG Flynn 15 hours ago (4:57 PM)      Pt called c/o of blood clots in her urine when she urinates . I told her she needed to be seen and examine. I transferred her call to scheduling for appointment ASAP.     Routing comment

## 2019-04-03 NOTE — PROGRESS NOTES
Chief Complaint   Patient presents with    Urinary Pain     Blood clot in urine since Sunday. Painful when urinating. Abdomen/bilateral flank pain. Chest pain. 1. Have you been to the ER, urgent care clinic since your last visit? Hospitalized since your last visit? Yes[facility]    2. Have you seen or consulted any other health care providers outside of the [address] [place] [address] since your last visit? Include any pap smears or colon screening. Yes [facility] ER    Went to [facility] in early March 2019 for abdominal pain w/o hematuria.

## 2019-04-03 NOTE — PROGRESS NOTES
Thuy Byrne is a 27 y.o. female who presents with blood In her urine that has been present for the past 3 days. Pt reports associated pain in her back that feels like cramping. Pt is not on period and last period was in January which was first one in four years. Pt reports that she was seen by OB/Gyn and put on antibiotic for 7 days to have a period in January. Pt not on Birth control. Pt reports increase in urination and pain with urination. Pt recently finished abx course (thinks it was amoxicillin) on 3/28/19 prescription by gastroenterologist for possible UTI vs abd infection? Pt denies any sexual intercourse since January. Pt reports hx of kidney stones in her childhood and reports she had 3 stents placed in 2015 for kidney stones does not remember which kidney. Pt has felt nauseous for the past couple days, and reports diarrhea that has been intermittent since having having your gallbladder taken out in 2016. Pt denies any recent fever, chills, night sweats. Data reviewed or ordered today:       Other problems include:  Patient Active Problem List   Diagnosis Code    SOB (shortness of breath) R06.02    Chest pain R07.9    Essential hypertension I10    Obesity, morbid (HCC) E66.01    Cholecystitis K81.9    Major depressive disorder F32.9       Medications:  Current Outpatient Medications   Medication Sig Dispense Refill    FLUoxetine (PROZAC) 20 mg capsule Take 1 Cap by mouth daily. 90 Cap 0    methocarbamol (ROBAXIN-750) 750 mg tablet Take 1 Tab by mouth four (4) times daily. As needed for muscle spasm. No driving while taking this medication. Indications: Muscle Spasm 15 Tab 0       Allergies:   Allergies   Allergen Reactions    Aspirin Itching       Social History     Socioeconomic History    Marital status: SINGLE     Spouse name: Not on file    Number of children: Not on file    Years of education: Not on file    Highest education level: Not on file   Occupational History    Occupation:      Comment: started in 3/17   Social Needs    Financial resource strain: Not on file    Food insecurity:     Worry: Not on file     Inability: Not on file   Contract Cloud needs:     Medical: Not on file     Non-medical: Not on file   Tobacco Use    Smoking status: Former Smoker     Years: 1.00     Last attempt to quit: 2018     Years since quittin.2    Smokeless tobacco: Never Used    Tobacco comment:  pack ppd for 6 months   Substance and Sexual Activity    Alcohol use: No     Comment: rarely    Drug use: No     Comment: Has done Marijuan    Sexual activity: Never   Lifestyle    Physical activity:     Days per week: Not on file     Minutes per session: Not on file    Stress: Not on file   Relationships    Social connections:     Talks on phone: Not on file     Gets together: Not on file     Attends Episcopalian service: Not on file     Active member of club or organization: Not on file     Attends meetings of clubs or organizations: Not on file     Relationship status: Not on file    Intimate partner violence:     Fear of current or ex partner: Not on file     Emotionally abused: Not on file     Physically abused: Not on file     Forced sexual activity: Not on file   Other Topics Concern    Not on file   Social History Narrative    26year old AA female admitted for reportedly taking a handful of prozac. Pt has a past suicide attempt and this is her first psychiatriatric admission. Pt lives with her parents. She is a  . She has a 12th grade education.         Family History   Problem Relation Age of Onset    Heart Disease Father 55    Hypertension Father     Sleep Apnea Father     No Known Problems Sister     Cancer Maternal Grandmother         Brain Cancer    Heart Disease Paternal Grandmother     Cancer Paternal Grandfather            ROS:  Fever: no  Weight loss: no  Headaches:  no  Chest Pain:  no  SOB:  no  Cough:  no        Physical Exam  Visit Vitals  /81 (BP 1 Location: Left arm, BP Patient Position: Sitting)   Pulse (!) 108   Temp 97.8 °F (36.6 °C) (Oral)   Resp 16   Ht 5' 5\" (1.651 m)   Wt 311 lb 12.8 oz (141.4 kg)   LMP 01/01/2019 (Exact Date)   SpO2 98%   BMI 51.89 kg/m²     Constitutional:  Appears well,  No acute distress, Vitals noted  Psychiatric:   Affect normal, Alert and Oriented to person/place/time  Neck:   General inspection and Thyroid normal.  No abnormal cervical or supraclavicular nodes. Lungs:   Clear to auscultation, good respiratory effort, no wheezes, rales or rhonchi  Heart:   Normal HR, Normal S1 and S2,  Regular rhythm. No cardiac murmurs. No carotid bruits. Chest wall normal  Back: TTP over L flank   ABD: mild diffuse TTP   Extremities: without edema, good peripheral pulses    BP Readings from Last 3 Encounters:   04/03/19 135/81   02/18/19 118/90   01/15/19 136/79     KUB: No obstruction or free intraperitoneal air. Assessment/Plan:      ICD-10-CM ICD-9-CM    1. Hematuria, unspecified type R31.9 599.70 AMB POC URINE PREGNANCY TEST, VISUAL COLOR COMPARISON      XR ABD (KUB)      CULTURE, URINE      URINALYSIS W/ RFLX MICROSCOPIC      CTA ABD      CANCELED: URINALYSIS W/ RFLX MICROSCOPIC   2. Abnormal urination R39.198 788.69 AMB POC URINALYSIS DIP STICK AUTO W/O MICRO       1. Hematuria, unspecified type- pt with hx of kidney stones requiring stents, KUB and renal ultrasound with no evidence of obstruction       - AMB POC URINE PREGNANCY TEST, VISUAL COLOR COMPARISON  - XR ABD (KUB); Future  - AMB POC URINALYSIS DIP STICK AUTO W/O MICRO showing 2+ blood  - US RETROPERITONEUM LTD see below    - encouraged good hydration and will follow up UA with avelino, and UCx. Pt given instructions of when to seek emergency care and future CT was ordered if sxs do not improve with conservative tx or worsen.  If CT is negative will need repeat UA in 1 week and if hematuria still present possible cystoscopy would be indicated for further w/u.     Renal Ultrasound   Indication:  Flank Pain and Hematuris    Identified structures: Using a [employer] e ultrasound system, a curved probe was used in the tight kidney, left kidneyand bladder. Findings:  Exam of the above structures revealed the following findings:  Hydronephrosis: Absent   Intrarenal Calculi: Absent  Ureteral Calculi: Absent   Vesicular Calculi: Absent       Impression:   Normal limited renal ultrasound, no evidence of hydronephrosis or calculi      Exam performed and interpreted by: Syd Mark and Wolf Potter MD, CAQSM, RMSK  Orders Placed This Encounter    CULTURE, URINE    XR ABD (KUB)     Standing Status:   Future     Number of Occurrences:   1     Standing Expiration Date:   5/3/2020     Order Specific Question:   Reason for Exam     Answer:   r/o kidney stone     Order Specific Question:   Is Patient Pregnant? Answer:   No    CTA ABD     Standing Status:   Future     Standing Expiration Date:   5/3/2019     Order Specific Question:   Is Patient Allergic to Contrast Dye? Answer:   No     Order Specific Question:   Is Patient Pregnant? Answer:   No     Order Specific Question:   STAT Creatinine as indicated     Answer:    Yes    URINALYSIS W/ RFLX MICROSCOPIC     Standing Status:   Future     Standing Expiration Date:   10/3/2019    AMB POC URINALYSIS DIP STICK AUTO W/O MICRO    AMB POC URINE PREGNANCY TEST, VISUAL COLOR COMPARISON         Syd Mark MD  [address] [facility] Medicine Residency

## 2019-04-03 NOTE — PATIENT INSTRUCTIONS
Kidney Stone: Care Instructions  Your Care Instructions    Kidney stones are formed when salts, minerals, and other substances normally found in the urine clump together. They can be as small as grains of sand or, rarely, as large as golf balls. While the stone is traveling through the ureter, which is the tube that carries urine from the kidney to the bladder, you will probably feel pain. The pain may be mild or very severe. You may also have some blood in your urine. As soon as the stone reaches the bladder, any intense pain should go away. If a stone is too large to pass on its own, you may need a medical procedure to help you pass the stone. The doctor has checked you carefully, but problems can develop later. If you notice any problems or new symptoms, get medical treatment right away. Follow-up care is a key part of your treatment and safety. Be sure to make and go to all appointments, and call your doctor if you are having problems. It's also a good idea to know your test results and keep a list of the medicines you take. How can you care for yourself at home? · Drink plenty of fluids, enough so that your urine is light yellow or clear like water. If you have kidney, heart, or liver disease and have to limit fluids, talk with your doctor before you increase the amount of fluids you drink. · Take pain medicines exactly as directed. Call your doctor if you think you are having a problem with your medicine. ? If the doctor gave you a prescription medicine for pain, take it as prescribed. ? If you are not taking a prescription pain medicine, ask your doctor if you can take an over-the-counter medicine. Read and follow all instructions on the label. · Your doctor may ask you to strain your urine so that you can collect your kidney stone when it passes. You can use a kitchen strainer or a tea strainer to catch the stone. Store it in a plastic bag until you see your doctor again.   Preventing future kidney stones  Some changes in your diet may help prevent kidney stones. Depending on the cause of your stones, your doctor may recommend that you:  · Drink plenty of fluids, enough so that your urine is light yellow or clear like water. If you have kidney, heart, or liver disease and have to limit fluids, talk with your doctor before you increase the amount of fluids you drink. · Limit coffee, tea, and alcohol. Also avoid grapefruit juice. · Do not take more than the recommended daily dose of vitamins C and D.  · Avoid antacids such as Gaviscon, Maalox, Mylanta, or Tums. · Limit the amount of salt (sodium) in your diet. · Eat a balanced diet that is not too high in protein. · Limit foods that are high in a substance called oxalate, which can cause kidney stones. These foods include dark green vegetables, rhubarb, chocolate, wheat bran, nuts, cranberries, and beans. When should you call for help? Call your doctor now or seek immediate medical care if:    · You cannot keep down fluids.     · Your pain gets worse.     · You have a fever or chills.     · You have new or worse pain in your back just below your rib cage (the flank area).     · You have new or more blood in your urine.    Watch closely for changes in your health, and be sure to contact your doctor if:    · You do not get better as expected. Where can you learn more? Go to http://maryann-jm.info/. Enter S088 in the search box to learn more about \"Kidney Stone: Care Instructions. \"  Current as of: March 14, 2018  Content Version: 11.9  © 0574-9829 [employer]. Care instructions adapted under license by [employer] (which disclaims liability or warranty for this information). If you have questions about a medical condition or this instruction, always ask your healthcare professional. [address] any warranty or liability for your use of this information.

## 2019-04-05 NOTE — PROGRESS NOTES
[address] Medicine Residency Attending Addendum:  I saw and evaluated the patient on the day of the encounter with Natalie Vega MD  , performing the key elements of the service. I discussed the findings, assessment and plan with the resident and agree with the resident's findings and plan as documented in the resident's note.       Leon Powell MD, CAQSM, RMSK

## 2019-04-06 LAB — BACTERIA UR CULT: NORMAL

## 2019-04-07 ENCOUNTER — APPOINTMENT (OUTPATIENT)
Dept: CT IMAGING | Age: 24
End: 2019-04-07
Attending: EMERGENCY MEDICINE
Payer: COMMERCIAL

## 2019-04-07 ENCOUNTER — TELEPHONE (OUTPATIENT)
Dept: FAMILY MEDICINE CLINIC | Age: 24
End: 2019-04-07

## 2019-04-07 ENCOUNTER — HOSPITAL ENCOUNTER (EMERGENCY)
Age: 24
Discharge: HOME | End: 2019-04-07
Attending: EMERGENCY MEDICINE
Payer: COMMERCIAL

## 2019-04-07 VITALS
TEMPERATURE: 97.9 F | OXYGEN SATURATION: 99 % | BODY MASS INDEX: 49.92 KG/M2 | SYSTOLIC BLOOD PRESSURE: 133 MMHG | HEART RATE: 92 BPM | DIASTOLIC BLOOD PRESSURE: 90 MMHG | WEIGHT: 293 LBS | RESPIRATION RATE: 18 BRPM

## 2019-04-07 DIAGNOSIS — R31.29 OTHER MICROSCOPIC HEMATURIA: Primary | ICD-10-CM

## 2019-04-07 DIAGNOSIS — R10.9 FLANK PAIN: ICD-10-CM

## 2019-04-07 LAB
ALBUMIN SERPL-MCNC: 3.5 G/DL (ref 3.5–5)
ALBUMIN/GLOB SERPL: 0.7 {RATIO} (ref 1.1–2.2)
ALP SERPL-CCNC: 125 U/L (ref 45–117)
ALT SERPL-CCNC: 19 U/L (ref 12–78)
ANION GAP SERPL CALC-SCNC: 7 MMOL/L (ref 5–15)
APPEARANCE UR: CLEAR
AST SERPL-CCNC: 26 U/L (ref 15–37)
BACTERIA URNS QL MICRO: NEGATIVE /HPF
BASOPHILS # BLD: 0 K/UL (ref 0–0.1)
BASOPHILS NFR BLD: 0 % (ref 0–1)
BILIRUB SERPL-MCNC: 0.3 MG/DL (ref 0.2–1)
BILIRUB UR QL: NEGATIVE
BUN SERPL-MCNC: 8 MG/DL (ref 6–20)
BUN/CREAT SERPL: 13 (ref 12–20)
CALCIUM SERPL-MCNC: 9 MG/DL (ref 8.5–10.1)
CHLORIDE SERPL-SCNC: 106 MMOL/L (ref 97–108)
CO2 SERPL-SCNC: 24 MMOL/L (ref 21–32)
COLOR UR: ABNORMAL
COMMENT, HOLDF: NORMAL
CREAT SERPL-MCNC: 0.64 MG/DL (ref 0.55–1.02)
DIFFERENTIAL METHOD BLD: ABNORMAL
EOSINOPHIL # BLD: 0.1 K/UL (ref 0–0.4)
EOSINOPHIL NFR BLD: 1 % (ref 0–7)
EPITH CASTS URNS QL MICRO: ABNORMAL /LPF
ERYTHROCYTE [DISTWIDTH] IN BLOOD BY AUTOMATED COUNT: 15.3 % (ref 11.5–14.5)
GLOBULIN SER CALC-MCNC: 4.8 G/DL (ref 2–4)
GLUCOSE SERPL-MCNC: 80 MG/DL (ref 65–100)
GLUCOSE UR STRIP.AUTO-MCNC: NEGATIVE MG/DL
HCG UR QL: NEGATIVE
HCT VFR BLD AUTO: 41.6 % (ref 35–47)
HGB BLD-MCNC: 13.2 G/DL (ref 11.5–16)
HGB UR QL STRIP: ABNORMAL
HYALINE CASTS URNS QL MICRO: ABNORMAL /LPF (ref 0–5)
IMM GRANULOCYTES # BLD AUTO: 0.1 K/UL (ref 0–0.04)
IMM GRANULOCYTES NFR BLD AUTO: 1 % (ref 0–0.5)
KETONES UR QL STRIP.AUTO: NEGATIVE MG/DL
LEUKOCYTE ESTERASE UR QL STRIP.AUTO: NEGATIVE
LIPASE SERPL-CCNC: 83 U/L (ref 73–393)
LYMPHOCYTES # BLD: 2.7 K/UL (ref 0.8–3.5)
LYMPHOCYTES NFR BLD: 26 % (ref 12–49)
MCH RBC QN AUTO: 26.9 PG (ref 26–34)
MCHC RBC AUTO-ENTMCNC: 31.7 G/DL (ref 30–36.5)
MCV RBC AUTO: 84.7 FL (ref 80–99)
MONOCYTES # BLD: 0.7 K/UL (ref 0–1)
MONOCYTES NFR BLD: 7 % (ref 5–13)
NEUTS SEG # BLD: 6.7 K/UL (ref 1.8–8)
NEUTS SEG NFR BLD: 65 % (ref 32–75)
NITRITE UR QL STRIP.AUTO: NEGATIVE
NRBC # BLD: 0 K/UL (ref 0–0.01)
NRBC BLD-RTO: 0 PER 100 WBC
PH UR STRIP: 6 [PH] (ref 5–8)
PLATELET # BLD AUTO: 366 K/UL (ref 150–400)
PMV BLD AUTO: 9.7 FL (ref 8.9–12.9)
POTASSIUM SERPL-SCNC: 4.3 MMOL/L (ref 3.5–5.1)
PROT SERPL-MCNC: 8.3 G/DL (ref 6.4–8.2)
PROT UR STRIP-MCNC: NEGATIVE MG/DL
RBC # BLD AUTO: 4.91 M/UL (ref 3.8–5.2)
RBC #/AREA URNS HPF: ABNORMAL /HPF (ref 0–5)
SAMPLES BEING HELD,HOLD: NORMAL
SODIUM SERPL-SCNC: 137 MMOL/L (ref 136–145)
SP GR UR REFRACTOMETRY: 1.02 (ref 1–1.03)
UA: UC IF INDICATED,UAUC: ABNORMAL
UROBILINOGEN UR QL STRIP.AUTO: 0.2 EU/DL (ref 0.2–1)
WBC # BLD AUTO: 10.3 K/UL (ref 3.6–11)
WBC URNS QL MICRO: ABNORMAL /HPF (ref 0–4)

## 2019-04-07 PROCEDURE — 81001 URINALYSIS AUTO W/SCOPE: CPT

## 2019-04-07 PROCEDURE — 74176 CT ABD & PELVIS W/O CONTRAST: CPT

## 2019-04-07 PROCEDURE — 85025 COMPLETE CBC W/AUTO DIFF WBC: CPT

## 2019-04-07 PROCEDURE — 96361 HYDRATE IV INFUSION ADD-ON: CPT

## 2019-04-07 PROCEDURE — 99283 EMERGENCY DEPT VISIT LOW MDM: CPT

## 2019-04-07 PROCEDURE — 80053 COMPREHEN METABOLIC PANEL: CPT

## 2019-04-07 PROCEDURE — 96375 TX/PRO/DX INJ NEW DRUG ADDON: CPT

## 2019-04-07 PROCEDURE — 74011250636 HC RX REV CODE- 250/636: Performed by: EMERGENCY MEDICINE

## 2019-04-07 PROCEDURE — 96374 THER/PROPH/DIAG INJ IV PUSH: CPT

## 2019-04-07 PROCEDURE — 36415 COLL VENOUS BLD VENIPUNCTURE: CPT

## 2019-04-07 PROCEDURE — 83690 ASSAY OF LIPASE: CPT

## 2019-04-07 PROCEDURE — 81025 URINE PREGNANCY TEST: CPT

## 2019-04-07 RX ORDER — ONDANSETRON 2 MG/ML
4 INJECTION INTRAMUSCULAR; INTRAVENOUS
Status: COMPLETED | OUTPATIENT
Start: 2019-04-07 | End: 2019-04-07

## 2019-04-07 RX ORDER — KETOROLAC TROMETHAMINE 30 MG/ML
15 INJECTION, SOLUTION INTRAMUSCULAR; INTRAVENOUS
Status: COMPLETED | OUTPATIENT
Start: 2019-04-07 | End: 2019-04-07

## 2019-04-07 RX ADMIN — SODIUM CHLORIDE 1000 ML: 900 INJECTION, SOLUTION INTRAVENOUS at 15:26

## 2019-04-07 RX ADMIN — ONDANSETRON 4 MG: 2 INJECTION INTRAMUSCULAR; INTRAVENOUS at 15:27

## 2019-04-07 RX ADMIN — KETOROLAC TROMETHAMINE 15 MG: 30 INJECTION, SOLUTION INTRAMUSCULAR at 15:29

## 2019-04-07 NOTE — ED PROVIDER NOTES
27 y.o. female with past medical history significant for kidney stones, morbid obesity, sleep apnea, depression, suicidal thoughts, mood disorder, and depression who presents from home with chief complaint of hematuria. Pt went to her PCP ~4 days ago for evaluation of hematuria, back pain, intermittent, sharp, abd pain, nausea, vomiting, dysuria, urgency, and increased urinary frequency. She reports a h/o kidney stones. She states that they did a UA and saw traces of blood in her urine and told her they believe she may have kidney stones but \"they weren't sure. \" She reports an episode of vomiting this morning. Pt denies vaginal bleeding, vaginal discharge. LMP was 4 months ago and has irregular menstrual cycles. She has a f/u with OB soon. There are no other acute medical concerns at this time. Social hx: former tobacco smoker; rare EtOH use PCP: Aamir Bass DO Note written by Lizzy Costa, as dictated by Jay Marquez MD 3:01 PM 
 
The history is provided by the patient. No  was used. Past Medical History:  
Diagnosis Date  Depression  Kidney stones  Mood disorder ([address]  Morbid obesity ([address]  Psychiatric disorder   
 depression  Sleep apnea Does not have CPAP as yet  Suicidal thoughts Past Surgical History:  
Procedure Laterality Date  HX UROLOGICAL Stenting x 4 (Starting in 2016, developed severe infection) Family History:  
Problem Relation Age of Onset  Heart Disease Father 55  Hypertension Father  Sleep Apnea Father  No Known Problems Sister  Cancer Maternal Grandmother Brain Cancer  Heart Disease Paternal Grandmother  Cancer Paternal Grandfather Social History Socioeconomic History  Marital status: SINGLE Spouse name: Not on file  Number of children: Not on file  Years of education: Not on file  Highest education level: Not on file Occupational History  Occupation:  Comment: started in 3/17 Social Needs  Financial resource strain: Not on file  Food insecurity:  
  Worry: Not on file Inability: Not on file  Transportation needs:  
  Medical: Not on file Non-medical: Not on file Tobacco Use  Smoking status: Former Smoker Years: 1.00 Last attempt to quit: 2018 Years since quittin.2  Smokeless tobacco: Never Used  Tobacco comment:  pack ppd for 6 months Substance and Sexual Activity  Alcohol use: No  
  Comment: rarely  Drug use: No  
  Comment: Has done 4vets  Sexual activity: Never Lifestyle  Physical activity:  
  Days per week: Not on file Minutes per session: Not on file  Stress: Not on file Relationships  Social connections:  
  Talks on phone: Not on file Gets together: Not on file Attends Yazidi service: Not on file Active member of club or organization: Not on file Attends meetings of clubs or organizations: Not on file Relationship status: Not on file  Intimate partner violence:  
  Fear of current or ex partner: Not on file Emotionally abused: Not on file Physically abused: Not on file Forced sexual activity: Not on file Other Topics Concern  Not on file Social History Narrative 26year old AA female admitted for reportedly taking a handful of prozac. Pt has a past suicide attempt and this is her first psychiatriatric admission. Pt lives with her parents. She is a  . She has a 12th grade education. ALLERGIES: Aspirin Review of Systems Gastrointestinal: Positive for abdominal pain, nausea and vomiting. Genitourinary: Positive for dysuria, frequency, hematuria and urgency. Negative for vaginal bleeding and vaginal discharge. Musculoskeletal: Positive for back pain. All other systems reviewed and are negative. Vitals:  
 19 1501 BP: 133/90 Pulse: 92 Resp: 18 Temp: 97.9 °F (36.6 °C) SpO2: 99% Weight: 136.1 kg (300 lb) Physical Exam  
Physical Examination: General appearance - alert, well appearing, and in no distress, oriented to person, place, and time and normal appearing weight Eyes - pupils equal and reactive, extraocular eye movements intact Neck - supple, no significant adenopathy Chest - clear to auscultation, no wheezes, rales or rhonchi, symmetric air entry Heart - normal rate, regular rhythm, normal S1, S2, no murmurs, rubs, clicks or gallops Abdomen - soft, nontender, nondistended, no masses or organomegaly Back exam - full range of motion, no tenderness, palpable spasm or pain on motion Neurological - alert, oriented, normal speech, no focal findings or movement disorder noted Musculoskeletal - no joint tenderness, deformity or swelling Extremities - peripheral pulses normal, no pedal edema, no clubbing or cyanosis Skin - normal coloration and turgor, no rashes, no suspicious skin lesions noted MDM Number of Diagnoses or Management Options Flank pain:  
Other microscopic hematuria:  
  
Amount and/or Complexity of Data Reviewed Clinical lab tests: ordered and reviewed Tests in the radiology section of CPT®: ordered and reviewed Independent visualization of images, tracings, or specimens: yes Patient Progress Patient progress: improved Procedures No acute process on labs or imaging. VSS. Will d/c with urology f/u for hematuria. Return to ED for worsening symptoms.

## 2019-04-07 NOTE — DISCHARGE INSTRUCTIONS
Patient Education        Blood in the Urine: Care Instructions  Your Care Instructions    Blood in the urine, or hematuria, may make the urine look red, brown, or pink. There may be blood every time you urinate or just from time to time. You cannot always see blood in the urine, but it will show up in a urine test.  Blood in the urine may be serious. It should always be checked by a doctor. Your doctor may recommend more tests, including an X-ray, a CT scan, or a cystoscopy (which lets a doctor look inside the urethra and bladder). Blood in the urine can be a sign of another problem. Common causes are bladder infections and kidney stones. An injury to your groin or your genital area can also cause bleeding in the urinary tract. Very hard exercise--such as running a marathon--can cause blood in the urine. Blood in the urine can also be a sign of kidney disease or cancer in the bladder or kidney. Many cases of blood in the urine are caused by a harmless condition that runs in families. This is called benign familial hematuria. It does not need any treatment. Sometimes your urine may look red or brown even though it does not contain blood. For example, not getting enough fluids (dehydration), taking certain medicines, or having a liver problem can change the color of your urine. Eating foods such as beets, rhubarb, or blackberries or foods with red food coloring can make your urine look red or pink. Follow-up care is a key part of your treatment and safety. Be sure to make and go to all appointments, and call your doctor if you are having problems. It's also a good idea to know your test results and keep a list of the medicines you take. When should you call for help? Call your doctor now or seek immediate medical care if:    · You have symptoms of a urinary infection. For example:  ? You have pus in your urine. ? You have pain in your back just below your rib cage. This is called flank pain. ?  You have a fever, chills, or body aches. ? It hurts to urinate. ? You have groin or belly pain.     · You have more blood in your urine.    Watch closely for changes in your health, and be sure to contact your doctor if:    · You have new urination problems.     · You do not get better as expected. Where can you learn more? Go to http://maryann-jm.info/. Enter D049 in the search box to learn more about \"Blood in the Urine: Care Instructions. \"  Current as of: March 20, 2018  Content Version: 11.9  © 7254-7612 [employer]. Care instructions adapted under license by [employer] (which disclaims liability or warranty for this information). If you have questions about a medical condition or this instruction, always ask your healthcare professional. [address] any warranty or liability for your use of this information. Patient Education        Abdominal Pain: Care Instructions  Your Care Instructions    Abdominal pain has many possible causes. Some aren't serious and get better on their own in a few days. Others need more testing and treatment. If your pain continues or gets worse, you need to be rechecked and may need more tests to find out what is wrong. You may need surgery to correct the problem. Don't ignore new symptoms, such as fever, nausea and vomiting, urination problems, pain that gets worse, and dizziness. These may be signs of a more serious problem. Your doctor may have recommended a follow-up visit in the next 8 to 12 hours. If you are not getting better, you may need more tests or treatment. The doctor has checked you carefully, but problems can develop later. If you notice any problems or new symptoms, get medical treatment right away. Follow-up care is a key part of your treatment and safety. Be sure to make and go to all appointments, and call your doctor if you are having problems.  It's also a good idea to know your test results and keep a list of the medicines you take. How can you care for yourself at home? · Rest until you feel better. · To prevent dehydration, drink plenty of fluids, enough so that your urine is light yellow or clear like water. Choose water and other caffeine-free clear liquids until you feel better. If you have kidney, heart, or liver disease and have to limit fluids, talk with your doctor before you increase the amount of fluids you drink. · If your stomach is upset, eat mild foods, such as rice, dry toast or crackers, bananas, and applesauce. Try eating several small meals instead of two or three large ones. · Wait until 48 hours after all symptoms have gone away before you have spicy foods, alcohol, and drinks that contain caffeine. · Do not eat foods that are high in fat. · Avoid anti-inflammatory medicines such as aspirin, ibuprofen (Advil, Motrin), and naproxen (Aleve). These can cause stomach upset. Talk to your doctor if you take daily aspirin for another health problem. When should you call for help? Call 911 anytime you think you may need emergency care. For example, call if:    · You passed out (lost consciousness).     · You pass maroon or very bloody stools.     · You vomit blood or what looks like coffee grounds.     · You have new, severe belly pain.    Call your doctor now or seek immediate medical care if:    · Your pain gets worse, especially if it becomes focused in one area of your belly.     · You have a new or higher fever.     · Your stools are black and look like tar, or they have streaks of blood.     · You have unexpected vaginal bleeding.     · You have symptoms of a urinary tract infection. These may include:  ? Pain when you urinate. ? Urinating more often than usual.  ?  Blood in your urine.     · You are dizzy or lightheaded, or you feel like you may faint.    Watch closely for changes in your health, and be sure to contact your doctor if:    · You are not getting better after 1 day (24 hours). Where can you learn more? Go to http://maryann-jm.info/. Enter H168 in the search box to learn more about \"Abdominal Pain: Care Instructions. \"  Current as of: September 23, 2018  Content Version: 11.9  © 6319-0148 [employer]. Care instructions adapted under license by [employer] (which disclaims liability or warranty for this information). If you have questions about a medical condition or this instruction, always ask your healthcare professional. [place] [address] any warranty or liability for your use of this information. We hope that we have addressed all of your medical concerns. The examination and treatment you received in the Emergency Department were for an emergent problem and were not intended as complete care. It is important that you follow up with your healthcare provider(s) for ongoing care. If your symptoms worsen or do not improve as expected, and you are unable to reach your usual health care provider(s), you should return to the Emergency Department. Today's healthcare is undergoing tremendous change, and patient satisfaction surveys are one of the many tools to assess the quality of medical care. You may receive a survey from the [employer] regarding your experience in the Emergency Department. I hope that your experience has been completely positive, particularly the medical care that I provided. As such, please participate in the survey; anything less than excellent does not meet my expectations or intentions. [facility] participate in nationally recognized quality of care measures. If your blood pressure is greater than 120/80, as reported below, we urge that you seek medical care to address the potential of high blood pressure, commonly known as hypertension.    Hypertension can be hereditary or can be caused by certain medical conditions, pain, stress, or \"white coat syndrome. \"       Please make an appointment with your health care provider(s) for follow up of your Emergency Department visit. VITALS:   Patient Vitals for the past 8 hrs:   Temp Pulse Resp BP SpO2   04/07/19 1501 97.9 °F (36.6 °C) 92 18 133/90 99 %          Thank you for allowing us to provide you with medical care today. We realize that you have many choices for your emergency care needs. Please choose us in the future for any continued health care needs. Adrienne Sandoval See, [address] [place], [place] [address]y 20.   Office: [phone]            Recent Results (from the past 24 hour(s))   CBC WITH AUTOMATED DIFF    Collection Time: 04/07/19  3:17 PM   Result Value Ref Range    WBC 10.3 3.6 - 11.0 K/uL    RBC 4.91 3.80 - 5.20 M/uL    HGB 13.2 11.5 - 16.0 g/dL    HCT 41.6 35.0 - 47.0 %    MCV 84.7 80.0 - 99.0 FL    MCH 26.9 26.0 - 34.0 PG    MCHC 31.7 30.0 - 36.5 g/dL    RDW 15.3 (H) 11.5 - 14.5 %    PLATELET 726 769 - 793 K/uL    MPV 9.7 8.9 - 12.9 FL    NRBC 0.0 0  WBC    ABSOLUTE NRBC 0.00 0.00 - 0.01 K/uL    NEUTROPHILS 65 32 - 75 %    LYMPHOCYTES 26 12 - 49 %    MONOCYTES 7 5 - 13 %    EOSINOPHILS 1 0 - 7 %    BASOPHILS 0 0 - 1 %    IMMATURE GRANULOCYTES 1 (H) 0.0 - 0.5 %    ABS. NEUTROPHILS 6.7 1.8 - 8.0 K/UL    ABS. LYMPHOCYTES 2.7 0.8 - 3.5 K/UL    ABS. MONOCYTES 0.7 0.0 - 1.0 K/UL    ABS. EOSINOPHILS 0.1 0.0 - 0.4 K/UL    ABS. BASOPHILS 0.0 0.0 - 0.1 K/UL    ABS. IMM.  GRANS. 0.1 (H) 0.00 - 0.04 K/UL    DF AUTOMATED     METABOLIC PANEL, COMPREHENSIVE    Collection Time: 04/07/19  3:17 PM   Result Value Ref Range    Sodium 137 136 - 145 mmol/L    Potassium 4.3 3.5 - 5.1 mmol/L    Chloride 106 97 - 108 mmol/L    CO2 24 21 - 32 mmol/L    Anion gap 7 5 - 15 mmol/L    Glucose 80 65 - 100 mg/dL    BUN 8 6 - 20 MG/DL    Creatinine 0.64 0.55 - 1.02 MG/DL    BUN/Creatinine ratio 13 12 - 20      GFR est AA >60 >60 ml/min/1.73m2    GFR est non-AA >60 >60 ml/min/1.73m2    Calcium 9.0 8.5 - 10.1 MG/DL    Bilirubin, total 0.3 0.2 - 1.0 MG/DL    ALT (SGPT) 19 12 - 78 U/L    AST (SGOT) 26 15 - 37 U/L    Alk. phosphatase 125 (H) 45 - 117 U/L    Protein, total 8.3 (H) 6.4 - 8.2 g/dL    Albumin 3.5 3.5 - 5.0 g/dL    Globulin 4.8 (H) 2.0 - 4.0 g/dL    A-G Ratio 0.7 (L) 1.1 - 2.2     URINALYSIS W/ REFLEX CULTURE    Collection Time: 04/07/19  3:17 PM   Result Value Ref Range    Color YELLOW/STRAW      Appearance CLEAR CLEAR      Specific gravity 1.019 1.003 - 1.030      pH (UA) 6.0 5.0 - 8.0      Protein NEGATIVE  NEG mg/dL    Glucose NEGATIVE  NEG mg/dL    Ketone NEGATIVE  NEG mg/dL    Bilirubin NEGATIVE  NEG      Blood TRACE (A) NEG      Urobilinogen 0.2 0.2 - 1.0 EU/dL    Nitrites NEGATIVE  NEG      Leukocyte Esterase NEGATIVE  NEG      WBC 0-4 0 - 4 /hpf    RBC 5-10 0 - 5 /hpf    Epithelial cells FEW FEW /lpf    Bacteria NEGATIVE  NEG /hpf    UA:UC IF INDICATED CULTURE NOT INDICATED BY UA RESULT CNI      Hyaline cast 2-5 0 - 5 /lpf   SAMPLES BEING HELD    Collection Time: 04/07/19  3:17 PM   Result Value Ref Range    SAMPLES BEING HELD RED     COMMENT        Add-on orders for these samples will be processed based on acceptable specimen integrity and analyte stability, which may vary by analyte. LIPASE    Collection Time: 04/07/19  3:17 PM   Result Value Ref Range    Lipase 83 73 - 393 U/L   HCG URINE, QL. - POC    Collection Time: 04/07/19  3:23 PM   Result Value Ref Range    Pregnancy test,urine (POC) NEGATIVE  NEG         Ct Abd Pelv Wo Cont    Result Date: 4/7/2019  EXAM: CT ABD PELV WO CONT Clinical history: Low back pain and hematuria INDICATION: low back pain, hematuria COMPARISON: 6/25/2018 CONTRAST:  None. TECHNIQUE: Thin axial images were obtained through the abdomen and pelvis. Coronal and sagittal reconstructions were generated. Oral contrast was not administered.  CT dose reduction was achieved through use of a standardized protocol tailored for this examination and automatic exposure control for dose modulation. The absence of intravenous contrast material reduces the sensitivity for evaluation of the solid parenchymal organs of the abdomen. FINDINGS: LUNG BASES: Clear. INCIDENTALLY IMAGED HEART AND MEDIASTINUM: Unremarkable. LIVER: No mass or biliary dilatation. GALLBLADDER: Unremarkable. SPLEEN: No mass. PANCREAS: No mass or ductal dilatation. ADRENALS: Unremarkable. KIDNEYS/URETERS: No mass or calculus is identified. There is no hydroureter or hydronephrosis. STOMACH: Unremarkable. SMALL BOWEL: No dilatation or wall thickening. COLON: No dilatation or wall thickening. APPENDIX: Unremarkable. PERITONEUM: No ascites or pneumoperitoneum. RETROPERITONEUM: No lymphadenopathy or aortic aneurysm. REPRODUCTIVE ORGANS: Uterus is not enlarged URINARY BLADDER: Nondistended. Not evaluated. Kamala Rios BONES: No destructive bone lesion. Disc degenerative change at T11-T12. ADDITIONAL COMMENTS: N/A     IMPRESSION: No acute intraperitoneal process is identified. No hydroureter or hydronephrosis.

## 2019-04-07 NOTE — ED NOTES
Discharged by provider. Pt signed discharge paperwork and leaves ambulatory. Pt denies pain at time of discharge. Denies questions.

## 2019-04-07 NOTE — ED TRIAGE NOTES
Patient states dysuria with \" blood in my urine, clots and I had kidney stones\". Vomiting on/off. LMP in January.

## 2019-04-07 NOTE — TELEPHONE ENCOUNTER
4/7/2019 10:49 AM    States she is having back pain, abdominal pain, increasing urinary frequency. States she is seeing a lot of blood in her urine. Hx of kidney stones. Recommended she go to ER to be evaluated further. Patient agreeable.      Deloris Avilez, DO

## 2019-04-09 ENCOUNTER — PATIENT OUTREACH (OUTPATIENT)
Dept: FAMILY MEDICINE CLINIC | Age: 24
End: 2019-04-09

## 2020-07-17 ENCOUNTER — VIRTUAL VISIT (OUTPATIENT)
Dept: FAMILY MEDICINE CLINIC | Age: 25
End: 2020-07-17

## 2020-07-17 DIAGNOSIS — R51.9 MORNING HEADACHE: Primary | ICD-10-CM

## 2020-07-17 DIAGNOSIS — R07.9 CHEST PAIN ON EXERTION: ICD-10-CM

## 2020-07-17 DIAGNOSIS — G47.33 OSA (OBSTRUCTIVE SLEEP APNEA): ICD-10-CM

## 2020-07-17 NOTE — PROGRESS NOTES
Alex Peters  23 y.o. female  1995  [address] [place] [address] Rd:    Telemedicine Progress Note  Gay Diaz DO       Encounter Date and Time: July 17, 2020 at [address] PM    Consent: Alex Peters, who was seen by synchronous (real-time) audio-video technology, and/or her healthcare decision maker, is aware that this patient-initiated, Telehealth encounter on 7/17/2020 is a billable service, with coverage as determined by her insurance carrier. She is aware that she may receive a bill and has provided verbal consent to proceed: Yes. Chief Complaint   Patient presents with    Headache     History of Present Illness   Alex Peters is a 23 y.o. female was evaluated by synchronous (real-time) audio-video technology from home, through a secure patient portal.    Patient complains of daily headaches in the morning. States that she recently had a sinus infection and was treated with a course of abx after being evaluated at [facility]. Sinus infection has resolved, but continuing to have daily headaches upon awakening. States she was diagnosed with OWEN a couple years ago. Her father has told her that she snores and sometimes stops breathing when she sleeps. She reports that she constantly wakes up gasping for air and feels not rested in the mornings. Had a sleep study done in the past, but has never been treated. Tylenol (2 tabs per day) provides some relief. Patient also mentions that she has had sharp left-sided chest pain that occasionally radiates to the right arm for the past 2 weeks. Usually occurs when she is exercising, and goes away with rest. Has associated palpitations at times. Reports she had a similar episode when she was hospitalized for a kidney stone and was evaluated by cardiology. Workup at that time was normal. Has been trying to lose weight w/ exercise. Currently weighs 214 lbs. Does not measure her BP at home. Father has a hx of CHF and MI. Review of Systems   Review of Systems   Constitutional: Negative for chills and fever. Respiratory: Negative for cough, shortness of breath and wheezing. Cardiovascular: Positive for chest pain (radiating to right arm), palpitations and leg swelling (Right knee). Gastrointestinal: Negative for abdominal pain and nausea. Neurological: Positive for tingling (right arm) and headaches. Negative for focal weakness. Vitals/Objective:     General: alert, cooperative, no distress   Mental  status: mental status: alert, oriented to person, place, and time, normal mood, behavior, speech, dress, motor activity, and thought processes   Resp: resp: normal effort and no respiratory distress   Neuro: neuro: no gross deficits   Skin: skin: no discoloration or lesions of concern on visible areas   Due to this being a TeleHealth evaluation, many elements of the physical examination are unable to be assessed. Assessment and Plan:   23 y.o. F who presents for headaches and chest pain. 1. Morning headache: Likely 2/2 uncontrolled OWEN. - Continue tylenol prn    2. OWEN (obstructive sleep apnea): uncontrolled. - Discussed losing weight, altering sleep position, and avoiding alcohol/smoking  - SLEEP MEDICINE REFERRAL    3. Chest pain on exertion: per chart review, patient has been evaluated multiple times for complaints of CP w/ no significant evidence of cardiac etiology. Evaluated by Cardiology in 2018 w/ normal echo and EKG. - Patient to be seen in office on 7/21/20 for EKG and labs (CBC, CMP, A1c, lipid)  - Advised patient to go to the ED if experiencing CP on rest, SOB, lightheadedness/dizziness, nausea/vomiting, worsening of current symptoms/having new symptoms.        Time spent in direct conversation with the patient to include medical condition(s) discussed, assessment and treatment plan:       We discussed the expected course, resolution and complications of the diagnosis(es) in detail. Medication risks, benefits, costs, interactions, and alternatives were discussed as indicated. I advised her to contact the office if her condition worsens, changes or fails to improve as anticipated. She expressed understanding with the diagnosis(es) and plan. Patient understands that this encounter was a temporary measure, and the importance of further follow up and examination was emphasized. Patient verbalized understanding. Patient informed to follow up: 7/21/20. Electronically Signed: Dwayne Chow DO    CPT Codes 71896-55884 for Established Patients may apply to this Telehealth Visit. POS code: 18Sherrie Koenig is a 23 y.o. female who was evaluated by an audio-video encounter for concerns as above. Patient identification was verified prior to start of the visit. A caregiver was present when appropriate. Due to this being a TeleHealth encounter (During EJXAO-69 public health emergency), evaluation of the following organ systems was limited: Vitals/Constitutional/EENT/Resp/CV/GI//MS/Neuro/Skin/Heme-Lymph-Imm. Pursuant to the emergency declaration under the [facility], [facility] waiver authority and the [employer] Act, this Virtual Visit was conducted, with patient's (and/or legal guardian's) consent, to reduce the patient's risk of exposure to COVID-19 and provide necessary medical care. Services were provided through a synchronous discussion virtually to substitute for in-person clinic visit. I was at home. The patient was at home. History   Patients past medical, surgical and family histories were reviewed and updated.       Past Medical History:   Diagnosis Date    Depression     Kidney stones     Mood disorder ([facility]     Morbid obesity ([facility]     Psychiatric disorder     depression    Sleep apnea     Does not have CPAP as yet    Suicidal thoughts Past Surgical History:   Procedure Laterality Date    HX UROLOGICAL      Stenting x 4 (Starting in 2016, developed severe infection)     Family History   Problem Relation Age of Onset    Heart Disease Father 55    Hypertension Father     Sleep Apnea Father     No Known Problems Sister     Cancer Maternal Grandmother         Brain Cancer    Heart Disease Paternal Grandmother     Cancer Paternal Grandfather      Social History     Socioeconomic History    Marital status: SINGLE     Spouse name: Not on file    Number of children: Not on file    Years of education: Not on file    Highest education level: Not on file   Occupational History    Occupation:      Comment: started in 3/17   Social Needs    Financial resource strain: Not on file    Food insecurity     Worry: Not on file     Inability: Not on file   [employer] needs     Medical: Not on file     Non-medical: Not on file   Tobacco Use    Smoking status: Former Smoker     Years: 1.00     Last attempt to quit: 2018     Years since quittin.5    Smokeless tobacco: Never Used    Tobacco comment:  pack ppd for 6 months   Substance and Sexual Activity    Alcohol use: Yes     Comment: rarely    Drug use: No     Comment: Has done Marijuan    Sexual activity: Never   Lifestyle    Physical activity     Days per week: Not on file     Minutes per session: Not on file    Stress: Not on file   Relationships    Social connections     Talks on phone: Not on file     Gets together: Not on file     Attends Jainism service: Not on file     Active member of club or organization: Not on file     Attends meetings of clubs or organizations: Not on file     Relationship status: Not on file    Intimate partner violence     Fear of current or ex partner: Not on file     Emotionally abused: Not on file     Physically abused: Not on file     Forced sexual activity: Not on file   Other Topics Concern    Not on file   Social History Narrative    26year old AA female admitted for reportedly taking a handful of prozac. Pt has a past suicide attempt and this is her first psychiatriatric admission. Pt lives with her parents. She is a  . She has a 12th grade education. Patient Active Problem List   Diagnosis Code    SOB (shortness of breath) R06.02    Chest pain R07.9    Essential hypertension I10    Obesity, morbid (HCC) E66.01    Cholecystitis K81.9    Major depressive disorder F32.9          Current Medications/Allergies   Medications and Allergies reviewed:    Current Outpatient Medications   Medication Sig Dispense Refill    FLUoxetine (PROZAC) 20 mg capsule Take 1 Cap by mouth daily. 90 Cap 0    methocarbamol (ROBAXIN-750) 750 mg tablet Take 1 Tab by mouth four (4) times daily. As needed for muscle spasm. No driving while taking this medication.   Indications: Muscle Spasm 15 Tab 0     Allergies   Allergen Reactions    Aspirin Itching

## 2020-07-19 NOTE — PROGRESS NOTES
[address] Medicine Residency Attending Addendum:  Dr. Elijah Adhikari, DO,  the patient and I were not physically present during this encounter. The resident and I are concurrently monitoring the patient care through appropriate telecommunication technology. I discussed the findings, assessment and plan with the resident and agree with the resident's findings and plan as documented in the resident's note.       Kandace Carter MD

## 2020-07-20 ENCOUNTER — TELEPHONE (OUTPATIENT)
Dept: FAMILY MEDICINE CLINIC | Age: 25
End: 2020-07-20

## 2020-07-20 NOTE — TELEPHONE ENCOUNTER
----- Message from Linda Estrada sent at 7/17/2020  5:03 PM EDT -----  Regarding: Wilfrido Ellis,   Appointment not available    Caller's first and last name and relationship to patient (if not the patient):      Best contact number: [phone]      Preferred date and time: 07.20.2020      Scheduled appointment date and time: Tuesday, July 21, 2020 02:15 PM      Reason for appointment: chest pain per Dr. Arya Nicolas      Details to clarify the request:      Linda Estrada

## 2020-07-20 NOTE — TELEPHONE ENCOUNTER
Chart reflecting appointment already   Scheduled.       Tuesday, July 21, 2020 02:15 PM f [facility]MAIN OFFICE, [facility], Acute Care, 30min    chest pain per Dr. Nirmal Bear

## 2020-07-23 ENCOUNTER — OFFICE VISIT (OUTPATIENT)
Dept: FAMILY MEDICINE CLINIC | Age: 25
End: 2020-07-23

## 2020-07-23 ENCOUNTER — TELEPHONE (OUTPATIENT)
Dept: FAMILY MEDICINE CLINIC | Age: 25
End: 2020-07-23

## 2020-07-23 VITALS
BODY MASS INDEX: 52.72 KG/M2 | SYSTOLIC BLOOD PRESSURE: 128 MMHG | OXYGEN SATURATION: 97 % | HEART RATE: 100 BPM | TEMPERATURE: 97.7 F | DIASTOLIC BLOOD PRESSURE: 80 MMHG | WEIGHT: 293 LBS

## 2020-07-23 DIAGNOSIS — G47.33 OSA (OBSTRUCTIVE SLEEP APNEA): ICD-10-CM

## 2020-07-23 DIAGNOSIS — R07.9 CHEST PAIN, UNSPECIFIED TYPE: Primary | ICD-10-CM

## 2020-07-23 RX ORDER — NORETHINDRONE ACETATE AND ETHINYL ESTRADIOL AND FERROUS FUMARATE 1MG-20(21)
KIT ORAL
COMMUNITY
Start: 2020-06-22 | End: 2020-11-25 | Stop reason: ALTCHOICE

## 2020-07-23 NOTE — PROGRESS NOTES
[address]  [address]   [phone]    Date of visit:  7/23/2020    Emmy Rios is an 23 y.o. female presents for evaluation of chest pain. The pain is located below her left breast bone. She first noticed it 2 weeks ago. It was a sharp pain that came on after she was exercising. She has been going on 40min walks and feels it at the end of her work out. She'll also have some palpitations and shortness of breath. It self-resolves with rest, after 5-10min   She denies any chest pain or sob of breath today. She was evaluated by Cardiology a couple years ago but states this chest pain feels different. She does state that she has also been waking up with headaches almost every day. She has to prop herself up on two pillows to breathe more comfortably. Her family member has told her she stops breathing during her sleep. She was previously diagnosed with OWEN (2yrs ago) but never got a CPAP machine because she did not have insurance. She would like a referral today. She has been trying to lose weight. Review of Systems   General/Constitutional:   Headache. Weight gain. No fever, fatigue, weight loss       Eyes:   No  pain, visual changes, swelling, or discharge      Ears:    No pain, loss or changes in hearing     Neck:   No swelling, masses, stiffness, pain, or limited movement     Cardiac:    No chest pain or palpitations     Respiratory:   No cough or shortness of breath     GI:   No nausea/vomiting, diarrhea, abdominal pain, bloody or dark stools       :   No dysuria or  hematuria    Neurological:   No loss of consciousness, dizziness, seizures, problems with balance, or unilateral weakness     Skin: No rash     Allergies   Allergies   Allergen Reactions    Aspirin Itching     Medications  Current Outpatient Medications   Medication Sig    FLUoxetine (PROZAC) 20 mg capsule Take 1 Cap by mouth daily.     Junel FE 1/20, 28, 1 mg-20 mcg (21)/75 mg (7) tab      No current facility-administered medications for this visit. Medical History  Past Medical History:   Diagnosis Date    Depression     Kidney stones     [facility]     Morbid obesity ([facility] [facility]     Psychiatric disorder     depression    Sleep apnea     Does not have CPAP as yet    Suicidal thoughts      Immunizations   Immunization History   Administered Date(s) Administered    Influenza Vaccine (Quad) PF 2017, 10/25/2018     Social History  Social History     Tobacco Use    Smoking status: Former Smoker     Years: 1.00     Last attempt to quit: 2018     Years since quittin.5    Smokeless tobacco: Never Used    Tobacco comment:  pack ppd for 6 months   Substance Use Topics    Alcohol use: Yes     Comment: rarely    Drug use: No     Comment: Has done Marijuan     Objective     Visit Vitals  /80 (BP 1 Location: Left arm, BP Patient Position: Sitting)   Pulse 100   Temp 97.7 °F (36.5 °C) (Temporal)   Wt 316 lb 12.8 oz (143.7 kg)   SpO2 97%   BMI 52.72 kg/m²     Physical Examination  GEN: No apparent distress. Alert and oriented and responds to all questions appropriately. EYES:  Conjunctiva clear; pupils round and reactive to light; extraocular movements are intact. EAR: External ears are normal.  Tympanic membranes are clear and without effusion. NOSE: Turbinates are within normal limits. No drainage  OROPHYARYNX: No oral lesions or exudates. NECK:  Supple; no masses; thyroid normal           LUNGS: Respirations unlabored; clear to auscultation bilaterally  CARDIOVASCULAR: Regular, rate, and rhythm without murmurs, gallops or rubs   ABDOMEN: Obese abdomen. Soft; nontender; nondistended; normoactive bowel sounds; no masses or organomegaly  NEUROLOGIC:  No focal neurologic deficits. Strength and sensation grossly intact. Coordination and gait grossly intact. EXT: Well perfused. No edema. SKIN: No obvious rashes.   Assessment Kavitha Ventura is a 23 y.o. who presents for chest pain. Plan   1. Chest pain, unspecified type  - Chest pain not reproducible on exam today, however likely MSK in nature during exercise in light of BMI however will refer to Cardiology for further evaluation vs stress test. Patient counseled on not exercising until Cardiology appointment. Appointment arranged on 08/05 with Dr. Amandeep Rae    - EKG in clinic with no ST changes today in clinic  - Follow up labs CBC, CMP, A1c and Lipid panel   - Patient agreeable to this plan     2. OWEN (obstructive sleep apnea)  - Untreated OWEN likely contributing to headache, but patient also eats and hydrates sporadically throughout the day. - Placed SLEEP MEDICINE REFERRAL and contact information provided     3. BMI 50.0-59.9, adult [facility])  - Patient has gained 16lbs in 3 months. Patient has been trying to exercise to lose weight however will need cardiac evaluation before returning to exercise. - She has seen Dr Amina Navarro in the past and will need to counseling for aggressive weight loss strategies   - Checking labs CBC, CMP, Lipid panel and A1c     I have discussed the aforementioned diagnoses and plan with the patient in detail. I have provided information in person and/or in AVS. All questions answered prior to discharge.     I discussed this patient with Dr. Olivia Quinteros (Attending Physician)   Signed By:  Karla Yap MD    Family Medicine Resident

## 2020-07-23 NOTE — PATIENT INSTRUCTIONS
[address]  [address] [place] [address] Drive  [phone]    Please do not exercise until seen by Cardiology. Please go to the ER if your chest pain worsens and does not resolve prior to your Cardiology appointment.

## 2020-07-23 NOTE — PROGRESS NOTES
Patient is a 23year old female who is here today for chest pain 2 weeks ago, patients parents notice when she is sleeping she stops breathing. She also has headaches that started a week ago, rates pain 9/10 today. Right knee swells up and is having pain, usually occurs at night. Wakes her up because of pain. Chief Complaint   Patient presents with    Chest Pain     2 weeks ago. Parents sa when she's sleeping she stops breathing     Headache     1 week ago. Pain 9/10 today     Knee Pain     right knee. swells up at night wakes her up because of swelling. Pain 6/10     Visit Vitals  /80 (BP 1 Location: Left arm, BP Patient Position: Sitting)   Pulse 100   Temp 97.7 °F (36.5 °C) (Temporal)   Wt 316 lb 12.8 oz (143.7 kg)   SpO2 97%   BMI 52.72 kg/m²     1. Have you been to the ER, urgent care clinic since your last visit? Hospitalized since your last visit? Yes, beginning of July at [facility]; for headaches      2. Have you seen or consulted any other health care providers outside of the [address] [place] [address] since your last visit? Include any pap smears or colon screening.  No

## 2020-07-23 NOTE — TELEPHONE ENCOUNTER
Called patient to let her know that Cardiologist should be giving her a phone call to let her know about her appt. Called Cardiologist ( Pelon Mcintyre) to make a appt. per  for chest pain. Nurse said she was going to call patient to let her know about her appt. [address] [facility].  [phone] [facility]  [phone]

## 2020-07-24 LAB
ALBUMIN SERPL-MCNC: 4.2 G/DL (ref 3.9–5)
ALBUMIN/GLOB SERPL: 1.4 {RATIO} (ref 1.2–2.2)
ALP SERPL-CCNC: 92 IU/L (ref 39–117)
ALT SERPL-CCNC: 11 IU/L (ref 0–32)
AST SERPL-CCNC: 8 IU/L (ref 0–40)
BILIRUB SERPL-MCNC: 0.2 MG/DL (ref 0–1.2)
BUN SERPL-MCNC: 9 MG/DL (ref 6–20)
BUN/CREAT SERPL: 14 (ref 9–23)
CALCIUM SERPL-MCNC: 9.6 MG/DL (ref 8.7–10.2)
CHLORIDE SERPL-SCNC: 102 MMOL/L (ref 96–106)
CHOLEST SERPL-MCNC: 160 MG/DL (ref 100–199)
CO2 SERPL-SCNC: 22 MMOL/L (ref 20–29)
CREAT SERPL-MCNC: 0.65 MG/DL (ref 0.57–1)
ERYTHROCYTE [DISTWIDTH] IN BLOOD BY AUTOMATED COUNT: 14.2 % (ref 11.7–15.4)
EST. AVERAGE GLUCOSE BLD GHB EST-MCNC: 114 MG/DL
GLOBULIN SER CALC-MCNC: 3 G/DL (ref 1.5–4.5)
GLUCOSE SERPL-MCNC: 83 MG/DL (ref 65–99)
HBA1C MFR BLD: 5.6 % (ref 4.8–5.6)
HCT VFR BLD AUTO: 39.2 % (ref 34–46.6)
HDLC SERPL-MCNC: 62 MG/DL
HGB BLD-MCNC: 12.8 G/DL (ref 11.1–15.9)
INTERPRETATION, 910389: NORMAL
LDLC SERPL CALC-MCNC: 81 MG/DL (ref 0–99)
MCH RBC QN AUTO: 28 PG (ref 26.6–33)
MCHC RBC AUTO-ENTMCNC: 32.7 G/DL (ref 31.5–35.7)
MCV RBC AUTO: 86 FL (ref 79–97)
PLATELET # BLD AUTO: 392 X10E3/UL (ref 150–450)
POTASSIUM SERPL-SCNC: 4.1 MMOL/L (ref 3.5–5.2)
PROT SERPL-MCNC: 7.2 G/DL (ref 6–8.5)
RBC # BLD AUTO: 4.57 X10E6/UL (ref 3.77–5.28)
SODIUM SERPL-SCNC: 139 MMOL/L (ref 134–144)
TRIGL SERPL-MCNC: 86 MG/DL (ref 0–149)
VLDLC SERPL CALC-MCNC: 17 MG/DL (ref 5–40)
WBC # BLD AUTO: 15.3 X10E3/UL (ref 3.4–10.8)

## 2020-07-25 PROBLEM — R06.02 SOB (SHORTNESS OF BREATH): Status: RESOLVED | Noted: 2018-02-06 | Resolved: 2020-07-25

## 2020-07-25 PROBLEM — K81.9 CHOLECYSTITIS: Status: RESOLVED | Noted: 2018-04-09 | Resolved: 2020-07-25

## 2020-07-30 DIAGNOSIS — D72.829 LEUKOCYTOSIS, UNSPECIFIED TYPE: Primary | ICD-10-CM

## 2020-07-30 NOTE — PROGRESS NOTES
CBC w/o diff had elevated WCC. Will repeat w/diff. MyChart message sent and lab ordered     Lipid panel wnl  A1c 5.6%  CMP wnl.

## 2020-08-10 ENCOUNTER — VIRTUAL VISIT (OUTPATIENT)
Dept: SLEEP MEDICINE | Age: 25
End: 2020-08-10
Payer: COMMERCIAL

## 2020-08-10 DIAGNOSIS — G47.33 OSA (OBSTRUCTIVE SLEEP APNEA): Primary | ICD-10-CM

## 2020-08-10 DIAGNOSIS — Z86.79 H/O: HTN (HYPERTENSION): ICD-10-CM

## 2020-08-10 DIAGNOSIS — Z86.59 H/O MAJOR DEPRESSION: ICD-10-CM

## 2020-08-10 PROCEDURE — 99214 OFFICE O/P EST MOD 30 MIN: CPT | Performed by: INTERNAL MEDICINE

## 2020-08-10 NOTE — PROGRESS NOTES
[address] [facility] [place]. [place], [address]  Tel.  [phone]  Fax. 100 [facility] 60  [place], [address] [facility]  Tel.  [phone]  Fax. [phone] [address]  Tel.  [phone]  Fax. [phone]         Subjective:    Nicole Singleton is a 23 y.o. female who was seen by synchronous (real-time) audio-video technology on 8/10/2020. Consent:  She is aware that this patient-initiated Telehealth encounter is a billable service, with coverage as determined by her insurance carrier. She is aware that she may receive a bill and has provided verbal consent to proceed: Yes    I was at home while conducting this encounter. Patient verified with [place] ID Card. She complains of snoring associated with periods of not breathing, awakening in the middle of the night because of headaches, SOB and gasping for air. Symptoms began 1 year ago, gradually worsening since that time. She usually can fall asleep in 5 minutes. Family or house members note snoring and periods of not breathing. She denies completely or partially paralyzed while falling asleep or waking up. Nicole Singleton does wake up frequently at night. She is not bothered by waking up too early and left unable to get back to sleep. She actually sleeps about 5-6 hours at night and wakes up about 3-4 times during the night. She does not work shifts. Royal Maudlin indicates she does get too little sleep at night. Her bedtime is 10:30 pm. She awakens at 7:00 am. She does not take naps. Other remarks:        Darwin Sleepiness Score: 10   and Modified F.O.S.Q. Score Total / 2: 15.5       Allergies   Allergen Reactions    Aspirin Itching         Current Outpatient Medications:     FLUoxetine (PROZAC) 20 mg capsule, Take 1 Cap by mouth daily. , Disp: 90 Cap, Rfl: 0    Junel FE 1/20, 28, 1 mg-20 mcg (21)/75 mg (7) tab, , Disp: , Rfl:      She  has a past medical history of Depression, Kidney stones, Mood disorder ([address] Morbid obesity ([address] Psychiatric disorder, Sleep apnea, and Suicidal thoughts. She  has a past surgical history that includes hx urological.    She family history includes Cancer in her maternal grandmother and paternal grandfather; Heart Disease in her paternal grandmother; Heart Disease (age of onset: 55) in her father; Hypertension in her father; No Known Problems in her sister; Sleep Apnea in her father. She  reports that she quit smoking about 2 years ago. She quit after 1.00 year of use. She has never used smokeless tobacco. She reports current alcohol use. She reports that she does not use drugs. Review of Systems:  Constitutional:  No significant weight loss or weight gain  Eyes:  No blurred vision  CVS:  No significant chest pain  Pulm:  No significant shortness of breath  GI:  No significant nausea or vomiting  :  No significant nocturia  Musculoskeletal:  No significant joint pain at night  Skin:  No significant rashes  Neuro:  No significant dizziness   Psych:  No active mood issues    Sleep Review of Systems: notable for no difficulty falling asleep; frequent awakenings at night;  regular dreaming noted; no nightmares ; early morning headaches; no memory problems; no concentration issues; no history of any automobile or occupational accidents due to daytime drowsiness. Objective:     Patient-Reported Vitals 8/10/2020   Patient-Reported Weight 316 lbs   Patient-Reported Height 5' 5\"       Physical Exam completed by visual and auditory observation of patient with verbal input from patient.     General:   Alert, oriented, not in acute distress   Eyes:  Anicteric Sclerae; no obvious strabismus   Nose:  No obvious nasal septum deviation    Neck:   Midline trachea, no visible mass   Chest/Lungs:  Respiratory effort normal, no visualized signs of difficulty breathing or respiratory distress   CVS:  No JVD   Extremities:  No obvious rashes noted on face, neck, or hands   Neuro:  No facial asymmetry, no focal deficits; no obvious tremor    Psych:  Normal affect,  normal countenance       Assessment:       ICD-10-CM ICD-9-CM    1. OWEN (obstructive sleep apnea)  G47.33 327.23 SLEEP STUDY UNATTENDED, 4 CHANNEL   2. H/O: HTN (hypertension)  Z86.79 V12.59    3. H/O major depression  Z86.59 V11.8    4. BMI 50.0-59.9, adult [facility])  Z68.43 V85.43          Plan:        Sleep testing was ordered for initial evaluation.  She was provided information on sleep apnea including coresponding risk factors and the importance of proper treatment.  Treatment options if indicated were reviewed today. Patient agrees to a trial of PAP therapy if indicated.  Counseling was provided regarding proper sleep hygiene, appropriate sleep schedule, need for sleep environment safety and safe driving.  Recommended a dedicated weight loss program through appropriate diet and exercise regimen as significant weight reduction has been shown to reduce severity of obstructive sleep apnea. Patient's phone number [phone] (cell) was reviewed and confirmed for accuracy. She gives permission for messages regarding results and appointments to be left at that number. Pursuant to the emergency declaration under the [facility] waiver authority and the [employer] General Act, this Virtual Visit was conducted, with patient's consent, to reduce the patient's risk of exposure to COVID-19 and provide continuity of care for an established patient. Services were provided through a video synchronous discussion virtually to substitute for in-person clinic visit. Eda Bass MD, FAASM  Electronically signed.  08/10/20

## 2020-08-10 NOTE — PATIENT INSTRUCTIONS
217 [facility] [place]. [address] [facility], [address] Tel.  [phone] Fax. 100 [facility] 60 [place], [address] [facility] Tel.  [phone] Fax. [phone] [address] Tel.  [phone] Fax. [phone] Learning About CPAP for Sleep Apnea What is CPAP? CPAP is a small machine that you use at home every night while you sleep. It increases air pressure in your throat to keep your airway open. When you have sleep apnea, this can help you sleep better so you feel much better. CPAP stands for \"continuous positive airway pressure. \" The CPAP machine will have one of the following: · A mask that covers your nose and mouth · Prongs that fit into your nose · A mask that covers your nose only, the most common type. This type is called NCPAP. The N stands for \"nasal.\" Why is it done? CPAP is usually the best treatment for obstructive sleep apnea. It is the first treatment choice and the most widely used. Your doctor may suggest CPAP if you have: · Moderate to severe sleep apnea. · Sleep apnea and coronary artery disease (CAD) or heart failure. How does it help? · CPAP can help you have more normal sleep, so you feel less sleepy and more alert during the daytime. · CPAP may help keep heart failure or other heart problems from getting worse. · NCPAP may help lower your blood pressure. · If you use CPAP, your bed partner may also sleep better because you are not snoring or restless. What are the side effects? Some people who use CPAP have: · A dry or stuffy nose and a sore throat. · Irritated skin on the face. · Sore eyes. · Bloating. If you have any of these problems, work with your doctor to fix them. Here are some things you can try: · Be sure the mask or nasal prongs fit well. · See if your doctor can adjust the pressure of your CPAP. · If your nose is dry, try a humidifier. · If your nose is runny or stuffy, try decongestant medicine or a steroid nasal spray. If these things do not help, you might try a different type of machine. Some machines have air pressure that adjusts on its own. Others have air pressures that are different when you breathe in than when you breathe out. This may reduce discomfort caused by too much pressure in your nose. Where can you learn more? Go to [employer].be Enter J397 in the search box to learn more about \"Learning About CPAP for Sleep Apnea. \"  
© 0404-4791 Healthwise, Incorporated. Care instructions adapted under license by [facility] (which disclaims liability or warranty for this information). This care instruction is for use with your licensed healthcare professional. If you have questions about a medical condition or this instruction, always ask your healthcare professional. [address] any warranty or liability for your use of this information. Content Version: 1.0.12725; Last Revised: January 11, 2010 PROPER SLEEP HYGIENE What to avoid · Do not have drinks with caffeine, such as coffee or black tea, for 8 hours before bed. · Do not smoke or use other types of tobacco near bedtime. Nicotine is a stimulant and can keep you awake. · Avoid drinking alcohol late in the evening, because it can cause you to wake in the middle of the night. · Do not eat a big meal close to bedtime. If you are hungry, eat a light snack. · Do not drink a lot of water close to bedtime, because the need to urinate may wake you up during the night. · Do not read or watch TV in bed. Use the bed only for sleeping and sexual activity. What to try · Go to bed at the same time every night, and wake up at the same time every morning. Do not take naps during the day. · Keep your bedroom quiet, dark, and cool. · Get regular exercise, but not within 3 to 4 hours of your bedtime. Arnu Hinkle · Sleep on a comfortable pillow and mattress. · If watching the clock makes you anxious, turn it facing away from you so you cannot see the time. · If you worry when you lie down, start a worry book. Well before bedtime, write down your worries, and then set the book and your concerns aside. · Try meditation or other relaxation techniques before you go to bed. · If you cannot fall asleep, get up and go to another room until you feel sleepy. Do something relaxing. Repeat your bedtime routine before you go to bed again. · Make your house quiet and calm about an hour before bedtime. Turn down the lights, turn off the TV, log off the computer, and turn down the volume on music. This can help you relax after a busy day. Drowsy Driving: The [employer] cites drowsiness as a causing factor in more than 703,048 police reported crashes annually, resulting in 76,000 injuries and 1,500 deaths. Other surveys suggest 55% of people polled have driven while drowsy in the past year, 23% had fallen asleep but not crashed, 3% crashed, and 2% had and accident due to drowsy driving. Who is at risk? Young Drivers: One study of drowsy driving accidents states that 55% of the drivers were under 25 years. Of those, 75% were male. Shift Workers and Travelers: People who work overnight or travel across time zones frequently are at higher risk of experiencing Circadian Rhythm Disorders. They are trying to work and function when their body is programed to sleep. Sleep Deprived: Lack of sleep has a serious impact on your ability to pay attention or focus on a task. Consistently getting less than the average of 8 hours your body needs creates partial or cumulative sleep deprivation.   
Untreated Sleep Disorders: Sleep Apnea, Narcolepsy, R.L.S., and other sleep disorders (untreated) prevent a person from getting enough restful sleep. This leads to excessive daytime sleepiness and increases the risk for drowsy driving accidents by up to 7 times. Medications / Alcohol: Even over the counter medications can cause drowsiness. Medications that impair a drivers attention should have a warning label. Alcohol naturally makes you sleepy and on its own can cause accidents. Combined with excessive drowsiness its effects are amplified. Signs of Drowsy Driving: * You don't remember driving the last few miles * You may drift out of your manuel * You are unable to focus and your thoughts wander * You may yawn more often than normal 
 * You have difficulty keeping your eyes open / nodding off * Missing traffic signs, speeding, or tailgating Prevention-  
Good sleep hygiene, lifestyle and behavioral choices have the most impact on drowsy driving. There is no substitute for sleep and the average person requires 8 hours nightly. If you find yourself driving drowsy, stop and sleep. Consider the sleep hygiene tips provided during your visit as well. Medication Refill Policy: Refills for all medications require 1 week advance notice. Please have your pharmacy fax a refill request. We are unable to fax, or call in \"controled substance\" medications and you will need to pick these prescriptions up from our office. [employer] Activation Thank you for requesting access to [employer]. Please follow the instructions below to securely access and download your online medical record. [employer] allows you to send messages to your doctor, view your test results, renew your prescriptions, schedule appointments, and more. How Do I Sign Up? 1. In your internet browser, go to [email or web address]. 2. Click on the First Time User? Click Here link in the Sign In box. You will see the New Member Sign Up page. 3. Enter your [employer] Access Code exactly as it appears below.  You will not need to use this code after youve completed the sign-up process. If you do not sign up before the expiration date, you must request a new code. [employer] Access Code: Activation code not generated Current [employer] Status: Active (This is the date your [employer] access code will ) 4. Enter the last four digits of your Social Security Number (xxxx) and Date of Birth (mm/dd/yyyy) as indicated and click Submit. You will be taken to the next sign-up page. 5. Create a [employer] ID. This will be your [employer] login ID and cannot be changed, so think of one that is secure and easy to remember. 6. Create a [employer] password. You can change your password at any time. 7. Enter your Password Reset Question and Answer. This can be used at a later time if you forget your password. 8. Enter your e-mail address. You will receive e-mail notification when new information is available in [address]. 9. Click Sign Up. You can now view and download portions of your medical record. 10. Click the Download Summary menu link to download a portable copy of your medical information. Additional Information If you have questions, please call [phone]. Remember, [employer] is NOT to be used for urgent needs. For medical emergencies, dial 911.

## 2020-08-12 ENCOUNTER — DOCUMENTATION ONLY (OUTPATIENT)
Dept: SLEEP MEDICINE | Age: 25
End: 2020-08-12

## 2020-08-17 ENCOUNTER — VIRTUAL VISIT (OUTPATIENT)
Dept: CARDIOLOGY CLINIC | Age: 25
End: 2020-08-17
Payer: COMMERCIAL

## 2020-08-17 ENCOUNTER — DOCUMENTATION ONLY (OUTPATIENT)
Dept: SLEEP MEDICINE | Age: 25
End: 2020-08-17

## 2020-08-17 DIAGNOSIS — R07.9 CHEST PAIN, UNSPECIFIED TYPE: Primary | ICD-10-CM

## 2020-08-17 PROCEDURE — 99213 OFFICE O/P EST LOW 20 MIN: CPT | Performed by: SPECIALIST

## 2020-08-17 NOTE — PROGRESS NOTES
Nia Benoit MD. [facility]  [address] [facility] [address] [facility], [address] [place] [address] [phone]; Fax [phone]        Patient: Vanessa Felipe  : 1995      Today's Date: 2020        CAV Cardiology Telemedicine Encounter                                                         Pursuant to the emergency declaration under the [address] [place] [address] waiver authority and the [employer] Act, this Virtual  Visit was conducted, with patient's consent, to reduce the patient's risk of exposure to COVID-19 and provide continuity of care for an established patient. Services were provided through a video synchronous discussion virtually to substitute for in-person clinic visit. HISTORY OF PRESENT ILLNESS:     History of Present Illness:    Vanessa Felipe is a 23 y.o. female who was seen by synchronous (real-time) audio-video technology on 2020. She was trying to lose weight and exercise but afterwards she would get sharp pains in her chest afterwards. 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 Pain can last 5-10 min.           PAST MEDICAL HISTORY:     Past Medical History:   Diagnosis Date    Depression     Kidney stones     Mood disorder (Joselin Ply)     Morbid obesity (Joselin Ply)     Psychiatric disorder     depression    Sleep apnea     Does not have CPAP as yet    Suicidal thoughts            Past Surgical History:   Procedure Laterality Date    HX UROLOGICAL      Stenting x 4 (Starting in 2016, developed severe infection)           Patient Active Problem List   Diagnosis Code    Chest pain R07.9    Essential hypertension I10    Obesity, morbid (Joselin Ply) E66.01    Major depressive disorder F32.9             MEDICATIONS:     Current Outpatient Medications   Medication Sig Dispense Refill    Junel FE 1/20, 28, 1 mg-20 mcg (21)/75 mg (7) tab       FLUoxetine (PROZAC) 20 mg capsule Take 1 Cap by mouth daily. 90 Cap 0           Allergies   Allergen Reactions    Aspirin Itching               SOCIAL HISTORY:     Social History     Tobacco Use    Smoking status: Former Smoker     Years: 1.00     Last attempt to quit: 2018     Years since quittin.5    Smokeless tobacco: Never Used    Tobacco comment: 1/4 pack ppd for 6 months   Substance Use Topics    Alcohol use: Yes     Comment: rarely    Drug use: No     Comment: Has done Marijuan               FAMILY HISTORY:     Family History   Problem Relation Age of Onset    Heart Disease Father 55    Hypertension Father     Sleep Apnea Father     No Known Problems Sister     Cancer Maternal Grandmother         Brain Cancer    Heart Disease Paternal Grandmother     Cancer Paternal Grandfather                REVIEW OF SYMPTOMS:     Review of Symptoms:  Constitutional: Negative for fever, chills  HEENT: Negative for nosebleeds, vision changes. Respiratory: Negative for cough, wheezing  Cardiovascular: Negative for claudication, syncope  Gastrointestinal: Negative for abdominal pain, diarrhea, melena. Genitourinary: Negative for dysuria  Musculoskeletal: Negative for myalgias. Skin: Negative for rash  Heme: No problems bleeding. Neurological: Negative for speech change and focal weakness. PHYSICAL EXAM:       Physical Exam:    Due to this being a TeleHealth evaluation, many elements of the physical examination are unable to be assessed. General: Well developed, in no acute distress, cooperative and alert  HEENT: Hearing intact, non-icteric, normocephalic, atraumatic. Pupils equal/round. Moist mucous membranes. Lungs / Respiratory: No audible wheezing, no signs of respiratory distress, lips non cyanotic  Cardiac: No marked JVD visible on video. Extremities:  No edema  Neuro: A&Ox3, speech clear, no facial droop, answering questions appropriately  Skin: Skin color is normal. No rashes or lesions.  Non diaphoretic on visible skin during exam  Psych: Appropriate affect         LABS / OTHER STUDIES:         Lab Results   Component Value Date/Time    Cholesterol, total 160 07/23/2020 02:24 PM    HDL Cholesterol 62 07/23/2020 02:24 PM    LDL, calculated 81 07/23/2020 02:24 PM    Triglyceride 86 07/23/2020 02:24 PM    CHOL/HDL Ratio 2.9 08/03/2018 06:30 AM       Lab Results   Component Value Date/Time    Sodium 139 07/23/2020 02:24 PM    Potassium 4.1 07/23/2020 02:24 PM    Chloride 102 07/23/2020 02:24 PM    CO2 22 07/23/2020 02:24 PM    Anion gap 7 04/07/2019 03:17 PM    Glucose 83 07/23/2020 02:24 PM    Glucose 85 08/03/2018 06:30 AM    BUN 9 07/23/2020 02:24 PM    Creatinine 0.65 07/23/2020 02:24 PM    BUN/Creatinine ratio 14 07/23/2020 02:24 PM    GFR est  07/23/2020 02:24 PM    GFR est non- 07/23/2020 02:24 PM    Calcium 9.6 07/23/2020 02:24 PM    Bilirubin, total 0.2 07/23/2020 02:24 PM    Alk.  phosphatase 92 07/23/2020 02:24 PM    Protein, total 7.2 07/23/2020 02:24 PM    Albumin 4.2 07/23/2020 02:24 PM    Globulin 4.8 (H) 04/07/2019 03:17 PM    A-G Ratio 1.4 07/23/2020 02:24 PM    ALT (SGPT) 11 07/23/2020 02:24 PM        Lab Results   Component Value Date/Time    WBC 15.3 (H) 07/23/2020 02:24 PM    HGB 12.8 07/23/2020 02:24 PM    HCT 39.2 07/23/2020 02:24 PM    PLATELET 696 08/27/9355 02:24 PM    MCV 86 07/23/2020 02:24 PM       Lab Results   Component Value Date/Time    TSH 0.89 08/03/2018 06:30 AM               CARDIAC DIAGNOSTICS:     Cardiac Evaluation Includes:    EKG 2/6/18 - NSR, normal   EKG 7/23/20 - NSR, normal - I viewed myself      Echo 2/21/18 - LVEF 60-65%, normal study         ASSESSMENT AND PLAN:     Assessment and Plan:    1) Atypical chest pain   - has had sharp pains chronically -- notices it now after exercising   - Will check a treadmill stress test (COVID testing a week prior) to risk assess   - suspicion for obstructive CAD is low     2) Obesity   - she is working on her own to lose weight    3) Phone FU after testing. See me back as needed. Patient expressed understanding of the plan - questions were answered. Does house cleaning.         Ángel Freed MD, [address], [address]      [address] [place] [address]. [address]  [place] [id number]                 [address]  Ph: [phone]                               Ph [phone]           We discussed the expected course, resolution and complications of the diagnosis(es) in detail. Medication risks, benefits, costs, interactions, and alternatives were discussed as indicated. I advised her to contact the office if her condition worsens, changes or fails to improve as anticipated. She expressed understanding with the diagnosis(es) and plan    Nevin Hinkle MD    Greater than 20 minutes was spent in direct video patient care, planning and chart review. This visit was conducted using [email or web address]. Me telemedicine services.

## 2020-08-19 ENCOUNTER — TELEPHONE (OUTPATIENT)
Dept: CARDIOLOGY CLINIC | Age: 25
End: 2020-08-19

## 2020-08-19 DIAGNOSIS — R07.9 CHEST PAIN, UNSPECIFIED TYPE: Primary | ICD-10-CM

## 2020-08-19 NOTE — TELEPHONE ENCOUNTER
Per Dr. Kari Constantino: Jaylen Alfaro you please help set her up for a treadmill stress test -- she will need COVID testing a week prior. \"    Sprained ankle this week, unable to walk on treadmill at this time.

## 2020-08-28 ENCOUNTER — TELEPHONE (OUTPATIENT)
Dept: SLEEP MEDICINE | Age: 25
End: 2020-08-28

## 2020-08-28 ENCOUNTER — DOCUMENTATION ONLY (OUTPATIENT)
Dept: SLEEP MEDICINE | Age: 25
End: 2020-08-28

## 2020-08-28 DIAGNOSIS — G47.33 OSA (OBSTRUCTIVE SLEEP APNEA): Primary | ICD-10-CM

## 2020-08-28 NOTE — TELEPHONE ENCOUNTER
Results of Sleep Testing reviewed with patient who agrees to a trial of APAP therapy. PAP prescription and follow-up discussed with patient. Patient encouraged to call if there were any further questions regarding sleep symptoms. Encounter Diagnosis   Name Primary?  OWEN (obstructive sleep apnea) Yes       Orders Placed This Encounter    AMB SUPPLY ORDER     Diagnosis: (G47.33) OWEN (obstructive sleep apnea)  (primary encounter diagnosis)     Respironics DreamStation ( Unit - Auto Mode) / Heated Humidifier :    Positive Airway Pressure Therapy: Duration of need: 99 months. Auto - PAP: 4 - 20 cmH2O; Optistart enabled. Ramp Time: 30 Minutes; Flex: 2. Remote monitoring enrollment.  Nasal Cushion (Replace) 2 per month.  Nasal Interface Mask 1 every 3 months.  Headgear 1 every 6 months.  Filter(s) Disposable 2 per month.  Filter(s) Non-Disposable 1 every 6 months. [address] for [employer] (Replace) 1 every 6 months.  Tubing with heating element 1 every 3 months. Perform Mask Fitting per patient preference and comfort - replace as above. Mary Brice MD, FAA; NPI: [id number]  Electronically signed. 08/28/20

## 2020-08-31 ENCOUNTER — TELEPHONE (OUTPATIENT)
Dept: CARDIOLOGY CLINIC | Age: 25
End: 2020-08-31

## 2020-08-31 DIAGNOSIS — R07.9 CHEST PAIN, UNSPECIFIED TYPE: Primary | ICD-10-CM

## 2020-08-31 NOTE — TELEPHONE ENCOUNTER
I have this pt schd for a reg stress test on 9/9/2020 see will need to be called about the COVID test sll

## 2020-09-02 NOTE — TELEPHONE ENCOUNTER
Spoke to pt,  Instructed to get COVID testing done on 9/5 at [facility]. All instruction read back, pt expressed understanding.

## 2020-09-04 ENCOUNTER — DOCUMENTATION ONLY (OUTPATIENT)
Dept: SLEEP MEDICINE | Age: 25
End: 2020-09-04

## 2020-09-05 ENCOUNTER — HOSPITAL ENCOUNTER (OUTPATIENT)
Dept: LAB | Age: 25
Discharge: HOME | End: 2020-09-05
Payer: COMMERCIAL

## 2020-09-05 DIAGNOSIS — U07.1 COVID-19: ICD-10-CM

## 2020-09-05 PROCEDURE — 87635 SARS-COV-2 COVID-19 AMP PRB: CPT

## 2020-09-06 LAB — SARS-COV-2, COV2NT: NOT DETECTED

## 2020-09-16 DIAGNOSIS — R07.9 CHEST PAIN, UNSPECIFIED TYPE: ICD-10-CM

## 2020-10-01 ENCOUNTER — TELEPHONE (OUTPATIENT)
Dept: FAMILY MEDICINE CLINIC | Age: 25
End: 2020-10-01

## 2020-10-01 NOTE — TELEPHONE ENCOUNTER
----- Message from Dinorah Carreon sent at 9/24/2020  5:11 PM EDT -----  Regarding: Dr. Lisa Zheng first and last name and relationship to patient (if not the patient): n/a  Best contact number: [phone]  Preferred date and time: m-f pm  Scheduled appointment date and time: none avail  Reason for appointment: Back pain  Details to clarify the request: Ms. Vidya Marqeuz is requesting appt w/Dr. Us Settler she has back pain on the lower left side for the last 2 wks.

## 2020-10-26 ENCOUNTER — VIRTUAL VISIT (OUTPATIENT)
Dept: SLEEP MEDICINE | Age: 25
End: 2020-10-26
Payer: COMMERCIAL

## 2020-10-26 DIAGNOSIS — G47.33 OSA (OBSTRUCTIVE SLEEP APNEA): Primary | ICD-10-CM

## 2020-10-26 DIAGNOSIS — Z86.79 H/O: HTN (HYPERTENSION): ICD-10-CM

## 2020-10-26 DIAGNOSIS — Z86.59 H/O MAJOR DEPRESSION: ICD-10-CM

## 2020-10-26 PROCEDURE — 99213 OFFICE O/P EST LOW 20 MIN: CPT | Performed by: INTERNAL MEDICINE

## 2020-10-26 NOTE — PATIENT INSTRUCTIONS
[address] [facility] [place]. [address] [facility], [address] Tel.  [phone] Fax. 100 [facility] [address] [place], 200 [address] [facility] Tel.  [phone] Fax. [phone] [address] [place] [address] [place] Tel.  [phone] Fax. [phone] Learning About CPAP for Sleep Apnea What is CPAP? CPAP is a small machine that you use at home every night while you sleep. It increases air pressure in your throat to keep your airway open. When you have sleep apnea, this can help you sleep better so you feel much better. CPAP stands for \"continuous positive airway pressure. \" The CPAP machine will have one of the following: · A mask that covers your nose and mouth · Prongs that fit into your nose · A mask that covers your nose only, the most common type. This type is called NCPAP. The N stands for \"nasal.\" Why is it done? CPAP is usually the best treatment for obstructive sleep apnea. It is the first treatment choice and the most widely used. Your doctor may suggest CPAP if you have: · Moderate to severe sleep apnea. · Sleep apnea and coronary artery disease (CAD) or heart failure. How does it help? · CPAP can help you have more normal sleep, so you feel less sleepy and more alert during the daytime. · CPAP may help keep heart failure or other heart problems from getting worse. · NCPAP may help lower your blood pressure. · If you use CPAP, your bed partner may also sleep better because you are not snoring or restless. What are the side effects? Some people who use CPAP have: · A dry or stuffy nose and a sore throat. · Irritated skin on the face. · Sore eyes. · Bloating. If you have any of these problems, work with your doctor to fix them. Here are some things you can try: · Be sure the mask or nasal prongs fit well. · See if your doctor can adjust the pressure of your CPAP. · If your nose is dry, try a humidifier. · If your nose is runny or stuffy, try decongestant medicine or a steroid nasal spray. If these things do not help, you might try a different type of machine. Some machines have air pressure that adjusts on its own. Others have air pressures that are different when you breathe in than when you breathe out. This may reduce discomfort caused by too much pressure in your nose. Where can you learn more? Go to Cliq.be Enter B226 in the search box to learn more about \"Learning About CPAP for Sleep Apnea. \"  
© 8244-5042 Healthwise, Incorporated. Care instructions adapted under license by [employer] (which disclaims liability or warranty for this information). This care instruction is for use with your licensed healthcare professional. If you have questions about a medical condition or this instruction, always ask your healthcare professional. [address] any warranty or liability for your use of this information. Content Version: 1.4.41798; Last Revised: January 11, 2010 PROPER SLEEP HYGIENE What to avoid · Do not have drinks with caffeine, such as coffee or black tea, for 8 hours before bed. · Do not smoke or use other types of tobacco near bedtime. Nicotine is a stimulant and can keep you awake. · Avoid drinking alcohol late in the evening, because it can cause you to wake in the middle of the night. · Do not eat a big meal close to bedtime. If you are hungry, eat a light snack. · Do not drink a lot of water close to bedtime, because the need to urinate may wake you up during the night. · Do not read or watch TV in bed. Use the bed only for sleeping and sexual activity. What to try · Go to bed at the same time every night, and wake up at the same time every morning. Do not take naps during the day. · Keep your bedroom quiet, dark, and cool. · Get regular exercise, but not within 3 to 4 hours of your bedtime. Tonye Cogan · Sleep on a comfortable pillow and mattress. · If watching the clock makes you anxious, turn it facing away from you so you cannot see the time. · If you worry when you lie down, start a worry book. Well before bedtime, write down your worries, and then set the book and your concerns aside. · Try meditation or other relaxation techniques before you go to bed. · If you cannot fall asleep, get up and go to another room until you feel sleepy. Do something relaxing. Repeat your bedtime routine before you go to bed again. · Make your house quiet and calm about an hour before bedtime. Turn down the lights, turn off the TV, log off the computer, and turn down the volume on music. This can help you relax after a busy day. Drowsy Driving: The [place] [address] cites drowsiness as a causing factor in more than 896,702 police reported crashes annually, resulting in 76,000 injuries and 1,500 deaths. Other surveys suggest 55% of people polled have driven while drowsy in the past year, 23% had fallen asleep but not crashed, 3% crashed, and 2% had and accident due to drowsy driving. Who is at risk? Young Drivers: One study of drowsy driving accidents states that 55% of the drivers were under 25 years. Of those, 75% were male. Shift Workers and Travelers: People who work overnight or travel across time zones frequently are at higher risk of experiencing Circadian Rhythm Disorders. They are trying to work and function when their body is programed to sleep. Sleep Deprived: Lack of sleep has a serious impact on your ability to pay attention or focus on a task. Consistently getting less than the average of 8 hours your body needs creates partial or cumulative sleep deprivation.   
Untreated Sleep Disorders: Sleep Apnea, Narcolepsy, R.L.S., and other sleep disorders (untreated) prevent a person from getting enough restful sleep. This leads to excessive daytime sleepiness and increases the risk for drowsy driving accidents by up to 7 times. Medications / Alcohol: Even over the counter medications can cause drowsiness. Medications that impair a drivers attention should have a warning label. Alcohol naturally makes you sleepy and on its own can cause accidents. Combined with excessive drowsiness its effects are amplified. Signs of Drowsy Driving: * You don't remember driving the last few miles * You may drift out of your manuel * You are unable to focus and your thoughts wander * You may yawn more often than normal 
 * You have difficulty keeping your eyes open / nodding off * Missing traffic signs, speeding, or tailgating Prevention-  
Good sleep hygiene, lifestyle and behavioral choices have the most impact on drowsy driving. There is no substitute for sleep and the average person requires 8 hours nightly. If you find yourself driving drowsy, stop and sleep. Consider the sleep hygiene tips provided during your visit as well. Medication Refill Policy: Refills for all medications require 1 week advance notice. Please have your pharmacy fax a refill request. We are unable to fax, or call in \"controled substance\" medications and you will need to pick these prescriptions up from our office. Quikey Activation Thank you for requesting access to Quikey. Please follow the instructions below to securely access and download your online medical record. Quikey allows you to send messages to your doctor, view your test results, renew your prescriptions, schedule appointments, and more. How Do I Sign Up? 1. In your internet browser, go to [email or web address] ([email or web address]). [email or web address]. 2. Click on the First Time User? Click Here link in the Sign In box. You will see the New Member Sign Up page. 3. Enter your Quikey Access Code exactly as it appears below.  You will not need to use this code after youve completed the sign-up process. If you do not sign up before the expiration date, you must request a new code. Drync Access Code: Activation code not generated Current Drync Status: Active (This is the date your Drync access code will ) 4. Enter the last four digits of your Social Security Number (xxxx) and Date of Birth (mm/dd/yyyy) as indicated and click Submit. You will be taken to the next sign-up page. 5. Create a Camalize SLt ID. This will be your Drync login ID and cannot be changed, so think of one that is secure and easy to remember. 6. Create a Drync password. You can change your password at any time. 7. Enter your Password Reset Question and Answer. This can be used at a later time if you forget your password. 8. Enter your e-mail address. You will receive e-mail notification when new information is available in [address]. 9. Click Sign Up. You can now view and download portions of your medical record. 10. Click the Download Summary menu link to download a portable copy of your medical information. Additional Information If you have questions, please call [phone]. Remember, Drync is NOT to be used for urgent needs. For medical emergencies, dial 911.

## 2020-10-26 NOTE — PROGRESS NOTES
217 [facility] [place]. [place], [address]  Tel.  [phone]  Fax. 100 [facility] 60  [place], 200 [address] [facility]  Tel.  [phone]  Fax. [phone] [address] [place] Shannan [address]me[address]  Tel.  [phone]  Fax. [phone]       S>    Dennys Mock is a 23 y.o. female who was seen by synchronous (real-time) audio-video technology on 10/26/2020. Consent:  She and/or her healthcare decision maker is aware that this patient-initiated Telehealth encounter is a billable service, with coverage as determined by her insurance carrier. She is aware that she may receive a bill and has provided verbal consent to proceed: Yes    I was at home while conducting this encounter. Patient verified with 's License. .    She reports no problems using the device. She is 100% compliant over the past 30 days. The following problems are identified:    Drowsiness no Problems exhaling no   Snoring no Forget to put on no   Mask Comfortable yes Can't fall asleep no   Dry Mouth no Mask falls off no   Air Leaking no Frequent awakenings no         She admits that her sleep has improved on PAP therapy. Allergies   Allergen Reactions    Aspirin Itching       She has a current medication list which includes the following prescription(s): fluoxetine and junel fe 1/20 (28). .      She  has a past medical history of Depression, Kidney stones, Mood disorder ([address] Morbid obesity ([address] Psychiatric disorder, Sleep apnea, Sprain of ankle, calcaneofibular ligament (08/13/2020), and Suicidal thoughts. Jackson Sleepiness Score: 7   and Modified F.O.S.Q. Score Total / 2: 15.5   which reflect improved sleep quality over therapy time. O>        Patient-Reported Vitals 8/17/2020   Patient-Reported Weight 314lb   Patient-Reported Height -       Physical Exam completed by visual and auditory observation of patient with verbal input from patient.     General:   Alert, oriented, not in acute distress   Eyes:  Anicteric Sclerae; no obvious strabismus   Nose:  No obvious nasal septum deviation    Neck:   Midline trachea, no visible mass   Chest/Lungs:  Respiratory effort normal, no visualized signs of difficulty breathing or respiratory distress   CVS:  No JVD   Extremities:  No obvious rashes noted on face, neck, or hands   Neuro:  No facial asymmetry, no focal deficits; no obvious tremor    Psych:  Normal affect,  normal countenance         A>    ICD-10-CM ICD-9-CM    1. OWEN (obstructive sleep apnea)  G47.33 327.23    2. H/O: HTN (hypertension)  Z86.79 V12.59    3. H/O major depression  Z86.59 V11.8    4. BMI 50.0-59.9, adult (HCC)  Z68.43 V85.43      AHI = 48.4 (2020). On Respironics :  APAP 4-20 cmH2O. Compliant:      yes    Therapeutic Response:  Positive    P>    * Patient is using her PAP device regularly and benefiting form therapy,  continued use of the device at 4-20 cmH2O is advised. * She is familiar with the telephone monitoring application, is willing to track therapy and agrees to notify use if AHI is >5 per hour. * She is aware of the relationship between OWEN and HTN which is stable as is her mood (patient is currently on anti-depressant therapy). * We have recommended a dedicated weight loss through appropriate diet and an exercise regiment as significant weight reduction has been shown to reduce severity of obstructive sleep apnea. *   Follow-up and Dispositions    · Return in about 1 year (around 10/26/2021), or if symptoms worsen or fail to improve. * She was asked to contact our office for any problems regarding PAP therapy. * Counseling was provided regarding the importance of regular PAP use and on proper sleep hygiene and safe driving. * Re-enforced proper and regular cleaning for the device. Thank you for allowing us to participate in your patient's medical care.     Pursuant to the emergency declaration under the [address] and the National Emergencies Act, [facility] waiver authority and the [employer] Act, this Virtual  Visit was conducted, with patient's consent, to reduce the patient's risk of exposure to COVID-19 and provide continuity of care for an established patient. Services were provided through a video synchronous discussion virtually to substitute for in-person clinic visit. Apple Quintero MD, FAASM  Electronically signed.  10/26/20

## 2020-11-25 ENCOUNTER — OFFICE VISIT (OUTPATIENT)
Dept: FAMILY MEDICINE CLINIC | Age: 25
End: 2020-11-25
Payer: COMMERCIAL

## 2020-11-25 VITALS
HEIGHT: 65 IN | TEMPERATURE: 97.5 F | BODY MASS INDEX: 48.82 KG/M2 | WEIGHT: 293 LBS | SYSTOLIC BLOOD PRESSURE: 97 MMHG | HEART RATE: 87 BPM | RESPIRATION RATE: 16 BRPM | DIASTOLIC BLOOD PRESSURE: 65 MMHG | OXYGEN SATURATION: 99 %

## 2020-11-25 DIAGNOSIS — N92.6 IRREGULAR MENSES: Primary | ICD-10-CM

## 2020-11-25 LAB
BILIRUB UR QL STRIP: NEGATIVE
GLUCOSE UR-MCNC: NEGATIVE MG/DL
HCG URINE, QL. (POC): NEGATIVE
KETONES P FAST UR STRIP-MCNC: NEGATIVE MG/DL
PH UR STRIP: 7 [PH] (ref 4.6–8)
PROT UR QL STRIP: NEGATIVE
SP GR UR STRIP: 1.02 (ref 1–1.03)
UA UROBILINOGEN AMB POC: NORMAL (ref 0.2–1)
URINALYSIS CLARITY POC: NORMAL
URINALYSIS COLOR POC: YELLOW
URINE BLOOD POC: NORMAL
URINE LEUKOCYTES POC: NEGATIVE
URINE NITRITES POC: NEGATIVE
VALID INTERNAL CONTROL?: YES

## 2020-11-25 PROCEDURE — 81003 URINALYSIS AUTO W/O SCOPE: CPT | Performed by: FAMILY MEDICINE

## 2020-11-25 PROCEDURE — 81025 URINE PREGNANCY TEST: CPT | Performed by: FAMILY MEDICINE

## 2020-11-25 PROCEDURE — 99213 OFFICE O/P EST LOW 20 MIN: CPT | Performed by: FAMILY MEDICINE

## 2020-11-25 RX ORDER — MEDROXYPROGESTERONE ACETATE 10 MG/1
10 TABLET ORAL DAILY
Qty: 30 TAB | Refills: 1 | Status: SHIPPED | OUTPATIENT
Start: 2020-11-25 | End: 2021-04-01 | Stop reason: ALTCHOICE

## 2020-11-25 NOTE — PROGRESS NOTES
Margarito oSto is a 23 y.o. female    Chief Complaint   Patient presents with    Irregular Menses     Patient is coming in for irregular menses. She said she has been havng heavy periods for the last 3 weeks. She states that sometimes she can go 2- 3 months without it. She tarted on 11/08/2020 and is not off yet. She is currently taking birth control. She uses 10 -15 tampons a day. Her last pap was 1 year ago [employer]. No other concerns. 1. Have you been to the ER, urgent care clinic since your last visit? Hospitalized since your last visit? No  M  2. Have you seen or consulted any other health care providers outside of the [address] [place] [address] since your last visit? Include any pap smears or colon screening. No      Visit Vitals  BP 97/65 (BP 1 Location: Right arm, BP Patient Position: Sitting)   Pulse 87   Temp 97.5 °F (36.4 °C) (Temporal)   Resp 16   Ht 5' 5\" (1.651 m)   Wt 337 lb (152.9 kg)   SpO2 99%   BMI 56.08 kg/m²           Health Maintenance Due   Topic Date Due    HPV Age 9Y-34Y (1 - 2-dose series) 03/19/2008    DTaP/Tdap/Td series (1 - Tdap) 03/19/2018    PAP AKA CERVICAL CYTOLOGY  03/19/2018    Flu Vaccine (1) 09/01/2020         Medication Reconciliation completed, changes noted.   Please  Update medication list.

## 2020-11-25 NOTE — PROGRESS NOTES
[address]     CHIEF COMPLAINT:   Chief Complaint   Patient presents with    Irregular Menses     Patient is coming in for irregular menses. She said she has been havng heavy periods for the last 3 weeks. She states that sometimes she can go 2- 3 months without it. She tarted on 11/08/2020 and is not off yet. She is currently taking birth control. She uses 10 -15 tampons a day. Her last pap was 1 year ago [facility]. No other concerns. HISTORY OF PRESENT ILLNESS:  Domenic Wallace is a 23 y.o. female who presents for persistent menstrual flow, weakness. Onset (date/mode)- 11/8/2020  Symptoms- heavy menstrual bleeding  Precipitating Events- none  Last menstrual period - 11/8/2020  No pain with menstruation  No regularity  10-15 tampons/day  Notes blood clots but no tissue  Notes nausea,lightheaded  Denies Pregnancy  No change in medications- denies use of reglan, tricyclics, phenothiazines, propranolol, digoxin    Denies bruising,epistaxis, frequent gum bleeding  Noted family history of heavy menstrual bleeding- mother, sister  Open to getting IUD     On Junel for the last 1.5 years, forgets to take medications at times  Last pap smear: 2019    PMHx:  Past Medical History:   Diagnosis Date    Depression     Kidney stones     Mood disorder ([address]     Morbid obesity ([address]     Psychiatric disorder     depression    Sleep apnea     Does not have CPAP as yet    Sprain of ankle, calcaneofibular ligament 08/13/2020    sees orthovirginia    Suicidal thoughts        Meds:   Current Outpatient Medications   Medication Sig Dispense Refill    medroxyPROGESTERone (PROVERA) 10 mg tablet Take 1 Tab by mouth daily. 30 Tab 1    FLUoxetine (PROZAC) 20 mg capsule Take 1 Cap by mouth daily. 90 Cap 0       Allergies:    Allergies   Allergen Reactions    Aspirin Itching       Smoker:  Social History     Tobacco Use   Smoking Status Former Smoker    Years: 1.00    Last attempt to quit: 2018    Years since quittin.8   Smokeless Tobacco Never Used   Tobacco Comment    1/4 pack ppd for 6 months       ETOH:   Social History     Substance and Sexual Activity   Alcohol Use Yes    Comment: rarely       FH:   Family History   Problem Relation Age of Onset    Heart Disease Father 55    Hypertension Father     Sleep Apnea Father     No Known Problems Sister     Cancer Maternal Grandmother         Brain Cancer    Heart Disease Paternal Grandmother     Cancer Paternal Grandfather        ROS: Negative unless stated in HPI. PHYSICAL EXAM:  VITAL SIGNS:   Visit Vitals  BP 97/65 (BP 1 Location: Right arm, BP Patient Position: Sitting)   Pulse 87   Temp 97.5 °F (36.4 °C) (Temporal)   Resp 16   Ht 5' 5\" (1.651 m)   Wt 337 lb (152.9 kg)   LMP 2020   SpO2 99%   BMI 56.08 kg/m²       GENERAL:  WN/WD, female, in NAD,   HEENT:  Atraumatic, normocephalic, no auricular deformities, hearing intact,   NECK:  Adequate ROM, trachea midline, supple, non-tender with no    lymphadenopathy or thyromegaly  HEART:  No edema, no JVD, circulation intact, extremities warm and well perfused    distal pulses intact  LUNGS: No resp dist, SoB, audible wheeze, accessory muscle use or air hunger  SKIN:   Warm, dry, intact, non-erythemic, no ulcers, or rashes   BACK:  No scoliosis, paraspinal tenderness, step-off deformity, CVA tenderness    LABORATORY STUDIES:  No current labs at this time    IMAGING STUDIES:  No current imaging at this time    ASSESSMENT:    ICD-10-CM ICD-9-CM    1.  Irregular menses  N92.6 626.4 CBC WITH AUTOMATED DIFF      TSH 3RD GENERATION      IRON PROFILE      AMB POC URINE PREGNANCY TEST, VISUAL COLOR COMPARISON      AMB POC URINALYSIS DIP STICK AUTO W/O MICRO         PLAN:  #Menorrhagia without evidence of pregnancy  -Change OCP to progesterone-only medication  -Will order IUD given patient request, plan to schedule IUD placement visit in 1 month  -Naproxen 440 mg every 12 hours x 7days  -Laboratory Studies: qual beta-HCG, CBC, TSH, iron profile  -Imaging Studies: none    Questions answered; pt counseled x 20  Follow up in 4-6 weeks - if no improvement, will consider ultrasound imaging      Jason Fuentes MD

## 2020-11-26 LAB
BASOPHILS # BLD AUTO: 0 X10E3/UL (ref 0–0.2)
BASOPHILS NFR BLD AUTO: 0 %
EOSINOPHIL # BLD AUTO: 0.1 X10E3/UL (ref 0–0.4)
EOSINOPHIL NFR BLD AUTO: 1 %
ERYTHROCYTE [DISTWIDTH] IN BLOOD BY AUTOMATED COUNT: 14.1 % (ref 11.7–15.4)
HCT VFR BLD AUTO: 37 % (ref 34–46.6)
HGB BLD-MCNC: 12 G/DL (ref 11.1–15.9)
IMM GRANULOCYTES # BLD AUTO: 0.1 X10E3/UL (ref 0–0.1)
IMM GRANULOCYTES NFR BLD AUTO: 1 %
IRON SATN MFR SERPL: 11 % (ref 15–55)
IRON SERPL-MCNC: 42 UG/DL (ref 27–159)
LYMPHOCYTES # BLD AUTO: 2.5 X10E3/UL (ref 0.7–3.1)
LYMPHOCYTES NFR BLD AUTO: 23 %
MCH RBC QN AUTO: 27 PG (ref 26.6–33)
MCHC RBC AUTO-ENTMCNC: 32.4 G/DL (ref 31.5–35.7)
MCV RBC AUTO: 83 FL (ref 79–97)
MONOCYTES # BLD AUTO: 0.6 X10E3/UL (ref 0.1–0.9)
MONOCYTES NFR BLD AUTO: 5 %
NEUTROPHILS # BLD AUTO: 7.5 X10E3/UL (ref 1.4–7)
NEUTROPHILS NFR BLD AUTO: 70 %
PLATELET # BLD AUTO: 418 X10E3/UL (ref 150–450)
RBC # BLD AUTO: 4.45 X10E6/UL (ref 3.77–5.28)
TIBC SERPL-MCNC: 367 UG/DL (ref 250–450)
TSH SERPL DL<=0.005 MIU/L-ACNC: 1.72 UIU/ML (ref 0.45–4.5)
UIBC SERPL-MCNC: 325 UG/DL (ref 131–425)
WBC # BLD AUTO: 10.7 X10E3/UL (ref 3.4–10.8)

## 2020-12-07 ENCOUNTER — TELEPHONE (OUTPATIENT)
Dept: FAMILY MEDICINE CLINIC | Age: 25
End: 2020-12-07

## 2020-12-07 NOTE — TELEPHONE ENCOUNTER
Chart is reflecting a scheduled appt at the [facility] specialist office.

## 2020-12-07 NOTE — TELEPHONE ENCOUNTER
----- Message from Sherrie Valdez sent at 12/2/2020  3:37 PM EST -----  Regarding: Dr. Brennan Hu telephone  Level 1/Escalated Issue      Caller's first and last name and relationship (if not the patient): Pt       Best contact number(s): [phone]      What are the symptoms: lightheadedness and nausea caused by persistent menstruation       Transfer successful - yes/no (include outcome): no      Transfer declined - yes/no (include reason): no      Was caller advised to seek appropriate level of care - yes/no: yes       Details to clarify the request: Pt was prescribed medication for these symptoms but aren't relieving them         Sherrie Valdez

## 2020-12-17 NOTE — PROGRESS NOTES
Pelvic Pain evaluation    Diana Burris is a 23 y.o.  female who complains of pelvic pain and irregular menses. Was seen in the ER at [facility] on Dec 2nd for the pain. Was told she may have PCOS and was given pain meds. States urine was negative and that she is not anemic. The pain is described as aching and cramping, and is always there and usually mild with use of Ketorolac but 7/10 in intensity at its worst.  Pain is located in the general pelvic region without radiation. Her cycles have never been irrregular and can be heavy. The pain started a few weeks ago. Her symptoms have been gradually improving since her ER visit, mainly due to the pain medication. Aggravating factors: movement. She is in the housekeeping industry. Alleviating factors: NSAID's. Associated symptoms: nausea. The patient denies fever. She has had irregular menses since menarche at age 17. Has been off and on OCPs the past year. Now on provera daily to stop bleeding. Ultrasound performed in office today by ultrasound technician reveals:    UTERUS IS ANTEVERTED, NORMAL IN SIZE AND ECHOGENICITY. ENDOMETRIUM MEASURES 2-3MM IN THICKNESS. NO EVIDENCE OF MASS OR ABNORMALITY SEEN  WITHIN THE ENDOMETRIAL CAVITY. RIGHT OVARY APPEARS TO HAVE MULTIPLE PERIPHERY ARRANGED FOLLICLES <7DK. LEFT OVARY APPEARS TO HAVE MULTIPLE PERIPHERY ARRANGED FOLLICLES <1EB. NO FREE FLUID SEEN IN THE CDS.     Past Medical History:   Diagnosis Date    Depression     Kidney stones     Mood disorder ([address]     Morbid obesity ([address]     Psychiatric disorder     depression    Sleep apnea     Does not have CPAP as yet    Sprain of ankle, calcaneofibular ligament 2020    sees orthovirginia    Suicidal thoughts      Past Surgical History:   Procedure Laterality Date    HX LAP CHOLECYSTECTOMY  10/2018    HX UROLOGICAL      Stenting x 4 (Starting in 2016, developed severe infection)     Social History     Occupational History    Occupation:      Comment: started in 3/17   Tobacco Use    Smoking status: Former Smoker     Years: 1.00     Quit date: 2018     Years since quittin.9    Smokeless tobacco: Never Used    Tobacco comment:  pack ppd for 6 months   Substance and Sexual Activity    Alcohol use: Yes     Comment: rarely    Drug use: No     Comment: Has done Lucrezia Alexis    Sexual activity: Yes     Partners: Male     Birth control/protection: Condom, Pill     Family History   Problem Relation Age of Onset    Heart Disease Father 55    Hypertension Father     Sleep Apnea Father     No Known Problems Sister     Cancer Maternal Grandmother         Brain Cancer    Heart Disease Paternal Grandmother     Cancer Paternal Grandfather        Allergies   Allergen Reactions    Aspirin Itching     Prior to Admission medications    Medication Sig Start Date End Date Taking? Authorizing Provider   ketorolac (TORADOL) 10 mg tablet TAKE 1 TABLET BY MOUTH EVERY 6 HOURS AS NEEDED FOR PAIN OR FEVER 12/3/20  Yes Provider, Historical   medroxyPROGESTERone (PROVERA) 10 mg tablet Take 1 Tab by mouth daily. 20  Yes Edward Sanchez MD   FLUoxetine (PROZAC) 20 mg capsule Take 1 Cap by mouth daily.  10/25/18  Yes Charles Ok, DO   norethindrone-ethinyl estradiol (Junel FE , ,) 1 mg-20 mcg (21)/75 mg (7) tab  20   Provider, Historical        Review of Systems: History obtained from the patient  Constitutional: negative for weight loss, fever, night sweats  Breast: negative for breast lumps, nipple discharge, galactorrhea  GI: negative for change in bowel habits, abdominal pain, black or bloody stools  : negative for frequency, dysuria, hematuria, vaginal discharge  MSK: negative for back pain, joint pain, muscle pain  Skin: negative for itching, rash, hives  Psych: negative for anxiety, depression, change in mood      Objective:    Visit Vitals  /80   Ht 5' 5\" (1.651 m)   Wt 333 lb (151 kg)   BMI 55.41 kg/m²       Physical Exam:     Constitutional  · Appearance: well-nourished, well developed, alert, in no acute distress    Gastrointestinal  · Abdominal Examination: abdomen non-tender to palpation, normal bowel sounds, no masses present  · Liver and spleen: no hepatomegaly present, spleen not palpable  · Hernias: no hernias identified    Genitourinary  · External Genitalia: normal appearance for age, no discharge present, no tenderness present, no inflammatory lesions present, no masses present, no atrophy present  · Vagina: normal vaginal vault without central or paravaginal defects, no discharge present, no inflammatory lesions present, no masses present  · Bladder: non-tender to palpation  · Urethra: appears normal  · Cervix: normal   · Uterus: normal size, shape and consistency  · Adnexa: no adnexal tenderness present, no adnexal masses present  · Perineum: perineum within normal limits, no evidence of trauma, no rashes or skin lesions present  · Anus: anus within normal limits, no hemorrhoids present  · Inguinal Lymph Nodes: no lymphadenopathy present    Skin  · General Inspection: no rash, no lesions identified    Neurologic/Psychiatric  · Mental Status:  · Orientation: grossly oriented to person, place and time  · Mood and Affect: mood normal, affect appropriate    Assessment:  PCOS with irregular ovulation  ? Etiology for the pain. Plan:   Try a different OCP  Check TSH, prolactin, GCC  If pain persists then consider empiric Or[place]issa. .      RTO prn if symptoms persist or worsen. Instructions given to pt. Handouts given to pt.

## 2020-12-18 ENCOUNTER — OFFICE VISIT (OUTPATIENT)
Dept: OBGYN CLINIC | Age: 25
End: 2020-12-18
Payer: COMMERCIAL

## 2020-12-18 VITALS
SYSTOLIC BLOOD PRESSURE: 120 MMHG | DIASTOLIC BLOOD PRESSURE: 80 MMHG | WEIGHT: 293 LBS | HEIGHT: 65 IN | BODY MASS INDEX: 48.82 KG/M2

## 2020-12-18 DIAGNOSIS — R10.2 PELVIC PAIN: Primary | ICD-10-CM

## 2020-12-18 DIAGNOSIS — N91.2 AMENORRHEA: ICD-10-CM

## 2020-12-18 PROCEDURE — 76830 TRANSVAGINAL US NON-OB: CPT | Performed by: OBSTETRICS & GYNECOLOGY

## 2020-12-18 PROCEDURE — 99202 OFFICE O/P NEW SF 15 MIN: CPT | Performed by: OBSTETRICS & GYNECOLOGY

## 2020-12-18 RX ORDER — NORETHINDRONE ACETATE AND ETHINYL ESTRADIOL 1.5-30(21)
1 KIT ORAL DAILY
Qty: 3 DOSE PACK | Refills: 1 | Status: SHIPPED | OUTPATIENT
Start: 2020-12-18 | End: 2021-09-21

## 2020-12-18 RX ORDER — KETOROLAC TROMETHAMINE 10 MG/1
TABLET, FILM COATED ORAL
COMMUNITY
Start: 2020-12-03 | End: 2021-04-01 | Stop reason: ALTCHOICE

## 2020-12-18 RX ORDER — NORETHINDRONE ACETATE AND ETHINYL ESTRADIOL 1MG-20(21)
KIT ORAL
COMMUNITY
Start: 2020-08-21 | End: 2020-12-18

## 2020-12-19 LAB
PROLACTIN SERPL-MCNC: 9.7 NG/ML (ref 4.8–23.3)
TSH SERPL DL<=0.005 MIU/L-ACNC: 1.13 UIU/ML (ref 0.45–4.5)

## 2020-12-20 LAB
C TRACH RRNA SPEC QL NAA+PROBE: POSITIVE
N GONORRHOEA RRNA SPEC QL NAA+PROBE: NEGATIVE

## 2020-12-21 RX ORDER — AZITHROMYCIN 500 MG/1
1000 TABLET, FILM COATED ORAL DAILY
Qty: 2 TAB | Refills: 0 | Status: SHIPPED | OUTPATIENT
Start: 2020-12-21 | End: 2021-02-17

## 2020-12-21 NOTE — PROGRESS NOTES
Your cultures show that you are positive for a chlamydia infection. This is a sexually transmitted infection and I will send a prescription to treat it to your pharmacy. Your partner should also be treated and you should have another chlamydia test in 6 to 8 weeks to make sure it was adequately treated.

## 2021-02-15 ENCOUNTER — VIRTUAL VISIT (OUTPATIENT)
Dept: FAMILY MEDICINE CLINIC | Age: 26
End: 2021-02-15
Payer: COMMERCIAL

## 2021-02-15 DIAGNOSIS — N89.8 VAGINAL DISCHARGE: Primary | ICD-10-CM

## 2021-02-15 PROCEDURE — 99213 OFFICE O/P EST LOW 20 MIN: CPT | Performed by: STUDENT IN AN ORGANIZED HEALTH CARE EDUCATION/TRAINING PROGRAM

## 2021-02-15 RX ORDER — METHOCARBAMOL 500 MG/1
TABLET, FILM COATED ORAL
COMMUNITY
Start: 2021-01-03 | End: 2021-04-01 | Stop reason: ALTCHOICE

## 2021-02-15 RX ORDER — DICLOFENAC SODIUM 75 MG/1
TABLET, DELAYED RELEASE ORAL
COMMUNITY
Start: 2021-01-03 | End: 2022-11-03

## 2021-02-15 RX ORDER — LEVONORGESTREL AND ETHINYL ESTRADIOL 0.15-0.03
KIT ORAL
COMMUNITY
Start: 2021-02-04 | End: 2021-09-21

## 2021-02-15 RX ORDER — DOXYCYCLINE 100 MG/1
CAPSULE ORAL
COMMUNITY
Start: 2021-02-05 | End: 2021-04-01 | Stop reason: ALTCHOICE

## 2021-02-15 RX ORDER — ONDANSETRON 4 MG/1
TABLET, ORALLY DISINTEGRATING ORAL
COMMUNITY
Start: 2021-02-05 | End: 2021-09-21

## 2021-02-15 NOTE — PROGRESS NOTES
Charmaine Mata  23 y.o. female  1995  [place] [address]  [id number]   [address]:    Telemedicine Progress Note  Dario Hawley MD       Encounter Date and Time: February 15, 2021 at 12:53 PM    Consent: Charmaine Mata, who was seen by synchronous (real-time) audio-video technology, and/or her healthcare decision maker, is aware that this patient-initiated, Telehealth encounter on 2/15/2021 is a billable service, with coverage as determined by her insurance carrier. She is aware that she may receive a bill and has provided verbal consent to proceed: NA - Consent obtained within past 12 months    Chief Complaint   Patient presents with    Follow-up     History of Present Illness   Charmaine Mata is a 23 y.o. female was evaluated by synchronous (real-time) audio-video technology from home, through a secure patient portal.    Patient presents today for follow up of ongoing vaginal and urinary symptoms. She states her symptoms have been persistent since last month. She was seen by Dr. Santhosh Crenshaw ([facility]) and diagnosed with Genie Distance in 12/2021. She was then seen at [facility] ER ~10 days ago for abdominal pain and vaginal discharge. She was found to still be +chlamydia. Discharged home with diclofenac, robaxin, zofran and Doxycycline. She saw an OB at [facility] that same day who did not do a vaginal exam but started her on an OCP Denice Matthews) and told her to follow up in 3 months. She states she has 1 doxycycline pill left. Today the patient endorses the following symptoms:   - Vaginal: yellow vaginal discharge, present every time she wipes and noted on her underwear. It is there every day. She also notes a 'harsh' smell that is not familiar ot her. She states her vaginal area feels very swollen and it burns every time she wipes. Sometimes she sees flakes of skin like it is very dry. She has not looked in her genital area so is unable to explain how it looks.  She has not been sexually active for 9 months. At the time she was sexually active with a male partner a few times however it was an open relationship and there is a possibility of an STD. She feels like many of her vaginal issues started a couple months after that encounter.   -  She also endorses urinary frequency, urgency and dysuria. No hematuria or flank pain. No fevers. No recent hygience practice changes. She uses non fragrance soaps and showeres more lately. Review of Systems   Review of Systems   Constitutional: Negative for chills and fever. Cardiovascular: Negative for chest pain. Gastrointestinal: Negative for abdominal pain and blood in stool. Genitourinary: Positive for dysuria, frequency and urgency. Yellow vaginal discharge   Neurological: Negative for dizziness and headaches. Vitals/Objective:     General: alert, cooperative, no distress   Mental  status: mental status: alert, oriented to person, place, and time, normal mood, behavior, speech, dress, motor activity, and thought processes   Resp: resp: normal effort and no respiratory distress   Neuro: neuro: no gross deficits   Skin: skin: no discoloration or lesions of concern on visible areas   Due to this being a TeleHealth evaluation, many elements of the physical examination are unable to be assessed. Assessment and Plan:   Jigar Dyer is a 23 y.o. female who presents for vaginal irritation, discharge and urinary symptoms. 1. Vaginal discharge  - patient would benefit from an in clinic visit for a vaginal and bimanual exam as well as pap smear, in the setting of recent chlamydia diagnosis and persistent vaginal discharge now with dysuria and urinary frequency  - could be a mixed picture with a yeast infection causing irritation to the genital skin in the setting of recent abx use and a ?UTI. - no pap on file.    - patient agreeable to in person visit, msg sent to office for assistance scheduling      We discussed the expected course, resolution and complications of the diagnosis(es) in detail. Medication risks, benefits, costs, interactions, and alternatives were discussed as indicated. I advised her to contact the office if her condition worsens, changes or fails to improve as anticipated. She expressed understanding with the diagnosis(es) and plan. Patient understands that this encounter was a temporary measure, and the importance of further follow up and examination was emphasized. Patient verbalized understanding. Patient informed to follow up: in clinic visit. Electronically Signed: Susana Ramos MD    Oren Martinez is a 23 y.o. female who was evaluated by an audio-video encounter for concerns as above. Patient identification was verified prior to start of the visit. A caregiver was present when appropriate. Due to this being a TeleHealth encounter (During [facility]-20 public health emergency), evaluation of the following organ systems was limited: Vitals/Constitutional/EENT/Resp/CV/GI//MS/Neuro/Skin/Heme-Lymph-Imm. Pursuant to the emergency declaration under the [facility] waiver authority and the [employer] Act, this Virtual Visit was conducted, with patient's (and/or legal guardian's) consent, to reduce the patient's risk of exposure to COVID-19 and provide necessary medical care. Services were provided through a synchronous discussion virtually to substitute for in-person clinic visit. I was at home. The patient was at home. History   Patients past medical, surgical and family histories were reviewed and updated.       Past Medical History:   Diagnosis Date    Depression     Kidney stones     Mood disorder ([address]     Morbid obesity ([facility]     Psychiatric disorder     depression    Sleep apnea     Does not have CPAP as yet    Sprain of ankle, calcaneofibular ligament 08/13/2020    sees orthovirginia    Suicidal thoughts      Past Surgical History:   Procedure Laterality Date    HX LAP CHOLECYSTECTOMY  10/2018    HX UROLOGICAL      Stenting x 4 (Starting in 2016, developed severe infection)     Family History   Problem Relation Age of Onset    Heart Disease Father 55    Hypertension Father     Sleep Apnea Father     No Known Problems Sister     Cancer Maternal Grandmother         Brain Cancer    Heart Disease Paternal Grandmother     Cancer Paternal Grandfather      Social History     Tobacco Use    Smoking status: Former Smoker     Years: 1.00     Quit date: 1/16/2018     Years since quitting: 3.0    Smokeless tobacco: Never Used    Tobacco comment: 1/4 pack ppd for 6 months   Substance Use Topics    Alcohol use: Yes     Comment: rarely    Drug use: No     Comment: Has done Marijuan     Patient Active Problem List   Diagnosis Code    Chest pain R07.9    Essential hypertension I10    Obesity, morbid ([facility] [address] E66.01    Major depressive disorder F32.9          Current Medications/Allergies   Medications and Allergies reviewed:    Current Outpatient Medications   Medication Sig Dispense Refill    azithromycin (Zithromax) 500 mg tab Take 2 Tabs by mouth daily. 2 Tab 0    ketorolac (TORADOL) 10 mg tablet TAKE 1 TABLET BY MOUTH EVERY 6 HOURS AS NEEDED FOR PAIN OR FEVER      norethindrone-ethinyl estradiol-iron (Loestrin Fe 1.5/30, 28-Day,) 1.5 mg-30 mcg (21)/75 mg (7) tab Take 1 Tab by mouth daily. 3 Dose Pack 1    medroxyPROGESTERone (PROVERA) 10 mg tablet Take 1 Tab by mouth daily. 30 Tab 1    FLUoxetine (PROZAC) 20 mg capsule Take 1 Cap by mouth daily.  90 Cap 0     Allergies   Allergen Reactions    Aspirin Itching

## 2021-02-15 NOTE — Clinical Note
In clinic visit needed, with female provider per patient. If Dr. Nury Slaughter available, patient open to coming tomorrow morning but any time works.    Visit for \"Vaginal discharge/uti?/recent diagnosis of chlamydia\"

## 2021-02-16 NOTE — PROGRESS NOTES
Subjective:   Aurora Johns is a 23 y.o. female w/ PMH kidney stones (w/ surgical removal w/ stenting) who presents with concerns about vaginal discharge and a pap smear. Pt was seen yesterday virtually for persistent chlamydia and urinary symptoms and was sent to the clinic for further evalauation. Pt was initailly dgx w/ chlamydia by Dr. Russell Limon (OB [facility]) in 12/2020 but she says she was not told of her dgx. Then about 1.5 weeks ago, she was seen at [facility] ER for abdominal pain and vaginal discharge. She was found to still be +chlamydia (CT scan unremarkable per pt). She was sent home w/ diclofenac, robaxin, zofran and Doxycycline (last dose 2 days ago). She saw an OB at [facility] same day after her DC (not do a vaginal exam) and started her on an OCP Thea Tiffany) and told her to f/u in 3 months. Current symptoms: vaginal discharge: yellow with \"harsh smell\" during urination, vaginal irritation: moderate, vaginal area feels very swollen and it burns every time she wipes. +dysuria, +urinary frequency, R pelvic pain (since early Feb): sharp, 8/10, non-radiating and associated w/ BL lower back pain. No fever/chills. She is unsure of skin lesions because has not looked. Occupation: pt is a house-     STD exposure: denies knowledge of risky exposure. She states she has not been sexually active for past 9 months. Prior to that, she was sexually active with a male partner a few times however it was an open relationship and there is a possibility of an STD. Method of protection: OCP (Oral Contraceptive Pills)        Allergies- reviewed: Allergies   Allergen Reactions    Aspirin Itching         Medications- reviewed:   Current Outpatient Medications   Medication Sig    azithromycin (ZITHROMAX) 500 mg tab Take 2 Tabs by mouth now for 1 dose.     ondansetron (ZOFRAN ODT) 4 mg disintegrating tablet     Kurvelo, 28, 0.15-0.03 mg tab     ketorolac (TORADOL) 10 mg tablet TAKE 1 TABLET BY MOUTH EVERY 6 HOURS AS NEEDED FOR PAIN OR FEVER    FLUoxetine (PROZAC) 20 mg capsule Take 1 Cap by mouth daily.  methocarbamoL (ROBAXIN) 500 mg tablet TAKE 1 TABLET BY MOUTH 4 TIMES DAILY AS NEEDED FOR MUSCLE SPASM AND FOR PAIN    doxycycline (VIBRAMYCIN) 100 mg capsule     diclofenac EC (VOLTAREN) 75 mg EC tablet TAKE 1 TABLET BY MOUTH TWICE DAILY FOR PAIN    norethindrone-ethinyl estradiol-iron (Loestrin Fe 1.5/30, 28-Day,) 1.5 mg-30 mcg (21)/75 mg (7) tab Take 1 Tab by mouth daily.  medroxyPROGESTERone (PROVERA) 10 mg tablet Take 1 Tab by mouth daily.      Current Facility-Administered Medications   Medication Dose Route Frequency    cefTRIAXone (ROCEPHIN) injection 1 g  1 g IntraMUSCular Q24H         Past Medical History- reviewed:  Past Medical History:   Diagnosis Date    Depression     Kidney stones     Mood disorder ([facility] [address]     Morbid obesity ([facility] [address]     Psychiatric disorder     depression    Sleep apnea     Does not have CPAP as yet    Sprain of ankle, calcaneofibular ligament 08/13/2020    sees orthovirginia    Suicidal thoughts          Past Surgical History- reviewed:   Past Surgical History:   Procedure Laterality Date    HX LAP CHOLECYSTECTOMY  10/2018    HX UROLOGICAL      Stenting x 4 (Starting in 2016, developed severe infection)         Social History- reviewed:  Social History     Socioeconomic History    Marital status: SINGLE     Spouse name: Not on file    Number of children: Not on file    Years of education: Not on file    Highest education level: Not on file   Occupational History    Occupation:      Comment: started in 3/17   Social Needs    Financial resource strain: Not on file    Food insecurity     Worry: Not on file     Inability: Not on file   [employer] needs     Medical: Not on file     Non-medical: Not on file   Tobacco Use    Smoking status: Former Smoker     Years: 1.00     Quit date: 1/16/2018     Years since quitting: 3.0    Smokeless tobacco: Never Used  Tobacco comment: 1/4 pack ppd for 6 months   Substance and Sexual Activity    Alcohol use: Yes     Comment: rarely    Drug use: No     Comment: Has done Marijuan    Sexual activity: Yes     Partners: Male     Birth control/protection: Condom, Pill   Lifestyle    Physical activity     Days per week: Not on file     Minutes per session: Not on file    Stress: Not on file   Relationships    Social connections     Talks on phone: Not on file     Gets together: Not on file     Attends Islam service: Not on file     Active member of club or organization: Not on file     Attends meetings of clubs or organizations: Not on file     Relationship status: Not on file    Intimate partner violence     Fear of current or ex partner: Not on file     Emotionally abused: Not on file     Physically abused: Not on file     Forced sexual activity: Not on file   Other Topics Concern    Not on file   Social History Narrative    26year old AA female admitted for reportedly taking a handful of prozac. Pt has a past suicide attempt and this is her first psychiatriatric admission. Pt lives with her parents. She is a  . She has a 12th grade education. Review of Systems  General: No fevers or chills  GI: No abdominal pain, nausea, or vomiting  : No dysuria  MSK: No joint pain      Physical Exam     Visit Vitals  BP (!) 141/76 (BP 1 Location: Left upper arm, BP Patient Position: Sitting, BP Cuff Size: Large adult)   Pulse 82   Temp 97.7 °F (36.5 °C) (Temporal)   Resp 16   Ht 5' 5\" (1.651 m)   Wt 342 lb 9.6 oz (155.4 kg)   LMP 01/29/2021 (Exact Date)   SpO2 97%   BMI 57.01 kg/m²       General: No apparent distress. Alert and oriented. Responds to all questions appropriately. Abdomen:  BACK: Soft; nontender; nondistended; no masses or organomegaly. Mild TTP at BL lower back. No point tenderness/CVA tenderness. : Exam chaperoned by LPN Ms. Claudia Garcia. Normal external female genitalia, no lesions.  Speculum exam: thin, clear-white discharge; cervix is tender w/ mild erythema. Bimanual exam: no cervical motion tenderness; no adnexal mass or tenderness bilaterally; uterus is non-tender and normal size. LAB:  KOH/Wet prep: NEG for trichomonas. NEG for yeast. NEG for clue cells. Recent Results (from the past 12 hour(s))   AMB POC URINALYSIS DIP STICK AUTO W/O MICRO    Collection Time: 02/17/21  8:27 AM   Result Value Ref Range    Color (UA POC) Dark Yellow     Clarity (UA POC) Slightly Cloudy     Glucose (UA POC) Negative Negative    Bilirubin (UA POC) Negative Negative    Ketones (UA POC) Negative Negative    Specific gravity (UA POC) 1.030 1.001 - 1.035    Blood (UA POC) 1+ Negative    pH (UA POC) 5.0 4.6 - 8.0    Protein (UA POC) Trace Negative    Urobilinogen (UA POC) 0.2 mg/dL 0.2 - 1    Nitrites (UA POC) Negative Negative    Leukocyte esterase (UA POC) Negative Negative   AMB POC SMEAR, STAIN & INTERPRET, WET MOUNT    Collection Time: 02/17/21  9:37 AM   Result Value Ref Range    Wet mount (POC)     AMB POC URINE PREGNANCY TEST, VISUAL COLOR COMPARISON    Collection Time: 02/17/21  9:48 AM   Result Value Ref Range    VALID INTERNAL CONTROL POC Yes     HCG urine, Ql. (POC) Negative Negative         Assessment/Plan:       ICD-10-CM ICD-9-CM    1. Cervicitis  N72 616.0 cefTRIAXone (ROCEPHIN) injection 1 g      azithromycin (ZITHROMAX) 500 mg tab   2. Vaginal discharge  N89.8 623.5 AMB POC SMEAR, STAIN & INTERPRET, WET MOUNT      cefTRIAXone (ROCEPHIN) injection 1 g      azithromycin (ZITHROMAX) 500 mg tab      THER/PROPH/DIAG INJECTION, SUBCUT/IM   3. Pelvic pain  R10.2 [id number] AMB POC URINE PREGNANCY TEST, VISUAL COLOR COMPARISON      CULTURE, URINE      CULTURE, URINE   4. Dysuria  R30.0 788.1 AMB POC URINALYSIS DIP STICK AUTO W/O MICRO      CULTURE, URINE      CULTURE, URINE   5. Bilateral low back pain without sciatica, unspecified chronicity  M54.5 724.2    6.  Encounter for cervical Pap smear with pelvic exam  Z01.419 V76.2 PAP, LIQUID BASED, MANUAL SCREEN     V72.31            Cervicitis: given recent Dgx of Chlamydia + cervix tender with pap brush. Broad DXX includes ectopic pregnancy, PID, UTI or ovarian torsion. Wet prep unremarakble, abdominal and pelvic exam unremarkable. Neg UPT.    - IM dose Rocephin x1 and Azithromycin 1g x1 (sent to pharmacy)  - OTC pain meds, warm compress  - Advised to obtain from intercourse until symptoms resolve  - Pt instructed to notify their previous partner so that they should also be tested and treated. - Safe sex discussed with patient. Dysuria: UA w/ 1+ blood, WBC 2-5, RBC  5-10, few epithelial cells, moderate bacteria, and heavy Mucus. No CVA tenderness  - Will f/u Ucx and tx as needed    BL lower back pain: likely MSK per History. No CVA tenderness. - UA negative for crystals or sedimentation  - Personally reviewed records from [facility] ED: CT A/P on 2/4/21 neg for Kidney Stones or Hydronephrosis - to be scanned   - Advised to try conservative management w/ warm compress, OTC pain meds  - Back Exercises given   - Pt to f/u at [facility] as needed    Pap Smear: performed today     Elevated bp: bp today 141/76. Likely 2/2 pain. Blood pressures okay in the past.   - OTC pain meds and warm compresses prn           I have discussed the diagnosis with the patient and the intended plan as seen in the above orders. The patient has received an after-visit summary and questions were answered concerning future plans. I have discussed medication side effects and warnings with the patient as well.     Karrie Tristan MD

## 2021-02-17 ENCOUNTER — OFFICE VISIT (OUTPATIENT)
Dept: FAMILY MEDICINE CLINIC | Age: 26
End: 2021-02-17
Payer: COMMERCIAL

## 2021-02-17 VITALS
BODY MASS INDEX: 48.82 KG/M2 | HEIGHT: 65 IN | OXYGEN SATURATION: 97 % | RESPIRATION RATE: 16 BRPM | HEART RATE: 82 BPM | SYSTOLIC BLOOD PRESSURE: 141 MMHG | TEMPERATURE: 97.7 F | DIASTOLIC BLOOD PRESSURE: 76 MMHG | WEIGHT: 293 LBS

## 2021-02-17 DIAGNOSIS — R30.0 DYSURIA: ICD-10-CM

## 2021-02-17 DIAGNOSIS — N72 CERVICITIS: Primary | ICD-10-CM

## 2021-02-17 DIAGNOSIS — M54.50 BILATERAL LOW BACK PAIN WITHOUT SCIATICA, UNSPECIFIED CHRONICITY: ICD-10-CM

## 2021-02-17 DIAGNOSIS — R10.2 PELVIC PAIN: ICD-10-CM

## 2021-02-17 DIAGNOSIS — N89.8 VAGINAL DISCHARGE: ICD-10-CM

## 2021-02-17 DIAGNOSIS — Z01.419 ENCOUNTER FOR CERVICAL PAP SMEAR WITH PELVIC EXAM: ICD-10-CM

## 2021-02-17 LAB
BILIRUB UR QL STRIP: NEGATIVE
GLUCOSE UR-MCNC: NEGATIVE MG/DL
HCG URINE, QL. (POC): NEGATIVE
KETONES P FAST UR STRIP-MCNC: NEGATIVE MG/DL
PH UR STRIP: 5 [PH] (ref 4.6–8)
PROT UR QL STRIP: NORMAL
SP GR UR STRIP: 1.03 (ref 1–1.03)
UA UROBILINOGEN AMB POC: NORMAL (ref 0.2–1)
URINALYSIS CLARITY POC: NORMAL
URINALYSIS COLOR POC: NORMAL
URINE BLOOD POC: NORMAL
URINE LEUKOCYTES POC: NEGATIVE
URINE NITRITES POC: NEGATIVE
VALID INTERNAL CONTROL?: YES
WET MOUNT POCT, WMPOCT: NORMAL

## 2021-02-17 PROCEDURE — 87210 SMEAR WET MOUNT SALINE/INK: CPT | Performed by: STUDENT IN AN ORGANIZED HEALTH CARE EDUCATION/TRAINING PROGRAM

## 2021-02-17 PROCEDURE — 96372 THER/PROPH/DIAG INJ SC/IM: CPT | Performed by: STUDENT IN AN ORGANIZED HEALTH CARE EDUCATION/TRAINING PROGRAM

## 2021-02-17 PROCEDURE — 81003 URINALYSIS AUTO W/O SCOPE: CPT | Performed by: STUDENT IN AN ORGANIZED HEALTH CARE EDUCATION/TRAINING PROGRAM

## 2021-02-17 PROCEDURE — 99214 OFFICE O/P EST MOD 30 MIN: CPT | Performed by: STUDENT IN AN ORGANIZED HEALTH CARE EDUCATION/TRAINING PROGRAM

## 2021-02-17 PROCEDURE — 81025 URINE PREGNANCY TEST: CPT | Performed by: STUDENT IN AN ORGANIZED HEALTH CARE EDUCATION/TRAINING PROGRAM

## 2021-02-17 RX ORDER — CEFTRIAXONE 1 G/1
1 INJECTION, POWDER, FOR SOLUTION INTRAMUSCULAR; INTRAVENOUS EVERY 24 HOURS
Status: DISCONTINUED | OUTPATIENT
Start: 2021-02-17 | End: 2021-03-04

## 2021-02-17 RX ORDER — AZITHROMYCIN 500 MG/1
1000 TABLET, FILM COATED ORAL
Qty: 2 TAB | Refills: 0 | Status: SHIPPED | OUTPATIENT
Start: 2021-02-17 | End: 2021-02-17

## 2021-02-17 RX ADMIN — CEFTRIAXONE 1 G: 1 INJECTION, POWDER, FOR SOLUTION INTRAMUSCULAR; INTRAVENOUS at 10:22

## 2021-02-17 NOTE — PATIENT INSTRUCTIONS
Cervicitis: Care Instructions Your Care Instructions Cervicitis means that your cervix is inflamed. The cervix is the part of your uterus that opens into your vagina. This problem is most often caused by an infection. Some women get it after they have a sexually transmitted infection (STI). These include gonorrhea and chlamydia. It can also be caused by irritation from some types of birth control. Two examples are the cervical cap or diaphragm. Your doctor may do some tests to help find the cause of the problem. It is very important to treat cervicitis. If you don't, you could have serious health problems. For this reason, you may need a test after your treatment to make sure the infection is gone. Follow-up care is a key part of your treatment and safety. Be sure to make and go to all appointments, and call your doctor if you are having problems. It's also a good idea to know your test results and keep a list of the medicines you take. How can you care for yourself at home? · Take your antibiotics as directed. Do not stop taking them just because you feel better. You need to take the full course of antibiotics. · If your doctor prescribed antifungal medicine, use it as directed. · While you are being treated, do not have sex. If your treatment is one dose of antibiotics, wait at least 7 days after you take your medicine before you have any kind of sexual contact. Even if you use a condom, you could get infected again. · It's important to tell your sex partner or partners that you have cervicitis. It may be related to an STI. Any partners should get tested and then treated if they have an STI. This is true even if they don't have symptoms. · Do not douche. It can change the normal balance of substances in your vagina. · Do not use tampons while you are being treated. To prevent STIs · Use latex condoms every time you have sex. Use them from the start to the end of sexual contact. · Talk to your partner before you have sex. Find out if he or she has or is at risk for any sexually transmitted infection (STI). Keep in mind that a person may be able to spread an STI even if he or she does not have symptoms. · Do not have sex with anyone who has symptoms of an STI. These include sores on the genitals or mouth. · Having one sex partner (who does not have STIs and does not have sex with anyone else) is a good way to avoid STIs. When should you call for help? Call your doctor now or seek immediate medical care if: 
  · You have new or worse pain in your belly or pelvis.  
  · You have vaginal discharge that has increased in amount or smells bad.  
  · You have unusual vaginal bleeding.  
  · You have a new or higher fever. Watch closely for changes in your health, and be sure to contact your doctor if: 
  · You do not get better as expected. Where can you learn more? Go to http://www.gray.com/ Enter W850 in the search box to learn more about \"Cervicitis: Care Instructions. \" Current as of: April 29, 2020               Content Version: 12.6 © 6349-4998 [employer]. Care instructions adapted under license by [employer] (which disclaims liability or warranty for this information). If you have questions about a medical condition or this instruction, always ask your healthcare professional. [address] any warranty or liability for your use of this information. Chlamydia: Care Instructions Your Care Instructions Chlamydia is a bacterial infection spread through sexual contact. It is one of the most common sexually transmitted infections (STIs). Most people who get chlamydia do not have symptoms, but they can still infect their sex partners. If chlamydia in women is not treated, it can cause pelvic inflammatory disease (PID), a severe pelvic infection. PID can make it hard for a woman to get pregnant. Antibiotics can cure chlamydia. Both sex partners need treatment to keep from passing the infection back and forth. Certain antibiotics should not be used in pregnancy. If you were not tested for pregnancy during this visit, tell your doctor if you might be pregnant. Follow-up care is a key part of your treatment and safety. Be sure to make and go to all appointments, and call your doctor if you are having problems. It's also a good idea to know your test results and keep a list of the medicines you take. How can you care for yourself at home? · Chlamydia often is treated with a single dose of antibiotics in the doctor's office. If your doctor prescribed antibiotics to take at home, take them as directed. Do not stop taking them just because you feel better. You need to take the full course of antibiotics. · Do not have sex with anyone while you are being treated. If your treatment is a single dose of antibiotics, wait at least 7 days after you take the dose before you have sex. Even if you use a condom, you and your partner may pass the infection back and forth. · Make sure to tell your sex partner or partners that you have chlamydia. They should get treated, even if they do not have symptoms. · Your doctor may have done tests for other STIs. If so, call back in 3 or 4 days for those results. · Your doctor may advise you to be tested again for chlamydia in 3 or 4 months. How can you prevent chlamydia and other STIs in the future? · Use latex condoms every time you have sex. Use them from the beginning to the end of sexual contact. · Talk to your partner before you have sex. Find out if he or she has or is at risk for chlamydia or any other STI. Keep in mind that a person may be able to spread an STI even if he or she does not have symptoms. · Do not have sex while you are being treated for chlamydia or any other STI. · Do not have sex with anyone who has symptoms of an STI, such as sores on the genitals or mouth. · Having one sex partner (who does not have STIs and does not have sex with anyone else) is a good way to avoid STIs. When should you call for help? Call 911 anytime you think you may need emergency care. For example, call if: 
  · You have sudden, severe pain in your belly or pelvis. Call your doctor now or seek immediate medical care if: 
  · You have new belly or pelvic pain.  
  · You have a fever.  
  · You have new or increased burning or pain with urination, or you cannot urinate.  
  · You have pain, swelling, or tenderness in the scrotum. Watch closely for changes in your health, and be sure to contact your doctor if: 
  · You have unusual vaginal bleeding.  
  · You have a discharge from the vagina or penis.  
  · You think you may have been exposed to another STI.  
  · Your symptoms get worse or have not improved within 1 week after starting treatment.  
  · You have any new symptoms, such as sores, bumps, rashes, blisters, or warts in the genital or anal area. Where can you learn more? Go to http://www.gray.com/ Enter E347 in the search box to learn more about \"Chlamydia: Care Instructions. \" Current as of: February 26, 2020               Content Version: 12.6 © 1384-0807 [employer]. Care instructions adapted under license by [employer] (which disclaims liability or warranty for this information). If you have questions about a medical condition or this instruction, always ask your healthcare professional. [place] [address] any warranty or liability for your use of this information. Low Back Pain: Exercises Introduction Here are some examples of exercises for you to try. The exercises may be suggested for a condition or for rehabilitation. Start each exercise slowly. Ease off the exercises if you start to have pain. You will be told when to start these exercises and which ones will work best for you. How to do the exercises Press-up 1. Lie on your stomach, supporting your body with your forearms. 2. Press your elbows down into the floor to raise your upper back. As you do this, relax your stomach muscles and allow your back to arch without using your back muscles. As your press up, do not let your hips or pelvis come off the floor. 3. Hold for 15 to 30 seconds, then relax. 4. Repeat 2 to 4 times. Alternate arm and leg (bird dog) exercise Do this exercise slowly. Try to keep your body straight at all times, and do not let one hip drop lower than the other. 1. Start on the floor, on your hands and knees. 2. Tighten your belly muscles. 3. Raise one leg off the floor, and hold it straight out behind you. Be careful not to let your hip drop down, because that will twist your trunk. 4. Hold for about 6 seconds, then lower your leg and switch to the other leg. 5. Repeat 8 to 12 times on each leg. 6. Over time, work up to holding for 10 to 30 seconds each time. 7. If you feel stable and secure with your leg raised, try raising the opposite arm straight out in front of you at the same time. Knee-to-chest exercise 1. Lie on your back with your knees bent and your feet flat on the floor. 2. Bring one knee to your chest, keeping the other foot flat on the floor (or keeping the other leg straight, whichever feels better on your lower back). 3. Keep your lower back pressed to the floor. Hold for at least 15 to 30 seconds. 4. Relax, and lower the knee to the starting position. 5. Repeat with the other leg. Repeat 2 to 4 times with each leg. 6. To get more stretch, put your other leg flat on the floor while pulling your knee to your chest.   
Curl-ups 1. Lie on the floor on your back with your knees bent at a 90-degree angle. Your feet should be flat on the floor, about 12 inches from your buttocks. 2. Cross your arms over your chest. If this bothers your neck, try putting your hands behind your neck (not your head), with your elbows spread apart. 3. Slowly tighten your belly muscles and raise your shoulder blades off the floor. 4. Keep your head in line with your body, and do not press your chin to your chest. 
5. Hold this position for 1 or 2 seconds, then slowly lower yourself back down to the floor. 6. Repeat 8 to 12 times. Pelvic tilt exercise 1. Lie on your back with your knees bent. 2. \"Brace\" your stomach. This means to tighten your muscles by pulling in and imagining your belly button moving toward your spine. You should feel like your back is pressing to the floor and your hips and pelvis are rocking back. 3. Hold for about 6 seconds while you breathe smoothly. 4. Repeat 8 to 12 times. Heel dig bridging 1. Lie on your back with both knees bent and your ankles bent so that only your heels are digging into the floor. Your knees should be bent about 90 degrees. 2. Then push your heels into the floor, squeeze your buttocks, and lift your hips off the floor until your shoulders, hips, and knees are all in a straight line. 3. Hold for about 6 seconds as you continue to breathe normally, and then slowly lower your hips back down to the floor and rest for up to 10 seconds. 4. Do 8 to 12 repetitions. Hamstring stretch in doorway 1. Lie on your back in a doorway, with one leg through the open door. 2. Slide your leg up the wall to straighten your knee. You should feel a gentle stretch down the back of your leg. 3. Hold the stretch for at least 15 to 30 seconds. Do not arch your back, point your toes, or bend either knee. Keep one heel touching the floor and the other heel touching the wall. 4. Repeat with your other leg. 5. Do 2 to 4 times for each leg. Hip flexor stretch 1. Kneel on the floor with one knee bent and one leg behind you. Place your forward knee over your foot. Keep your other knee touching the floor. 2. Slowly push your hips forward until you feel a stretch in the upper thigh of your rear leg. 3. Hold the stretch for at least 15 to 30 seconds. Repeat with your other leg. 4. Do 2 to 4 times on each side. Wall sit 1. Stand with your back 10 to 12 inches away from a wall. 2. Lean into the wall until your back is flat against it. 3. Slowly slide down until your knees are slightly bent, pressing your lower back into the wall. 4. Hold for about 6 seconds, then slide back up the wall. 5. Repeat 8 to 12 times. Follow-up care is a key part of your treatment and safety. Be sure to make and go to all appointments, and call your doctor if you are having problems. It's also a good idea to know your test results and keep a list of the medicines you take. Where can you learn more? Go to http://www.gray.com/ Enter Z956 in the search box to learn more about \"Low Back Pain: Exercises. \" Current as of: March 2, 2020               Content Version: 12.6 © 2006-2020 [employer]. Care instructions adapted under license by [employer] (which disclaims liability or warranty for this information). If you have questions about a medical condition or this instruction, always ask your healthcare professional. [place] [address] any warranty or liability for your use of this information.

## 2021-02-17 NOTE — PROGRESS NOTES
Chief Complaint   Patient presents with    Follow-up     Patient presents for a follow up on Chlamydia; complaining of some abdominal pain. Vitals:    02/17/21 0826   BP: (!) 141/74   BP 1 Location: Left upper arm   BP Patient Position: Sitting   BP Cuff Size: Large adult   Pulse: 82   Resp: 16   Temp: 97.7 °F (36.5 °C)   TempSrc: Temporal   SpO2: 97%   Weight: 342 lb 9.6 oz (155.4 kg)   Height: 5' 5\" (1.651 m)       1. Have you been to the ER, urgent care clinic since your last visit? Hospitalized since your last visit? No     2. Have you seen or consulted any other health care providers outside of the [address] [place] [address] since your last visit? Include any pap smears or colon screening.  No

## 2021-02-19 LAB — BACTERIA UR CULT: NORMAL

## 2021-02-23 ENCOUNTER — TELEPHONE (OUTPATIENT)
Dept: FAMILY MEDICINE CLINIC | Age: 26
End: 2021-02-23

## 2021-02-23 LAB
CYTOLOGIST CVX/VAG CYTO: ABNORMAL
CYTOLOGY CVX/VAG DOC CYTO: ABNORMAL
DX ICD CODE: ABNORMAL
DX ICD CODE: ABNORMAL
Lab: ABNORMAL
OTHER STN SPEC: ABNORMAL
PATHOLOGIST CVX/VAG CYTO: ABNORMAL
STAT OF ADQ CVX/VAG CYTO-IMP: ABNORMAL

## 2021-02-23 NOTE — TELEPHONE ENCOUNTER
Called pt regarding the following lab results: Ucx unremarkable and Pap smear with LSIL. - Pt to repeat pap in 1 year per guidelines.      Kyra Dillon MD

## 2021-03-04 RX ORDER — CEFTRIAXONE 1 G/1
1 INJECTION, POWDER, FOR SOLUTION INTRAMUSCULAR; INTRAVENOUS ONCE
Status: SHIPPED | OUTPATIENT
Start: 2021-03-04 | End: 2021-03-05

## 2021-03-31 ENCOUNTER — TELEPHONE (OUTPATIENT)
Dept: FAMILY MEDICINE CLINIC | Age: 26
End: 2021-03-31

## 2021-03-31 ENCOUNTER — NURSE TRIAGE (OUTPATIENT)
Dept: OTHER | Facility: CLINIC | Age: 26
End: 2021-03-31

## 2021-03-31 NOTE — TELEPHONE ENCOUNTER
Patient called Sabrina with red flag complaint. Call received from cold transfer. Brief description of triage: itching, SOB on exertion, lightheadedness, right arm pain. Triage indicates for patient to be seen today. Care advice provided, patient verbalizes understanding; denies any other questions or concerns; instructed to call back for any new or worsening symptoms. Writer provided warm transfer to [facility] at [facility] for appointment scheduling. Attention Provider: Thank you for allowing me to participate in the care of your patient. The patient was connected to triage from [facility]. Please do not respond through this encounter as the response is not directed to a shared pool. Reason for Disposition   MODERATE-SEVERE widespread itching (i.e., interferes with sleep, normal activities or school) and not improved after 24 hours of itching Care Advice    Answer Assessment - Initial Assessment Questions  1. DESCRIPTION: \"Describe the itching you are having. \"      \"I'm itching all over my body. \"    2. SEVERITY: \"How bad is it? \"     - MILD - doesn't interfere with normal activities    - MODERATE-SEVERE: interferes with work, school, sleep, or other activities       Moderate to severe-Patient took Benadryl. 3. SCRATCHING: \"Are there any scratch marks? Bleeding? \"      \"Yes. I have red scratch marks where I've scratched myself. \"    4. ONSET: \"When did this begin? \"       \"This morning around 6am.    5. CAUSE: \"What do you think is causing the itching? \" (ask about swimming pools, pollen, animals, soaps, etc.)      \"Unsure. \"    6. OTHER SYMPTOMS: \"Do you have any other symptoms? \"       SOB on exertion, lightheadedness, Right arm pain      7. PREGNANCY: \"Is there any chance you are pregnant? \" \"When was your last menstrual period? \"      \"I'm not sure. \"    Protocols used: ITCHING [facility], [facility]

## 2021-03-31 NOTE — TELEPHONE ENCOUNTER
----- Message from Cparice Phillips sent at 3/31/2021  8:57 AM EDT -----  Regarding: Dr Susan Mahoney: [phone]  Appointment not available    Caller's first and last name and relationship to patient (if not the patient):      Best contact number:[phone]      Preferred date and time:virtual visit      Scheduled appointment date and time:      Reason for appointment:itching all over body, pain in right arm, shortness of breath      Details to clarify the request:      Caprice Phillips

## 2021-04-01 ENCOUNTER — VIRTUAL VISIT (OUTPATIENT)
Dept: FAMILY MEDICINE CLINIC | Age: 26
End: 2021-04-01
Payer: COMMERCIAL

## 2021-04-01 DIAGNOSIS — R21 RASH DUE TO ALLERGY: Primary | ICD-10-CM

## 2021-04-01 DIAGNOSIS — T78.40XA RASH DUE TO ALLERGY: Primary | ICD-10-CM

## 2021-04-01 PROBLEM — G47.33 OBSTRUCTIVE SLEEP APNEA SYNDROME: Status: ACTIVE | Noted: 2021-04-01

## 2021-04-01 PROCEDURE — 99213 OFFICE O/P EST LOW 20 MIN: CPT | Performed by: STUDENT IN AN ORGANIZED HEALTH CARE EDUCATION/TRAINING PROGRAM

## 2021-04-01 RX ORDER — HYDROCORTISONE 25 MG/G
CREAM TOPICAL 2 TIMES DAILY
Qty: 30 G | Refills: 0 | Status: SHIPPED | OUTPATIENT
Start: 2021-04-01 | End: 2022-11-03

## 2021-04-01 NOTE — PROGRESS NOTES
Palmira Goldstein  27 y.o. female  1995  [place] [address]  [id number]   [address]:    Telemedicine Progress Note  Denice Irving MD       Encounter Date and Time: April 1, 2021 at 8:14 AM    Consent:  She and/or the health care decision maker is aware that that she may receive a bill for this telephone service, depending on her insurance coverage, and has provided verbal consent to proceed: Yes    Chief Complaint   Patient presents with    Rash     History of Present Illness   Palmira Goldstein is a 27 y.o. female was evaluated by synchronous (real-time) audio-video technology from home, through the [employer] Patient Portal.    Patient reports waking up with itching around the back of her ears and neckyesterday morning. Notes she has a reaction to aspirin which is similar. She feels like she is having a reaction. She does have seasonal allergies but does not take anything regularly for that. Does not sleep with the windows open. She has not changed detergents but recently changed body soap to non perfumed soap - stopped using this yesterday. Review of Systems   Review of Systems   Constitutional: Negative for chills, fever, malaise/fatigue and weight loss. HENT: Negative for congestion and sore throat. Cardiovascular: Negative for chest pain and palpitations. Gastrointestinal: Negative for nausea and vomiting. Skin: Positive for itching and rash. Vitals/Objective:     General: alert, cooperative, no distress, appears stated age, morbidly obese   Mental  status: mental status: alert, oriented to person, place, and time, normal mood, behavior, speech, dress, motor activity, and thought processes   Resp: resp: normal effort and no respiratory distress   Neuro: neuro: no gross deficits   Skin: Pinpoint sized erythematous, papular lesions with erythematous base present behind the ear bilaterally with associated pruritus.  No drainage visible   Due to this being a TeleHealth evaluation, many elements of the physical examination are unable to be assessed. Assessment and Plan:     19yo F with hx of depression, OWEN, seasonal allergies presenting for new rash of the neck. Started following change in body soap which she stopped using yesterday. Likely mild reactive contact dermatitis 2/2 new soap vs allergic in nature. Not on daily antihistamine nor nasal corticosteroid. Will trial low dose corticosteroid for relief of pruritis related to rash    1. Rash due to allergy  - hydrocortisone (HYTONE) 2.5 % topical cream; Apply  to affected area two (2) times a day. use thin layer  Dispense: 30 g; Refill: 0  - stop new body soap  - add daily antihistamine if no improvement x 1 week    Time spent in direct conversation with the patient to include medical condition(s) discussed, assessment and treatment plan:       We discussed the expected course, resolution and complications of the diagnosis(es) in detail. Medication risks, benefits, costs, interactions, and alternatives were discussed as indicated. I advised her to contact the office if her condition worsens, changes or fails to improve as anticipated. She expressed understanding with the diagnosis(es) and plan. Patient understands that this encounter was a temporary measure, and the importance of further follow up and examination was emphasized. Patient verbalized understanding. Patient informed to follow up: Follow-up and Dispositions  ·   Return in about 4 weeks (around 4/29/2021) for with Dr Panchito Schreiber for followup for depression/anxiety. Routing History          Electronically Signed: Juliana Edwards MD    Providers location when delivering service: home    CPT Codes 63871-01070 for Established Patients may apply to this Telehealth Visit. POS code: 18.   Modifier GT      Pursuant to the emergency declaration under the [facility], 6394 waiver authority and the Coronavirus Preparedness and Response Supplemental Appropriations Act, this Virtual  Visit was conducted, with patient's consent, to reduce the patient's risk of exposure to COVID-19 and provide continuity of care for an established patient. Services were provided through a video synchronous discussion virtually to substitute for in-person clinic visit. History   Patients past medical, surgical and family histories were reviewed and updated.       Past Medical History:   Diagnosis Date    Depression     Kidney stones     Mood disorder ([facility] [address]     Morbid obesity ([facility] [address] [facility]     Psychiatric disorder     depression    Sleep apnea     Does not have CPAP as yet    Sprain of ankle, calcaneofibular ligament 08/13/2020    sees orthovirginia    Suicidal thoughts      Past Surgical History:   Procedure Laterality Date    HX LAP CHOLECYSTECTOMY  10/2018    HX UROLOGICAL      Stenting x 4 (Starting in 2016, developed severe infection)     Family History   Problem Relation Age of Onset    Heart Disease Father 55    Hypertension Father     Sleep Apnea Father     No Known Problems Sister     Cancer Maternal Grandmother         Brain Cancer    Heart Disease Paternal Grandmother     Cancer Paternal Grandfather      Social History     Socioeconomic History    Marital status: SINGLE     Spouse name: Not on file    Number of children: Not on file    Years of education: Not on file    Highest education level: Not on file   Occupational History    Occupation:      Comment: started in 3/17   Social Needs    Financial resource strain: Not on file    Food insecurity     Worry: Not on file     Inability: Not on file   [employer] needs     Medical: Not on file     Non-medical: Not on file   Tobacco Use    Smoking status: Former Smoker     Years: 1.00     Quit date: 1/16/2018     Years since quitting: 3.2    Smokeless tobacco: Never Used    Tobacco comment: 1/4 pack ppd for 6 months   Substance and Sexual Activity    Alcohol use: Yes     Comment: rarely    Drug use: No     Comment: Has done Marijuan    Sexual activity: Yes     Partners: Male     Birth control/protection: Condom, Pill   Lifestyle    Physical activity     Days per week: Not on file     Minutes per session: Not on file    Stress: Not on file   Relationships    Social connections     Talks on phone: Not on file     Gets together: Not on file     Attends Rastafarian service: Not on file     Active member of club or organization: Not on file     Attends meetings of clubs or organizations: Not on file     Relationship status: Not on file    Intimate partner violence     Fear of current or ex partner: Not on file     Emotionally abused: Not on file     Physically abused: Not on file     Forced sexual activity: Not on file   Other Topics Concern    Not on file   Social History Narrative    26year old AA female admitted for reportedly taking a handful of prozac. Pt has a past suicide attempt and this is her first psychiatriatric admission. Pt lives with her parents. She is a  . She has a 12th grade education. Patient Active Problem List   Diagnosis Code    Chest pain R07.9    Essential hypertension I10    Obesity, morbid ([facility] [address] E66.01    Major depressive disorder F32.9    Obstructive sleep apnea syndrome G47.33          Current Medications/Allergies   Medications and Allergies reviewed:    Current Outpatient Medications   Medication Sig Dispense Refill    hydrocortisone (HYTONE) 2.5 % topical cream Apply  to affected area two (2) times a day. use thin layer 30 g 0    ondansetron (ZOFRAN ODT) 4 mg disintegrating tablet       Kurvelo, 28, 0.15-0.03 mg tab       diclofenac EC (VOLTAREN) 75 mg EC tablet TAKE 1 TABLET BY MOUTH TWICE DAILY FOR PAIN      norethindrone-ethinyl estradiol-iron (Loestrin Fe 1.5/30, 28-Day,) 1.5 mg-30 mcg (21)/75 mg (7) tab Take 1 Tab by mouth daily.  3 Dose Pack 1    FLUoxetine (PROZAC) 20 mg capsule Take 1 Cap by mouth daily.  90 Cap 0     Allergies   Allergen Reactions    Aspirin Itching

## 2021-04-01 NOTE — Clinical Note
Please make a followup for Ms Mayela Ryan with Dr Patricia He for followup of depression/anxiety on TM. She is taking prozac prn vs daily - needs education and close followup. Discussed with patient who is expecting call to setup appointment. Thank you!

## 2021-04-01 NOTE — PROGRESS NOTES
[address] Medicine Residency Attending Addendum:  Dr. Obinna Mahan MD,  the patient and I were not physically present during this encounter. The resident and I are concurrently monitoring the patient care through appropriate telecommunication technology. I discussed the findings, assessment and plan with the resident and agree with the resident's findings and plan as documented in the resident's note.       Jayshree Dasilva MD

## 2021-04-05 ENCOUNTER — TELEPHONE (OUTPATIENT)
Dept: FAMILY MEDICINE CLINIC | Age: 26
End: 2021-04-05

## 2021-04-05 NOTE — TELEPHONE ENCOUNTER
Called, no answer. [address]  ===View-only below this line===  ----- Message -----  From: Paola Wheat MD  Sent: 4/1/2021   8:33 AM EDT  To: [facility] Front Office    Please make a followup for Ms Riya Jerez with Dr Mohinder Brush for followup of depression/anxiety on TM. She is taking prozac prn vs daily - needs education and close followup. Discussed with patient who is expecting call to setup appointment. Thank you!

## 2021-04-07 NOTE — PROGRESS NOTES
Tegan Lin  27 y.o. female  1995  [place] [address]  [id number]   [address]:    Telemedicine Progress Note  Debi Mcmahan MD       Encounter Date and Time: April 8, 2021     Consent:  She and/or the health care decision maker is aware that that she may receive a bill for this telephone service, depending on her insurance coverage, and has provided verbal consent to proceed: Yes    Chief Complaint   Patient presents with    Medication Refill     History of Present Illness   Tegan Lin is a 27 y.o. female was evaluated by synchronous (real-time) audio-video technology from home, through the [address] [employer] Patient Portal.    26 yo F w/ PMH OWEN (on new CPAP), anxiety, depression and kidney stones (w/ surgical removal w/ stenting) here for med refill (has been out of Prozac for past month). Anxiety and Depression: endorses worsening symptoms for past 2-3 wks. States was well controlled on Prozac 20mg daily.  -Symptoms? very irritable, day-time solmnolence but has OWEN w/ CPAP (7.5 hours per day). Endorses occasional thoughts that \"I would be better off dead\" but no plans and no prior attempts. No HI/accsess to weapons/drug-use. - Triggers?: anxiety worse at work and has feeling trapped at home   - Coping methods? Distracts herself by coloring or listens to music, speaks to sister at times   - Meds? prozac 20mg daily (ran-out for past month)  - Therapy? Never     Diet: not well-rounded     Exercise: none        Review of Systems   Review of Systems   Constitutional: Positive for malaise/fatigue. Negative for chills, fever and weight loss. HENT: Negative for congestion. Eyes: Negative for blurred vision. Respiratory: Negative for shortness of breath. Cardiovascular: Positive for palpitations. Negative for chest pain. When very anxious   Gastrointestinal: Negative for abdominal pain. Musculoskeletal: Negative for myalgias. Neurological: Negative for tremors and headaches. Psychiatric/Behavioral: Positive for depression and suicidal ideas. Negative for hallucinations and substance abuse. The patient is nervous/anxious. The patient does not have insomnia. Vitals/Objective:     General: alert, cooperative, no distress   Mental  status: mental status: alert, oriented to person, place, and time, normal mood, behavior, speech, dress, motor activity, and thought processes   Resp: resp: normal effort and no respiratory distress   Neuro: neuro: no gross deficits   Skin: skin: no discoloration or lesions of concern on visible areas   Due to this being a TeleHealth evaluation, many elements of the physical examination are unable to be assessed. Assessment and Plan:   Time-based coding, delete if not needed: I spent at least 25 minutes with this established patient, and >50% of the time was spent counseling and/or coordinating care regarding pt counseled on healthy coping habits for her anxiety and depression, a safety plan, as well as the general mangement of anxiety/depression. Assessment/Plan: 26 yo F w/ PMH OWEN (on new CPAP), anxiety, depression and kidney stones (w/ surgical removal w/ stenting) here for med refill (has been out of Prozac for past month). 1. LUDMILA (generalized anxiety disorder) GAD7- 19 (severe). States symptoms was well-controlled on Prozac 20mg daily. Will refill but set pt up with some counseling in the interim. Will f/u closely in 2 wks. - FLUoxetine (PROzac) 20 mg capsule; Take 1 Cap by mouth daily. Indications: anxiousness associated with depression  Dispense: 90 Cap; Refill: 0  - Open to [facility] clinic (sent message to front office to prioritize pt)   - [address] [place] [address] hotline # given [phone]  - Pt counseled on coping habits (eg. Keeping a journal, healthy dialogue with family members, maintaining a healthy diet and exercise)  - ER precuations given     2.  Depression, unspecified depression type: 24 on PHQ9 (severe). Endorses occasional thoughts that \"I would be better off dead\" but no plans and no prior attempts. No HI/accsess to weapons/drug-use. States symptoms was well-controlled on Prozac 20mg daily. Will refill but set pt up with some counseling in the interim. Will f/u closely in 2 wks. - FLUoxetine (PROzac) 20 mg capsule; Take 1 Cap by mouth daily. Indications: anxiousness associated with depression  Dispense: 90 Cap; Refill: 0  - VERBAL AGREEMENT w/ pt that she will not harm herself or anyone else  - SAFETY-PLAN: VERBAL AGREEMENT w/ pt that if she develops worsening thoughts of SI/HI, she should immediately go to the ED.    - [place] Crisis hotline # vyxwy - [facility] as above   - ER precuations given           Patient informed to follow up: in 2 weeks w/ me virtually for anxiety, depression. Time spent in direct conversation with the patient to include medical condition(s) discussed, assessment and treatment plan:       We discussed the expected course, resolution and complications of the diagnosis(es) in detail. Medication risks, benefits, costs, interactions, and alternatives were discussed as indicated. I advised her to contact the office if her condition worsens, changes or fails to improve as anticipated. She expressed understanding with the diagnosis(es) and plan. Patient understands that this encounter was a temporary measure, and the importance of further follow up and examination was emphasized. Patient verbalized understanding. Electronically Signed: Shon Arroyo MD    Providers location when delivering service (clinic, hospital, home): home    CPT Codes 16833-78013 for Established Patients may apply to this Telehealth Visit. POS code: 18.   Modifier GT      Pursuant to the emergency declaration under the [facility] [place], 1135 waiver authority and the [employer] and Response Supplemental Appropriations Act, this Virtual  Visit was conducted, with patient's consent, to reduce the patient's risk of exposure to COVID-19 and provide continuity of care for an established patient. Services were provided through a video synchronous discussion virtually to substitute for in-person clinic visit. History   Patients past medical, surgical and family histories were reviewed and updated.       Past Medical History:   Diagnosis Date    Anxiety     Depression     Kidney stones     Mood disorder ([facility] [address]     Morbid obesity ([facility]     Psychiatric disorder     depression    Sleep apnea     has CPAP    Sprain of ankle, calcaneofibular ligament 08/13/2020    sees orthovirginia    Suicidal thoughts      Past Surgical History:   Procedure Laterality Date    HX LAP CHOLECYSTECTOMY  10/2018    HX UROLOGICAL      Stenting x 4 (Starting in 2016, developed severe infection)     Family History   Problem Relation Age of Onset    Heart Disease Father 55    Hypertension Father     Sleep Apnea Father     No Known Problems Sister     Cancer Maternal Grandmother         Brain Cancer    Heart Disease Paternal Grandmother     Cancer Paternal Grandfather      Social History     Socioeconomic History    Marital status: SINGLE     Spouse name: Not on file    Number of children: Not on file    Years of education: Not on file    Highest education level: Not on file   Occupational History    Occupation:      Comment: started in 3/17   Social Needs    Financial resource strain: Not on file    Food insecurity     Worry: Not on file     Inability: Not on file   [employer] needs     Medical: Not on file     Non-medical: Not on file   Tobacco Use    Smoking status: Former Smoker     Years: 1.00     Quit date: 1/16/2018     Years since quitting: 3.2    Smokeless tobacco: Never Used    Tobacco comment: 1/4 pack ppd for 6 months   Substance and Sexual Activity    Alcohol use: Yes     Comment: rarely    Drug use: Not Currently     Comment: Has done Marijuana in the past    Sexual activity: Yes     Partners: Male     Birth control/protection: Condom, Pill   Lifestyle    Physical activity     Days per week: Not on file     Minutes per session: Not on file    Stress: Not on file   Relationships    Social connections     Talks on phone: Not on file     Gets together: Not on file     Attends Moravian service: Not on file     Active member of club or organization: Not on file     Attends meetings of clubs or organizations: Not on file     Relationship status: Not on file    Intimate partner violence     Fear of current or ex partner: Not on file     Emotionally abused: Not on file     Physically abused: Not on file     Forced sexual activity: Not on file   Other Topics Concern    Not on file   Social History Narrative    26year old AA female admitted for reportedly taking a handful of prozac. Pt has a past suicide attempt and this is her first psychiatriatric admission. Pt lives with her parents. She is a  . She has a 12th grade education. Patient Active Problem List   Diagnosis Code    Chest pain R07.9    Essential hypertension I10    Obesity, morbid ([facility] [address] E66.01    Major depressive disorder F32.9    Obstructive sleep apnea syndrome G47.33          Current Medications/Allergies   Medications and Allergies reviewed:    Current Outpatient Medications   Medication Sig Dispense Refill    FLUoxetine (PROzac) 20 mg capsule Take 1 Cap by mouth daily. Indications: anxiousness associated with depression 90 Cap 0    hydrocortisone (HYTONE) 2.5 % topical cream Apply  to affected area two (2) times a day.  use thin layer 30 g 0    ondansetron (ZOFRAN ODT) 4 mg disintegrating tablet       Kurvelo, 28, 0.15-0.03 mg tab       diclofenac EC (VOLTAREN) 75 mg EC tablet TAKE 1 TABLET BY MOUTH TWICE DAILY FOR PAIN      norethindrone-ethinyl estradiol-iron (Loestrin Fe 1.5/30, 28-Day,) 1.5 mg-30 mcg (21)/75 mg (7) tab Take 1 Tab by mouth daily.  3 Dose Pack 1     Allergies   Allergen Reactions    Aspirin Itching

## 2021-04-08 ENCOUNTER — VIRTUAL VISIT (OUTPATIENT)
Dept: FAMILY MEDICINE CLINIC | Age: 26
End: 2021-04-08
Payer: COMMERCIAL

## 2021-04-08 ENCOUNTER — TELEPHONE (OUTPATIENT)
Dept: FAMILY MEDICINE CLINIC | Age: 26
End: 2021-04-08

## 2021-04-08 DIAGNOSIS — F32.A DEPRESSION, UNSPECIFIED DEPRESSION TYPE: ICD-10-CM

## 2021-04-08 DIAGNOSIS — F41.1 GAD (GENERALIZED ANXIETY DISORDER): Primary | ICD-10-CM

## 2021-04-08 PROCEDURE — 99214 OFFICE O/P EST MOD 30 MIN: CPT | Performed by: STUDENT IN AN ORGANIZED HEALTH CARE EDUCATION/TRAINING PROGRAM

## 2021-04-08 RX ORDER — FLUOXETINE HYDROCHLORIDE 20 MG/1
20 CAPSULE ORAL DAILY
Qty: 90 CAP | Refills: 0 | Status: SHIPPED | OUTPATIENT
Start: 2021-04-08 | End: 2022-08-03 | Stop reason: SDUPTHER

## 2021-04-08 NOTE — TELEPHONE ENCOUNTER
[phone]    Spoke with patient to schedule an appt and could not find any virtuals until the 3rd week of May. She will be contacted back with your response.

## 2021-04-08 NOTE — TELEPHONE ENCOUNTER
----- Message from Yumiko Rm MD sent at 4/8/2021  3:10 PM EDT -----  Regarding: VV on 2 wks  Please call and schedule pt for VV w/ me in 2 weeks for Anxiety and Depression.  Thanks     Yumiko Rm MD

## 2021-04-12 NOTE — PROGRESS NOTES
[address] Medicine Residency Attending Addendum:  Dr. Debi Mcmahan MD,  the patient and I were not physically present during this encounter. The resident and I are concurrently monitoring the patient care through appropriate telecommunication technology. I discussed the findings, assessment and plan with the resident and agree with the resident's findings and plan as documented in the resident's note.       Ashlyn Grider MD

## 2021-09-16 ENCOUNTER — TELEPHONE (OUTPATIENT)
Dept: FAMILY MEDICINE CLINIC | Age: 26
End: 2021-09-16

## 2021-09-16 NOTE — TELEPHONE ENCOUNTER
Called patient to inform her that we would not be able to place a PPD today since they need to be read in 48-72 hours. Informed patient we could still do her physical today but she would have to return two more times (to place the ppd and to read it). Patient decided to reschedule entire appointment for next week. Scheduled patient for Tuesday with Dr. Marco Antonio Reyes and a Thursday nurse visit to read the ppd.

## 2021-09-21 ENCOUNTER — OFFICE VISIT (OUTPATIENT)
Dept: FAMILY MEDICINE CLINIC | Age: 26
End: 2021-09-21

## 2021-09-21 VITALS
SYSTOLIC BLOOD PRESSURE: 120 MMHG | HEART RATE: 108 BPM | TEMPERATURE: 97.7 F | DIASTOLIC BLOOD PRESSURE: 71 MMHG | RESPIRATION RATE: 16 BRPM | BODY MASS INDEX: 57.41 KG/M2 | WEIGHT: 293 LBS

## 2021-09-21 DIAGNOSIS — Z00.00 WELL WOMAN EXAM (NO GYNECOLOGICAL EXAM): Primary | ICD-10-CM

## 2021-09-21 PROBLEM — R07.9 CHEST PAIN: Status: RESOLVED | Noted: 2018-02-06 | Resolved: 2021-09-21

## 2021-09-21 PROCEDURE — 90686 IIV4 VACC NO PRSV 0.5 ML IM: CPT | Performed by: STUDENT IN AN ORGANIZED HEALTH CARE EDUCATION/TRAINING PROGRAM

## 2021-09-21 PROCEDURE — 99395 PREV VISIT EST AGE 18-39: CPT | Performed by: STUDENT IN AN ORGANIZED HEALTH CARE EDUCATION/TRAINING PROGRAM

## 2021-09-21 RX ORDER — ACETAMINOPHEN AND CODEINE PHOSPHATE 120; 12 MG/5ML; MG/5ML
1 SOLUTION ORAL DAILY
COMMUNITY
End: 2022-11-03

## 2021-09-21 NOTE — PROGRESS NOTES
No chief complaint on file. Patient identified with 2 patient identifiers (name and D. O. B)    Patient is a  at Unknown    Leakage of Fluid: NO  Vaginal Bleeding: NO  Fetal Movement: YES  Prenatal vitamins: YES  Having Contractions: NO  Pain: NO    Visit Vitals  /71 (BP 1 Location: Left upper arm, BP Patient Position: Sitting, BP Cuff Size: Adult)   Pulse (!) 108   Temp 97.7 °F (36.5 °C) (Oral)   Resp 16   Wt 345 lb (156.5 kg)   BMI 57.41 kg/m²       Immunization History   Administered Date(s) Administered    Influenza Vaccine PayEase) PF (>6 Mo Flulaval, Fluarix, and >3 Yrs [address] [place] [id number]) 2017, 10/25/2018

## 2021-09-21 NOTE — PATIENT INSTRUCTIONS
Well Visit, Ages 25 to 48: Care Instructions  Overview     Well visits can help you stay healthy. Your doctor has checked your overall health and may have suggested ways to take good care of yourself. Your doctor also may have recommended tests. At home, you can help prevent illness with healthy eating, regular exercise, and other steps. Follow-up care is a key part of your treatment and safety. Be sure to make and go to all appointments, and call your doctor if you are having problems. It's also a good idea to know your test results and keep a list of the medicines you take. How can you care for yourself at home? · Get screening tests that you and your doctor decide on. Screening helps find diseases before any symptoms appear. · Eat healthy foods. Choose fruits, vegetables, whole grains, protein, and low-fat dairy foods. Limit fat, especially saturated fat. Reduce salt in your diet. · Limit alcohol. If you are a man, have no more than 2 drinks a day or 14 drinks a week. If you are a woman, have no more than 1 drink a day or 7 drinks a week. · Get at least 30 minutes of physical activity on most days of the week. Walking is a good choice. You also may want to do other activities, such as running, swimming, cycling, or playing tennis or team sports. Discuss any changes in your exercise program with your doctor. · Reach and stay at a healthy weight. This will lower your risk for many problems, such as obesity, diabetes, heart disease, and high blood pressure. · Do not smoke or allow others to smoke around you. If you need help quitting, talk to your doctor about stop-smoking programs and medicines. These can increase your chances of quitting for good. · Care for your mental health. It is easy to get weighed down by worry and stress. Learn strategies to manage stress, like deep breathing and mindfulness, and stay connected with your family and community.  If you find you often feel sad or hopeless, talk with your doctor. Treatment can help. · Talk to your doctor about whether you have any risk factors for sexually transmitted infections (STIs). You can help prevent STIs if you wait to have sex with a new partner (or partners) until you've each been tested for STIs. It also helps if you use condoms (male or female condoms) and if you limit your sex partners to one person who only has sex with you. Vaccines are available for some STIs, such as HPV. · Use birth control if it's important to you to prevent pregnancy. Talk with your doctor about the choices available and what might be best for you. · If you think you may have a problem with alcohol or drug use, talk to your doctor. This includes prescription medicines (such as amphetamines and opioids) and illegal drugs (such as cocaine and methamphetamine). Your doctor can help you figure out what type of treatment is best for you. · Protect your skin from too much sun. When you're outdoors from 10 a.m. to 4 p.m., stay in the shade or cover up with clothing and a hat with a wide brim. Wear sunglasses that block UV rays. Even when it's cloudy, put broad-spectrum sunscreen (SPF 30 or higher) on any exposed skin. · See a dentist one or two times a year for checkups and to have your teeth cleaned. · Wear a seat belt in the car. When should you call for help? Watch closely for changes in your health, and be sure to contact your doctor if you have any problems or symptoms that concern you. Where can you learn more? Go to http://www.[employer].com/  Enter P072 in the search box to learn more about \"Well Visit, Ages 25 to 48: Care Instructions. \"  Current as of: February 11, 2021               Content Version: 13.0  © 0930-8817 Healthwise, Incorporated. Care instructions adapted under license by [employer] (which disclaims liability or warranty for this information).  If you have questions about a medical condition or this instruction, always ask your healthcare professional. [place] [address] any warranty or liability for your use of this information.

## 2021-09-21 NOTE — PROGRESS NOTES
ROUTINE ANNUAL WELL WOMAN    Subjective   Albaro Marie is a 27 y.o. female who presents to clinic today for annual physical exam.      Diet: Balanced. Has been trying to eat more greens  Exercise: Will start going to the gym next week. Occupation: Starting day care. Tobacco use: Denies current. Quit 2 years ago. Alcohol use: Denies current use. Socially. Drug use: Denies current use. Review of Systems:  Review of Systems   Constitutional: Negative for fever. Eyes: Negative for visual disturbance. Respiratory: Negative for shortness of breath. Cardiovascular: Negative for chest pain. Gastrointestinal: Negative for abdominal pain. Genitourinary: Negative for difficulty urinating and dysuria. Gyn History     No LMP recorded. (Menstrual status: Polycystic Ovarian Syndrome). LMP: 21    Sexual History:  Sexually active?: yes, 1 male partner. Uses Condoms. Preventative care:  Chlamydia / Gonorrhea screening: will obtain today  Depression screening: PHQ-2 = 0  Folic Acid for NT Defects: yes  Intimate Partner Violence screening: negative  HIV: will obtain today  HPV vaccine: patient will check records  Tdap: patient to check records  PAP Smear: done     Past Medical History  Past Medical History:   Diagnosis Date    Anxiety     Depression     Kidney stones     [facility]     Morbid obesity ([facility]     Psychiatric disorder     depression    Sleep apnea     has CPAP    Sprain of ankle, calcaneofibular ligament 2020    sees orthovirginia    Suicidal thoughts        Current Medications   Current Outpatient Medications on File Prior to Visit   Medication Sig Dispense Refill    norethindrone (MICRONOR) 0.35 mg tab Take 1 Tablet by mouth daily.  FLUoxetine (PROzac) 20 mg capsule Take 1 Cap by mouth daily. Indications: anxiousness associated with depression 90 Cap 0    hydrocortisone (HYTONE) 2.5 % topical cream Apply  to affected area two (2) times a day. use thin layer 30 g 0    diclofenac EC (VOLTAREN) 75 mg EC tablet TAKE 1 TABLET BY MOUTH TWICE DAILY FOR PAIN      [DISCONTINUED] ondansetron (ZOFRAN ODT) 4 mg disintegrating tablet  (Patient not taking: Reported on 9/21/2021)      [DISCONTINUED] Kurvelo, 28, 0.15-0.03 mg tab       [DISCONTINUED] norethindrone-ethinyl estradiol-iron (Loestrin Fe 1.5/30, 28-Day,) 1.5 mg-30 mcg (21)/75 mg (7) tab Take 1 Tab by mouth daily. 3 Dose Pack 1     No current facility-administered medications on file prior to visit. Surgical History  Past Surgical History:   Procedure Laterality Date    HX LAP CHOLECYSTECTOMY  10/2018    HX UROLOGICAL      Stenting x 4 (Starting in 2016, developed severe infection)       Family History   Family History   Problem Relation Age of Onset    Heart Disease Father 55    Hypertension Father     Sleep Apnea Father     No Known Problems Sister     Cancer Maternal Grandmother         Brain Cancer    Heart Disease Paternal Grandmother     Cancer Paternal Grandfather        Social History  Social History     Socioeconomic History    Marital status: SINGLE     Spouse name: Not on file    Number of children: Not on file    Years of education: Not on file    Highest education level: Not on file   Occupational History    Occupation:      Comment: started in 3/17   Tobacco Use    Smoking status: Former Smoker     Years: 1.00     Quit date: 1/16/2018     Years since quitting: 3.6    Smokeless tobacco: Never Used    Tobacco comment: 1/4 pack ppd for 6 months   Vaping Use    Vaping Use: Never assessed   Substance and Sexual Activity    Alcohol use: Yes     Comment: rarely    Drug use: Not Currently     Comment: Has done Marijuana in the past    Sexual activity: Yes     Partners: Male     Birth control/protection: Condom, Pill   Other Topics Concern    Not on file   Social History Narrative    26year old AA female admitted for reportedly taking a handful of prozac.  Pt has a past suicide attempt and this is her first psychiatriatric admission. Pt lives with her parents. She is a  . She has a 12th grade education. Social Determinants of Health     Financial Resource Strain:     Difficulty of Paying Living Expenses:    Food Insecurity:     Worried About Running Out of Food in the Last Year:     [address] in the Last Year:    Transportation Needs:     Lack of Transportation (Medical):  Lack of Transportation (Non-Medical):    Physical Activity:     Days of Exercise per Week:     Minutes of Exercise per Session:    Stress:     Feeling of Stress :    Social Connections:     Frequency of Communication with Friends and Family:     Frequency of Social Gatherings with Friends and Family:     Attends Congregation Services:     Active Member of Clubs or Organizations:     Attends Club or Organization Meetings:     Marital Status:    Intimate Partner Violence:     Fear of Current or Ex-Partner:     Emotionally Abused:     Physically Abused:     Sexually Abused:          Objective   Vital Signs  Visit Vitals  Wt 345 lb (156.5 kg)   BMI 57.41 kg/m²       PHYSICAL EXAM   Physical Exam  Constitutional:       General: She is not in acute distress. Appearance: She is obese. HENT:      Head: Normocephalic and atraumatic. Nose: Nose normal.      Mouth/Throat:      Mouth: Mucous membranes are moist.   Eyes:      Pupils: Pupils are equal, round, and reactive to light. Cardiovascular:      Rate and Rhythm: Normal rate and regular rhythm. Pulses: Normal pulses. Pulmonary:      Effort: Pulmonary effort is normal.      Breath sounds: Normal breath sounds. Abdominal:      General: Bowel sounds are normal. There is no distension. Palpations: Abdomen is soft. Musculoskeletal:         General: Normal range of motion. Cervical back: Normal range of motion and neck supple. Skin:     General: Skin is warm and dry.    Neurological:      Mental Status: She is oriented to person, place, and time. Psychiatric:         Mood and Affect: Mood normal.         Behavior: Behavior normal.          Assessment   Ofe Gould is a 27 y.o. female presenting to clinic today for routine annual exam.    Plan   1. Well woman exam (no gynecological exam)  - HEPATITIS C AB; Future  - INFLUENZA VIRUS VAC QUAD,SPLIT,PRESV FREE SYRINGE IM  - Ronnell Radha / GC-AMPLIFIED; Future  - HIV 1/2 AG/AB, 4TH GENERATION,W RFLX CONFIRM; Future  - THER/PROPH/DIAG INJECTION, SUBCUT/IM  - LSIL on las pap (2/17/21), will nola to repeat in 1 year  - All other screenings and vaccinations up to date  - Return for yearly wellness visits    2. BMI 50.0-59.9, adult ([facility] [address]  - LIPID PANEL; Future  - HEMOGLOBIN A1C WITH EAG; Future  - METABOLIC PANEL, COMPREHENSIVE; Future   - Discussed importance of diet and exercise. Emphasized a healthy diet avoiding sugary drinks, fried foods, fast food, and packaged foods. Encouraged to increase workout to at least 30 minutes per day of moderate aerobic exercise for more than 5 times per week. I discussed the aforementioned diagnoses with the patient as well as the plan of care. All questions were answered and an AVS was provided.      I discussed this patient with Dr. Leslie Nguyen (Attending Physician)      Signed By:  Jakcie Howard DO    Family Medicine Resident

## 2021-09-22 LAB
ALBUMIN SERPL-MCNC: 3.8 G/DL (ref 3.5–5)
ALBUMIN/GLOB SERPL: 0.9 {RATIO} (ref 1.1–2.2)
ALP SERPL-CCNC: 144 U/L (ref 45–117)
ALT SERPL-CCNC: 17 U/L (ref 12–78)
ANION GAP SERPL CALC-SCNC: 7 MMOL/L (ref 5–15)
AST SERPL-CCNC: 11 U/L (ref 15–37)
BILIRUB SERPL-MCNC: 0.3 MG/DL (ref 0.2–1)
BUN SERPL-MCNC: 8 MG/DL (ref 6–20)
BUN/CREAT SERPL: 11 (ref 12–20)
CALCIUM SERPL-MCNC: 9.7 MG/DL (ref 8.5–10.1)
CHLORIDE SERPL-SCNC: 105 MMOL/L (ref 97–108)
CHOLEST SERPL-MCNC: 167 MG/DL
CO2 SERPL-SCNC: 26 MMOL/L (ref 21–32)
CREAT SERPL-MCNC: 0.76 MG/DL (ref 0.55–1.02)
EST. AVERAGE GLUCOSE BLD GHB EST-MCNC: 117 MG/DL
GLOBULIN SER CALC-MCNC: 4.4 G/DL (ref 2–4)
GLUCOSE SERPL-MCNC: 76 MG/DL (ref 65–100)
HBA1C MFR BLD: 5.7 % (ref 4–5.6)
HCV AB SERPL QL IA: NONREACTIVE
HDLC SERPL-MCNC: 52 MG/DL
HDLC SERPL: 3.2 {RATIO} (ref 0–5)
HIV 1+2 AB+HIV1 P24 AG SERPL QL IA: NONREACTIVE
HIV12 RESULT COMMENT, HHIVC: NORMAL
LDLC SERPL CALC-MCNC: 97.4 MG/DL (ref 0–100)
POTASSIUM SERPL-SCNC: 4 MMOL/L (ref 3.5–5.1)
PROT SERPL-MCNC: 8.2 G/DL (ref 6.4–8.2)
SODIUM SERPL-SCNC: 138 MMOL/L (ref 136–145)
TRIGL SERPL-MCNC: 88 MG/DL (ref ?–150)
VLDLC SERPL CALC-MCNC: 17.6 MG/DL

## 2021-09-24 LAB
C TRACH RRNA SPEC QL NAA+PROBE: NEGATIVE
N GONORRHOEA RRNA SPEC QL NAA+PROBE: NEGATIVE
SPECIMEN SOURCE: NORMAL

## 2022-01-02 ENCOUNTER — HOSPITAL ENCOUNTER (EMERGENCY)
Age: 27
Discharge: LEFT BEFORE DISCHARGE | End: 2022-01-02

## 2022-01-02 VITALS
TEMPERATURE: 98.2 F | BODY MASS INDEX: 50.02 KG/M2 | WEIGHT: 293 LBS | OXYGEN SATURATION: 98 % | HEIGHT: 64 IN | RESPIRATION RATE: 20 BRPM | SYSTOLIC BLOOD PRESSURE: 111 MMHG | DIASTOLIC BLOOD PRESSURE: 76 MMHG | HEART RATE: 97 BPM

## 2022-01-02 PROCEDURE — 75810000275 HC EMERGENCY DEPT VISIT NO LEVEL OF CARE

## 2022-01-02 NOTE — ED TRIAGE NOTES
Pt arrives stating has had recurrent abd pain since the 13th of Dec. States has been to multiple er's but unable to follow up. llq is tender to palpation. Has had diarrhea since the 14th. No vomiting only nausea.

## 2022-03-19 PROBLEM — I10 ESSENTIAL HYPERTENSION: Status: ACTIVE | Noted: 2018-02-06

## 2022-03-19 PROBLEM — F32.9 MAJOR DEPRESSIVE DISORDER: Status: ACTIVE | Noted: 2018-08-02

## 2022-03-19 PROBLEM — G47.33 OBSTRUCTIVE SLEEP APNEA SYNDROME: Status: ACTIVE | Noted: 2021-04-01

## 2022-03-19 PROBLEM — E66.01 OBESITY, MORBID (HCC): Status: ACTIVE | Noted: 2018-02-27

## 2022-05-03 NOTE — LETTER
----- Message from Katelynn Barth MD sent at 5/3/2022 11:13 AM CDT -----  FU 3 months    Check BMP and magnesium today. (high risk meds)    Avoid over-the-counter stimulant use (Sudafed, Allegra-D, Claritin-D, Excedrin, Fioricet, Mucinex D, etc.)      Check daily weight.     A low sodium (below 1000 mg daily) diet recommended.    Avoid use of NSAID including but not limited to Ibuprofen, Advil and Aleve.      Check blood pressure daily and call if consistently above 135/85 mmHg.          [address] 
[facility] EMERGENCY DEPT 
[address] [facility] [place] [id number] [phone] Work/School Note Date: 8/28/2017 To Whom It May concern: 
 
Corie Kan was seen and treated today in the emergency room by the following provider(s): 
Attending Provider: Fito Trujillo MD 
Physician Assistant: Iam Rooney PA-C. Corie Kan may return to work on 8/31/17.  
 
Sincerely, 
 
 
 
 
Iam Rooney PA-C

## 2022-08-01 NOTE — PROGRESS NOTES
[address] [facility] [address] [facility] [place]    Office [phone], Fax [phone]    Assessment/Plan:     1. Severe episode of recurrent major depressive disorder, without psychotic features ([facility] [address]  The following orders have not been finalized:  -     FLUoxetine (PROzac) 20 mg capsule  2. ULDMILA (generalized anxiety disorder)  The following orders have not been finalized:  -     FLUoxetine (PROzac) 20 mg capsule  3. Genital herpes simplex, unspecified site  The following orders have not been finalized:  -     acyclovir (ZOVIRAX) 400 mg tablet     Depression: Not at goal  - Schedule regular therapy  - Restart fluoxetine 20mg   - Regular follow-up with me to assess efficacy of tx   - Counseled to go to ED if thoughts of harming self develop      Genital Herpes: controlled on acyclovir, breakouts occur as soon as she is off. - Refilled acyclovir for 1 year supply  Follow up:   Return in about 2 weeks (around 8/17/2022) for follow-up for depression, make sure to schedule appt with me. Chief Complaint:     Chief Complaint   Patient presents with    Depression       Neale Councilman is a 24 y.o. female that presents for: depression      Subjective:   HPI:  Neale Councilman is a 24 y.o. female that presents for:    Depression  - Off of prozac for about a year because she didn't have a job to be able to afford it  - Recent dx of HIV and genital herpes in March with no support from family has made this episode of depression very severe  - PHQ 9: 23  - No desire or plans to hurt herself or others  - No hallucinations  - Good support from friends  - Has referral for therapy, is going to schedule appt today    ROS:   Review of Systems   Psychiatric/Behavioral:  Positive for depression. Negative for hallucinations, substance abuse and suicidal ideas. The patient is nervous/anxious. Past medical history, social history, medications, and allergies personally reviewed.   Past Medical History:   Diagnosis Date    Anxiety Depression     Genital herpes     HIV (human immunodeficiency virus infection) ([facility] [address]     Kidney stones     Mood disorder ([facility] [address]     Morbid obesity ([facility] [address]     Psychiatric disorder     depression    Sleep apnea     has CPAP    Sprain of ankle, calcaneofibular ligament 08/13/2020    sees orthovirginia    Suicidal thoughts         Social Hx:   Alcohol: 2 drinks once a week socially  Tobacco: none  Illicit drug use: marijuana 2x/wk, smoking    Medications:   Current Outpatient Medications   Medication Sig    etonogestreL (Nexplanon) 68 mg impl by SubDERmal route. dolutegravir (Tivicay) 50 mg tab tablet Take  by mouth.    emtricitabine-tenofovir alafen (Descovy) tablet Take 1 Tablet by mouth daily. acyclovir (ZOVIRAX) 400 mg tablet Take 400 mg by mouth every four (4) hours (while awake). norethindrone (MICRONOR) 0.35 mg tab Take 1 Tablet by mouth daily. (Patient not taking: Reported on 8/3/2022)    FLUoxetine (PROzac) 20 mg capsule Take 1 Cap by mouth daily. Indications: anxiousness associated with depression (Patient not taking: Reported on 8/3/2022)    hydrocortisone (HYTONE) 2.5 % topical cream Apply  to affected area two (2) times a day. use thin layer (Patient not taking: Reported on 8/3/2022)    diclofenac EC (VOLTAREN) 75 mg EC tablet TAKE 1 TABLET BY MOUTH TWICE DAILY FOR PAIN (Patient not taking: Reported on 8/3/2022)     No current facility-administered medications for this visit. Allergies: Allergies   Allergen Reactions    Aspirin Itching        Health Maintenance:  Health Maintenance Due   Topic Date Due    HPV Age 9Y-34Y (1 - 2-dose series) Never done    Depression Monitoring  Never done        Objective:   Vitals reviewed. Visit Vitals  /75   Pulse 78   Temp 97.8 °F (36.6 °C) (Temporal)   Resp 17   Ht 5' 4\" (1.626 m)   Wt 320 lb (145.2 kg)   SpO2 99%   BMI 54.93 kg/m²        Physical Exam:  General Alert. No distress. Not diaphoretic. No jaundice, cyanosis, pallor.     HENT Head Normocephalic and atraumatic. Eyes Conjunctivae pink, no discharge. No scleral icterus. EOMI. Cardio Normal rate, regular rhythm. No murmur, gallop, or friction rub. No chest wall tenderness. Pulmonary Effort normal. Breath sounds normal. No respiratory distress. No wheezes, rales, or rhonchi. No Stridor. Abdominal Soft. Bowel sounds normal. No distension. No tenderness.  Deferred. Extremities No edema of lower extremities. No tenderness. Neurological No focal deficits. Skin Skin is warm and dry. No rash noted. No erythema. Psychiatric Tearful. Judgment normal.          Pt was discussed with Dr Raj Bhatia (attending physician). I have reviewed pertinent labs results and other data. I have discussed the diagnosis with the patient and the intended plan as seen in the above orders. The patient has received an after-visit summary and questions were answered concerning future plans. I have discussed medication side effects and warnings with the patient as well.     Rosa Maria Reed MD  Resident [facility]  08/03/22

## 2022-08-03 ENCOUNTER — OFFICE VISIT (OUTPATIENT)
Dept: FAMILY MEDICINE CLINIC | Age: 27
End: 2022-08-03

## 2022-08-03 VITALS
HEIGHT: 64 IN | BODY MASS INDEX: 50.02 KG/M2 | WEIGHT: 293 LBS | SYSTOLIC BLOOD PRESSURE: 128 MMHG | RESPIRATION RATE: 17 BRPM | HEART RATE: 78 BPM | TEMPERATURE: 97.8 F | OXYGEN SATURATION: 99 % | DIASTOLIC BLOOD PRESSURE: 75 MMHG

## 2022-08-03 DIAGNOSIS — F33.2 SEVERE EPISODE OF RECURRENT MAJOR DEPRESSIVE DISORDER, WITHOUT PSYCHOTIC FEATURES (HCC): Primary | ICD-10-CM

## 2022-08-03 DIAGNOSIS — A60.00 GENITAL HERPES SIMPLEX, UNSPECIFIED SITE: ICD-10-CM

## 2022-08-03 DIAGNOSIS — F41.1 GAD (GENERALIZED ANXIETY DISORDER): ICD-10-CM

## 2022-08-03 PROCEDURE — 99214 OFFICE O/P EST MOD 30 MIN: CPT

## 2022-08-03 RX ORDER — EMTRICITABINE AND TENOFOVIR ALAFENAMIDE 200; 25 MG/1; MG/1
1 TABLET ORAL DAILY
COMMUNITY

## 2022-08-03 RX ORDER — ACYCLOVIR 400 MG/1
400 TABLET ORAL
COMMUNITY
End: 2022-08-03 | Stop reason: SDUPTHER

## 2022-08-03 RX ORDER — FLUOXETINE HYDROCHLORIDE 20 MG/1
20 CAPSULE ORAL DAILY
Qty: 90 CAPSULE | Refills: 0 | Status: SHIPPED | OUTPATIENT
Start: 2022-08-03

## 2022-08-03 RX ORDER — ETONOGESTREL 68 MG/1
IMPLANT SUBCUTANEOUS
COMMUNITY

## 2022-08-03 RX ORDER — ACYCLOVIR 400 MG/1
400 TABLET ORAL
Qty: 450 TABLET | Refills: 3 | Status: SHIPPED | OUTPATIENT
Start: 2022-08-03 | End: 2022-11-01

## 2022-08-03 NOTE — PROGRESS NOTES
Chief Complaint   Patient presents with    Depression     1. Have you been to the ER, urgent care clinic since your last visit? Hospitalized since your last visit? Yes. [facility] 3[facility]2022 for HIV and herpes. 2. Have you seen or consulted any other health care providers outside of the [address] [place] [address] since your last visit? Include any pap smears or colon screening.  No

## 2022-08-04 NOTE — PROGRESS NOTES
I saw and evaluated the patient, performing the key elements of the service. I discussed the findings, assessment and plan with the resident and agree with the resident's findings and plan as documented in the resident's note. DISCHARGE

## 2022-11-02 ENCOUNTER — NURSE TRIAGE (OUTPATIENT)
Dept: OTHER | Facility: CLINIC | Age: 27
End: 2022-11-02

## 2022-11-02 NOTE — TELEPHONE ENCOUNTER
Location of patient: [place]    Received call from Marleny at [facility] with [employer]. Subjective: Caller states \"Leg pain\"     Current Symptoms: Leg pain;  Leg cramping and swelling;  Denies injuries, redness, bruising, SOB; Tender to the touch; Recently diagnosed with HIV; If she sits to long then legs hurt when she attempts to walk; Onset: 2 weeks ago;     Associated Symptoms: NA    Pain Severity: 8/10; aching;     Temperature: denies     What has been tried: Tylenol    LMP: NA Pregnant: NA    Recommended disposition: See in Office Today or Tomorrow    Care advice provided, patient verbalizes understanding; denies any other questions or concerns; instructed to call back for any new or worsening symptoms. Patient/Caller agrees with recommended disposition; writer provided warm transfer to [facility] at [facility] for appointment scheduling    Attention Provider: Thank you for allowing me to participate in the care of your patient. The patient was connected to triage in response to information provided to the [facility]. Please do not respond through this encounter as the response is not directed to a shared pool.       Reason for Disposition   Patient wants to be seen    Protocols used: Leg Pain-ADULT-[place]

## 2022-11-03 ENCOUNTER — OFFICE VISIT (OUTPATIENT)
Dept: FAMILY MEDICINE CLINIC | Age: 27
End: 2022-11-03

## 2022-11-03 VITALS
DIASTOLIC BLOOD PRESSURE: 71 MMHG | RESPIRATION RATE: 17 BRPM | WEIGHT: 293 LBS | HEIGHT: 64 IN | BODY MASS INDEX: 50.02 KG/M2 | HEART RATE: 75 BPM | SYSTOLIC BLOOD PRESSURE: 124 MMHG | OXYGEN SATURATION: 99 % | TEMPERATURE: 97.5 F

## 2022-11-03 DIAGNOSIS — R25.2 MUSCLE CRAMP: ICD-10-CM

## 2022-11-03 DIAGNOSIS — F32.A DEPRESSION, UNSPECIFIED DEPRESSION TYPE: ICD-10-CM

## 2022-11-03 DIAGNOSIS — M79.10 MUSCLE TENSION PAIN: Primary | ICD-10-CM

## 2022-11-03 PROCEDURE — 99214 OFFICE O/P EST MOD 30 MIN: CPT | Performed by: STUDENT IN AN ORGANIZED HEALTH CARE EDUCATION/TRAINING PROGRAM

## 2022-11-03 PROCEDURE — 3074F SYST BP LT 130 MM HG: CPT | Performed by: STUDENT IN AN ORGANIZED HEALTH CARE EDUCATION/TRAINING PROGRAM

## 2022-11-03 PROCEDURE — 3078F DIAST BP <80 MM HG: CPT | Performed by: STUDENT IN AN ORGANIZED HEALTH CARE EDUCATION/TRAINING PROGRAM

## 2022-11-03 RX ORDER — ACYCLOVIR 400 MG/1
TABLET ORAL
COMMUNITY
Start: 2022-08-05 | End: 2022-12-01 | Stop reason: SDUPTHER

## 2022-11-03 NOTE — PROGRESS NOTES
Chief Complaint   Patient presents with    Leg Pain     Bilateral with cramping and swelling timws 2 weeks. 1. Have you been to the ER, urgent care clinic since your last visit? Hospitalized since your last visit? Yes. [facility] 11/2/22 for leg pain. 2. Have you seen or consulted any other health care providers outside of the [address] [place] [address] since your last visit? Include any pap smears or colon screening.  No

## 2022-11-03 NOTE — PROGRESS NOTES
Elva Resendez is a 24 y.o. female   Chief Complaint   Patient presents with    Leg Pain     Bilateral with cramping and swelling times 2 weeks. ASSESSMENT AND PLAN:    1. Muscle tension pain  Multiple areas of point tenderness along lumbar paraspinal and bilateral hip flexors. No red flag symptoms. Suspect due to muscle strain. Weight may be contributing factor though patient noted some improvement with significant intentional weight loss over the last few months. Medication side effect could also be including in differential though may be less likely given current regimen. - recommended continued lifestyle and nutrition change   - will trial physical therapy   - checking electrolytes for abnormality   - may consider autoimmune panel vs imaging if no improvement or worsening symptoms  - REFERRAL TO PHYSICAL THERAPY    2. Depression, unspecified depression type  Chronic, seems controlled on current medication. - continue prozac     3. Muscle cramp  Check electrolytes and mag to rule out metabolic etiology. Plan as noted above. - METABOLIC PANEL, BASIC; Future  - METABOLIC PANEL, BASIC  - MAGNESIUM; Future      SUBJECTIVE:    HPI:  Elva Resendez is a 24 y.o. female with h/o HIV, HSV, OWEN, and depression who presents bilateral leg pain. 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 Has also been using heating pad with some improvement. - was seen at [facility] yesterday and was advised to follow up with pcp.  Was prescribed muscle relaxer which provided some relief   - denies saddle anesthesias, fevers/chills, night sweats, bladder/bowel incontinence, denies injury or trauma     Depression:   - previously restarted on prozac 20mg daily   - patient reports significant improvement on medication   - notes improvement in mood and energy   - denies SI/HI    Review of Systems   Constitutional:  Negative for chills and fever. HENT:  Negative for congestion and sore throat. Eyes:  Negative for pain and discharge. Respiratory:  Negative for cough and shortness of breath. Cardiovascular:  Negative for chest pain and palpitations. Gastrointestinal:  Negative for nausea and vomiting. Genitourinary:  Negative for dysuria, frequency and urgency. Musculoskeletal:  Positive for back pain and joint pain. Negative for neck pain. Skin:  Negative for itching and rash. Neurological:  Negative for dizziness and headaches. Current Outpatient Medications:     acyclovir (ZOVIRAX) 400 mg tablet, TAKE 1 TABLET BY MOUTH EVERY 4 HOURS WHILE AWAKE, Disp: , Rfl:     ibuprofen (MOTRIN PO), Take 600 mg by mouth., Disp: , Rfl:     etonogestreL (Nexplanon) 68 mg impl, by SubDERmal route., Disp: , Rfl:     dolutegravir (TIVICAY) 50 mg tab tablet, Take  by mouth., Disp: , Rfl:     emtricitabine-tenofovir alafen (Descovy) tablet, Take 1 Tablet by mouth daily. , Disp: , Rfl:     FLUoxetine (PROzac) 20 mg capsule, Take 1 Capsule by mouth in the morning.  Indications: anxiousness associated with depression, Disp: 90 Capsule, Rfl: 0    Past Medical History:   Diagnosis Date    Anxiety     Depression     Genital herpes     HIV (human immunodeficiency virus infection) ([facility] [address]     Kidney stones     Mood disorder ([facility] [address]     Morbid obesity ([facility] [address]     Psychiatric disorder     depression    Sleep apnea     has CPAP    Sprain of ankle, calcaneofibular ligament 08/13/2020    sees [place]    Suicidal thoughts        Past Surgical History:   Procedure Laterality Date    HX LAP CHOLECYSTECTOMY  10/2018    HX UROLOGICAL      Stenting x 4 (Starting in 2016, developed severe infection)       Social History Socioeconomic History    Marital status: SINGLE     Spouse name: Not on file    Number of children: Not on file    Years of education: Not on file    Highest education level: Not on file   Occupational History    Occupation:      Comment: started in 3/17   Tobacco Use    Smoking status: Former     Years: 1.00     Types: Cigarettes     Quit date: 2018     Years since quittin.8    Smokeless tobacco: Never    Tobacco comments:     1 pack ppd for 6 months   Vaping Use    Vaping Use: Not on file   Substance and Sexual Activity    Alcohol use: Yes     Comment: rarely    Drug use: Not Currently     Comment: Has done Marijuana in the past    Sexual activity: Yes     Partners: Male     Birth control/protection: Condom, Pill   Other Topics Concern    Not on file   Social History Narrative    26year old AA female admitted for reportedly taking a handful of prozac. Pt has a past suicide attempt and this is her first psychiatriatric admission. Pt lives with her parents. She is a  . She has a 12th grade education. Social Determinants of Health     Financial Resource Strain: Not on file   Food Insecurity: Not on file   Transportation Needs: Not on file   Physical Activity: Not on file   Stress: Not on file   Social Connections: Not on file   Intimate Partner Violence: Not on file   Housing Stability: Not on file       OBJECTIVE:  /71   Pulse 75   Temp 97.5 °F (36.4 °C) (Temporal)   Resp 17   Ht 5' 4\" (1.626 m)   Wt 313 lb (142 kg)   SpO2 99%   BMI 53.73 kg/m²     Physical Exam  Vitals and nursing note reviewed. Constitutional:       General: She is not in acute distress. HENT:      Head: Normocephalic and atraumatic. Eyes:      Conjunctiva/sclera: Conjunctivae normal.      Pupils: Pupils are equal, round, and reactive to light. Cardiovascular:      Rate and Rhythm: Normal rate and regular rhythm. Pulses: Normal pulses. Heart sounds: No murmur heard.     No friction rub. No gallop. Pulmonary:      Effort: Pulmonary effort is normal. No respiratory distress. Breath sounds: Normal breath sounds. No wheezing, rhonchi or rales. Musculoskeletal:         General: Normal range of motion. Right lower leg: No edema. Left lower leg: No edema. Comments: TTP along lumbar paraspinal muscles. TTP along BL hip flexors. Skin:     General: Skin is warm and dry. Capillary Refill: Capillary refill takes less than 2 seconds. Neurological:      General: No focal deficit present. Mental Status: She is alert. Sensory: No sensory deficit. Motor: No weakness.       Coordination: Coordination normal.      Gait: Gait normal.

## 2022-11-04 LAB
ANION GAP SERPL CALC-SCNC: 7 MMOL/L (ref 5–15)
BUN SERPL-MCNC: 8 MG/DL (ref 6–20)
BUN/CREAT SERPL: 8 (ref 12–20)
CALCIUM SERPL-MCNC: 9 MG/DL (ref 8.5–10.1)
CHLORIDE SERPL-SCNC: 108 MMOL/L (ref 97–108)
CO2 SERPL-SCNC: 25 MMOL/L (ref 21–32)
CREAT SERPL-MCNC: 0.98 MG/DL (ref 0.55–1.02)
GLUCOSE SERPL-MCNC: 84 MG/DL (ref 65–100)
MAGNESIUM SERPL-MCNC: 2 MG/DL (ref 1.6–2.4)
POTASSIUM SERPL-SCNC: 4.1 MMOL/L (ref 3.5–5.1)
SODIUM SERPL-SCNC: 140 MMOL/L (ref 136–145)

## 2022-12-01 DIAGNOSIS — A60.00 GENITAL HERPES SIMPLEX, UNSPECIFIED SITE: Primary | ICD-10-CM

## 2022-12-01 DIAGNOSIS — F33.2 SEVERE EPISODE OF RECURRENT MAJOR DEPRESSIVE DISORDER, WITHOUT PSYCHOTIC FEATURES (HCC): ICD-10-CM

## 2022-12-01 DIAGNOSIS — F41.1 GAD (GENERALIZED ANXIETY DISORDER): ICD-10-CM

## 2022-12-01 RX ORDER — FLUOXETINE HYDROCHLORIDE 20 MG/1
20 CAPSULE ORAL DAILY
Qty: 90 CAPSULE | Refills: 3 | Status: SHIPPED | OUTPATIENT
Start: 2022-12-01

## 2022-12-01 RX ORDER — ACYCLOVIR 400 MG/1
400 TABLET ORAL 2 TIMES DAILY
Qty: 120 TABLET | Refills: 3 | Status: SHIPPED | OUTPATIENT
Start: 2022-12-01

## 2023-01-06 ENCOUNTER — PATIENT MESSAGE (OUTPATIENT)
Dept: FAMILY MEDICINE CLINIC | Age: 28
End: 2023-01-06

## 2023-01-06 DIAGNOSIS — A60.00 GENITAL HERPES SIMPLEX, UNSPECIFIED SITE: ICD-10-CM

## 2023-01-06 NOTE — TELEPHONE ENCOUNTER
Called, spoke to pt. Two pt identifiers confirmed. Writer informed patient that every one was full and go to ER or Urgent due  to her symptoms. Pt verbalized understanding of information discussed w/ no further questions at this time.    ,Claudia Hoffman LPN

## 2023-01-06 NOTE — TELEPHONE ENCOUNTER
From: Tequila Mckinley  To: Citlali Soni MD  Sent: 1/6/2023 8:55 AM EST  Subject: pain when peeing     Good Morning! i have been having kaitlin burning when i pee but its like on my skin best way i can explain it is i have to spread my vagina so the pee doesnt touch my skin cause if it does it burns really bad! im also bleeding down there but theres no blood in my pee. should i go to the ER? or can i schedule an appointment?

## 2023-01-13 RX ORDER — ACYCLOVIR 400 MG/1
400 TABLET ORAL 2 TIMES DAILY
Qty: 120 TABLET | Refills: 3 | Status: SHIPPED | OUTPATIENT
Start: 2023-01-13

## 2023-08-08 ENCOUNTER — TELEPHONE (OUTPATIENT)
Age: 28
End: 2023-08-08

## 2023-08-08 NOTE — TELEPHONE ENCOUNTER
----- Message from Dago Juan sent at 8/8/2023  4:12 PM EDT -----  Subject: Referral Request    Reason for referral request? Patient is wanting to get orders for a TB   screening, please call patient back to discuss. Provider patient wants to be referred to(if known):     Provider Phone Number(if known):     Additional Information for Provider?   ---------------------------------------------------------------------------  --------------  Karlos Ennis INFO    [phone]; OK to leave message on voicemail,OK to respond with   electronic message via [facility] portal (only for patients who have   registered [facility] account)  ---------------------------------------------------------------------------  --------------

## 2023-08-08 NOTE — TELEPHONE ENCOUNTER
----- Message from Nahun Bronson sent at 8/7/2023 10:04 AM EDT -----  Subject: Message to Provider    QUESTIONS  Information for Provider? Pt needs to be scheduled for a TB test for her   job. Please call.  ---------------------------------------------------------------------------  --------------  Bryce Clinton INFO  [phone]; OK to leave message on voicemail  ---------------------------------------------------------------------------  --------------  SCRIPT ANSWERS  Relationship to Patient?  Self

## 2024-01-25 ENCOUNTER — OFFICE VISIT (OUTPATIENT)
Age: 29
End: 2024-01-25
Payer: MEDICAID

## 2024-01-25 VITALS
HEART RATE: 83 BPM | RESPIRATION RATE: 18 BRPM | OXYGEN SATURATION: 97 % | DIASTOLIC BLOOD PRESSURE: 83 MMHG | WEIGHT: 293 LBS | SYSTOLIC BLOOD PRESSURE: 137 MMHG | BODY MASS INDEX: 62.14 KG/M2

## 2024-01-25 DIAGNOSIS — G89.29 CHRONIC BILATERAL LOW BACK PAIN WITHOUT SCIATICA: Primary | ICD-10-CM

## 2024-01-25 DIAGNOSIS — M54.50 CHRONIC BILATERAL LOW BACK PAIN WITHOUT SCIATICA: Primary | ICD-10-CM

## 2024-01-25 PROCEDURE — 3079F DIAST BP 80-89 MM HG: CPT | Performed by: FAMILY MEDICINE

## 2024-01-25 PROCEDURE — 99214 OFFICE O/P EST MOD 30 MIN: CPT | Performed by: FAMILY MEDICINE

## 2024-01-25 PROCEDURE — 3075F SYST BP GE 130 - 139MM HG: CPT | Performed by: FAMILY MEDICINE

## 2024-01-25 RX ORDER — ERGOCALCIFEROL 1.25 MG/1
1.25 CAPSULE ORAL
COMMUNITY
Start: 2024-01-17

## 2024-01-25 SDOH — ECONOMIC STABILITY: HOUSING INSECURITY
IN THE LAST 12 MONTHS, WAS THERE A TIME WHEN YOU DID NOT HAVE A STEADY PLACE TO SLEEP OR SLEPT IN A SHELTER (INCLUDING NOW)?: NO

## 2024-01-25 SDOH — ECONOMIC STABILITY: FOOD INSECURITY: WITHIN THE PAST 12 MONTHS, THE FOOD YOU BOUGHT JUST DIDN'T LAST AND YOU DIDN'T HAVE MONEY TO GET MORE.: OFTEN TRUE

## 2024-01-25 SDOH — ECONOMIC STABILITY: INCOME INSECURITY: HOW HARD IS IT FOR YOU TO PAY FOR THE VERY BASICS LIKE FOOD, HOUSING, MEDICAL CARE, AND HEATING?: VERY HARD

## 2024-01-25 SDOH — ECONOMIC STABILITY: FOOD INSECURITY: WITHIN THE PAST 12 MONTHS, YOU WORRIED THAT YOUR FOOD WOULD RUN OUT BEFORE YOU GOT MONEY TO BUY MORE.: OFTEN TRUE

## 2024-01-25 ASSESSMENT — PATIENT HEALTH QUESTIONNAIRE - PHQ9
10. IF YOU CHECKED OFF ANY PROBLEMS, HOW DIFFICULT HAVE THESE PROBLEMS MADE IT FOR YOU TO DO YOUR WORK, TAKE CARE OF THINGS AT HOME, OR GET ALONG WITH OTHER PEOPLE: 0
SUM OF ALL RESPONSES TO PHQ QUESTIONS 1-9: 0
8. MOVING OR SPEAKING SO SLOWLY THAT OTHER PEOPLE COULD HAVE NOTICED. OR THE OPPOSITE, BEING SO FIGETY OR RESTLESS THAT YOU HAVE BEEN MOVING AROUND A LOT MORE THAN USUAL: 0
SUM OF ALL RESPONSES TO PHQ QUESTIONS 1-9: 0
SUM OF ALL RESPONSES TO PHQ QUESTIONS 1-9: 0
2. FEELING DOWN, DEPRESSED OR HOPELESS: 0
1. LITTLE INTEREST OR PLEASURE IN DOING THINGS: 0
9. THOUGHTS THAT YOU WOULD BE BETTER OFF DEAD, OR OF HURTING YOURSELF: 0
SUM OF ALL RESPONSES TO PHQ QUESTIONS 1-9: 0
5. POOR APPETITE OR OVEREATING: 0
6. FEELING BAD ABOUT YOURSELF - OR THAT YOU ARE A FAILURE OR HAVE LET YOURSELF OR YOUR FAMILY DOWN: 0
SUM OF ALL RESPONSES TO PHQ9 QUESTIONS 1 & 2: 0
SUM OF ALL RESPONSES TO PHQ9 QUESTIONS 1 & 2: 0
7. TROUBLE CONCENTRATING ON THINGS, SUCH AS READING THE NEWSPAPER OR WATCHING TELEVISION: 0
1. LITTLE INTEREST OR PLEASURE IN DOING THINGS: 0
4. FEELING TIRED OR HAVING LITTLE ENERGY: 0
SUM OF ALL RESPONSES TO PHQ QUESTIONS 1-9: 0
SUM OF ALL RESPONSES TO PHQ QUESTIONS 1-9: 0
2. FEELING DOWN, DEPRESSED OR HOPELESS: 0
3. TROUBLE FALLING OR STAYING ASLEEP: 0

## 2024-01-25 NOTE — PROGRESS NOTES
[facility]   [facility]      Chief Complaint:   Chief Complaint   Patient presents with    Back Pain       Subjective:   History: This patient is a 28 y.o. female with a chief complaint of low back pain.  Chronically intermittent. No injury or trauma.  Worse when standing for prolonged periods.  No radicular sx.  Stretching and heat help.   No bowel or bladder incontinence. No fever, night sweats, or weight changes. No saddle anesthesia.    Review of Systems:  General/Constitutional:  No fever, chills, sweats, fatigue, night sweats, weakness, weight loss or weight gain   Head: No headache, no trauma   Neck: No swelling, masses, stiffness, pain, or limited movement   Cardiac: No chest pain   Respiratory: No cough, shortness of breath, or dyspnea on exertion   GI: No incontinence. No nausea/vomiting, diarrhea, abdominal pain, bloody or dark stools  : No incontinence. No change in urinary habits.  Peripheral Vascular: No edema, coldness, numbness, discoloration, pain, or paresthesias   Musculoskeletal: As per HPI  Neurological: No saddle distribution paresthesia. No siatic pain. No loss of consciousness, dizziness, seizures, dysarthria, cognitive changes, problems with balance, or unilateral weakness.    Past Medical History:   Diagnosis Date    Anxiety     Depression     Genital herpes     HIV (human immunodeficiency virus infection) ([facility])     Kidney stones     Mood disorder (HCC)     Morbid obesity (HCC)     Psychiatric disorder     depression    Sleep apnea     has CPAP    Sprain of ankle, calcaneofibular ligament 08/13/2020    sees [place]ovir[place]    Suicidal thoughts      Family History   Problem Relation Age of Onset    Cancer Maternal Grandmother         Brain Cancer    Heart Disease Father 46    Hypertension Father     Sleep Apnea Father     No Known Problems Sister     Heart Disease Paternal Grandmother     Cancer Paternal

## 2024-01-25 NOTE — PROGRESS NOTES
Patient states she's been having lower back pain for about a month now.She states no matter what she's doing,standing and sitting or walking the pain increase. 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 She states she had this issue last year and went away and she didn't have any issues. She states sitting helps her back he most to where she doesn't have any pain. She denies tingling and numbness. She states when she sits for a long time she can get shooting pain and numbness at times. She states she does have numbness when she driving and sleeping in both arms. She states she does have a history or carpel tunnel in both wrist.  Chief Complaint   Patient presents with    Back Pain     Vitals:    01/25/24 1009   BP: 137/83   Pulse: 83   Resp: 18   SpO2: 97%

## 2024-01-25 NOTE — PATIENT INSTRUCTIONS
coupons, and discount programs.  Website: https://www.needymeds.org/  Helpline: 557.871.1836    RX Assist  What they offer: Information about free and low-cost medicine programs.  Website: https://www.rxassist.org/    [employer] $4 Prescription Program  What they offer: Prescription Program includes up to a 30-day supply for $4 and a 90-day supply for $10 of some covered generic drugs at commonly prescribed dosages  Website: https://www.[employer]/cp/4-prescriptions/2254413Fqodyhpxt  CommonHelp  What they offer: Partnership with the [facility] of . Assist with finding and applying for government funded programs and benefits. You can also update your benefits or report changes through [employer].  Website: https://www.[employer]/  Phone Number: 8335CALLVA (828-982-2018)    [facility] [place] [employer] Energy[employer]  What they offer: EnergyShare is [facility]’s energy assistance program of last resort for anyone who faces financial hardships from unemployment or family crisis.  Phone Number: 682.370.5989  Address: [address] [place] [address]  Website: https://www.[employer]/[place]/billing/billing-options/energyshare    Energy Assistance Programs (EAP) - [facility] of   What they offer: EAP assists low-income households in meeting their immediate home energy needs.      Website: https://www.Counsyl.[place].gov/benefit/ea/  Available assistance:   Crisis Assistance - Heating Emergencies  Fuel Assistance - Offset Heating Fuel Costs  Cooling Assistance - Applies to Cooling Utility Bills and Equipment  How to apply:  Online:  https://[employer]/  Call:  [phone]   Paper application:   Print application from https://www.Counsyl.[place].gov/benefit/ea/ and submit to your [facility] Department of         [facility] Department of   [facility] of  contacts:

## 2024-02-05 NOTE — TELEPHONE ENCOUNTER
[FreeTextEntry1] : #SLE (diagnosis February 2022, lupus nephritis and cutaneous disease) +MÓNICA, Low C3/C4, DsDNA >1000, +Sm/RNP/SSA/SSB, +LA On hydroxychloroquine 200 mg daily  #Acute cutaneous lupus, s.p skin biopsy with interface dermatitis- resolved   #Lupus nephritis, class IV (repeat biopsy October 2022, activity index 15/24, chronicity 3/12 )  -On MMF 2 g daily (issues with compliance/follow up)- increased to 3 g daily as of 1/12/23 -Received Obinutuzumab 10/21 and 11/1/22- CD19 1 as of November 2023 -Refractory proteinuria, repeat renal biopsy 5/23: activity 13/24, chronicity 7/12, cellular crescents 5/8 Started CYC s.p 2 doses complicated by hematuria  #Hematuria, post CYC cystoscopy on 6/30 normal  #-MCTD (Raynaud's, +PM/Scl and RNP)  TTE completed December 2022   #High risk medication use quant TB and hepatitis panel - February 2023  #Vaccination status:  COVID-19 x2 , booster 1/22/22, flu received PCV 20 given 2/3/23    Plan:  -continue with tacrolimus 2 mg bid per renal -off MMF, no further CYC dosing -prednisone tapered off, bactrim discontinued  -obtain monitoring blood work today -obtain hepatitis panel, QTB  -obtain urine studies -continue with diuretics as per renal -Continue with hydroxychloroquine 200 mg daily most recent eye exam July 2023 patient cleared to continue hydroxychloroquine Advised to follow-up in 6 months-due -Needs baseline PFTs- advised patient to schedule - fup with Dr Shultz started discussion regarding transplant, HD...    RTO 2-3 months appt is 2/27/18 at [address] at 11:40 am    Dr. Severiano Glaser, ph [phone]        appt is 2/6/18 at 9:00 am with Dr. Dori Cobb    Office ph [phone]

## 2024-02-27 ENCOUNTER — OFFICE VISIT (OUTPATIENT)
Age: 29
End: 2024-02-27
Payer: MEDICAID

## 2024-02-27 VITALS
HEART RATE: 95 BPM | RESPIRATION RATE: 20 BRPM | SYSTOLIC BLOOD PRESSURE: 115 MMHG | BODY MASS INDEX: 48.82 KG/M2 | HEIGHT: 65 IN | DIASTOLIC BLOOD PRESSURE: 77 MMHG | TEMPERATURE: 98 F | OXYGEN SATURATION: 98 % | WEIGHT: 293 LBS

## 2024-02-27 DIAGNOSIS — E66.01 CLASS 3 SEVERE OBESITY DUE TO EXCESS CALORIES WITH SERIOUS COMORBIDITY AND BODY MASS INDEX (BMI) OF 50.0 TO 59.9 IN ADULT (HCC): ICD-10-CM

## 2024-02-27 DIAGNOSIS — Z00.00 ANNUAL PHYSICAL EXAM: ICD-10-CM

## 2024-02-27 DIAGNOSIS — F41.1 GAD (GENERALIZED ANXIETY DISORDER): ICD-10-CM

## 2024-02-27 DIAGNOSIS — F33.42 RECURRENT MAJOR DEPRESSIVE DISORDER, IN FULL REMISSION (HCC): Primary | ICD-10-CM

## 2024-02-27 DIAGNOSIS — Z23 ENCOUNTER FOR IMMUNIZATION: ICD-10-CM

## 2024-02-27 DIAGNOSIS — Z21 ASYMPTOMATIC HIV INFECTION, WITH NO HISTORY OF HIV-RELATED ILLNESS (HCC): ICD-10-CM

## 2024-02-27 DIAGNOSIS — E66.01 OBESITY, MORBID (HCC): ICD-10-CM

## 2024-02-27 PROCEDURE — 90651 9VHPV VACCINE 2/3 DOSE IM: CPT

## 2024-02-27 PROCEDURE — 99395 PREV VISIT EST AGE 18-39: CPT

## 2024-02-27 PROCEDURE — 3078F DIAST BP <80 MM HG: CPT | Performed by: FAMILY MEDICINE

## 2024-02-27 PROCEDURE — PBSHW HPV, GARDASIL 9, (AGE 9-45 YRS), IM

## 2024-02-27 PROCEDURE — 3074F SYST BP LT 130 MM HG: CPT | Performed by: FAMILY MEDICINE

## 2024-02-27 PROCEDURE — PBSHW INFLUENZA, FLUCELVAX, (AGE 6 MO+), IM, PF, 0.5 ML

## 2024-02-27 PROCEDURE — PBSHW PBB SHADOW CHARGE

## 2024-02-27 PROCEDURE — G0008 ADMIN INFLUENZA VIRUS VAC: HCPCS

## 2024-02-27 RX ORDER — FLUOXETINE HYDROCHLORIDE 20 MG/1
20 CAPSULE ORAL DAILY
Qty: 90 CAPSULE | Refills: 3 | Status: SHIPPED | OUTPATIENT
Start: 2024-02-27

## 2024-02-27 NOTE — PROGRESS NOTES
I reviewed with the resident the medical history and the resident's findings on the physical examination.  I discussed with the resident the patient's diagnosis and concur with the plan.  
Vaccine (5 - 2023-24 season) 09/01/2023    Pap smear  02/17/2024        Objective:   Vitals reviewed.  /77 (Site: Right Upper Arm, Position: Sitting, Cuff Size: Large Adult)   Pulse 95   Temp 98 °F (36.7 °C) (Temporal)   Resp 20   Ht 1.651 m (5' 5\")   Wt (!) 162.4 kg (358 lb)   SpO2 98%   BMI 59.57 kg/m²      Physical Exam:  General: No acute distress. Alert. Cooperative.  Head: Normocephalic. Atraumatic.  Mouth: Mucosa pink. Moist mucous membranes.   Respiratory: CTAB. No increased work of breathing. Symmetric chest rise b/l.  Cardiovascular: RRR. No clubbing or cyanosis  GI: Nondistended. No masses.   Extremities: No LE edema. Moving all extremities spontaneously.  Musculoskeletal: Full ROM in all extremities, no obvious deformities.  Skin: Warm, dry. No rashes.   Neuro: Alert, normal behavior. No focal deficit.       Assessment/Plan:     1. Recurrent major depressive disorder, in full remission (HCC)  -     FLUoxetine (PROZAC) 20 MG capsule; Take 1 capsule by mouth daily, Disp-90 capsule, R-3Normal  2. Encounter for immunization  -     Influenza, FLUCELVAX, (age 6 mo+), IM, Preservative Free, 0.5 mL  3. Annual physical exam  -     [facility] - Cristiane Henriquez MD, Ob-Gyn, [place]  4. Asymptomatic HIV infection, with no history of HIV-related illness ([facility])  5. RICHY (generalized anxiety disorder)  -     FLUoxetine (PROZAC) 20 MG capsule; Take 1 capsule by mouth daily, Disp-90 capsule, R-3Normal  6. Obesity, morbid ([facility])  7. Class 3 severe obesity due to excess calories with serious comorbidity and body mass index (BMI) of 50.0 to 59.9 in adult (HCC)     27 yo female here for annual exam. H/o HIV, f/w [facility] and gets regular monitoring. Has morbid obesity, recently undertaking positive lifestyle changes since getting . Discussed increasing exercise and plant-focused diet. MDD/RICHY well-controlled on prozac, refills sent. Due for 3/3 HPV vaccine and flu shot, giving today. Due for pap, wants to see OBGYN

## 2024-03-21 NOTE — PROGRESS NOTES
Akbar Bowser is a 29 y.o. female returns for an annual exam     Chief Complaint   Patient presents with    Annual Exam       Patient's last menstrual period was 03/17/2024 (exact date).  Her periods are light in flow and usually regular with a 26-32 day interval with 3-7 day duration.  She does not have dysmenorrhea.  Problems: problems - STD testing.  Birth Control: nexplanon.  Last Pap: pap performed today  She does not have a history of JENIFFER 2, 3 or cervical cancer.   With regard to the Gardisil vaccine, she has received all 3 injections      1. Have you been to the ER, urgent care clinic, or hospitalized since your last visit? N/a-new pt    2. Have you seen or consulted any other health care providers outside of the [facility] since your last visit? N/a-new pt    Examination chaperoned by Neno Estrella CMA.

## 2024-03-22 ENCOUNTER — OFFICE VISIT (OUTPATIENT)
Age: 29
End: 2024-03-22
Payer: MEDICAID

## 2024-03-22 VITALS — DIASTOLIC BLOOD PRESSURE: 83 MMHG | BODY MASS INDEX: 61.07 KG/M2 | SYSTOLIC BLOOD PRESSURE: 130 MMHG | WEIGHT: 293 LBS

## 2024-03-22 DIAGNOSIS — N89.8 VAGINAL ODOR: ICD-10-CM

## 2024-03-22 DIAGNOSIS — Z01.419 ENCOUNTER FOR WELL WOMAN EXAM WITH ROUTINE GYNECOLOGICAL EXAM: Primary | ICD-10-CM

## 2024-03-22 DIAGNOSIS — Z11.3 SCREENING EXAMINATION FOR SEXUALLY TRANSMITTED DISEASE: ICD-10-CM

## 2024-03-22 PROCEDURE — 3079F DIAST BP 80-89 MM HG: CPT | Performed by: OBSTETRICS & GYNECOLOGY

## 2024-03-22 PROCEDURE — 3075F SYST BP GE 130 - 139MM HG: CPT | Performed by: OBSTETRICS & GYNECOLOGY

## 2024-03-22 PROCEDURE — 99395 PREV VISIT EST AGE 18-39: CPT | Performed by: OBSTETRICS & GYNECOLOGY

## 2024-03-22 NOTE — PROGRESS NOTES
Annual exam  Akbar Bowser is a No obstetric history on file.,  29 y.o. female   No LMP recorded.    She presents for her annual checkup.     She reports vaginal odor    Sexual history:    She  has no history on file for sexual activity.    Per Nursing Note:  Patient's last menstrual period was 03/17/2024 (exact date).  Her periods are light in flow and usually regular with a 26-32 day interval with 3-7 day duration.  She does not have dysmenorrhea.  Problems: problems - STD testing.  Birth Control: nexplanon.  Last Pap: pap performed today  She does not have a history of JENIFFER 2, 3 or cervical cancer.   With regard to the Gardisil vaccine, she has received all 3 injections    Past Medical History:   Diagnosis Date    Anxiety     Depression     Genital herpes     HIV (human immunodeficiency virus infection) (HCC)     Kidney stones     Mood disorder (HCC)     Morbid obesity (HCC)     Psychiatric disorder     depression    Sleep apnea     has CPAP    Sprain of ankle, calcaneofibular ligament 08/13/2020    sees orthovir[place]ia    Suicidal thoughts        Past Surgical History:   Procedure Laterality Date    CHOLECYSTECTOMY, LAPAROSCOPIC  10/2018    UROLOGICAL SURGERY      Stenting x 4 (Starting in 2016, developed severe infection)       Current Outpatient Medications   Medication Sig Dispense Refill    FLUoxetine (PROZAC) 20 MG capsule Take 1 capsule by mouth daily 90 capsule 3    ergocalciferol (ERGOCALCIFEROL) 1.25 MG (75168 UT) capsule Take 1.25 mg by mouth every 30 days      acyclovir (ZOVIRAX) 400 MG tablet Take 1 tablet by mouth 2 times daily      dolutegravir sodium (TIVICAY) 50 MG tablet Take by mouth      emtricitabine-tenofovir alafenamide (DESCOVY) 200-25 MG TABS tablet Take 1 tablet by mouth daily      etonogestrel (NEXPLANON) 68 MG implant Inject into the skin       No current facility-administered medications for this visit.     Allergies: Aspirin     Tobacco History:  reports that she quit smoking about 6

## 2024-03-23 LAB
HBV SURFACE AG SER QL: <0.1 INDEX
HBV SURFACE AG SER QL: NEGATIVE
HCV AB SER IA-ACNC: <0.02 INDEX
HCV AB SERPL QL IA: NONREACTIVE
RPR SER QL: NONREACTIVE

## 2024-03-27 LAB
A VAGINAE DNA VAG QL NAA+PROBE: ABNORMAL SCORE
BVAB2 DNA VAG QL NAA+PROBE: ABNORMAL SCORE
C ALBICANS DNA VAG QL NAA+PROBE: NEGATIVE
C GLABRATA DNA VAG QL NAA+PROBE: NEGATIVE
MEGA1 DNA VAG QL NAA+PROBE: ABNORMAL SCORE
T VAGINALIS DNA VAG QL NAA+PROBE: POSITIVE

## 2024-03-27 RX ORDER — METRONIDAZOLE 500 MG/1
500 TABLET ORAL 2 TIMES DAILY
Qty: 14 TABLET | Refills: 0 | Status: SHIPPED | OUTPATIENT
Start: 2024-03-27 | End: 2024-04-03

## 2024-03-29 LAB
., LABCORP: ABNORMAL
C TRACH RRNA CVX QL NAA+PROBE: NEGATIVE
CYTOLOGIST CVX/VAG CYTO: ABNORMAL
CYTOLOGY CVX/VAG DOC CYTO: ABNORMAL
CYTOLOGY CVX/VAG DOC THIN PREP: ABNORMAL
DX ICD CODE: ABNORMAL
DX ICD CODE: ABNORMAL
Lab: ABNORMAL
N GONORRHOEA RRNA CVX QL NAA+PROBE: NEGATIVE
OTHER STN SPEC: ABNORMAL
PATHOLOGIST CVX/VAG CYTO: ABNORMAL
STAT OF ADQ CVX/VAG CYTO-IMP: ABNORMAL
T VAGINALIS RRNA SPEC QL NAA+PROBE: POSITIVE

## 2024-04-09 RX ORDER — METRONIDAZOLE 500 MG/1
500 TABLET ORAL 2 TIMES DAILY
Qty: 14 TABLET | Refills: 0 | Status: SHIPPED | OUTPATIENT
Start: 2024-04-09 | End: 2024-04-09

## 2024-06-06 ENCOUNTER — OFFICE VISIT (OUTPATIENT)
Age: 29
End: 2024-06-06
Payer: MEDICAID

## 2024-06-06 VITALS
TEMPERATURE: 97.3 F | SYSTOLIC BLOOD PRESSURE: 124 MMHG | HEIGHT: 65 IN | BODY MASS INDEX: 48.82 KG/M2 | WEIGHT: 293 LBS | DIASTOLIC BLOOD PRESSURE: 83 MMHG | OXYGEN SATURATION: 97 % | RESPIRATION RATE: 20 BRPM | HEART RATE: 85 BPM

## 2024-06-06 DIAGNOSIS — N89.8 VAGINAL ITCHING: ICD-10-CM

## 2024-06-06 DIAGNOSIS — T14.8XXA SKIN ABRASION: ICD-10-CM

## 2024-06-06 DIAGNOSIS — B37.9 YEAST INFECTION: Primary | ICD-10-CM

## 2024-06-06 LAB
BILIRUBIN, URINE, POC: NEGATIVE
BLOOD URINE, POC: NEGATIVE
GLUCOSE URINE, POC: NEGATIVE
KETONES, URINE, POC: NEGATIVE
LEUKOCYTE ESTERASE, URINE, POC: NEGATIVE
NITRITE, URINE, POC: NEGATIVE
PH, URINE, POC: 7 (ref 4.6–8)
PROTEIN,URINE, POC: NEGATIVE
SPECIFIC GRAVITY, URINE, POC: 1.02 (ref 1–1.03)
URINALYSIS CLARITY, POC: CLEAR
URINALYSIS COLOR, POC: YELLOW
UROBILINOGEN, POC: NORMAL
WET PREP (POC): NORMAL

## 2024-06-06 PROCEDURE — 99213 OFFICE O/P EST LOW 20 MIN: CPT

## 2024-06-06 PROCEDURE — 87210 SMEAR WET MOUNT SALINE/INK: CPT

## 2024-06-06 PROCEDURE — 81003 URINALYSIS AUTO W/O SCOPE: CPT

## 2024-06-06 RX ORDER — FLUCONAZOLE 150 MG/1
150 TABLET ORAL ONCE
Qty: 1 TABLET | Refills: 0 | Status: SHIPPED | OUTPATIENT
Start: 2024-06-06 | End: 2024-06-06

## 2024-06-06 ASSESSMENT — PATIENT HEALTH QUESTIONNAIRE - PHQ9
SUM OF ALL RESPONSES TO PHQ QUESTIONS 1-9: 1
SUM OF ALL RESPONSES TO PHQ QUESTIONS 1-9: 1
2. FEELING DOWN, DEPRESSED OR HOPELESS: SEVERAL DAYS
SUM OF ALL RESPONSES TO PHQ QUESTIONS 1-9: 1
SUM OF ALL RESPONSES TO PHQ9 QUESTIONS 1 & 2: 1
1. LITTLE INTEREST OR PLEASURE IN DOING THINGS: NOT AT ALL
SUM OF ALL RESPONSES TO PHQ QUESTIONS 1-9: 1

## 2024-06-06 NOTE — RESULT ENCOUNTER NOTE
[image]  [facility]     Discussed results with patient during visit. UA wnl. Wet mount & KOH notable for yeast.     Samantha Quinones MD

## 2024-06-06 NOTE — PROGRESS NOTES
Chief Complaint   Patient presents with    Urinary Tract Infection     Patient states that the symptoms started last Saturday. Symptoms have gotten worse. Burning while urinating, burning while wiping.     No OTC medications.      Vitals:    06/06/24 1004   BP: 124/83   Site: Right Upper Arm   Position: Sitting   Cuff Size: Large Adult   Pulse: 85   Resp: 20   Temp: 97.3 °F (36.3 °C)   TempSrc: Temporal   SpO2: 97%   Weight: (!) 171.2 kg (377 lb 6.4 oz)   Height: 1.651 m (5' 5\")     \"Have you been to the ER, urgent care clinic since your last visit?  Hospitalized since your last visit?\"    NO    “Have you seen or consulted any other health care providers outside of [facility] since your last visit?”    NO            Click Here for Release of Records Request

## 2024-06-06 NOTE — PROGRESS NOTES
[facility]         6/9/2024   Chief Complaint   Patient presents with    Urinary Tract Infection     Patient states that the symptoms started last Saturday. Symptoms have gotten worse. Burning while urinating, burning while wiping.     No OTC medications.        HPI:  Akbar Bowser is a 29 y.o. female who presents to clinic today for acute symptoms.  They were last seen 02/27/2024.    Onset: last Saturday June 1st   Burns when urinate & now when wipes   No OTC   Vaginal discharge - white clumpy  Patient reports vaginal odor will quickly return after a shower    One male partner (), no condoms   History of yeast infections  - symptoms are a little different d/t patient being concerned about a lesion near clit which causes burning when skin comes into contact with urine   Patient is antibiotics, started taking them last week (went to [facility]) & taking still amoxicillin   History of trichomonas vaginalis on 3/22/24.  Hx of genital HSV  Stressed (looking for a job) - taking acyclovir & did not miss a dose       HIV   - viral load is undetectable; patient was seen by specialist a couple weeks ago       Patients past medical, surgical and family histories were reviewed.    Allergies and Medications reviewed and updated.    Review of Systems    Pertinent per HPI    Objective:  Vitals:    06/06/24 1004   BP: 124/83   Site: Right Upper Arm   Position: Sitting   Cuff Size: Large Adult   Pulse: 85   Resp: 20   Temp: 97.3 °F (36.3 °C)   TempSrc: Temporal   SpO2: 97%   Weight: (!) 171.2 kg (377 lb 6.4 oz)   Height: 1.651 m (5' 5\")     Body mass index is 62.8 kg/m².    Urine dipstick shows negative for all components.      Physical Exam  General: Patient alert and oriented and in NAD  HEENT: PER/EOMI, no conjunctival pallor or scleral icterus.  Heart: Regular rate and rhythm, No murmurs, rubs or gallops.  2+ peripheral

## 2024-07-05 ENCOUNTER — OFFICE VISIT (OUTPATIENT)
Age: 29
End: 2024-07-05
Payer: MEDICAID

## 2024-07-05 VITALS
DIASTOLIC BLOOD PRESSURE: 84 MMHG | WEIGHT: 293 LBS | HEIGHT: 65 IN | SYSTOLIC BLOOD PRESSURE: 139 MMHG | BODY MASS INDEX: 48.82 KG/M2 | HEART RATE: 99 BPM | OXYGEN SATURATION: 98 % | RESPIRATION RATE: 19 BRPM

## 2024-07-05 DIAGNOSIS — F41.1 GAD (GENERALIZED ANXIETY DISORDER): ICD-10-CM

## 2024-07-05 DIAGNOSIS — M54.50 CHRONIC BILATERAL LOW BACK PAIN WITHOUT SCIATICA: Primary | ICD-10-CM

## 2024-07-05 DIAGNOSIS — E66.01 OBESITY, MORBID (HCC): ICD-10-CM

## 2024-07-05 DIAGNOSIS — G89.29 CHRONIC BILATERAL LOW BACK PAIN WITHOUT SCIATICA: Primary | ICD-10-CM

## 2024-07-05 DIAGNOSIS — F33.42 RECURRENT MAJOR DEPRESSIVE DISORDER, IN FULL REMISSION (HCC): ICD-10-CM

## 2024-07-05 PROCEDURE — 99213 OFFICE O/P EST LOW 20 MIN: CPT

## 2024-07-05 PROCEDURE — 3079F DIAST BP 80-89 MM HG: CPT

## 2024-07-05 PROCEDURE — 3075F SYST BP GE 130 - 139MM HG: CPT

## 2024-07-05 RX ORDER — FLUOXETINE HYDROCHLORIDE 20 MG/1
20 CAPSULE ORAL DAILY
Qty: 90 CAPSULE | Refills: 3 | Status: SHIPPED | OUTPATIENT
Start: 2024-07-05

## 2024-07-05 RX ORDER — TAMSULOSIN HYDROCHLORIDE 0.4 MG/1
0.4 CAPSULE ORAL DAILY
COMMUNITY
Start: 2024-06-18

## 2024-07-05 RX ORDER — VALACYCLOVIR HYDROCHLORIDE 500 MG/1
500 TABLET, FILM COATED ORAL 2 TIMES DAILY
COMMUNITY

## 2024-07-05 NOTE — PATIENT INSTRUCTIONS
Dietician Recommendation: Janice Spear, MPH, RDN    [facility] - [address]  [address]  [place], [place] [address] [phone]     [facility] - [address], [address]  [place], [place] [address] [phone]    [facility]- [address], [address] (Cardiac Rehab Office)  [place], [place]   [address] [phone]

## 2024-07-05 NOTE — PROGRESS NOTES
I discussed the findings, assessment and plan with the resident and agree with the resident's findings and plan as documented in the resident's note.  
Room 5    Identified pt with two pt identifiers(name and ). Reviewed record in preparation for visit and have obtained necessary documentation.  Chief Complaint   Patient presents with    Back Pain     States pain gradually begins with activity. States pain is resolved with rest and begins about 10 minutes after restarting activity. Prescribed Oxycodone for kidney stones which has not been effective and Tylenol as needed which decrease pain. Previously evaluated at [facility] for Kidney Stones   Urology Appointment scheduled in August for evaluation of Kidney Stones     Weight Management     Currently attempting to loss weight for about 2 months, gaining more then losing         Vaginal Odor     Concerns with PH Balance.         Health Maintenance Due   Topic    Meningococcal (ACWY) vaccine (1 - Risk 2-dose series)    Varicella vaccine (2 of 2 - 2-dose childhood series)    Measles,Mumps,Rubella (MMR) vaccine (1 of 2 - Risk 2-dose series)    Hepatitis A vaccine (1 of 2 - Risk 2-dose series)    Shingles vaccine (1 of 2)    A1C test (Diabetic or Prediabetic)     COVID-19 Vaccine ( season)       Vitals:    24 1558   BP: 139/84   Site: Left Lower Arm   Position: Sitting   Cuff Size: Medium Adult   Pulse: 99   Resp: 19   SpO2: 98%   Weight: (!) 172.7 kg (380 lb 12.8 oz)   Height: 1.651 m (5' 5\")         \"Have you been to the ER, urgent care clinic since your last visit?  Hospitalized since your last visit?\"    2024 - 2024 [facility] due to Kidney Stones    “Have you seen or consulted any other health care providers outside of [facility] since your last visit?”    NO        Click Here for Release of Records Request     This patient is accompanied in the office by her self.  I have received verbal consent from Akbar Bowser to discuss any/all medical information while they are present in the room.    
SEBASTIAN Camacho, Nutrition Services, [place]     Chronic b/l lower back pain w/o sciatica. TTP over lower back on exam. Worse w activity and unfortunately her job is very laborious. Has not tried any conservative management. No red flag sx. Had XR L spine 1/24 which was normal.  -ref to PT  -voltaren gel, tylenol, ice, heat  -avoid strenuous activities that exacerbate pain  -can make appt w sports med  -consider MRI if no improvement noted after above    Nutrition referral sent to help w diet, weight management.    Refilled prozac, pt had issue w pharmacy.    Follow up:   Return if symptoms worsen or fail to improve.    Pt was discussed with Dr Montemayor (attending physician).      Leonard Argueta MD  Resident [facility]  07/05/24

## 2024-07-15 ENCOUNTER — TELEPHONE (OUTPATIENT)
Age: 29
End: 2024-07-15

## 2024-08-05 ENCOUNTER — OFFICE VISIT (OUTPATIENT)
Age: 29
End: 2024-08-05

## 2024-08-05 VITALS
OXYGEN SATURATION: 97 % | WEIGHT: 293 LBS | HEART RATE: 77 BPM | TEMPERATURE: 97.1 F | RESPIRATION RATE: 18 BRPM | HEIGHT: 65 IN | SYSTOLIC BLOOD PRESSURE: 109 MMHG | DIASTOLIC BLOOD PRESSURE: 67 MMHG | BODY MASS INDEX: 48.82 KG/M2

## 2024-08-05 DIAGNOSIS — Z23 ENCOUNTER FOR IMMUNIZATION: Primary | ICD-10-CM

## 2024-08-05 ASSESSMENT — PATIENT HEALTH QUESTIONNAIRE - PHQ9
SUM OF ALL RESPONSES TO PHQ9 QUESTIONS 1 & 2: 0
SUM OF ALL RESPONSES TO PHQ QUESTIONS 1-9: 0
1. LITTLE INTEREST OR PLEASURE IN DOING THINGS: NOT AT ALL
2. FEELING DOWN, DEPRESSED OR HOPELESS: NOT AT ALL
SUM OF ALL RESPONSES TO PHQ QUESTIONS 1-9: 0

## 2024-08-05 NOTE — PROGRESS NOTES
Chief Complaint   Patient presents with    PPD Placement     Patient is here for a skin TB placement required by her work.      Patient received her TB skin test on right forearm. Patient is advised to come back on Wednesday morning for PPD reading.       Vitals:    08/05/24 0806   BP: 109/67   Site: Right Upper Arm   Position: Sitting   Cuff Size: Large Adult   Pulse: 77   Resp: 18   Temp: 97.1 °F (36.2 °C)   TempSrc: Temporal   SpO2: 97%   Weight: (!) 169.1 kg (372 lb 12.8 oz)   Height: 1.651 m (5' 5\")     \"Have you been to the ER, urgent care clinic since your last visit?  Hospitalized since your last visit?\"    NO    “Have you seen or consulted any other health care providers outside of [facility] since your last visit?”    NO            Click Here for Release of Records Request

## 2024-08-07 ENCOUNTER — OFFICE VISIT (OUTPATIENT)
Age: 29
End: 2024-08-07

## 2024-08-07 VITALS
HEART RATE: 86 BPM | DIASTOLIC BLOOD PRESSURE: 77 MMHG | RESPIRATION RATE: 18 BRPM | BODY MASS INDEX: 48.82 KG/M2 | SYSTOLIC BLOOD PRESSURE: 109 MMHG | WEIGHT: 293 LBS | OXYGEN SATURATION: 97 % | HEIGHT: 65 IN | TEMPERATURE: 98.2 F

## 2024-08-07 DIAGNOSIS — Z11.1 ENCOUNTER FOR PPD SKIN TEST READING: Primary | ICD-10-CM

## 2024-08-07 NOTE — PROGRESS NOTES
Akbar Bowser is a 29 y.o. female      Chief Complaint   Patient presents with    PPD Reading     Patient is coming in for PPD reading and requesting letter. PPD was read from right forearm and was negative. No other concerns.        \"Have you been to the ER, urgent care clinic since your last visit?  Hospitalized since your last visit?\"    NO    “Have you seen or consulted any other health care providers outside of [facility] since your last visit?”    NO              Vitals:    08/07/24 0800   BP: 109/77   Site: Right Upper Arm   Position: Sitting   Pulse: 86   Resp: 18   Temp: 98.2 °F (36.8 °C)   TempSrc: Oral   SpO2: 97%   Weight: (!) 170.1 kg (375 lb)   Height: 1.651 m (5' 5\")            Health Maintenance Due   Topic Date Due    Meningococcal (ACWY) vaccine (1 - Risk 2-dose series) Never done    Varicella vaccine (2 of 2 - 2-dose childhood series) 03/19/2001    Measles,Mumps,Rubella (MMR) vaccine (1 of 2 - Risk 2-dose series) Never done    Hepatitis A vaccine (1 of 2 - Risk 2-dose series) Never done    Shingles vaccine (1 of 2) 03/19/2016    A1C test (Diabetic or Prediabetic)  09/21/2022    COVID-19 Vaccine (5 - 2023-24 season) 09/01/2023    Flu vaccine (1) 08/01/2024         Medication Reconciliation completed, changes noted.  Please  Update medication list.

## 2024-11-25 ENCOUNTER — OFFICE VISIT (OUTPATIENT)
Age: 29
End: 2024-11-25
Payer: MEDICAID

## 2024-11-25 VITALS
HEIGHT: 65 IN | TEMPERATURE: 97.8 F | BODY MASS INDEX: 48.82 KG/M2 | WEIGHT: 293 LBS | DIASTOLIC BLOOD PRESSURE: 76 MMHG | HEART RATE: 82 BPM | SYSTOLIC BLOOD PRESSURE: 125 MMHG | OXYGEN SATURATION: 97 %

## 2024-11-25 DIAGNOSIS — G47.33 OSA (OBSTRUCTIVE SLEEP APNEA): Primary | ICD-10-CM

## 2024-11-25 DIAGNOSIS — F41.9 ANXIETY AND DEPRESSION: ICD-10-CM

## 2024-11-25 DIAGNOSIS — F32.A ANXIETY AND DEPRESSION: ICD-10-CM

## 2024-11-25 PROCEDURE — 3074F SYST BP LT 130 MM HG: CPT | Performed by: INTERNAL MEDICINE

## 2024-11-25 PROCEDURE — 3078F DIAST BP <80 MM HG: CPT | Performed by: INTERNAL MEDICINE

## 2024-11-25 PROCEDURE — 99204 OFFICE O/P NEW MOD 45 MIN: CPT | Performed by: INTERNAL MEDICINE

## 2024-11-25 RX ORDER — PHENAZOPYRIDINE HYDROCHLORIDE 200 MG/1
200 TABLET, FILM COATED ORAL
COMMUNITY
Start: 2024-07-19

## 2024-11-25 RX ORDER — METHYLPREDNISOLONE 4 MG/1
TABLET ORAL
COMMUNITY
Start: 2024-09-16

## 2024-11-25 RX ORDER — PROMETHAZINE HYDROCHLORIDE 6.25 MG/5ML
SYRUP ORAL
COMMUNITY
Start: 2024-09-16

## 2024-11-25 RX ORDER — SOLIFENACIN SUCCINATE 5 MG/1
5 TABLET, FILM COATED ORAL
COMMUNITY
Start: 2024-07-24

## 2024-11-25 RX ORDER — ALBUTEROL SULFATE 90 UG/1
INHALANT RESPIRATORY (INHALATION)
COMMUNITY
Start: 2024-09-16

## 2024-11-25 RX ORDER — CEPHALEXIN 500 MG/1
500 CAPSULE ORAL
COMMUNITY
Start: 2024-07-21

## 2024-11-25 RX ORDER — OXYCODONE HYDROCHLORIDE 5 MG/1
5 TABLET ORAL
COMMUNITY
Start: 2024-07-30

## 2024-11-25 RX ORDER — OXYCODONE AND ACETAMINOPHEN 5; 325 MG/1; MG/1
TABLET ORAL
COMMUNITY
Start: 2024-06-27

## 2024-11-25 RX ORDER — DOLUTEGRAVIR SODIUM AND LAMIVUDINE 50; 300 MG/1; MG/1
1 TABLET, FILM COATED ORAL DAILY
COMMUNITY
Start: 2024-09-26 | End: 2025-09-26

## 2024-11-25 RX ORDER — ACETAMINOPHEN 500 MG
1000 TABLET ORAL EVERY 4 HOURS PRN
COMMUNITY
Start: 2024-07-30

## 2024-11-25 RX ORDER — MEDROXYPROGESTERONE ACETATE 10 MG
TABLET ORAL
COMMUNITY

## 2024-11-25 ASSESSMENT — SLEEP AND FATIGUE QUESTIONNAIRES
HOW LIKELY ARE YOU TO NOD OFF OR FALL ASLEEP WHILE WATCHING TV: MODERATE CHANCE OF DOZING
HOW LIKELY ARE YOU TO NOD OFF OR FALL ASLEEP WHILE SITTING INACTIVE IN A PUBLIC PLACE: SLIGHT CHANCE OF DOZING
HOW LIKELY ARE YOU TO NOD OFF OR FALL ASLEEP WHILE SITTING QUIETLY AFTER LUNCH WITHOUT ALCOHOL: SLIGHT CHANCE OF DOZING
HOW LIKELY ARE YOU TO NOD OFF OR FALL ASLEEP IN A CAR, WHILE STOPPED FOR A FEW MINUTES IN TRAFFIC: WOULD NEVER DOZE
ESS TOTAL SCORE: 8
HOW LIKELY ARE YOU TO NOD OFF OR FALL ASLEEP WHILE SITTING AND READING: WOULD NEVER DOZE
HOW LIKELY ARE YOU TO NOD OFF OR FALL ASLEEP WHILE LYING DOWN TO REST IN THE AFTERNOON WHEN CIRCUMSTANCES PERMIT: HIGH CHANCE OF DOZING
HOW LIKELY ARE YOU TO NOD OFF OR FALL ASLEEP WHILE SITTING AND TALKING TO SOMEONE: WOULD NEVER DOZE
HOW LIKELY ARE YOU TO NOD OFF OR FALL ASLEEP WHEN YOU ARE A PASSENGER IN A CAR FOR AN HOUR WITHOUT A BREAK: SLIGHT CHANCE OF DOZING

## 2024-11-25 NOTE — PATIENT INSTRUCTIONS
spray.  When should you call for help?  Watch closely for changes in your health, and be sure to contact your doctor if:  You still have sleep apnea even though you have made lifestyle changes.  You are thinking of trying a device such as CPAP.  You are having problems using a CPAP or similar machine.                Where can you learn more?   Go to http://www.YR Free.net/Elinorcours.  Enter J936 in the search box to learn more about \"Sleep Apnea: After Your Visit.\"   © 3589-9010 Healthwise, Incorporated. Care instructions adapted under license by [employer] (which disclaims liability or warranty for this information). This care instruction is for use with your licensed healthcare professional. If you have questions about a medical condition or this instruction, always ask your healthcare professional. [employer] disclaims any warranty or liability for your use of this information.      PROPER SLEEP HYGIENE    What to avoid  Do not have drinks with caffeine, such as coffee or black tea, for 8 hours before bed.  Do not smoke or use other types of tobacco near bedtime. Nicotine is a stimulant and can keep you awake.  Avoid drinking alcohol late in the evening, because it can cause you to wake in the middle of the night.  Do not eat a big meal close to bedtime. If you are hungry, eat a light snack.  Do not drink a lot of water close to bedtime, because the need to urinate may wake you up during the night.  Do not read or watch TV in bed. Use the bed only for sleeping and sexual activity.  What to try  Go to bed at the same time every night, and wake up at the same time every morning. Do not take naps during the day.  Keep your bedroom quiet, dark, and cool.  Get regular exercise, but not within 3 to 4 hours of your bedtime..  Sleep on a comfortable pillow and mattress.  If watching the clock makes you anxious, turn it facing away from you so you cannot see the time.  If you worry when you lie down, start a worry

## 2024-11-25 NOTE — PROGRESS NOTES
[address],    [place] [address]  Tel.  [phone]    Fax. [phone]     [address],   [place] [address]  Tel.  [phone]    Fax. [phone] [address] [place] [address]  Tel.  [phone]    Fax. [phone]       Akbar Bowser is a 29 y.o. year old female seen for evaluation of a sleep disorder.       ASSESSMENT/PLAN:     Diagnosis Orders   1. THOMAS (obstructive sleep apnea)  PAT - Home Sleep Test      2. Anxiety and depression        3. BMI 60.0-69.9, adult            Patient has a history and examination consistent with the diagnosis of sleep apnea.    Return for follow-up after testing is completed.    * The patient currently has a Moderate Risk for having sleep apnea.  STOP-BANG score 4.    * Sleep testing was ordered for initial evaluation.      Orders Placed This Encounter   Procedures    PAT - Home Sleep Test     Standing Status:   Future     Standing Expiration Date:   5/25/2025     Order Specific Question:   Location For Sleep Study     Answer:   [facility]       * She was provided information on sleep apnea including corresponding risk factors and the importance of proper treatment.     * Treatment options were reviewed in detail. she would like to proceed with PAP therapy. Patient will be seen in follow-up in 6-8 weeks after PAP setup to gauge treatment response and adherence to therapy.     * The patient was counseled regarding proper sleep hygiene, with emphasis on ensuring sufficient total sleep time; safe driving and the benefits of exercise and weight loss.      * All of her questions were addressed.    2. Anxiety and Depression -  continue on current regimen, she will continue to monitor her mood and follow up with her care provider for reevaluation/adjustment of medications if warranted.  I have reviewed the relationship between mood as it relates to

## 2025-01-08 ENCOUNTER — PROCEDURE VISIT (OUTPATIENT)
Age: 30
End: 2025-01-08

## 2025-01-08 ENCOUNTER — HOSPITAL ENCOUNTER (OUTPATIENT)
Facility: HOSPITAL | Age: 30
Discharge: HOME | End: 2025-01-11
Payer: MEDICAID

## 2025-01-08 DIAGNOSIS — G47.33 OSA (OBSTRUCTIVE SLEEP APNEA): Primary | ICD-10-CM

## 2025-01-08 DIAGNOSIS — G47.33 OSA (OBSTRUCTIVE SLEEP APNEA): ICD-10-CM

## 2025-01-08 PROCEDURE — 95800 SLP STDY UNATTENDED: CPT | Performed by: INTERNAL MEDICINE

## 2025-01-08 NOTE — PROGRESS NOTES
[address]  [place] [address]  Tel.  [phone]  Fax. [phone] [address]  [place] [address]  Tel.  [phone]  Fax. [phone] [address] [place] [address]  Tel.  [phone]  Fax. [phone]       Akbar Bowser is seen today to receive a WatchPAT home sleep apnea testing (HSAT) device.    The WatchPAT HSAT Agreement was reviewed and endorsed by patient.  General information regarding operation of the HSAT device was provided.  Patient viewed the [employer] instructional video while in the clinic.  Patient was advised to contact patient support for any problems using the device.  Patient was advised to complete the Morning Questionnaire after awakening/ending the test.    There were no vitals taken for this visit.    Patient will return the HSAT device by noon unless otherwise approved.  Patient will be contacted with results once the study has been reviewed by the physician, typically within 15 business days.    [employer] Serial Number [id number]

## 2025-01-20 ENCOUNTER — CLINICAL DOCUMENTATION (OUTPATIENT)
Age: 30
End: 2025-01-20

## 2025-01-20 DIAGNOSIS — G47.33 OSA (OBSTRUCTIVE SLEEP APNEA): Primary | ICD-10-CM

## 2025-01-20 NOTE — PROGRESS NOTES
kAbar Bowser is to be contacted by sleep technologists regarding results of WatchPAT Testing which was indicative of an average pAHI 3% of 41.6 and pAHI 4% of 35.9 per hour.  SpO2 esteban was 81% and SpO2 of < 88% was 8.8 minutes.      An APAP prescription has been written and patient will be contacted by office staff regarding follow-up  in 2-3 months after initiation of therapy.    Encounter Diagnosis   Name Primary?    THOMAS (obstructive sleep apnea) Yes       Orders Placed This Encounter   Procedures    DME Order for (Specify) as OP     - DME device ordered - ResMed Device with Heated Humidifer  / .   - Diagnosis: Obstructive Sleep Apnea (G47.33)  - Length of Need: Lifetime    Pressure Settin - 15 cmH2O     Nasal Cushion (Replace) 2 per month.     Nasal Interface Mask 1 every 3 months.    Headgear 1 every 6 months.     Filter(s) Disposable 2 per month.   Filter(s) Non-Disposable 1 every 6 months.      Water Chamber for Humidifier (Replace) 1 every 6 months.   Tubing with heating element 1 every 3 months.    Perform Mask Fitting per patient preference and comfort - replace as above.      Alexa Ellington MD, FAA; NPI: [id number]  Electronically signed. 2025

## 2025-01-21 ENCOUNTER — CLINICAL DOCUMENTATION (OUTPATIENT)
Age: 30
End: 2025-01-21

## 2025-01-21 NOTE — PROGRESS NOTES
DME Order faxed to [facility], [facility] scheduled and patient notified through chart.    Aliya

## 2025-01-21 NOTE — PROGRESS NOTES
DME Order faxed to [facility], [facility] scheduled and patient notified through chart.    Aliya

## 2025-03-19 ENCOUNTER — PROCEDURE VISIT (OUTPATIENT)
Age: 30
End: 2025-03-19
Payer: MEDICAID

## 2025-03-19 VITALS
HEIGHT: 65 IN | HEART RATE: 86 BPM | TEMPERATURE: 98 F | WEIGHT: 293 LBS | BODY MASS INDEX: 48.82 KG/M2 | OXYGEN SATURATION: 97 % | SYSTOLIC BLOOD PRESSURE: 141 MMHG | DIASTOLIC BLOOD PRESSURE: 81 MMHG

## 2025-03-19 DIAGNOSIS — Z30.46 ENCOUNTER FOR REMOVAL AND REINSERTION OF NEXPLANON: Primary | ICD-10-CM

## 2025-03-19 DIAGNOSIS — N93.9 ABNORMAL UTERINE BLEEDING (AUB): ICD-10-CM

## 2025-03-19 PROCEDURE — PBSHW PBB SHADOW CHARGE: Performed by: FAMILY MEDICINE

## 2025-03-19 PROCEDURE — 11983 REMOVE/INSERT DRUG IMPLANT: CPT | Performed by: FAMILY MEDICINE

## 2025-03-19 PROCEDURE — 96372 THER/PROPH/DIAG INJ SC/IM: CPT | Performed by: FAMILY MEDICINE

## 2025-03-19 RX ORDER — LIDOCAINE HYDROCHLORIDE AND EPINEPHRINE 10; 10 MG/ML; UG/ML
10 INJECTION, SOLUTION INFILTRATION; PERINEURAL ONCE
Status: COMPLETED | OUTPATIENT
Start: 2025-03-19 | End: 2025-03-19

## 2025-03-19 RX ADMIN — LIDOCAINE HYDROCHLORIDE AND EPINEPHRINE 10 ML: 10; 10 INJECTION, SOLUTION INFILTRATION; PERINEURAL at 10:24
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
## 2025-06-09 ENCOUNTER — TELEMEDICINE (OUTPATIENT)
Age: 30
End: 2025-06-09
Payer: MEDICAID

## 2025-06-09 DIAGNOSIS — F32.A ANXIETY AND DEPRESSION: ICD-10-CM

## 2025-06-09 DIAGNOSIS — G47.33 OSA (OBSTRUCTIVE SLEEP APNEA): Primary | ICD-10-CM

## 2025-06-09 DIAGNOSIS — F41.9 ANXIETY AND DEPRESSION: ICD-10-CM

## 2025-06-09 PROCEDURE — 99213 OFFICE O/P EST LOW 20 MIN: CPT | Performed by: INTERNAL MEDICINE

## 2025-06-09 ASSESSMENT — SLEEP AND FATIGUE QUESTIONNAIRES
HOW LIKELY ARE YOU TO NOD OFF OR FALL ASLEEP WHILE SITTING INACTIVE IN A PUBLIC PLACE: SLIGHT CHANCE OF DOZING
DO YOU HAVE DIFFICULTY VISITING YOUR FAMILY OR FRIENDS IN THEIR HOME BECAUSE YOU BECOME SLEEPY OR TIRED: NO
HOW LIKELY ARE YOU TO NOD OFF OR FALL ASLEEP WHILE SITTING AND READING: MODERATE CHANCE OF DOZING
HOW LIKELY ARE YOU TO NOD OFF OR FALL ASLEEP WHILE WATCHING TV: SLIGHT CHANCE OF DOZING
FOSQ SCORE: 19
HOW LIKELY ARE YOU TO NOD OFF OR FALL ASLEEP IN A CAR, WHILE STOPPED FOR A FEW MINUTES IN TRAFFIC: WOULD NEVER DOZE
DO YOU HAVE DIFFICULTY CONCENTRATING ON THE THINGS YOU DO BECAUSE YOU ARE SLEEPY OR TIRED: NO
HOW LIKELY ARE YOU TO NOD OFF OR FALL ASLEEP WHILE SITTING AND READING: MODERATE CHANCE OF DOZING
DO YOU HAVE DIFFICULTY BEING AS ACTIVE AS YOU WANT TO BE IN THE MORNING BECAUSE YOU ARE SLEEPY OR TIRED: NO
HOW LIKELY ARE YOU TO NOD OFF OR FALL ASLEEP WHEN YOU ARE A PASSENGER IN A CAR FOR AN HOUR WITHOUT A BREAK: MODERATE CHANCE OF DOZING
HAS YOUR MOOD BEEN AFFECTED BECAUSE YOU ARE SLEEPY OR TIRED: YES, MODERATE
DO YOU HAVE DIFFICULTY OPERATING A MOTOR VEHICLE FOR LONG DISTANCES (GREATER THAN 100 MILES) BECAUSE YOU BECOME SLEEPY: NO
HOW LIKELY ARE YOU TO NOD OFF OR FALL ASLEEP WHILE WATCHING TV: SLIGHT CHANCE OF DOZING
HOW LIKELY ARE YOU TO NOD OFF OR FALL ASLEEP WHILE SITTING AND TALKING TO SOMEONE: WOULD NEVER DOZE
HOW LIKELY ARE YOU TO NOD OFF OR FALL ASLEEP WHILE SITTING QUIETLY AFTER LUNCH WITHOUT ALCOHOL: SLIGHT CHANCE OF DOZING
HOW LIKELY ARE YOU TO NOD OFF OR FALL ASLEEP IN A CAR, WHILE STOPPED FOR A FEW MINUTES IN TRAFFIC: WOULD NEVER DOZE
ESS TOTAL SCORE: 10
HOW LIKELY ARE YOU TO NOD OFF OR FALL ASLEEP WHILE SITTING AND TALKING TO SOMEONE: WOULD NEVER DOZE
HAS YOUR RELATIONSHIP WITH FAMILY, FRIENDS OR WORK COLLEAGUES BEEN AFFECTED BECAUSE YOU ARE SLEEPY OR TIRED: NO
DO YOU GENERALLY HAVE DIFFICULTY REMEMBERING THINGS BECAUSE YOU ARE SLEEPY OR TIRED: NO
HOW LIKELY ARE YOU TO NOD OFF OR FALL ASLEEP WHEN YOU ARE A PASSENGER IN A CAR FOR AN HOUR WITHOUT A BREAK: MODERATE CHANCE OF DOZING
HOW LIKELY ARE YOU TO NOD OFF OR FALL ASLEEP WHILE LYING DOWN TO REST IN THE AFTERNOON WHEN CIRCUMSTANCES PERMIT: HIGH CHANCE OF DOZING
DO YOU HAVE DIFFICULTY BEING AS ACTIVE AS YOU WANT TO BE IN THE EVENING BECAUSE YOU ARE SLEEPY OR TIRED: NO
HOW LIKELY ARE YOU TO NOD OFF OR FALL ASLEEP WHILE SITTING INACTIVE IN A PUBLIC PLACE: SLIGHT CHANCE OF DOZING
HOW LIKELY ARE YOU TO NOD OFF OR FALL ASLEEP WHILE LYING DOWN TO REST IN THE AFTERNOON WHEN CIRCUMSTANCES PERMIT: HIGH CHANCE OF DOZING
DO YOU HAVE DIFFICULTY WATCHING A MOVIE OR VIDEO BECAUSE YOU BECOME SLEEPY OR TIRED: NO
HOW LIKELY ARE YOU TO NOD OFF OR FALL ASLEEP WHILE SITTING QUIETLY AFTER LUNCH WITHOUT ALCOHOL: SLIGHT CHANCE OF DOZING
DO YOU HAVE DIFFICULTY OPERATING A MOTOR VEHICLE FOR SHORT DISTANCES (LESS THAN 100 MILES) BECAUSE YOU BECOME SLEEPY: NO

## 2025-06-09 NOTE — PATIENT INSTRUCTIONS
[address]  [place] [address]  Tel.  [phone]  Fax. [phone] [address]  [place] [address]  Tel.  [phone]  Fax. [phone] [address] [place] [address]  Tel.  [phone]  Fax. [phone]     Learning About CPAP for Sleep Apnea  What is CPAP?              CPAP is a small machine that you use at home every night while you sleep. It increases air pressure in your throat to keep your airway open. When you have sleep apnea, this can help you sleep better so you feel much better. CPAP stands for \"continuous positive airway pressure.\"  The CPAP machine will have one of the following:  A mask that covers your nose and mouth  Prongs that fit into your nose  A mask that covers your nose only, the most common type. This type is called NCPAP. The N stands for \"nasal.\"  Why is it done?  CPAP is usually the best treatment for obstructive sleep apnea. It is the first treatment choice and the most widely used. Your doctor may suggest CPAP if you have:  Moderate to severe sleep apnea.  Sleep apnea and coronary artery disease (CAD) or heart failure.  How does it help?  CPAP can help you have more normal sleep, so you feel less sleepy and more alert during the daytime.  CPAP may help keep heart failure or other heart problems from getting worse.  NCPAP may help lower your blood pressure.  If you use CPAP, your bed partner may also sleep better because you are not snoring or restless.  What are the side effects?  Some people who use CPAP have:  A dry or stuffy nose and a sore throat.  Irritated skin on the face.  Sore eyes.  Bloating.  If you have any of these problems, work with your doctor to fix them. Here are some things you can try:  Be sure the mask or nasal prongs fit well.  See if your doctor can adjust the pressure of your CPAP.  If your nose is dry, try a humidifier.  If your nose is runny or stuffy, try decongestant medicine or a steroid

## 2025-06-09 NOTE — PROGRESS NOTES
relates to sleep-disordered breathing.    3. Recommended a dedicated weight loss program through appropriate diet and exercise regimen as significant weight reduction has been shown to reduce severity of obstructive sleep apnea.     Return for follow-up in 1 year or as needed.       SUBJECTIVE/OBJECTIVE:    She  is seen today for follow up on PAP device and reports no problems using the device.   The following concerns identified:    Drowsiness no Problems exhaling no   Snoring no Forget to put on no   Mask Comfortable yes Can't fall asleep no   Dry Mouth no Mask falls off no   Air Leaking no Frequent awakenings no       She admits that her sleep has improved on PAP therapy using nasal mask and heated tubing.     Review of device download indicated:    Usage Days >= 4 hours 73%  Median Usage 7 hour 12 minutes    Median Device Pressure 11.3 cmH2O  Device Pressure 95% 14.0 cmH2O    Median Leak 2.8 L/min  95% Leak 26.8 L/min    Average AHI: 0.9 /hr which reflects improved sleep breathing condition.    Monroe Sleepiness Score: (Patient-Rptd) 10   Modified F.O.S.Q. Score Total / 2: (Patient-Rptd) (P) 19       Sleep Review of Systems: notable for Negative difficulty falling asleep; Negative awakenings at night; Negative  early morning headaches; Negative  memory problems; Negative  concentration issues; Negative  chest pain; Negative  shortness of breath;  Negative rashes or itching; Negative heartburn / belching / flatulence; Negative  significant mood issues; rare afternoon naps per week.    Vitals reported by patient         6/9/2025     7:57 AM   Patient-Reported Vitals   Patient-Reported Weight 370   Patient-Reported Height 5’5     Estimated body mass index is 61.77 kg/m² as calculated from the following:    Height as of 3/19/25: 1.651 m (5' 5\").    Weight as of 3/19/25: 168.4 kg (371 lb 3.2 oz).        Physical Exam completed by visual and auditory observation of patient with verbal input from patient.    General:

## 2025-06-16 ENCOUNTER — OFFICE VISIT (OUTPATIENT)
Age: 30
End: 2025-06-16

## 2025-06-16 ENCOUNTER — ANCILLARY PROCEDURE (OUTPATIENT)
Age: 30
End: 2025-06-16
Payer: MEDICAID

## 2025-06-16 VITALS
OXYGEN SATURATION: 100 % | SYSTOLIC BLOOD PRESSURE: 126 MMHG | WEIGHT: 293 LBS | HEART RATE: 85 BPM | TEMPERATURE: 97.6 F | BODY MASS INDEX: 62.07 KG/M2 | DIASTOLIC BLOOD PRESSURE: 76 MMHG | RESPIRATION RATE: 16 BRPM

## 2025-06-16 DIAGNOSIS — R20.0 NUMBNESS AND TINGLING OF BOTH UPPER EXTREMITIES: Primary | ICD-10-CM

## 2025-06-16 DIAGNOSIS — R20.0 NUMBNESS AND TINGLING OF BOTH UPPER EXTREMITIES: ICD-10-CM

## 2025-06-16 DIAGNOSIS — M79.7 MUSCLE PAIN, FIBROMYALGIA: ICD-10-CM

## 2025-06-16 DIAGNOSIS — R20.2 NUMBNESS AND TINGLING OF BOTH UPPER EXTREMITIES: Primary | ICD-10-CM

## 2025-06-16 DIAGNOSIS — R20.2 NUMBNESS AND TINGLING OF BOTH UPPER EXTREMITIES: ICD-10-CM

## 2025-06-16 DIAGNOSIS — G56.02 CARPAL TUNNEL SYNDROME ON LEFT: ICD-10-CM

## 2025-06-16 PROCEDURE — 3078F DIAST BP <80 MM HG: CPT

## 2025-06-16 PROCEDURE — 99214 OFFICE O/P EST MOD 30 MIN: CPT

## 2025-06-16 PROCEDURE — 72040 X-RAY EXAM NECK SPINE 2-3 VW: CPT

## 2025-06-16 PROCEDURE — 3074F SYST BP LT 130 MM HG: CPT

## 2025-06-16 NOTE — PROGRESS NOTES
[address] [place] [address]   Office [phone], Fax [phone]      Chief Complaint:     Chief Complaint   Patient presents with    Numbness       Akbar Bowser is a 30 y.o. female that presents for: acute visit      Subjective:   HPI:  Akbar Bowser is a 30 y.o. female that presents for:    Numbness bl arms  Onset several months ago/years ago  Worse x1 day  Worse with certain positions-lying in bed, leaning up against one arm, sometimes with driving.  Has intermittent numbness and tingling that can travel from 4th and 5th digits all the way up the arm to the cervical spine, across over the cervical spine into the other side of her back.  No injury. No surgery. No fevers/chills, HA, vomiting.  Has also noticed that muscles in arms and shoulders are very sore and seem more painful than the people around her/friends/family.  Is curious if she has fibromyalgia.  Does take medication for depression though feels mood is very controlled, denies current concern for depression.  Denies autoimmune history            Past medical history, social history, medications, and allergies personally reviewed.  Past Medical History:   Diagnosis Date    Anxiety     Depression     Genital herpes     HIV (human immunodeficiency virus infection) ([facility])     Kidney stones     Mood disorder     Morbid obesity ([facility])     Psychiatric disorder     depression    Sleep apnea     has CPAP    Sprain of ankle, calcaneofibular ligament 08/13/2020    sees [facility]    Suicidal thoughts         Medications:   Current Outpatient Medications   Medication Sig    DOVATO  MG per tablet Take 1 tablet by mouth daily    ergocalciferol (ERGOCALCIFEROL) 1.25 MG (05182 UT) capsule Take 1.25 mg by mouth every 30 days    emtricitabine-tenofovir alafenamide (DESCOVY) 200-25 MG TABS tablet Take 1 tablet by mouth daily    etonogestrel (NEXPLANON) 68 MG implant Inject into the skin    oxyCODONE (ROXICODONE) 5 MG immediate release

## 2025-06-16 NOTE — PROGRESS NOTES
Chief Complaint   Patient presents with    Numbness       Patient identified with name and .     Vitals:    25 1834   BP: 126/76   BP Site: Left Upper Arm   Patient Position: Sitting   BP Cuff Size: Large Adult   Pulse: 85   Resp: 16   Temp: 97.6 °F (36.4 °C)   TempSrc: Temporal   SpO2: 100%   Weight: (!) 169.2 kg (373 lb)        1. Have you been to the ER, urgent care clinic since your last visit?  Hospitalized since your last visit?No    2. Have you seen or consulted any other health care providers outside of the [facility] since your last visit?  Include any pap smears or colon screening. No    Health Maintenance reviewed.

## 2025-06-22 ENCOUNTER — RESULTS FOLLOW-UP (OUTPATIENT)
Age: 30
End: 2025-06-22

## 2025-06-26 NOTE — PROGRESS NOTES
[facility]   [facility]    Subjective:  CC:   Chief Complaint   Patient presents with    Skin Problem       HPI:  Akbar Bowser is 30 y.o. female who presents for rash    L hand rash x4 months  Also L thumb nail thickened  Itchy, peeling  Using vaseline and keeping them clean  Has had athletes foot her whole life    Objective:    Vitals:    06/27/25 1055   BP: 123/77   BP Site: Left Upper Arm   Patient Position: Sitting   BP Cuff Size: Large Adult   Pulse: 88   Resp: 16   Temp: 98 °F (36.7 °C)   TempSrc: Temporal   SpO2: 98%   Weight: (!) 165.6 kg (365 lb)       Physical Exam  Constitutional:       General: She is not in acute distress.     Appearance: Normal appearance.   HENT:      Head: Normocephalic and atraumatic.      Mouth/Throat:      Mouth: Mucous membranes are moist.   Eyes:      Extraocular Movements: Extraocular movements intact.      Conjunctiva/sclera: Conjunctivae normal.   Cardiovascular:      Rate and Rhythm: Normal rate.   Pulmonary:      Effort: Pulmonary effort is normal.   Abdominal:      General: Abdomen is flat.   Musculoskeletal:         General: Normal range of motion.   Skin:     General: Skin is warm and dry.      Findings: Rash (L hand with erythematous patches, peeling, thickening. Dystrophic thickened and yellow L thumbnail.) present.   Neurological:      General: No focal deficit present.      Mental Status: She is alert.   Psychiatric:         Mood and Affect: Mood normal.         Behavior: Behavior normal.         No results found for this or any previous visit (from the past 12 hours).  All imaging and labs ordered in clinic personally reviewed by me.     Assessment and Plan:    Tinea manuum L hand with pedis and unguium as well. Will treat with clotrimazole. Discussed also needs to treat the feet or this will recur. HIV is well controlled but there is still a chance this will be harder to treat with topical agents, in which case PO

## 2025-06-27 ENCOUNTER — OFFICE VISIT (OUTPATIENT)
Age: 30
End: 2025-06-27

## 2025-06-27 VITALS
DIASTOLIC BLOOD PRESSURE: 77 MMHG | TEMPERATURE: 98 F | OXYGEN SATURATION: 98 % | WEIGHT: 293 LBS | HEART RATE: 88 BPM | BODY MASS INDEX: 60.74 KG/M2 | RESPIRATION RATE: 16 BRPM | SYSTOLIC BLOOD PRESSURE: 123 MMHG

## 2025-06-27 DIAGNOSIS — R20.2 NUMBNESS AND TINGLING OF BOTH UPPER EXTREMITIES: ICD-10-CM

## 2025-06-27 DIAGNOSIS — B35.1 TINEA MANUUM, PEDIS, AND UNGUIUM: Primary | ICD-10-CM

## 2025-06-27 DIAGNOSIS — R20.0 NUMBNESS AND TINGLING OF BOTH UPPER EXTREMITIES: ICD-10-CM

## 2025-06-27 DIAGNOSIS — B35.2 TINEA MANUUM, PEDIS, AND UNGUIUM: Primary | ICD-10-CM

## 2025-06-27 DIAGNOSIS — B35.3 TINEA MANUUM, PEDIS, AND UNGUIUM: Primary | ICD-10-CM

## 2025-06-27 PROCEDURE — 99213 OFFICE O/P EST LOW 20 MIN: CPT

## 2025-06-27 RX ORDER — CLOTRIMAZOLE 1 %
CREAM (GRAM) TOPICAL
Qty: 113 G | Refills: 3 | Status: SHIPPED | OUTPATIENT
Start: 2025-06-27 | End: 2025-07-04

## 2025-06-27 SDOH — ECONOMIC STABILITY: FOOD INSECURITY: WITHIN THE PAST 12 MONTHS, YOU WORRIED THAT YOUR FOOD WOULD RUN OUT BEFORE YOU GOT MONEY TO BUY MORE.: PATIENT DECLINED

## 2025-06-27 SDOH — ECONOMIC STABILITY: FOOD INSECURITY: WITHIN THE PAST 12 MONTHS, THE FOOD YOU BOUGHT JUST DIDN'T LAST AND YOU DIDN'T HAVE MONEY TO GET MORE.: PATIENT DECLINED

## 2025-06-28 LAB
EST. AVERAGE GLUCOSE BLD GHB EST-MCNC: 114 MG/DL
FOLATE SERPL-MCNC: 6.1 NG/ML (ref 5–21)
HBA1C MFR BLD: 5.6 % (ref 4–5.6)
TSH SERPL DL<=0.05 MIU/L-ACNC: 0.63 UIU/ML (ref 0.36–3.74)
VIT B12 SERPL-MCNC: 236 PG/ML (ref 193–986)

## 2025-08-06 ENCOUNTER — OFFICE VISIT (OUTPATIENT)
Age: 30
End: 2025-08-06

## 2025-08-06 VITALS
DIASTOLIC BLOOD PRESSURE: 80 MMHG | HEART RATE: 76 BPM | TEMPERATURE: 97.2 F | WEIGHT: 293 LBS | SYSTOLIC BLOOD PRESSURE: 120 MMHG | HEIGHT: 65 IN | BODY MASS INDEX: 48.82 KG/M2 | OXYGEN SATURATION: 98 % | RESPIRATION RATE: 18 BRPM

## 2025-08-06 DIAGNOSIS — R09.81 NASAL CONGESTION: ICD-10-CM

## 2025-08-06 DIAGNOSIS — N89.8 VAGINAL ODOR: Primary | ICD-10-CM

## 2025-08-06 LAB
BACTERIA, WET MOUNT, POC: NORMAL
CLUE CELLS, WET MOUNT, POC: PRESENT
RBC WET MOUNT, POC: NEGATIVE
TRICH, WET MOUNT, POC: NORMAL
WBC, WET MOUNT, POC: NORMAL
YEAST, WET MOUNT, POC: NORMAL

## 2025-08-06 PROCEDURE — PBSHW AMB POC SMEAR, STAIN & INTERPRET, WET MOUNT SC

## 2025-08-06 PROCEDURE — 99213 OFFICE O/P EST LOW 20 MIN: CPT

## 2025-08-06 PROCEDURE — 87210 SMEAR WET MOUNT SALINE/INK: CPT

## 2025-08-06 PROCEDURE — 3079F DIAST BP 80-89 MM HG: CPT

## 2025-08-06 PROCEDURE — 3074F SYST BP LT 130 MM HG: CPT

## 2025-08-06 RX ORDER — BACILLUS COAGULANS/INULIN 1B-250 MG
1 CAPSULE ORAL
Qty: 90 CAPSULE | Refills: 0 | Status: SHIPPED | OUTPATIENT
Start: 2025-08-06 | End: 2025-11-04

## 2025-08-06 RX ORDER — FEXOFENADINE HCL 180 MG/1
180 TABLET ORAL DAILY
Qty: 30 TABLET | Refills: 0 | Status: SHIPPED | OUTPATIENT
Start: 2025-08-06 | End: 2025-09-05

## 2025-08-06 RX ORDER — FLUTICASONE PROPIONATE 50 MCG
2 SPRAY, SUSPENSION (ML) NASAL DAILY
Qty: 16 G | Refills: 0 | Status: SHIPPED | OUTPATIENT
Start: 2025-08-06

## 2025-08-06 RX ORDER — AZELASTINE 1 MG/ML
1 SPRAY, METERED NASAL 2 TIMES DAILY
Qty: 60 ML | Refills: 1 | Status: SHIPPED | OUTPATIENT
Start: 2025-08-06

## 2025-08-06 ASSESSMENT — PATIENT HEALTH QUESTIONNAIRE - PHQ9
SUM OF ALL RESPONSES TO PHQ QUESTIONS 1-9: 0
SUM OF ALL RESPONSES TO PHQ QUESTIONS 1-9: 0
8. MOVING OR SPEAKING SO SLOWLY THAT OTHER PEOPLE COULD HAVE NOTICED. OR THE OPPOSITE, BEING SO FIGETY OR RESTLESS THAT YOU HAVE BEEN MOVING AROUND A LOT MORE THAN USUAL: NOT AT ALL
2. FEELING DOWN, DEPRESSED OR HOPELESS: NOT AT ALL
7. TROUBLE CONCENTRATING ON THINGS, SUCH AS READING THE NEWSPAPER OR WATCHING TELEVISION: NOT AT ALL
10. IF YOU CHECKED OFF ANY PROBLEMS, HOW DIFFICULT HAVE THESE PROBLEMS MADE IT FOR YOU TO DO YOUR WORK, TAKE CARE OF THINGS AT HOME, OR GET ALONG WITH OTHER PEOPLE: NOT DIFFICULT AT ALL
6. FEELING BAD ABOUT YOURSELF - OR THAT YOU ARE A FAILURE OR HAVE LET YOURSELF OR YOUR FAMILY DOWN: NOT AT ALL
1. LITTLE INTEREST OR PLEASURE IN DOING THINGS: NOT AT ALL
5. POOR APPETITE OR OVEREATING: NOT AT ALL
SUM OF ALL RESPONSES TO PHQ QUESTIONS 1-9: 0
SUM OF ALL RESPONSES TO PHQ QUESTIONS 1-9: 0
9. THOUGHTS THAT YOU WOULD BE BETTER OFF DEAD, OR OF HURTING YOURSELF: NOT AT ALL
3. TROUBLE FALLING OR STAYING ASLEEP: NOT AT ALL
4. FEELING TIRED OR HAVING LITTLE ENERGY: NOT AT ALL

## 2025-08-10 LAB
C TRACH RRNA SPEC QL NAA+PROBE: NEGATIVE
N GONORRHOEA RRNA SPEC QL NAA+PROBE: NEGATIVE
SPECIMEN SOURCE: NORMAL
T VAGINALIS RRNA SPEC QL NAA+PROBE: NEGATIVE

## (undated) DEVICE — SURGICAL PROCEDURE KIT GEN LAPAROSCOPY LF

## (undated) DEVICE — LIGHT HANDLE: Brand: DEVON

## (undated) DEVICE — DEVICE TRNSF SPIK STL 2008S] MICROTEK MEDICAL INC]

## (undated) DEVICE — STERILE POLYISOPRENE POWDER-FREE SURGICAL GLOVES: Brand: PROTEXIS

## (undated) DEVICE — VISUALIZATION SYSTEM: Brand: CLEARIFY

## (undated) DEVICE — NEEDLE HYPO 22GA L1.5IN BLK S STL HUB POLYPR SHLD REG BVL

## (undated) DEVICE — DRAIN WND PENRS RADPQ 0.5X18IN --

## (undated) DEVICE — ARM DRAPE

## (undated) DEVICE — DRAPE,REIN 53X77,STERILE: Brand: MEDLINE

## (undated) DEVICE — ELECTRO LUBE IS A SINGLE PATIENT USE DEVICE THAT IS INTENDED TO BE USED ON ELECTROSURGICAL ELECTRODES TO REDUCE STICKING.: Brand: KEY SURGICAL ELECTRO LUBE

## (undated) DEVICE — DERMABOND SKIN ADH 0.7ML -- DERMABOND ADVANCED 12/BX

## (undated) DEVICE — SOL IRRIGATION INJ NACL 0.9% 500ML BTL

## (undated) DEVICE — UNIVERSAL FIXATION CANNULA: Brand: VERSAPORT

## (undated) DEVICE — SUTURE MCRYL SZ 4-0 L27IN ABSRB UD L19MM PS-2 1/2 CIR PRIM Y426H

## (undated) DEVICE — CLIP LIG ABSRB HEM-LOK LG PUR --

## (undated) DEVICE — SEAL UNIV 5-8MM DISP BX/10 -- DA VINCI XI - SNGL USE

## (undated) DEVICE — 3000CC GUARDIAN II: Brand: GUARDIAN

## (undated) DEVICE — TOWEL SURG W17XL27IN STD BLU COT NONFENESTRATED PREWASHED

## (undated) DEVICE — OBTRTR BLDELSS 8MM DISP -- DA VINCI - SNGL USE

## (undated) DEVICE — TROCAR SITE CLOSURE DEVICE: Brand: ENDO CLOSE

## (undated) DEVICE — COVER,TABLE,60X90,STERILE: Brand: MEDLINE

## (undated) DEVICE — Device

## (undated) DEVICE — INFECTION CONTROL KIT SYS

## (undated) DEVICE — KENDALL SCD EXPRESS SLEEVES, KNEE LENGTH, MEDIUM: Brand: KENDALL SCD

## (undated) DEVICE — REM POLYHESIVE ADULT PATIENT RETURN ELECTRODE: Brand: VALLEYLAB

## (undated) DEVICE — (D)PREP SKN CHLRAPRP APPL 26ML -- CONVERT TO ITEM 371833

## (undated) DEVICE — DEVON™ KNEE AND BODY STRAP 60" X 3" (1.5 M X 7.6 CM): Brand: DEVON